# Patient Record
Sex: MALE | Race: WHITE | NOT HISPANIC OR LATINO | Employment: OTHER | URBAN - METROPOLITAN AREA
[De-identification: names, ages, dates, MRNs, and addresses within clinical notes are randomized per-mention and may not be internally consistent; named-entity substitution may affect disease eponyms.]

---

## 2019-06-27 ENCOUNTER — APPOINTMENT (OUTPATIENT)
Dept: CT IMAGING | Facility: HOSPITAL | Age: 44
End: 2019-06-27

## 2019-06-27 ENCOUNTER — HOSPITAL ENCOUNTER (OUTPATIENT)
Facility: HOSPITAL | Age: 44
Discharge: LEFT BEFORE DISCHARGE | End: 2019-06-29
Attending: HOSPITALIST | Admitting: INTERNAL MEDICINE

## 2019-06-27 ENCOUNTER — APPOINTMENT (OUTPATIENT)
Dept: GENERAL RADIOLOGY | Facility: HOSPITAL | Age: 44
End: 2019-06-27

## 2019-06-27 DIAGNOSIS — R07.9 CHEST PAIN, UNSPECIFIED TYPE: Primary | ICD-10-CM

## 2019-06-27 DIAGNOSIS — R07.2 PRECORDIAL PAIN: ICD-10-CM

## 2019-06-27 DIAGNOSIS — R94.39 ABNORMAL NUCLEAR STRESS TEST: ICD-10-CM

## 2019-06-27 DIAGNOSIS — I10 ESSENTIAL HYPERTENSION: ICD-10-CM

## 2019-06-27 LAB
ALBUMIN SERPL-MCNC: 3.6 G/DL (ref 3.5–4.8)
ALBUMIN/GLOB SERPL: 0.9 G/DL (ref 1–1.7)
ALP SERPL-CCNC: 78 U/L (ref 32–91)
ALT SERPL W P-5'-P-CCNC: 32 U/L (ref 17–63)
AMPHET+METHAMPHET UR QL: POSITIVE
ANION GAP SERPL CALCULATED.3IONS-SCNC: 15.7 MMOL/L (ref 10–20)
APTT PPP: 26.8 SECONDS (ref 24–31)
AST SERPL-CCNC: 28 U/L (ref 15–41)
BARBITURATES UR QL SCN: ABNORMAL
BASOPHILS # BLD AUTO: 0.1 10*3/MM3 (ref 0–0.2)
BASOPHILS NFR BLD AUTO: 0.6 % (ref 0–1.5)
BENZODIAZ UR QL SCN: NEGATIVE
BILIRUB SERPL-MCNC: 0.4 MG/DL (ref 0.3–1.2)
BILIRUB UR QL STRIP: NEGATIVE
BUN BLD-MCNC: 14 MG/DL (ref 8–20)
BUN/CREAT SERPL: 15.6 (ref 6.2–20.3)
CALCIUM SPEC-SCNC: 9.4 MG/DL (ref 8.9–10.3)
CANNABINOIDS SERPL QL: NEGATIVE
CHLORIDE SERPL-SCNC: 101 MMOL/L (ref 101–111)
CLARITY UR: CLEAR
CO2 SERPL-SCNC: 24 MMOL/L (ref 22–32)
COCAINE UR QL: NEGATIVE
COLOR UR: YELLOW
CREAT BLD-MCNC: 0.9 MG/DL (ref 0.7–1.2)
CREAT UR-MCNC: 115.9 MG/DL
D DIMER PPP FEU-MCNC: 0.71 MCGFEU/ML (ref 0.17–0.59)
DEPRECATED RDW RBC AUTO: 41.6 FL (ref 37–54)
EOSINOPHIL # BLD AUTO: 0.4 10*3/MM3 (ref 0–0.4)
EOSINOPHIL NFR BLD AUTO: 2.9 % (ref 0.3–6.2)
ERYTHROCYTE [DISTWIDTH] IN BLOOD BY AUTOMATED COUNT: 13.1 % (ref 12.3–15.4)
GFR SERPL CREATININE-BSD FRML MDRD: 92 ML/MIN/1.73
GLOBULIN UR ELPH-MCNC: 4 GM/DL (ref 2.5–3.8)
GLUCOSE BLD-MCNC: 117 MG/DL (ref 65–99)
GLUCOSE UR STRIP-MCNC: NEGATIVE MG/DL
HCT VFR BLD AUTO: 47.2 % (ref 37.5–51)
HGB BLD-MCNC: 15.6 G/DL (ref 13–17.7)
HGB UR QL STRIP.AUTO: NEGATIVE
HOLD SPECIMEN: NORMAL
HOLD SPECIMEN: NORMAL
INR PPP: 0.89 (ref 0.9–1.1)
KETONES UR QL STRIP: NEGATIVE
LEUKOCYTE ESTERASE UR QL STRIP.AUTO: NEGATIVE
LYMPHOCYTES # BLD AUTO: 2.4 10*3/MM3 (ref 0.7–3.1)
LYMPHOCYTES NFR BLD AUTO: 18.3 % (ref 19.6–45.3)
MCH RBC QN AUTO: 30.1 PG (ref 26.6–33)
MCHC RBC AUTO-ENTMCNC: 33 G/DL (ref 31.5–35.7)
MCV RBC AUTO: 91.4 FL (ref 79–97)
METHADONE UR QL SCN: NEGATIVE
MONOCYTES # BLD AUTO: 1.1 10*3/MM3 (ref 0.1–0.9)
MONOCYTES NFR BLD AUTO: 8.6 % (ref 5–12)
NEUTROPHILS # BLD AUTO: 9.2 10*3/MM3 (ref 1.7–7)
NEUTROPHILS NFR BLD AUTO: 69.6 % (ref 42.7–76)
NITRITE UR QL STRIP: NEGATIVE
NRBC BLD AUTO-RTO: 0 /100 WBC (ref 0–0.2)
OPIATES UR QL: NEGATIVE
PCP UR QL SCN: NEGATIVE
PH UR STRIP.AUTO: 5.5 [PH] (ref 5–8)
PLATELET # BLD AUTO: 449 10*3/MM3 (ref 140–450)
PMV BLD AUTO: 8.2 FL (ref 6–12)
POTASSIUM BLD-SCNC: 3.7 MMOL/L (ref 3.6–5.1)
PROT SERPL-MCNC: 7.6 G/DL (ref 6.1–7.9)
PROT UR QL STRIP: NEGATIVE
PROTHROMBIN TIME: 9.4 SECONDS (ref 9.6–11.7)
RBC # BLD AUTO: 5.17 10*6/MM3 (ref 4.14–5.8)
SODIUM BLD-SCNC: 137 MMOL/L (ref 136–144)
SP GR UR STRIP: 1.02 (ref 1–1.03)
TROPONIN I SERPL-MCNC: 0.03 NG/ML (ref 0–0.03)
UROBILINOGEN UR QL STRIP: NORMAL
WBC NRBC COR # BLD: 13.2 10*3/MM3 (ref 3.4–10.8)
WHOLE BLOOD HOLD SPECIMEN: NORMAL
WHOLE BLOOD HOLD SPECIMEN: NORMAL

## 2019-06-27 PROCEDURE — 80307 DRUG TEST PRSMV CHEM ANLYZR: CPT | Performed by: PHYSICIAN ASSISTANT

## 2019-06-27 PROCEDURE — 93005 ELECTROCARDIOGRAM TRACING: CPT | Performed by: PHYSICIAN ASSISTANT

## 2019-06-27 PROCEDURE — 0 IOPAMIDOL PER 1 ML: Performed by: PHYSICIAN ASSISTANT

## 2019-06-27 PROCEDURE — 99285 EMERGENCY DEPT VISIT HI MDM: CPT

## 2019-06-27 PROCEDURE — G0378 HOSPITAL OBSERVATION PER HR: HCPCS

## 2019-06-27 PROCEDURE — 85610 PROTHROMBIN TIME: CPT | Performed by: PHYSICIAN ASSISTANT

## 2019-06-27 PROCEDURE — 85730 THROMBOPLASTIN TIME PARTIAL: CPT | Performed by: PHYSICIAN ASSISTANT

## 2019-06-27 PROCEDURE — 93005 ELECTROCARDIOGRAM TRACING: CPT

## 2019-06-27 PROCEDURE — 80053 COMPREHEN METABOLIC PANEL: CPT | Performed by: PHYSICIAN ASSISTANT

## 2019-06-27 PROCEDURE — 82570 ASSAY OF URINE CREATININE: CPT | Performed by: PHYSICIAN ASSISTANT

## 2019-06-27 PROCEDURE — 96365 THER/PROPH/DIAG IV INF INIT: CPT

## 2019-06-27 PROCEDURE — 85379 FIBRIN DEGRADATION QUANT: CPT | Performed by: PHYSICIAN ASSISTANT

## 2019-06-27 PROCEDURE — 96366 THER/PROPH/DIAG IV INF ADDON: CPT

## 2019-06-27 PROCEDURE — 81003 URINALYSIS AUTO W/O SCOPE: CPT | Performed by: PHYSICIAN ASSISTANT

## 2019-06-27 PROCEDURE — 99219 PR INITIAL OBSERVATION CARE/DAY 50 MINUTES: CPT | Performed by: PHYSICIAN ASSISTANT

## 2019-06-27 PROCEDURE — 71275 CT ANGIOGRAPHY CHEST: CPT

## 2019-06-27 PROCEDURE — 85025 COMPLETE CBC W/AUTO DIFF WBC: CPT | Performed by: PHYSICIAN ASSISTANT

## 2019-06-27 PROCEDURE — 93005 ELECTROCARDIOGRAM TRACING: CPT | Performed by: HOSPITALIST

## 2019-06-27 PROCEDURE — 71045 X-RAY EXAM CHEST 1 VIEW: CPT

## 2019-06-27 PROCEDURE — 84484 ASSAY OF TROPONIN QUANT: CPT | Performed by: PHYSICIAN ASSISTANT

## 2019-06-27 RX ORDER — ASPIRIN 81 MG/1
324 TABLET, CHEWABLE ORAL ONCE
Status: COMPLETED | OUTPATIENT
Start: 2019-06-27 | End: 2019-06-27

## 2019-06-27 RX ORDER — NITROGLYCERIN 10 MG/100ML
10-50 INJECTION INTRAVENOUS
Status: DISCONTINUED | OUTPATIENT
Start: 2019-06-27 | End: 2019-06-29 | Stop reason: HOSPADM

## 2019-06-27 RX ORDER — HYDRALAZINE HYDROCHLORIDE 20 MG/ML
10 INJECTION INTRAMUSCULAR; INTRAVENOUS EVERY 6 HOURS PRN
Status: DISCONTINUED | OUTPATIENT
Start: 2019-06-27 | End: 2019-06-29 | Stop reason: HOSPADM

## 2019-06-27 RX ADMIN — NITROGLYCERIN 10 MCG/MIN: 10 INJECTION INTRAVENOUS at 18:55

## 2019-06-27 RX ADMIN — ASPIRIN 81 MG 324 MG: 81 TABLET ORAL at 22:39

## 2019-06-27 RX ADMIN — IOPAMIDOL 100 ML: 755 INJECTION, SOLUTION INTRAVENOUS at 21:12

## 2019-06-28 ENCOUNTER — APPOINTMENT (OUTPATIENT)
Dept: NUCLEAR MEDICINE | Facility: HOSPITAL | Age: 44
End: 2019-06-28

## 2019-06-28 ENCOUNTER — HOSPITAL ENCOUNTER (OUTPATIENT)
Dept: CARDIOLOGY | Facility: HOSPITAL | Age: 44
Discharge: HOME | End: 2019-06-28

## 2019-06-28 PROBLEM — R94.39 ABNORMAL NUCLEAR STRESS TEST: Status: ACTIVE | Noted: 2019-06-28

## 2019-06-28 PROBLEM — R07.2 PRECORDIAL PAIN: Status: ACTIVE | Noted: 2019-06-28

## 2019-06-28 PROBLEM — I10 ESSENTIAL HYPERTENSION: Status: ACTIVE | Noted: 2019-06-28

## 2019-06-28 LAB
ANION GAP SERPL CALCULATED.3IONS-SCNC: 13.9 MMOL/L (ref 10–20)
BASOPHILS # BLD AUTO: 0.1 10*3/MM3 (ref 0–0.2)
BASOPHILS NFR BLD AUTO: 0.7 % (ref 0–1.5)
BH CV ECHO MEAS - % IVS THICK: 13.5 %
BH CV ECHO MEAS - % LVPW THICK: 26.1 %
BH CV ECHO MEAS - ACS: 2.5 CM
BH CV ECHO MEAS - AO ROOT AREA (BSA CORRECTED): 1.6
BH CV ECHO MEAS - AO ROOT AREA: 9.6 CM^2
BH CV ECHO MEAS - AO ROOT DIAM: 3.5 CM
BH CV ECHO MEAS - ASC AORTA: 2.9 CM
BH CV ECHO MEAS - BSA(HAYCOCK): 2.3 M^2
BH CV ECHO MEAS - BSA: 2.2 M^2
BH CV ECHO MEAS - BZI_BMI: 29.8 KILOGRAMS/M^2
BH CV ECHO MEAS - BZI_METRIC_HEIGHT: 182.9 CM
BH CV ECHO MEAS - BZI_METRIC_WEIGHT: 99.8 KG
BH CV ECHO MEAS - EDV(CUBED): 111.5 ML
BH CV ECHO MEAS - EDV(MOD-SP2): 85.8 ML
BH CV ECHO MEAS - EDV(MOD-SP4): 146.1 ML
BH CV ECHO MEAS - EDV(TEICH): 108.2 ML
BH CV ECHO MEAS - EF(CUBED): 66.7 %
BH CV ECHO MEAS - EF(MOD-SP2): 57.4 %
BH CV ECHO MEAS - EF(MOD-SP4): 72.1 %
BH CV ECHO MEAS - EF(TEICH): 58.1 %
BH CV ECHO MEAS - ESV(CUBED): 37.1 ML
BH CV ECHO MEAS - ESV(MOD-SP2): 36.5 ML
BH CV ECHO MEAS - ESV(MOD-SP4): 40.7 ML
BH CV ECHO MEAS - ESV(TEICH): 45.3 ML
BH CV ECHO MEAS - FS: 30.7 %
BH CV ECHO MEAS - IVS/LVPW: 0.93
BH CV ECHO MEAS - IVSD: 1.8 CM
BH CV ECHO MEAS - IVSS: 2 CM
BH CV ECHO MEAS - LV DIASTOLIC VOL/BSA (35-75): 65.9 ML/M^2
BH CV ECHO MEAS - LV MASS(C)D: 422.6 GRAMS
BH CV ECHO MEAS - LV MASS(C)DI: 190.5 GRAMS/M^2
BH CV ECHO MEAS - LV MASS(C)S: 362.4 GRAMS
BH CV ECHO MEAS - LV MASS(C)SI: 163.3 GRAMS/M^2
BH CV ECHO MEAS - LV MAX PG: 3.6 MMHG
BH CV ECHO MEAS - LV MEAN PG: 1.4 MMHG
BH CV ECHO MEAS - LV SYSTOLIC VOL/BSA (12-30): 18.4 ML/M^2
BH CV ECHO MEAS - LV V1 MAX: 94.4 CM/SEC
BH CV ECHO MEAS - LV V1 MEAN: 51.3 CM/SEC
BH CV ECHO MEAS - LV V1 VTI: 15 CM
BH CV ECHO MEAS - LVIDD: 4.8 CM
BH CV ECHO MEAS - LVIDS: 3.3 CM
BH CV ECHO MEAS - LVOT AREA: 4.7 CM^2
BH CV ECHO MEAS - LVOT DIAM: 2.5 CM
BH CV ECHO MEAS - LVPWD: 1.9 CM
BH CV ECHO MEAS - LVPWS: 2.4 CM
BH CV ECHO MEAS - MV A MAX VEL: 71.8 CM/SEC
BH CV ECHO MEAS - MV DEC SLOPE: 157.5 CM/SEC^2
BH CV ECHO MEAS - MV DEC TIME: 0.27 SEC
BH CV ECHO MEAS - MV E MAX VEL: 42.9 CM/SEC
BH CV ECHO MEAS - MV E/A: 0.6
BH CV ECHO MEAS - MV MAX PG: 4.2 MMHG
BH CV ECHO MEAS - MV MEAN PG: 1.8 MMHG
BH CV ECHO MEAS - MV V2 MAX: 102.5 CM/SEC
BH CV ECHO MEAS - MV V2 MEAN: 62.4 CM/SEC
BH CV ECHO MEAS - MV V2 VTI: 22.3 CM
BH CV ECHO MEAS - MVA(VTI): 3.2 CM^2
BH CV ECHO MEAS - PA ACC TIME: 0.13 SEC
BH CV ECHO MEAS - PA MAX PG (FULL): 0.52 MMHG
BH CV ECHO MEAS - PA MAX PG: 5.5 MMHG
BH CV ECHO MEAS - PA MEAN PG (FULL): 0.29 MMHG
BH CV ECHO MEAS - PA MEAN PG: 2.7 MMHG
BH CV ECHO MEAS - PA PR(ACCEL): 19.1 MMHG
BH CV ECHO MEAS - PA V2 MAX: 116.8 CM/SEC
BH CV ECHO MEAS - PA V2 MEAN: 76 CM/SEC
BH CV ECHO MEAS - PA V2 VTI: 20.5 CM
BH CV ECHO MEAS - PVA(I,A): 9.1 CM^2
BH CV ECHO MEAS - PVA(I,D): 9.1 CM^2
BH CV ECHO MEAS - PVA(V,A): 9.6 CM^2
BH CV ECHO MEAS - PVA(V,D): 9.6 CM^2
BH CV ECHO MEAS - QP/QS: 2.6
BH CV ECHO MEAS - RV MAX PG: 4.9 MMHG
BH CV ECHO MEAS - RV MEAN PG: 2.4 MMHG
BH CV ECHO MEAS - RV V1 MAX: 111 CM/SEC
BH CV ECHO MEAS - RV V1 MEAN: 69.2 CM/SEC
BH CV ECHO MEAS - RV V1 VTI: 18.4 CM
BH CV ECHO MEAS - RVDD: 2.8 CM
BH CV ECHO MEAS - RVOT AREA: 10.1 CM^2
BH CV ECHO MEAS - RVOT DIAM: 3.6 CM
BH CV ECHO MEAS - SI(CUBED): 33.5 ML/M^2
BH CV ECHO MEAS - SI(LVOT): 32 ML/M^2
BH CV ECHO MEAS - SI(MOD-SP2): 22.2 ML/M^2
BH CV ECHO MEAS - SI(MOD-SP4): 47.5 ML/M^2
BH CV ECHO MEAS - SI(TEICH): 28.3 ML/M^2
BH CV ECHO MEAS - SV(CUBED): 74.3 ML
BH CV ECHO MEAS - SV(LVOT): 71 ML
BH CV ECHO MEAS - SV(MOD-SP2): 49.3 ML
BH CV ECHO MEAS - SV(MOD-SP4): 105.4 ML
BH CV ECHO MEAS - SV(RVOT): 186.4 ML
BH CV ECHO MEAS - SV(TEICH): 62.9 ML
BH CV NUCLEAR PRIOR STUDY: 3
BH CV STRESS COMMENTS STAGE 1: NORMAL
BH CV STRESS DOSE REGADENOSON STAGE 1: 0.4
BH CV STRESS DURATION MIN STAGE 1: 0
BH CV STRESS DURATION SEC STAGE 1: 10
BH CV STRESS PROTOCOL 1: NORMAL
BH CV STRESS RECOVERY BP: NORMAL MMHG
BH CV STRESS RECOVERY HR: 91 BPM
BH CV STRESS STAGE 1: 1
BUN BLD-MCNC: 12 MG/DL (ref 8–20)
BUN/CREAT SERPL: 17.1 (ref 6.2–20.3)
CALCIUM SPEC-SCNC: 9 MG/DL (ref 8.9–10.3)
CHLORIDE SERPL-SCNC: 101 MMOL/L (ref 101–111)
CO2 SERPL-SCNC: 23 MMOL/L (ref 22–32)
CREAT BLD-MCNC: 0.7 MG/DL (ref 0.7–1.2)
DEPRECATED RDW RBC AUTO: 42.4 FL (ref 37–54)
EOSINOPHIL # BLD AUTO: 0.2 10*3/MM3 (ref 0–0.4)
EOSINOPHIL NFR BLD AUTO: 1.5 % (ref 0.3–6.2)
ERYTHROCYTE [DISTWIDTH] IN BLOOD BY AUTOMATED COUNT: 13.3 % (ref 12.3–15.4)
GFR SERPL CREATININE-BSD FRML MDRD: 123 ML/MIN/1.73
GLUCOSE BLD-MCNC: 170 MG/DL (ref 65–99)
HAV IGM SERPL QL IA: ABNORMAL
HBV CORE IGM SERPL QL IA: ABNORMAL
HBV SURFACE AG SERPL QL IA: ABNORMAL
HCT VFR BLD AUTO: 43.8 % (ref 37.5–51)
HCV AB SER DONR QL: REACTIVE
HGB BLD-MCNC: 14.5 G/DL (ref 13–17.7)
LV EF NUC BP: 35 %
LYMPHOCYTES # BLD AUTO: 2.3 10*3/MM3 (ref 0.7–3.1)
LYMPHOCYTES NFR BLD AUTO: 18.4 % (ref 19.6–45.3)
MAXIMAL PREDICTED HEART RATE: 176 BPM
MAXIMAL PREDICTED HEART RATE: 176 BPM
MCH RBC QN AUTO: 30.1 PG (ref 26.6–33)
MCHC RBC AUTO-ENTMCNC: 33.1 G/DL (ref 31.5–35.7)
MCV RBC AUTO: 91 FL (ref 79–97)
MONOCYTES # BLD AUTO: 0.7 10*3/MM3 (ref 0.1–0.9)
MONOCYTES NFR BLD AUTO: 5.5 % (ref 5–12)
NEUTROPHILS # BLD AUTO: 9.2 10*3/MM3 (ref 1.7–7)
NEUTROPHILS NFR BLD AUTO: 73.9 % (ref 42.7–76)
NRBC BLD AUTO-RTO: 0.1 /100 WBC (ref 0–0.2)
PERCENT MAX PREDICTED HR: 67.61 %
PLATELET # BLD AUTO: 409 10*3/MM3 (ref 140–450)
PMV BLD AUTO: 8 FL (ref 6–12)
POTASSIUM BLD-SCNC: 3.9 MMOL/L (ref 3.6–5.1)
RBC # BLD AUTO: 4.81 10*6/MM3 (ref 4.14–5.8)
SODIUM BLD-SCNC: 134 MMOL/L (ref 136–144)
STRESS BASELINE BP: NORMAL MMHG
STRESS BASELINE HR: 84 BPM
STRESS PERCENT HR: 80 %
STRESS POST PEAK BP: NORMAL MMHG
STRESS POST PEAK HR: 119 BPM
STRESS TARGET HR: 150 BPM
STRESS TARGET HR: 150 BPM
TROPONIN I SERPL-MCNC: 0.03 NG/ML (ref 0–0.03)
WBC NRBC COR # BLD: 12.5 10*3/MM3 (ref 3.4–10.8)

## 2019-06-28 PROCEDURE — 0 TECHNETIUM SESTAMIBI: Performed by: HOSPITALIST

## 2019-06-28 PROCEDURE — 25010000002 HYDRALAZINE PER 20 MG: Performed by: PHYSICIAN ASSISTANT

## 2019-06-28 PROCEDURE — G0378 HOSPITAL OBSERVATION PER HR: HCPCS

## 2019-06-28 PROCEDURE — 96366 THER/PROPH/DIAG IV INF ADDON: CPT

## 2019-06-28 PROCEDURE — 85025 COMPLETE CBC W/AUTO DIFF WBC: CPT | Performed by: PHYSICIAN ASSISTANT

## 2019-06-28 PROCEDURE — 93018 CV STRESS TEST I&R ONLY: CPT | Performed by: INTERNAL MEDICINE

## 2019-06-28 PROCEDURE — 25010000002 KETOROLAC TROMETHAMINE PER 15 MG: Performed by: PHYSICIAN ASSISTANT

## 2019-06-28 PROCEDURE — 80074 ACUTE HEPATITIS PANEL: CPT | Performed by: PHYSICIAN ASSISTANT

## 2019-06-28 PROCEDURE — 93306 TTE W/DOPPLER COMPLETE: CPT | Performed by: INTERNAL MEDICINE

## 2019-06-28 PROCEDURE — A9500 TC99M SESTAMIBI: HCPCS | Performed by: HOSPITALIST

## 2019-06-28 PROCEDURE — 93017 CV STRESS TEST TRACING ONLY: CPT

## 2019-06-28 PROCEDURE — 84484 ASSAY OF TROPONIN QUANT: CPT | Performed by: PHYSICIAN ASSISTANT

## 2019-06-28 PROCEDURE — 78452 HT MUSCLE IMAGE SPECT MULT: CPT

## 2019-06-28 PROCEDURE — 99217 PR OBSERVATION CARE DISCHARGE MANAGEMENT: CPT | Performed by: HOSPITALIST

## 2019-06-28 PROCEDURE — 25010000002 REGADENOSON 0.4 MG/5ML SOLUTION: Performed by: HOSPITALIST

## 2019-06-28 PROCEDURE — 96376 TX/PRO/DX INJ SAME DRUG ADON: CPT

## 2019-06-28 PROCEDURE — 25010000002 ONDANSETRON PER 1 MG: Performed by: PHYSICIAN ASSISTANT

## 2019-06-28 PROCEDURE — 80048 BASIC METABOLIC PNL TOTAL CA: CPT | Performed by: PHYSICIAN ASSISTANT

## 2019-06-28 PROCEDURE — 93016 CV STRESS TEST SUPVJ ONLY: CPT | Performed by: NURSE PRACTITIONER

## 2019-06-28 PROCEDURE — 78452 HT MUSCLE IMAGE SPECT MULT: CPT | Performed by: INTERNAL MEDICINE

## 2019-06-28 PROCEDURE — 96375 TX/PRO/DX INJ NEW DRUG ADDON: CPT

## 2019-06-28 PROCEDURE — 99244 OFF/OP CNSLTJ NEW/EST MOD 40: CPT | Performed by: INTERNAL MEDICINE

## 2019-06-28 PROCEDURE — 93306 TTE W/DOPPLER COMPLETE: CPT

## 2019-06-28 RX ORDER — SODIUM CHLORIDE 0.9 % (FLUSH) 0.9 %
3-10 SYRINGE (ML) INJECTION AS NEEDED
Status: DISCONTINUED | OUTPATIENT
Start: 2019-06-28 | End: 2019-06-29 | Stop reason: HOSPADM

## 2019-06-28 RX ORDER — LISINOPRIL 20 MG/1
20 TABLET ORAL DAILY
Qty: 10 TABLET | Refills: 0 | Status: SHIPPED | OUTPATIENT
Start: 2019-06-28 | End: 2019-07-08

## 2019-06-28 RX ORDER — POTASSIUM CHLORIDE 20 MEQ/1
40 TABLET, EXTENDED RELEASE ORAL AS NEEDED
Status: DISCONTINUED | OUTPATIENT
Start: 2019-06-28 | End: 2019-06-29 | Stop reason: HOSPADM

## 2019-06-28 RX ORDER — POTASSIUM CHLORIDE 1.5 G/1.77G
40 POWDER, FOR SOLUTION ORAL AS NEEDED
Status: DISCONTINUED | OUTPATIENT
Start: 2019-06-28 | End: 2019-06-29 | Stop reason: HOSPADM

## 2019-06-28 RX ORDER — KETOROLAC TROMETHAMINE 15 MG/ML
15 INJECTION, SOLUTION INTRAMUSCULAR; INTRAVENOUS EVERY 6 HOURS PRN
Status: DISCONTINUED | OUTPATIENT
Start: 2019-06-28 | End: 2019-06-29 | Stop reason: HOSPADM

## 2019-06-28 RX ORDER — METOPROLOL SUCCINATE 25 MG/1
25 TABLET, EXTENDED RELEASE ORAL
Status: DISCONTINUED | OUTPATIENT
Start: 2019-06-28 | End: 2019-06-29 | Stop reason: HOSPADM

## 2019-06-28 RX ORDER — ACETAMINOPHEN 325 MG/1
650 TABLET ORAL EVERY 4 HOURS PRN
Status: DISCONTINUED | OUTPATIENT
Start: 2019-06-28 | End: 2019-06-29 | Stop reason: HOSPADM

## 2019-06-28 RX ORDER — DOCUSATE SODIUM 100 MG/1
100 CAPSULE, LIQUID FILLED ORAL 2 TIMES DAILY PRN
Status: DISCONTINUED | OUTPATIENT
Start: 2019-06-28 | End: 2019-06-29 | Stop reason: HOSPADM

## 2019-06-28 RX ORDER — SODIUM CHLORIDE 0.9 % (FLUSH) 0.9 %
3 SYRINGE (ML) INJECTION EVERY 12 HOURS SCHEDULED
Status: DISCONTINUED | OUTPATIENT
Start: 2019-06-28 | End: 2019-06-29 | Stop reason: HOSPADM

## 2019-06-28 RX ORDER — ONDANSETRON 2 MG/ML
4 INJECTION INTRAMUSCULAR; INTRAVENOUS EVERY 6 HOURS PRN
Status: DISCONTINUED | OUTPATIENT
Start: 2019-06-28 | End: 2019-06-29 | Stop reason: HOSPADM

## 2019-06-28 RX ORDER — ASPIRIN 81 MG/1
81 TABLET ORAL DAILY
Status: DISCONTINUED | OUTPATIENT
Start: 2019-06-28 | End: 2019-06-29 | Stop reason: HOSPADM

## 2019-06-28 RX ORDER — CHOLECALCIFEROL (VITAMIN D3) 125 MCG
5 CAPSULE ORAL NIGHTLY PRN
Status: DISCONTINUED | OUTPATIENT
Start: 2019-06-28 | End: 2019-06-29 | Stop reason: HOSPADM

## 2019-06-28 RX ORDER — ACETAMINOPHEN 650 MG/1
650 SUPPOSITORY RECTAL EVERY 4 HOURS PRN
Status: DISCONTINUED | OUTPATIENT
Start: 2019-06-28 | End: 2019-06-29 | Stop reason: HOSPADM

## 2019-06-28 RX ORDER — BISACODYL 10 MG
10 SUPPOSITORY, RECTAL RECTAL DAILY PRN
Status: DISCONTINUED | OUTPATIENT
Start: 2019-06-28 | End: 2019-06-29 | Stop reason: HOSPADM

## 2019-06-28 RX ORDER — ALUMINA, MAGNESIA, AND SIMETHICONE 2400; 2400; 240 MG/30ML; MG/30ML; MG/30ML
15 SUSPENSION ORAL EVERY 6 HOURS PRN
Status: DISCONTINUED | OUTPATIENT
Start: 2019-06-28 | End: 2019-06-29 | Stop reason: HOSPADM

## 2019-06-28 RX ORDER — LISINOPRIL 5 MG/1
10 TABLET ORAL
Status: DISCONTINUED | OUTPATIENT
Start: 2019-06-28 | End: 2019-06-29 | Stop reason: HOSPADM

## 2019-06-28 RX ORDER — CALCIUM CARBONATE 200(500)MG
2 TABLET,CHEWABLE ORAL 2 TIMES DAILY PRN
Status: DISCONTINUED | OUTPATIENT
Start: 2019-06-28 | End: 2019-06-29 | Stop reason: HOSPADM

## 2019-06-28 RX ORDER — ONDANSETRON 4 MG/1
4 TABLET, FILM COATED ORAL EVERY 6 HOURS PRN
Status: DISCONTINUED | OUTPATIENT
Start: 2019-06-28 | End: 2019-06-29 | Stop reason: HOSPADM

## 2019-06-28 RX ADMIN — ASPIRIN 81 MG: 81 TABLET, COATED ORAL at 21:08

## 2019-06-28 RX ADMIN — LISINOPRIL 10 MG: 5 TABLET ORAL at 21:08

## 2019-06-28 RX ADMIN — Medication 3 ML: at 08:51

## 2019-06-28 RX ADMIN — KETOROLAC TROMETHAMINE 15 MG: 15 INJECTION, SOLUTION INTRAMUSCULAR; INTRAVENOUS at 21:07

## 2019-06-28 RX ADMIN — TECHNETIUM TC 99M SESTAMIBI 1 DOSE: 1 INJECTION INTRAVENOUS at 08:10

## 2019-06-28 RX ADMIN — REGADENOSON 0.4 MG: 0.08 INJECTION, SOLUTION INTRAVENOUS at 08:10

## 2019-06-28 RX ADMIN — ONDANSETRON 4 MG: 2 INJECTION INTRAMUSCULAR; INTRAVENOUS at 13:35

## 2019-06-28 RX ADMIN — Medication 3 ML: at 21:09

## 2019-06-28 RX ADMIN — HYDRALAZINE HYDROCHLORIDE 10 MG: 20 INJECTION INTRAMUSCULAR; INTRAVENOUS at 15:45

## 2019-06-28 RX ADMIN — KETOROLAC TROMETHAMINE 15 MG: 15 INJECTION, SOLUTION INTRAMUSCULAR; INTRAVENOUS at 09:23

## 2019-06-28 RX ADMIN — ONDANSETRON 4 MG: 2 INJECTION INTRAMUSCULAR; INTRAVENOUS at 21:07

## 2019-06-28 RX ADMIN — HYDRALAZINE HYDROCHLORIDE 10 MG: 20 INJECTION INTRAMUSCULAR; INTRAVENOUS at 00:20

## 2019-06-28 RX ADMIN — TECHNETIUM TC 99M SESTAMIBI 1 DOSE: 1 INJECTION INTRAVENOUS at 06:55

## 2019-06-28 RX ADMIN — KETOROLAC TROMETHAMINE 15 MG: 15 INJECTION, SOLUTION INTRAMUSCULAR; INTRAVENOUS at 15:45

## 2019-06-28 RX ADMIN — KETOROLAC TROMETHAMINE 15 MG: 15 INJECTION, SOLUTION INTRAMUSCULAR; INTRAVENOUS at 01:54

## 2019-06-28 RX ADMIN — ONDANSETRON 4 MG: 2 INJECTION INTRAMUSCULAR; INTRAVENOUS at 03:57

## 2019-06-28 RX ADMIN — METOPROLOL SUCCINATE 25 MG: 25 TABLET, EXTENDED RELEASE ORAL at 21:09

## 2019-06-29 VITALS
OXYGEN SATURATION: 99 % | WEIGHT: 212.3 LBS | BODY MASS INDEX: 28.76 KG/M2 | HEIGHT: 72 IN | DIASTOLIC BLOOD PRESSURE: 75 MMHG | RESPIRATION RATE: 19 BRPM | TEMPERATURE: 98.5 F | SYSTOLIC BLOOD PRESSURE: 127 MMHG | HEART RATE: 72 BPM

## 2019-06-29 LAB
ANION GAP SERPL CALCULATED.3IONS-SCNC: 13.9 MMOL/L (ref 10–20)
ANION GAP SERPL CALCULATED.3IONS-SCNC: 15 MMOL/L (ref 10–20)
APTT PPP: 26.9 SECONDS (ref 24–31)
BASOPHILS # BLD AUTO: 0.1 10*3/MM3 (ref 0–0.2)
BASOPHILS # BLD AUTO: 0.1 10*3/MM3 (ref 0–0.2)
BASOPHILS NFR BLD AUTO: 0.7 % (ref 0–1.5)
BASOPHILS NFR BLD AUTO: 0.7 % (ref 0–1.5)
BUN BLD-MCNC: 10 MG/DL (ref 8–20)
BUN BLD-MCNC: 13 MG/DL (ref 8–20)
BUN/CREAT SERPL: 12.5 (ref 6.2–20.3)
BUN/CREAT SERPL: 16.3 (ref 6.2–20.3)
CALCIUM SPEC-SCNC: 9 MG/DL (ref 8.9–10.3)
CALCIUM SPEC-SCNC: 9.2 MG/DL (ref 8.9–10.3)
CHLORIDE SERPL-SCNC: 100 MMOL/L (ref 101–111)
CHLORIDE SERPL-SCNC: 101 MMOL/L (ref 101–111)
CO2 SERPL-SCNC: 25 MMOL/L (ref 22–32)
CO2 SERPL-SCNC: 25 MMOL/L (ref 22–32)
CREAT BLD-MCNC: 0.8 MG/DL (ref 0.7–1.2)
CREAT BLD-MCNC: 0.8 MG/DL (ref 0.7–1.2)
DEPRECATED RDW RBC AUTO: 42 FL (ref 37–54)
DEPRECATED RDW RBC AUTO: 42 FL (ref 37–54)
EOSINOPHIL # BLD AUTO: 0.3 10*3/MM3 (ref 0–0.4)
EOSINOPHIL # BLD AUTO: 0.4 10*3/MM3 (ref 0–0.4)
EOSINOPHIL NFR BLD AUTO: 2.6 % (ref 0.3–6.2)
EOSINOPHIL NFR BLD AUTO: 4 % (ref 0.3–6.2)
ERYTHROCYTE [DISTWIDTH] IN BLOOD BY AUTOMATED COUNT: 13.1 % (ref 12.3–15.4)
ERYTHROCYTE [DISTWIDTH] IN BLOOD BY AUTOMATED COUNT: 13.3 % (ref 12.3–15.4)
GFR SERPL CREATININE-BSD FRML MDRD: 105 ML/MIN/1.73
GFR SERPL CREATININE-BSD FRML MDRD: 105 ML/MIN/1.73
GLUCOSE BLD-MCNC: 120 MG/DL (ref 65–99)
GLUCOSE BLD-MCNC: 142 MG/DL (ref 65–99)
HCT VFR BLD AUTO: 42.6 % (ref 37.5–51)
HCT VFR BLD AUTO: 43.4 % (ref 37.5–51)
HGB BLD-MCNC: 14.3 G/DL (ref 13–17.7)
HGB BLD-MCNC: 14.6 G/DL (ref 13–17.7)
INR PPP: 0.94 (ref 0.9–1.1)
LYMPHOCYTES # BLD AUTO: 2 10*3/MM3 (ref 0.7–3.1)
LYMPHOCYTES # BLD AUTO: 2.7 10*3/MM3 (ref 0.7–3.1)
LYMPHOCYTES NFR BLD AUTO: 19.1 % (ref 19.6–45.3)
LYMPHOCYTES NFR BLD AUTO: 21.4 % (ref 19.6–45.3)
MCH RBC QN AUTO: 30.1 PG (ref 26.6–33)
MCH RBC QN AUTO: 30.2 PG (ref 26.6–33)
MCHC RBC AUTO-ENTMCNC: 33.5 G/DL (ref 31.5–35.7)
MCHC RBC AUTO-ENTMCNC: 33.5 G/DL (ref 31.5–35.7)
MCV RBC AUTO: 89.8 FL (ref 79–97)
MCV RBC AUTO: 90.3 FL (ref 79–97)
MONOCYTES # BLD AUTO: 0.9 10*3/MM3 (ref 0.1–0.9)
MONOCYTES # BLD AUTO: 1.2 10*3/MM3 (ref 0.1–0.9)
MONOCYTES NFR BLD AUTO: 8.8 % (ref 5–12)
MONOCYTES NFR BLD AUTO: 9.2 % (ref 5–12)
NEUTROPHILS # BLD AUTO: 6.9 10*3/MM3 (ref 1.7–7)
NEUTROPHILS # BLD AUTO: 8.3 10*3/MM3 (ref 1.7–7)
NEUTROPHILS NFR BLD AUTO: 66.1 % (ref 42.7–76)
NEUTROPHILS NFR BLD AUTO: 67.4 % (ref 42.7–76)
NRBC BLD AUTO-RTO: 0 /100 WBC (ref 0–0.2)
NRBC BLD AUTO-RTO: 0.1 /100 WBC (ref 0–0.2)
PLATELET # BLD AUTO: 409 10*3/MM3 (ref 140–450)
PLATELET # BLD AUTO: 426 10*3/MM3 (ref 140–450)
PMV BLD AUTO: 7.5 FL (ref 6–12)
PMV BLD AUTO: 7.7 FL (ref 6–12)
POTASSIUM BLD-SCNC: 3.9 MMOL/L (ref 3.6–5.1)
POTASSIUM BLD-SCNC: 4 MMOL/L (ref 3.6–5.1)
PROTHROMBIN TIME: 9.8 SECONDS (ref 9.6–11.7)
RBC # BLD AUTO: 4.72 10*6/MM3 (ref 4.14–5.8)
RBC # BLD AUTO: 4.83 10*6/MM3 (ref 4.14–5.8)
SODIUM BLD-SCNC: 136 MMOL/L (ref 136–144)
SODIUM BLD-SCNC: 136 MMOL/L (ref 136–144)
WBC NRBC COR # BLD: 10.3 10*3/MM3 (ref 3.4–10.8)
WBC NRBC COR # BLD: 12.6 10*3/MM3 (ref 3.4–10.8)

## 2019-06-29 PROCEDURE — 85025 COMPLETE CBC W/AUTO DIFF WBC: CPT | Performed by: PHYSICIAN ASSISTANT

## 2019-06-29 PROCEDURE — 93458 L HRT ARTERY/VENTRICLE ANGIO: CPT | Performed by: INTERNAL MEDICINE

## 2019-06-29 PROCEDURE — G0378 HOSPITAL OBSERVATION PER HR: HCPCS

## 2019-06-29 PROCEDURE — 85025 COMPLETE CBC W/AUTO DIFF WBC: CPT | Performed by: INTERNAL MEDICINE

## 2019-06-29 PROCEDURE — 80048 BASIC METABOLIC PNL TOTAL CA: CPT | Performed by: INTERNAL MEDICINE

## 2019-06-29 PROCEDURE — 85610 PROTHROMBIN TIME: CPT | Performed by: INTERNAL MEDICINE

## 2019-06-29 PROCEDURE — 25010000002 ONDANSETRON PER 1 MG: Performed by: PHYSICIAN ASSISTANT

## 2019-06-29 PROCEDURE — 80048 BASIC METABOLIC PNL TOTAL CA: CPT | Performed by: PHYSICIAN ASSISTANT

## 2019-06-29 PROCEDURE — 96376 TX/PRO/DX INJ SAME DRUG ADON: CPT

## 2019-06-29 PROCEDURE — 25010000002 KETOROLAC TROMETHAMINE PER 15 MG: Performed by: PHYSICIAN ASSISTANT

## 2019-06-29 PROCEDURE — C1769 GUIDE WIRE: HCPCS | Performed by: INTERNAL MEDICINE

## 2019-06-29 PROCEDURE — 99224 PR SBSQ OBSERVATION CARE/DAY 15 MINUTES: CPT | Performed by: HOSPITALIST

## 2019-06-29 PROCEDURE — C1894 INTRO/SHEATH, NON-LASER: HCPCS | Performed by: INTERNAL MEDICINE

## 2019-06-29 PROCEDURE — 85730 THROMBOPLASTIN TIME PARTIAL: CPT | Performed by: INTERNAL MEDICINE

## 2019-06-29 PROCEDURE — 25010000002 DIPHENHYDRAMINE PER 50 MG: Performed by: INTERNAL MEDICINE

## 2019-06-29 RX ORDER — ISOSORBIDE MONONITRATE 30 MG/1
30 TABLET, EXTENDED RELEASE ORAL DAILY
Qty: 10 TABLET | Refills: 0 | Status: SHIPPED | OUTPATIENT
Start: 2019-06-29 | End: 2019-07-08

## 2019-06-29 RX ORDER — DIPHENHYDRAMINE HYDROCHLORIDE 50 MG/ML
25 INJECTION INTRAMUSCULAR; INTRAVENOUS ONCE
Status: COMPLETED | OUTPATIENT
Start: 2019-06-29 | End: 2019-06-29

## 2019-06-29 RX ORDER — ACETAMINOPHEN 325 MG/1
325 TABLET ORAL EVERY 4 HOURS PRN
Status: DISCONTINUED | OUTPATIENT
Start: 2019-06-29 | End: 2019-06-29 | Stop reason: HOSPADM

## 2019-06-29 RX ORDER — SODIUM CHLORIDE 0.9 % (FLUSH) 0.9 %
3 SYRINGE (ML) INJECTION EVERY 12 HOURS SCHEDULED
Status: DISCONTINUED | OUTPATIENT
Start: 2019-06-29 | End: 2019-06-29 | Stop reason: HOSPADM

## 2019-06-29 RX ORDER — ASPIRIN 81 MG/1
81 TABLET ORAL DAILY
Qty: 10 TABLET | Refills: 0 | Status: SHIPPED | OUTPATIENT
Start: 2019-06-29 | End: 2019-07-08

## 2019-06-29 RX ORDER — SODIUM CHLORIDE 0.9 % (FLUSH) 0.9 %
3-10 SYRINGE (ML) INJECTION AS NEEDED
Status: DISCONTINUED | OUTPATIENT
Start: 2019-06-29 | End: 2019-06-29 | Stop reason: HOSPADM

## 2019-06-29 RX ORDER — NITROGLYCERIN 0.4 MG/1
0.4 TABLET SUBLINGUAL
Status: DISCONTINUED | OUTPATIENT
Start: 2019-06-29 | End: 2019-06-29 | Stop reason: HOSPADM

## 2019-06-29 RX ORDER — HYDROCODONE BITARTRATE AND ACETAMINOPHEN 5; 325 MG/1; MG/1
1 TABLET ORAL EVERY 4 HOURS PRN
Status: DISCONTINUED | OUTPATIENT
Start: 2019-06-29 | End: 2019-06-29 | Stop reason: HOSPADM

## 2019-06-29 RX ADMIN — DIPHENHYDRAMINE HYDROCHLORIDE 50 MG: 50 INJECTION, SOLUTION INTRAMUSCULAR; INTRAVENOUS at 11:12

## 2019-06-29 RX ADMIN — Medication 10 ML: at 08:30

## 2019-06-29 RX ADMIN — ASPIRIN 81 MG: 81 TABLET, COATED ORAL at 05:29

## 2019-06-29 RX ADMIN — ONDANSETRON 4 MG: 2 INJECTION INTRAMUSCULAR; INTRAVENOUS at 05:31

## 2019-06-29 RX ADMIN — Medication 10 ML: at 11:16

## 2019-06-29 RX ADMIN — LISINOPRIL 10 MG: 5 TABLET ORAL at 08:31

## 2019-06-29 RX ADMIN — KETOROLAC TROMETHAMINE 15 MG: 15 INJECTION, SOLUTION INTRAMUSCULAR; INTRAVENOUS at 05:29

## 2019-06-29 RX ADMIN — HYDROCODONE BITARTRATE AND ACETAMINOPHEN 1 TABLET: 5; 325 TABLET ORAL at 11:20

## 2019-07-08 ENCOUNTER — APPOINTMENT (OUTPATIENT)
Dept: GENERAL RADIOLOGY | Facility: HOSPITAL | Age: 44
End: 2019-07-08

## 2019-07-08 ENCOUNTER — HOSPITAL ENCOUNTER (OUTPATIENT)
Facility: HOSPITAL | Age: 44
Discharge: HOME | End: 2019-07-09
Attending: EMERGENCY MEDICINE | Admitting: INTERNAL MEDICINE

## 2019-07-08 DIAGNOSIS — F17.200 TOBACCO DEPENDENCY: ICD-10-CM

## 2019-07-08 DIAGNOSIS — I16.0 HYPERTENSIVE URGENCY: ICD-10-CM

## 2019-07-08 DIAGNOSIS — I25.9 CHEST PAIN DUE TO MYOCARDIAL ISCHEMIA, UNSPECIFIED ISCHEMIC CHEST PAIN TYPE: ICD-10-CM

## 2019-07-08 DIAGNOSIS — I20.0 UNSTABLE ANGINA (HCC): ICD-10-CM

## 2019-07-08 DIAGNOSIS — R07.9 CHEST PAIN, UNSPECIFIED TYPE: Primary | ICD-10-CM

## 2019-07-08 DIAGNOSIS — R94.39 ABNORMAL NUCLEAR STRESS TEST: ICD-10-CM

## 2019-07-08 PROBLEM — M54.9 ACUTE BACK PAIN: Status: ACTIVE | Noted: 2019-07-08

## 2019-07-08 PROBLEM — I10 ESSENTIAL HYPERTENSION: Chronic | Status: ACTIVE | Noted: 2019-06-28

## 2019-07-08 PROBLEM — Z91.199 MEDICALLY NONCOMPLIANT: Chronic | Status: ACTIVE | Noted: 2019-07-08

## 2019-07-08 PROBLEM — F10.20 ALCOHOL DEPENDENCE (HCC): Chronic | Status: ACTIVE | Noted: 2019-07-08

## 2019-07-08 PROBLEM — B19.20 HEPATITIS C: Chronic | Status: ACTIVE | Noted: 2019-07-08

## 2019-07-08 LAB
AMPHET+METHAMPHET UR QL: NEGATIVE
ANION GAP SERPL CALCULATED.3IONS-SCNC: 15.2 MMOL/L (ref 10–20)
BARBITURATES UR QL SCN: NORMAL
BASOPHILS # BLD AUTO: 0.2 10*3/MM3 (ref 0–0.2)
BASOPHILS NFR BLD AUTO: 1.2 % (ref 0–1.5)
BENZODIAZ UR QL SCN: NEGATIVE
BUN BLD-MCNC: 11 MG/DL (ref 8–20)
BUN/CREAT SERPL: 18.3 (ref 6.2–20.3)
CALCIUM SPEC-SCNC: 9.4 MG/DL (ref 8.9–10.3)
CANNABINOIDS SERPL QL: NEGATIVE
CHLORIDE SERPL-SCNC: 103 MMOL/L (ref 101–111)
CO2 SERPL-SCNC: 20 MMOL/L (ref 22–32)
COCAINE UR QL: NEGATIVE
CREAT BLD-MCNC: 0.6 MG/DL (ref 0.7–1.2)
CREAT UR-MCNC: 81.3 MG/DL
DEPRECATED RDW RBC AUTO: 40.7 FL (ref 37–54)
EOSINOPHIL # BLD AUTO: 0.4 10*3/MM3 (ref 0–0.4)
EOSINOPHIL NFR BLD AUTO: 2.6 % (ref 0.3–6.2)
ERYTHROCYTE [DISTWIDTH] IN BLOOD BY AUTOMATED COUNT: 13.1 % (ref 12.3–15.4)
GFR SERPL CREATININE-BSD FRML MDRD: 146 ML/MIN/1.73
GLUCOSE BLD-MCNC: 121 MG/DL (ref 65–99)
HCT VFR BLD AUTO: 41.6 % (ref 37.5–51)
HGB BLD-MCNC: 14 G/DL (ref 13–17.7)
HOLD SPECIMEN: NORMAL
LYMPHOCYTES # BLD AUTO: 2.6 10*3/MM3 (ref 0.7–3.1)
LYMPHOCYTES NFR BLD AUTO: 18.2 % (ref 19.6–45.3)
MCH RBC QN AUTO: 29.8 PG (ref 26.6–33)
MCHC RBC AUTO-ENTMCNC: 33.7 G/DL (ref 31.5–35.7)
MCV RBC AUTO: 88.3 FL (ref 79–97)
METHADONE UR QL SCN: NEGATIVE
MONOCYTES # BLD AUTO: 1.1 10*3/MM3 (ref 0.1–0.9)
MONOCYTES NFR BLD AUTO: 7.8 % (ref 5–12)
NEUTROPHILS # BLD AUTO: 10.1 10*3/MM3 (ref 1.7–7)
NEUTROPHILS NFR BLD AUTO: 70.2 % (ref 42.7–76)
NRBC BLD AUTO-RTO: 0 /100 WBC (ref 0–0.2)
OPIATES UR QL: NEGATIVE
PCP UR QL SCN: NEGATIVE
PLATELET # BLD AUTO: 497 10*3/MM3 (ref 140–450)
PMV BLD AUTO: 7.1 FL (ref 6–12)
POTASSIUM BLD-SCNC: 4.2 MMOL/L (ref 3.6–5.1)
RBC # BLD AUTO: 4.71 10*6/MM3 (ref 4.14–5.8)
SODIUM BLD-SCNC: 134 MMOL/L (ref 136–144)
TROPONIN I SERPL-MCNC: 0.03 NG/ML (ref 0–0.03)
TROPONIN I SERPL-MCNC: 0.03 NG/ML (ref 0–0.03)
WBC NRBC COR # BLD: 14.4 10*3/MM3 (ref 3.4–10.8)
WHOLE BLOOD HOLD SPECIMEN: NORMAL

## 2019-07-08 PROCEDURE — 80307 DRUG TEST PRSMV CHEM ANLYZR: CPT | Performed by: EMERGENCY MEDICINE

## 2019-07-08 PROCEDURE — 85025 COMPLETE CBC W/AUTO DIFF WBC: CPT | Performed by: EMERGENCY MEDICINE

## 2019-07-08 PROCEDURE — 96376 TX/PRO/DX INJ SAME DRUG ADON: CPT

## 2019-07-08 PROCEDURE — 93005 ELECTROCARDIOGRAM TRACING: CPT | Performed by: EMERGENCY MEDICINE

## 2019-07-08 PROCEDURE — 82570 ASSAY OF URINE CREATININE: CPT | Performed by: EMERGENCY MEDICINE

## 2019-07-08 PROCEDURE — 25010000002 KETOROLAC TROMETHAMINE PER 15 MG: Performed by: NURSE PRACTITIONER

## 2019-07-08 PROCEDURE — 99214 OFFICE O/P EST MOD 30 MIN: CPT | Performed by: NURSE PRACTITIONER

## 2019-07-08 PROCEDURE — 84484 ASSAY OF TROPONIN QUANT: CPT | Performed by: NURSE PRACTITIONER

## 2019-07-08 PROCEDURE — G0378 HOSPITAL OBSERVATION PER HR: HCPCS

## 2019-07-08 PROCEDURE — 96375 TX/PRO/DX INJ NEW DRUG ADDON: CPT

## 2019-07-08 PROCEDURE — 71045 X-RAY EXAM CHEST 1 VIEW: CPT

## 2019-07-08 PROCEDURE — 99220 PR INITIAL OBSERVATION CARE/DAY 70 MINUTES: CPT | Performed by: INTERNAL MEDICINE

## 2019-07-08 PROCEDURE — 25010000002 HYDROMORPHONE PER 4 MG: Performed by: EMERGENCY MEDICINE

## 2019-07-08 PROCEDURE — 84484 ASSAY OF TROPONIN QUANT: CPT | Performed by: EMERGENCY MEDICINE

## 2019-07-08 PROCEDURE — 96365 THER/PROPH/DIAG IV INF INIT: CPT

## 2019-07-08 PROCEDURE — 80048 BASIC METABOLIC PNL TOTAL CA: CPT | Performed by: EMERGENCY MEDICINE

## 2019-07-08 PROCEDURE — 99284 EMERGENCY DEPT VISIT MOD MDM: CPT

## 2019-07-08 PROCEDURE — 96366 THER/PROPH/DIAG IV INF ADDON: CPT

## 2019-07-08 RX ORDER — ASPIRIN 81 MG/1
81 TABLET ORAL DAILY
Status: DISCONTINUED | OUTPATIENT
Start: 2019-07-09 | End: 2019-07-08

## 2019-07-08 RX ORDER — ONDANSETRON 4 MG/1
4 TABLET, FILM COATED ORAL EVERY 6 HOURS PRN
Status: DISCONTINUED | OUTPATIENT
Start: 2019-07-08 | End: 2019-07-09 | Stop reason: HOSPADM

## 2019-07-08 RX ORDER — LABETALOL HYDROCHLORIDE 5 MG/ML
10 INJECTION, SOLUTION INTRAVENOUS EVERY 4 HOURS PRN
Status: DISCONTINUED | OUTPATIENT
Start: 2019-07-08 | End: 2019-07-09 | Stop reason: HOSPADM

## 2019-07-08 RX ORDER — SODIUM CHLORIDE 0.9 % (FLUSH) 0.9 %
10 SYRINGE (ML) INJECTION AS NEEDED
Status: DISCONTINUED | OUTPATIENT
Start: 2019-07-08 | End: 2019-07-09 | Stop reason: HOSPADM

## 2019-07-08 RX ORDER — HYDROMORPHONE HCL 110MG/55ML
1 PATIENT CONTROLLED ANALGESIA SYRINGE INTRAVENOUS ONCE
Status: COMPLETED | OUTPATIENT
Start: 2019-07-08 | End: 2019-07-08

## 2019-07-08 RX ORDER — NITROGLYCERIN 0.4 MG/1
0.4 TABLET SUBLINGUAL
Status: DISCONTINUED | OUTPATIENT
Start: 2019-07-08 | End: 2019-07-08

## 2019-07-08 RX ORDER — SODIUM CHLORIDE 0.9 % (FLUSH) 0.9 %
3-10 SYRINGE (ML) INJECTION AS NEEDED
Status: DISCONTINUED | OUTPATIENT
Start: 2019-07-08 | End: 2019-07-09 | Stop reason: HOSPADM

## 2019-07-08 RX ORDER — KETOROLAC TROMETHAMINE 15 MG/ML
15 INJECTION, SOLUTION INTRAMUSCULAR; INTRAVENOUS EVERY 6 HOURS PRN
Status: DISCONTINUED | OUTPATIENT
Start: 2019-07-08 | End: 2019-07-09 | Stop reason: HOSPADM

## 2019-07-08 RX ORDER — CHOLECALCIFEROL (VITAMIN D3) 125 MCG
5 CAPSULE ORAL NIGHTLY PRN
Status: DISCONTINUED | OUTPATIENT
Start: 2019-07-08 | End: 2019-07-09 | Stop reason: HOSPADM

## 2019-07-08 RX ORDER — ACETAMINOPHEN 325 MG/1
650 TABLET ORAL EVERY 4 HOURS PRN
Status: DISCONTINUED | OUTPATIENT
Start: 2019-07-08 | End: 2019-07-09 | Stop reason: HOSPADM

## 2019-07-08 RX ORDER — NITROGLYCERIN 20 MG/100ML
10-50 INJECTION INTRAVENOUS
Status: DISCONTINUED | OUTPATIENT
Start: 2019-07-08 | End: 2019-07-09

## 2019-07-08 RX ORDER — ASPIRIN 325 MG
325 TABLET ORAL ONCE
Status: COMPLETED | OUTPATIENT
Start: 2019-07-08 | End: 2019-07-08

## 2019-07-08 RX ORDER — ALUMINA, MAGNESIA, AND SIMETHICONE 2400; 2400; 240 MG/30ML; MG/30ML; MG/30ML
15 SUSPENSION ORAL EVERY 6 HOURS PRN
Status: DISCONTINUED | OUTPATIENT
Start: 2019-07-08 | End: 2019-07-09 | Stop reason: HOSPADM

## 2019-07-08 RX ORDER — ONDANSETRON 2 MG/ML
4 INJECTION INTRAMUSCULAR; INTRAVENOUS EVERY 6 HOURS PRN
Status: DISCONTINUED | OUTPATIENT
Start: 2019-07-08 | End: 2019-07-09 | Stop reason: HOSPADM

## 2019-07-08 RX ORDER — SODIUM CHLORIDE 0.9 % (FLUSH) 0.9 %
3 SYRINGE (ML) INJECTION EVERY 12 HOURS SCHEDULED
Status: DISCONTINUED | OUTPATIENT
Start: 2019-07-08 | End: 2019-07-09 | Stop reason: HOSPADM

## 2019-07-08 RX ORDER — BISACODYL 10 MG
10 SUPPOSITORY, RECTAL RECTAL DAILY PRN
Status: DISCONTINUED | OUTPATIENT
Start: 2019-07-08 | End: 2019-07-09 | Stop reason: HOSPADM

## 2019-07-08 RX ORDER — ACETAMINOPHEN 650 MG/1
650 SUPPOSITORY RECTAL EVERY 4 HOURS PRN
Status: DISCONTINUED | OUTPATIENT
Start: 2019-07-08 | End: 2019-07-09 | Stop reason: HOSPADM

## 2019-07-08 RX ORDER — LISINOPRIL 5 MG/1
5 TABLET ORAL
Status: DISCONTINUED | OUTPATIENT
Start: 2019-07-08 | End: 2019-07-09

## 2019-07-08 RX ADMIN — METOPROLOL TARTRATE 25 MG: 25 TABLET, FILM COATED ORAL at 15:39

## 2019-07-08 RX ADMIN — MELATONIN TAB 5 MG 5 MG: 5 TAB at 19:50

## 2019-07-08 RX ADMIN — NITROGLYCERIN 10 MCG/MIN: 20 INJECTION INTRAVENOUS at 11:55

## 2019-07-08 RX ADMIN — Medication 10 ML: at 19:51

## 2019-07-08 RX ADMIN — HYDROMORPHONE HYDROCHLORIDE 1 MG: 2 INJECTION, SOLUTION INTRAMUSCULAR; INTRAVENOUS; SUBCUTANEOUS at 11:31

## 2019-07-08 RX ADMIN — ACETAMINOPHEN 650 MG: 325 TABLET, FILM COATED ORAL at 15:39

## 2019-07-08 RX ADMIN — KETOROLAC TROMETHAMINE 15 MG: 15 INJECTION, SOLUTION INTRAMUSCULAR; INTRAVENOUS at 19:50

## 2019-07-08 RX ADMIN — ASPIRIN 325 MG ORAL TABLET 325 MG: 325 PILL ORAL at 11:31

## 2019-07-08 RX ADMIN — LISINOPRIL 5 MG: 5 TABLET ORAL at 15:39

## 2019-07-08 RX ADMIN — Medication 3 ML: at 20:09

## 2019-07-08 RX ADMIN — ACETAMINOPHEN 650 MG: 325 TABLET, FILM COATED ORAL at 19:40

## 2019-07-08 RX ADMIN — KETOROLAC TROMETHAMINE 15 MG: 15 INJECTION, SOLUTION INTRAMUSCULAR; INTRAVENOUS at 13:58

## 2019-07-09 VITALS
BODY MASS INDEX: 29.17 KG/M2 | HEIGHT: 72 IN | SYSTOLIC BLOOD PRESSURE: 116 MMHG | HEART RATE: 70 BPM | TEMPERATURE: 98.4 F | RESPIRATION RATE: 17 BRPM | WEIGHT: 215.39 LBS | DIASTOLIC BLOOD PRESSURE: 54 MMHG | OXYGEN SATURATION: 97 %

## 2019-07-09 LAB
ANION GAP SERPL CALCULATED.3IONS-SCNC: 15.4 MMOL/L (ref 5–15)
APTT PPP: 26.8 SECONDS (ref 24–31)
ARTICHOKE IGE QN: 77 MG/DL (ref 0–100)
BASOPHILS # BLD AUTO: 0.1 10*3/MM3 (ref 0–0.2)
BASOPHILS NFR BLD AUTO: 0.8 % (ref 0–1.5)
BUN BLD-MCNC: 15 MG/DL (ref 8–20)
BUN/CREAT SERPL: 21.4 (ref 6.2–20.3)
CALCIUM SPEC-SCNC: 9.4 MG/DL (ref 8.9–10.3)
CHLORIDE SERPL-SCNC: 101 MMOL/L (ref 101–111)
CHOLEST SERPL-MCNC: 128 MG/DL
CO2 SERPL-SCNC: 22 MMOL/L (ref 22–32)
CREAT BLD-MCNC: 0.7 MG/DL (ref 0.7–1.2)
DEPRECATED RDW RBC AUTO: 41.1 FL (ref 37–54)
EOSINOPHIL # BLD AUTO: 0.2 10*3/MM3 (ref 0–0.4)
EOSINOPHIL NFR BLD AUTO: 1.6 % (ref 0.3–6.2)
ERYTHROCYTE [DISTWIDTH] IN BLOOD BY AUTOMATED COUNT: 13.2 % (ref 12.3–15.4)
GFR SERPL CREATININE-BSD FRML MDRD: 123 ML/MIN/1.73
GLUCOSE BLD-MCNC: 143 MG/DL (ref 65–99)
HBA1C MFR BLD: 5.8 % (ref 3.5–5.6)
HCT VFR BLD AUTO: 40.2 % (ref 37.5–51)
HDLC SERPL QL: 2.72
HDLC SERPL-MCNC: 47 MG/DL
HGB BLD-MCNC: 13.5 G/DL (ref 13–17.7)
INR PPP: 0.93 (ref 0.9–1.1)
LDLC/HDLC SERPL: 1.51 {RATIO}
LYMPHOCYTES # BLD AUTO: 2.2 10*3/MM3 (ref 0.7–3.1)
LYMPHOCYTES NFR BLD AUTO: 15 % (ref 19.6–45.3)
MCH RBC QN AUTO: 29.9 PG (ref 26.6–33)
MCHC RBC AUTO-ENTMCNC: 33.6 G/DL (ref 31.5–35.7)
MCV RBC AUTO: 89.2 FL (ref 79–97)
MONOCYTES # BLD AUTO: 1.2 10*3/MM3 (ref 0.1–0.9)
MONOCYTES NFR BLD AUTO: 8.1 % (ref 5–12)
NEUTROPHILS # BLD AUTO: 10.7 10*3/MM3 (ref 1.7–7)
NEUTROPHILS NFR BLD AUTO: 74.5 % (ref 42.7–76)
NRBC BLD AUTO-RTO: 0.1 /100 WBC (ref 0–0.2)
PLATELET # BLD AUTO: 471 10*3/MM3 (ref 140–450)
PMV BLD AUTO: 7.2 FL (ref 6–12)
POTASSIUM BLD-SCNC: 4.4 MMOL/L (ref 3.6–5.1)
PROTHROMBIN TIME: 9.7 SECONDS (ref 9.6–11.7)
RBC # BLD AUTO: 4.5 10*6/MM3 (ref 4.14–5.8)
SODIUM BLD-SCNC: 134 MMOL/L (ref 136–144)
TRIGL SERPL-MCNC: 51 MG/DL
TROPONIN I SERPL-MCNC: <0.03 NG/ML (ref 0–0.03)
VLDLC SERPL-MCNC: 10.2 MG/DL
WBC NRBC COR # BLD: 14.4 10*3/MM3 (ref 3.4–10.8)

## 2019-07-09 PROCEDURE — 83036 HEMOGLOBIN GLYCOSYLATED A1C: CPT | Performed by: NURSE PRACTITIONER

## 2019-07-09 PROCEDURE — 25010000002 MORPHINE PER 10 MG: Performed by: INTERNAL MEDICINE

## 2019-07-09 PROCEDURE — 85025 COMPLETE CBC W/AUTO DIFF WBC: CPT | Performed by: NURSE PRACTITIONER

## 2019-07-09 PROCEDURE — G0378 HOSPITAL OBSERVATION PER HR: HCPCS

## 2019-07-09 PROCEDURE — 84484 ASSAY OF TROPONIN QUANT: CPT | Performed by: NURSE PRACTITIONER

## 2019-07-09 PROCEDURE — 80048 BASIC METABOLIC PNL TOTAL CA: CPT | Performed by: NURSE PRACTITIONER

## 2019-07-09 PROCEDURE — 80061 LIPID PANEL: CPT | Performed by: NURSE PRACTITIONER

## 2019-07-09 PROCEDURE — 99217 PR OBSERVATION CARE DISCHARGE MANAGEMENT: CPT | Performed by: INTERNAL MEDICINE

## 2019-07-09 PROCEDURE — 0 IOPAMIDOL PER 1 ML: Performed by: INTERNAL MEDICINE

## 2019-07-09 PROCEDURE — 25010000002 KETOROLAC TROMETHAMINE PER 15 MG: Performed by: NURSE PRACTITIONER

## 2019-07-09 PROCEDURE — 96376 TX/PRO/DX INJ SAME DRUG ADON: CPT

## 2019-07-09 PROCEDURE — 25010000002 DIPHENHYDRAMINE PER 50 MG: Performed by: INTERNAL MEDICINE

## 2019-07-09 PROCEDURE — 25010000002 FENTANYL CITRATE (PF) 100 MCG/2ML SOLUTION: Performed by: INTERNAL MEDICINE

## 2019-07-09 PROCEDURE — 25010000002 MIDAZOLAM PER 1 MG: Performed by: INTERNAL MEDICINE

## 2019-07-09 PROCEDURE — C1894 INTRO/SHEATH, NON-LASER: HCPCS | Performed by: INTERNAL MEDICINE

## 2019-07-09 PROCEDURE — 85730 THROMBOPLASTIN TIME PARTIAL: CPT | Performed by: INTERNAL MEDICINE

## 2019-07-09 PROCEDURE — 96366 THER/PROPH/DIAG IV INF ADDON: CPT

## 2019-07-09 PROCEDURE — C1769 GUIDE WIRE: HCPCS | Performed by: INTERNAL MEDICINE

## 2019-07-09 PROCEDURE — 25010000002 ONDANSETRON PER 1 MG: Performed by: NURSE PRACTITIONER

## 2019-07-09 PROCEDURE — 93458 L HRT ARTERY/VENTRICLE ANGIO: CPT | Performed by: INTERNAL MEDICINE

## 2019-07-09 PROCEDURE — 85610 PROTHROMBIN TIME: CPT | Performed by: INTERNAL MEDICINE

## 2019-07-09 RX ORDER — ASPIRIN 81 MG/1
81 TABLET ORAL DAILY
Status: DISCONTINUED | OUTPATIENT
Start: 2019-07-10 | End: 2019-07-09

## 2019-07-09 RX ORDER — DOXYCYCLINE HYCLATE 100 MG/1
100 TABLET, DELAYED RELEASE ORAL 2 TIMES DAILY
Qty: 14 TABLET | Refills: 0 | Status: SHIPPED | OUTPATIENT
Start: 2019-07-09 | End: 2019-07-23 | Stop reason: HOSPADM

## 2019-07-09 RX ORDER — ASPIRIN 81 MG/1
324 TABLET, CHEWABLE ORAL ONCE
Status: COMPLETED | OUTPATIENT
Start: 2019-07-09 | End: 2019-07-09

## 2019-07-09 RX ORDER — NITROGLYCERIN 0.4 MG/1
0.4 TABLET SUBLINGUAL
Status: DISCONTINUED | OUTPATIENT
Start: 2019-07-09 | End: 2019-07-09 | Stop reason: HOSPADM

## 2019-07-09 RX ORDER — LIDOCAINE HYDROCHLORIDE 20 MG/ML
INJECTION, SOLUTION INFILTRATION; PERINEURAL AS NEEDED
Status: DISCONTINUED | OUTPATIENT
Start: 2019-07-09 | End: 2019-07-09 | Stop reason: HOSPADM

## 2019-07-09 RX ORDER — DIPHENHYDRAMINE HCL 25 MG
25 TABLET ORAL EVERY 6 HOURS PRN
Status: DISCONTINUED | OUTPATIENT
Start: 2019-07-09 | End: 2019-07-09 | Stop reason: HOSPADM

## 2019-07-09 RX ORDER — ASPIRIN 81 MG/1
81 TABLET ORAL DAILY
Qty: 30 TABLET | Refills: 0 | Status: ON HOLD | OUTPATIENT
Start: 2019-07-10 | End: 2019-08-05

## 2019-07-09 RX ORDER — ONDANSETRON 2 MG/ML
4 INJECTION INTRAMUSCULAR; INTRAVENOUS EVERY 6 HOURS PRN
Status: DISCONTINUED | OUTPATIENT
Start: 2019-07-09 | End: 2019-07-09 | Stop reason: HOSPADM

## 2019-07-09 RX ORDER — HYDROCODONE BITARTRATE AND ACETAMINOPHEN 5; 325 MG/1; MG/1
1 TABLET ORAL EVERY 4 HOURS PRN
Status: DISCONTINUED | OUTPATIENT
Start: 2019-07-09 | End: 2019-07-09 | Stop reason: HOSPADM

## 2019-07-09 RX ORDER — MIDAZOLAM HYDROCHLORIDE 1 MG/ML
INJECTION INTRAMUSCULAR; INTRAVENOUS AS NEEDED
Status: DISCONTINUED | OUTPATIENT
Start: 2019-07-09 | End: 2019-07-09 | Stop reason: HOSPADM

## 2019-07-09 RX ORDER — LISINOPRIL 5 MG/1
10 TABLET ORAL
Status: DISCONTINUED | OUTPATIENT
Start: 2019-07-10 | End: 2019-07-09 | Stop reason: HOSPADM

## 2019-07-09 RX ORDER — ACETAMINOPHEN 325 MG/1
325 TABLET ORAL EVERY 4 HOURS PRN
Status: DISCONTINUED | OUTPATIENT
Start: 2019-07-09 | End: 2019-07-09 | Stop reason: HOSPADM

## 2019-07-09 RX ORDER — SODIUM CHLORIDE 0.9 % (FLUSH) 0.9 %
3-10 SYRINGE (ML) INJECTION AS NEEDED
Status: DISCONTINUED | OUTPATIENT
Start: 2019-07-09 | End: 2019-07-09

## 2019-07-09 RX ORDER — ASPIRIN 81 MG/1
TABLET, CHEWABLE ORAL
Status: DISCONTINUED
Start: 2019-07-09 | End: 2019-07-09 | Stop reason: HOSPADM

## 2019-07-09 RX ORDER — ATROPINE SULFATE 1 MG/ML
.5-1 INJECTION, SOLUTION INTRAMUSCULAR; INTRAVENOUS; SUBCUTANEOUS
Status: DISCONTINUED | OUTPATIENT
Start: 2019-07-09 | End: 2019-07-09 | Stop reason: HOSPADM

## 2019-07-09 RX ORDER — ASPIRIN 81 MG/1
81 TABLET ORAL DAILY
Status: DISCONTINUED | OUTPATIENT
Start: 2019-07-09 | End: 2019-07-09 | Stop reason: SDUPTHER

## 2019-07-09 RX ORDER — ASPIRIN 81 MG/1
81 TABLET ORAL DAILY
Status: DISCONTINUED | OUTPATIENT
Start: 2019-07-10 | End: 2019-07-09 | Stop reason: HOSPADM

## 2019-07-09 RX ORDER — SODIUM CHLORIDE 9 MG/ML
250 INJECTION, SOLUTION INTRAVENOUS ONCE AS NEEDED
Status: DISCONTINUED | OUTPATIENT
Start: 2019-07-09 | End: 2019-07-09 | Stop reason: HOSPADM

## 2019-07-09 RX ORDER — DOCUSATE SODIUM 100 MG/1
100 CAPSULE, LIQUID FILLED ORAL 2 TIMES DAILY
Status: DISCONTINUED | OUTPATIENT
Start: 2019-07-09 | End: 2019-07-09 | Stop reason: HOSPADM

## 2019-07-09 RX ORDER — DIPHENHYDRAMINE HYDROCHLORIDE 50 MG/ML
25 INJECTION INTRAMUSCULAR; INTRAVENOUS ONCE
Status: COMPLETED | OUTPATIENT
Start: 2019-07-09 | End: 2019-07-09

## 2019-07-09 RX ORDER — ONDANSETRON 4 MG/1
4 TABLET, FILM COATED ORAL EVERY 6 HOURS PRN
Status: DISCONTINUED | OUTPATIENT
Start: 2019-07-09 | End: 2019-07-09 | Stop reason: HOSPADM

## 2019-07-09 RX ORDER — LISINOPRIL 10 MG/1
10 TABLET ORAL
Qty: 30 TABLET | Refills: 0 | Status: SHIPPED | OUTPATIENT
Start: 2019-07-10 | End: 2019-08-09

## 2019-07-09 RX ORDER — SODIUM CHLORIDE 0.9 % (FLUSH) 0.9 %
3 SYRINGE (ML) INJECTION EVERY 12 HOURS SCHEDULED
Status: DISCONTINUED | OUTPATIENT
Start: 2019-07-09 | End: 2019-07-09

## 2019-07-09 RX ORDER — FENTANYL CITRATE 50 UG/ML
INJECTION, SOLUTION INTRAMUSCULAR; INTRAVENOUS AS NEEDED
Status: DISCONTINUED | OUTPATIENT
Start: 2019-07-09 | End: 2019-07-09 | Stop reason: HOSPADM

## 2019-07-09 RX ORDER — MORPHINE SULFATE 4 MG/ML
INJECTION, SOLUTION INTRAMUSCULAR; INTRAVENOUS AS NEEDED
Status: DISCONTINUED | OUTPATIENT
Start: 2019-07-09 | End: 2019-07-09 | Stop reason: HOSPADM

## 2019-07-09 RX ORDER — ACETAMINOPHEN 325 MG/1
650 TABLET ORAL EVERY 4 HOURS PRN
Status: DISCONTINUED | OUTPATIENT
Start: 2019-07-09 | End: 2019-07-09 | Stop reason: HOSPADM

## 2019-07-09 RX ADMIN — HYDROCODONE BITARTRATE AND ACETAMINOPHEN 1 TABLET: 5; 325 TABLET ORAL at 07:49

## 2019-07-09 RX ADMIN — HYDROCODONE BITARTRATE AND ACETAMINOPHEN 1 TABLET: 5; 325 TABLET ORAL at 12:34

## 2019-07-09 RX ADMIN — LABETALOL 20 MG/4 ML (5 MG/ML) INTRAVENOUS SYRINGE 10 MG: at 00:09

## 2019-07-09 RX ADMIN — METOPROLOL TARTRATE 12.5 MG: 25 TABLET, FILM COATED ORAL at 09:46

## 2019-07-09 RX ADMIN — HYDROCODONE BITARTRATE AND ACETAMINOPHEN 1 TABLET: 5; 325 TABLET ORAL at 16:17

## 2019-07-09 RX ADMIN — ASPIRIN 81 MG 324 MG: 81 TABLET ORAL at 07:59

## 2019-07-09 RX ADMIN — ACETAMINOPHEN 650 MG: 325 TABLET, FILM COATED ORAL at 04:53

## 2019-07-09 RX ADMIN — DIPHENHYDRAMINE HYDROCHLORIDE 25 MG: 50 INJECTION, SOLUTION INTRAMUSCULAR; INTRAVENOUS at 07:49

## 2019-07-09 RX ADMIN — ONDANSETRON 4 MG: 2 INJECTION INTRAMUSCULAR; INTRAVENOUS at 02:19

## 2019-07-09 RX ADMIN — KETOROLAC TROMETHAMINE 15 MG: 15 INJECTION, SOLUTION INTRAMUSCULAR; INTRAVENOUS at 09:52

## 2019-07-09 RX ADMIN — ACETAMINOPHEN 650 MG: 325 TABLET, FILM COATED ORAL at 00:52

## 2019-07-09 RX ADMIN — KETOROLAC TROMETHAMINE 15 MG: 15 INJECTION, SOLUTION INTRAMUSCULAR; INTRAVENOUS at 02:13

## 2019-07-09 RX ADMIN — LISINOPRIL 5 MG: 5 TABLET ORAL at 09:46

## 2019-07-10 ENCOUNTER — READMISSION MANAGEMENT (OUTPATIENT)
Dept: CALL CENTER | Facility: HOSPITAL | Age: 44
End: 2019-07-10

## 2019-07-10 NOTE — OUTREACH NOTE
Prep Survey      Responses   Facility patient discharged from?  [facility]   Is patient eligible?  Yes   Discharge diagnosis  Chest pain,    Left heart catheterization,    Hypertensive urgency,     Left thigh cellulitis   Does the patient have one of the following disease processes/diagnoses(primary or secondary)?  Other   Does the patient have Home health ordered?  No   Is there a DME ordered?  No   Prep survey completed?  Yes          Kanika Marino RN

## 2019-07-11 ENCOUNTER — APPOINTMENT (OUTPATIENT)
Dept: GENERAL RADIOLOGY | Facility: HOSPITAL | Age: 44
End: 2019-07-11

## 2019-07-11 ENCOUNTER — HOSPITAL ENCOUNTER (EMERGENCY)
Facility: HOSPITAL | Age: 44
Discharge: HOME | End: 2019-07-11
Attending: NURSE PRACTITIONER | Admitting: EMERGENCY MEDICINE

## 2019-07-11 ENCOUNTER — READMISSION MANAGEMENT (OUTPATIENT)
Dept: CALL CENTER | Facility: HOSPITAL | Age: 44
End: 2019-07-11

## 2019-07-11 VITALS
BODY MASS INDEX: 29.8 KG/M2 | TEMPERATURE: 97.5 F | SYSTOLIC BLOOD PRESSURE: 138 MMHG | RESPIRATION RATE: 20 BRPM | DIASTOLIC BLOOD PRESSURE: 70 MMHG | OXYGEN SATURATION: 99 % | HEART RATE: 80 BPM | HEIGHT: 72 IN | WEIGHT: 220 LBS

## 2019-07-11 DIAGNOSIS — M79.10 MYALGIA: Primary | ICD-10-CM

## 2019-07-11 LAB
ALBUMIN SERPL-MCNC: 3.1 G/DL (ref 3.5–4.8)
ALBUMIN/GLOB SERPL: 0.8 G/DL (ref 1–1.7)
ALP SERPL-CCNC: 67 U/L (ref 32–91)
ALT SERPL W P-5'-P-CCNC: 29 U/L (ref 17–63)
ANION GAP SERPL CALCULATED.3IONS-SCNC: 14.4 MMOL/L (ref 5–15)
AST SERPL-CCNC: 24 U/L (ref 15–41)
BASOPHILS # BLD AUTO: 0.1 10*3/MM3 (ref 0–0.2)
BASOPHILS NFR BLD AUTO: 1.1 % (ref 0–1.5)
BILIRUB SERPL-MCNC: 0.6 MG/DL (ref 0.3–1.2)
BILIRUB UR QL STRIP: NEGATIVE
BUN BLD-MCNC: 16 MG/DL (ref 8–20)
BUN/CREAT SERPL: 20 (ref 6.2–20.3)
CALCIUM SPEC-SCNC: 9.1 MG/DL (ref 8.9–10.3)
CHLORIDE SERPL-SCNC: 102 MMOL/L (ref 101–111)
CLARITY UR: CLEAR
CO2 SERPL-SCNC: 23 MMOL/L (ref 22–32)
COLOR UR: ABNORMAL
CREAT BLD-MCNC: 0.8 MG/DL (ref 0.7–1.2)
DEPRECATED RDW RBC AUTO: 41.1 FL (ref 37–54)
EOSINOPHIL # BLD AUTO: 0.6 10*3/MM3 (ref 0–0.4)
EOSINOPHIL NFR BLD AUTO: 5.4 % (ref 0.3–6.2)
ERYTHROCYTE [DISTWIDTH] IN BLOOD BY AUTOMATED COUNT: 13.3 % (ref 12.3–15.4)
GFR SERPL CREATININE-BSD FRML MDRD: 105 ML/MIN/1.73
GLOBULIN UR ELPH-MCNC: 4 GM/DL (ref 2.5–3.8)
GLUCOSE BLD-MCNC: 116 MG/DL (ref 65–99)
GLUCOSE UR STRIP-MCNC: NEGATIVE MG/DL
HCT VFR BLD AUTO: 37.4 % (ref 37.5–51)
HGB BLD-MCNC: 12.9 G/DL (ref 13–17.7)
HGB UR QL STRIP.AUTO: NEGATIVE
KETONES UR QL STRIP: NEGATIVE
LEUKOCYTE ESTERASE UR QL STRIP.AUTO: NEGATIVE
LIPASE SERPL-CCNC: 31 U/L (ref 22–51)
LYMPHOCYTES # BLD AUTO: 2.1 10*3/MM3 (ref 0.7–3.1)
LYMPHOCYTES NFR BLD AUTO: 18 % (ref 19.6–45.3)
MCH RBC QN AUTO: 30.4 PG (ref 26.6–33)
MCHC RBC AUTO-ENTMCNC: 34.5 G/DL (ref 31.5–35.7)
MCV RBC AUTO: 88.1 FL (ref 79–97)
MONOCYTES # BLD AUTO: 1.2 10*3/MM3 (ref 0.1–0.9)
MONOCYTES NFR BLD AUTO: 10.2 % (ref 5–12)
NEUTROPHILS # BLD AUTO: 7.6 10*3/MM3 (ref 1.7–7)
NEUTROPHILS NFR BLD AUTO: 65.3 % (ref 42.7–76)
NITRITE UR QL STRIP: NEGATIVE
NRBC BLD AUTO-RTO: 0 /100 WBC (ref 0–0.2)
PH UR STRIP.AUTO: 6 [PH] (ref 5–8)
PLATELET # BLD AUTO: 470 10*3/MM3 (ref 140–450)
PMV BLD AUTO: 7.3 FL (ref 6–12)
POTASSIUM BLD-SCNC: 4.4 MMOL/L (ref 3.6–5.1)
PROT SERPL-MCNC: 7.1 G/DL (ref 6.1–7.9)
PROT UR QL STRIP: NEGATIVE
RBC # BLD AUTO: 4.25 10*6/MM3 (ref 4.14–5.8)
SODIUM BLD-SCNC: 135 MMOL/L (ref 136–144)
SP GR UR STRIP: 1.03 (ref 1–1.03)
UROBILINOGEN UR QL STRIP: ABNORMAL
WBC NRBC COR # BLD: 11.6 10*3/MM3 (ref 3.4–10.8)

## 2019-07-11 PROCEDURE — 81003 URINALYSIS AUTO W/O SCOPE: CPT | Performed by: NURSE PRACTITIONER

## 2019-07-11 PROCEDURE — 99284 EMERGENCY DEPT VISIT MOD MDM: CPT

## 2019-07-11 PROCEDURE — 80053 COMPREHEN METABOLIC PANEL: CPT | Performed by: NURSE PRACTITIONER

## 2019-07-11 PROCEDURE — 96374 THER/PROPH/DIAG INJ IV PUSH: CPT

## 2019-07-11 PROCEDURE — 85025 COMPLETE CBC W/AUTO DIFF WBC: CPT | Performed by: NURSE PRACTITIONER

## 2019-07-11 PROCEDURE — 25010000002 ONDANSETRON PER 1 MG: Performed by: NURSE PRACTITIONER

## 2019-07-11 PROCEDURE — 25010000002 KETOROLAC TROMETHAMINE PER 15 MG: Performed by: NURSE PRACTITIONER

## 2019-07-11 PROCEDURE — 83690 ASSAY OF LIPASE: CPT | Performed by: NURSE PRACTITIONER

## 2019-07-11 PROCEDURE — 72072 X-RAY EXAM THORAC SPINE 3VWS: CPT

## 2019-07-11 PROCEDURE — 96375 TX/PRO/DX INJ NEW DRUG ADDON: CPT

## 2019-07-11 PROCEDURE — 71111 X-RAY EXAM RIBS/CHEST4/> VWS: CPT

## 2019-07-11 PROCEDURE — 72110 X-RAY EXAM L-2 SPINE 4/>VWS: CPT

## 2019-07-11 RX ORDER — LIDOCAINE 50 MG/G
3 PATCH TOPICAL
Status: DISCONTINUED | OUTPATIENT
Start: 2019-07-11 | End: 2019-07-11 | Stop reason: HOSPADM

## 2019-07-11 RX ORDER — KETOROLAC TROMETHAMINE 30 MG/ML
30 INJECTION, SOLUTION INTRAMUSCULAR; INTRAVENOUS ONCE
Status: COMPLETED | OUTPATIENT
Start: 2019-07-11 | End: 2019-07-11

## 2019-07-11 RX ORDER — ONDANSETRON 2 MG/ML
4 INJECTION INTRAMUSCULAR; INTRAVENOUS ONCE
Status: COMPLETED | OUTPATIENT
Start: 2019-07-11 | End: 2019-07-11

## 2019-07-11 RX ORDER — DICLOFENAC SODIUM 75 MG/1
75 TABLET, DELAYED RELEASE ORAL 2 TIMES DAILY PRN
Qty: 20 TABLET | Refills: 0 | Status: SHIPPED | OUTPATIENT
Start: 2019-07-11 | End: 2019-07-23 | Stop reason: HOSPADM

## 2019-07-11 RX ORDER — SODIUM CHLORIDE 0.9 % (FLUSH) 0.9 %
10 SYRINGE (ML) INJECTION AS NEEDED
Status: DISCONTINUED | OUTPATIENT
Start: 2019-07-11 | End: 2019-07-11 | Stop reason: HOSPADM

## 2019-07-11 RX ORDER — PREDNISONE 20 MG/1
20 TABLET ORAL DAILY
Qty: 5 TABLET | Refills: 0 | Status: SHIPPED | OUTPATIENT
Start: 2019-07-11 | End: 2019-07-23 | Stop reason: HOSPADM

## 2019-07-11 RX ORDER — HYDROCODONE BITARTRATE AND ACETAMINOPHEN 5; 325 MG/1; MG/1
1 TABLET ORAL ONCE AS NEEDED
Status: DISCONTINUED | OUTPATIENT
Start: 2019-07-11 | End: 2019-07-11 | Stop reason: HOSPADM

## 2019-07-11 RX ADMIN — LIDOCAINE 3 PATCH: 50 PATCH CUTANEOUS at 12:43

## 2019-07-11 RX ADMIN — ONDANSETRON 4 MG: 2 INJECTION INTRAMUSCULAR; INTRAVENOUS at 11:20

## 2019-07-11 RX ADMIN — HYDROCODONE BITARTRATE AND ACETAMINOPHEN 1 TABLET: 5; 325 TABLET ORAL at 12:45

## 2019-07-11 RX ADMIN — KETOROLAC TROMETHAMINE 30 MG: 30 INJECTION, SOLUTION INTRAMUSCULAR at 11:20

## 2019-07-11 NOTE — OUTREACH NOTE
Medical Week 1 Survey      Responses   Facility patient discharged from?  [facility]   Does the patient have one of the following disease processes/diagnoses(primary or secondary)?  Other   Is there a successful TCM telephone encounter documented?  No   Week 1 attempt successful?  No   Rescheduled  Revoked   Revoke  Other transitional program [Patient was in ER]          Yoana Bishop LPN

## 2019-07-13 ENCOUNTER — APPOINTMENT (OUTPATIENT)
Dept: CT IMAGING | Facility: HOSPITAL | Age: 44
End: 2019-07-13

## 2019-07-13 ENCOUNTER — APPOINTMENT (OUTPATIENT)
Dept: GENERAL RADIOLOGY | Facility: HOSPITAL | Age: 44
End: 2019-07-13

## 2019-07-13 ENCOUNTER — HOSPITAL ENCOUNTER (INPATIENT)
Facility: HOSPITAL | Age: 44
LOS: 10 days | Discharge: HOME | End: 2019-07-23
Attending: EMERGENCY MEDICINE | Admitting: INTERNAL MEDICINE

## 2019-07-13 ENCOUNTER — INPATIENT HOSPITAL (OUTPATIENT)
Dept: URBAN - METROPOLITAN AREA HOSPITAL 84 | Facility: HOSPITAL | Age: 44
End: 2019-07-13
Payer: COMMERCIAL

## 2019-07-13 DIAGNOSIS — R10.13 EPIGASTRIC PAIN: ICD-10-CM

## 2019-07-13 DIAGNOSIS — R11.2 INTRACTABLE VOMITING WITH NAUSEA, UNSPECIFIED VOMITING TYPE: ICD-10-CM

## 2019-07-13 DIAGNOSIS — A41.9 SEPSIS, DUE TO UNSPECIFIED ORGANISM: ICD-10-CM

## 2019-07-13 DIAGNOSIS — R55 SYNCOPE AND COLLAPSE: ICD-10-CM

## 2019-07-13 DIAGNOSIS — K62.5 RECTAL BLEEDING: ICD-10-CM

## 2019-07-13 DIAGNOSIS — K92.1 MELENA: ICD-10-CM

## 2019-07-13 DIAGNOSIS — B18.2 CHRONIC VIRAL HEPATITIS C: ICD-10-CM

## 2019-07-13 DIAGNOSIS — K92.0 HEMATEMESIS: ICD-10-CM

## 2019-07-13 DIAGNOSIS — F10.10 ALCOHOL ABUSE, UNCOMPLICATED: ICD-10-CM

## 2019-07-13 DIAGNOSIS — R55 SYNCOPE, UNSPECIFIED SYNCOPE TYPE: Primary | ICD-10-CM

## 2019-07-13 LAB
ABO GROUP BLD: NORMAL
ALBUMIN SERPL-MCNC: 2.7 G/DL (ref 3.5–4.8)
ALBUMIN/GLOB SERPL: 0.7 G/DL (ref 1–1.7)
ALP SERPL-CCNC: 62 U/L (ref 32–91)
ALT SERPL W P-5'-P-CCNC: 26 U/L (ref 17–63)
AMPHET+METHAMPHET UR QL: NEGATIVE
ANION GAP SERPL CALCULATED.3IONS-SCNC: 13.4 MMOL/L (ref 5–15)
ANION GAP SERPL CALCULATED.3IONS-SCNC: 18.5 MMOL/L (ref 5–15)
AST SERPL-CCNC: 23 U/L (ref 15–41)
BARBITURATES UR QL SCN: NORMAL
BASOPHILS # BLD AUTO: 0.1 10*3/MM3 (ref 0–0.2)
BASOPHILS # BLD AUTO: 0.1 10*3/MM3 (ref 0–0.2)
BASOPHILS NFR BLD AUTO: 0.7 % (ref 0–1.5)
BASOPHILS NFR BLD AUTO: 0.8 % (ref 0–1.5)
BENZODIAZ UR QL SCN: NEGATIVE
BILIRUB SERPL-MCNC: 0.6 MG/DL (ref 0.3–1.2)
BILIRUB UR QL STRIP: NEGATIVE
BLD GP AB SCN SERPL QL: NEGATIVE
BNP SERPL-MCNC: 33 PG/ML
BUN BLD-MCNC: 24 MG/DL (ref 8–20)
BUN BLD-MCNC: 31 MG/DL (ref 8–20)
BUN/CREAT SERPL: 20 (ref 6.2–20.3)
BUN/CREAT SERPL: 34.4 (ref 6.2–20.3)
CALCIUM SPEC-SCNC: 7.9 MG/DL (ref 8.9–10.3)
CALCIUM SPEC-SCNC: 9.2 MG/DL (ref 8.9–10.3)
CANNABINOIDS SERPL QL: NEGATIVE
CHLORIDE SERPL-SCNC: 100 MMOL/L (ref 101–111)
CHLORIDE SERPL-SCNC: 107 MMOL/L (ref 101–111)
CLARITY UR: CLEAR
CO2 SERPL-SCNC: 18 MMOL/L (ref 22–32)
CO2 SERPL-SCNC: 21 MMOL/L (ref 22–32)
COCAINE UR QL: NEGATIVE
COLOR UR: YELLOW
CREAT BLD-MCNC: 0.9 MG/DL (ref 0.7–1.2)
CREAT BLD-MCNC: 1.2 MG/DL (ref 0.7–1.2)
CREAT UR-MCNC: 131.3 MG/DL
D-LACTATE SERPL-SCNC: 1.5 MMOL/L (ref 0.5–2.2)
D-LACTATE SERPL-SCNC: 4.3 MMOL/L (ref 0.5–2)
DEPRECATED RDW RBC AUTO: 41.6 FL (ref 37–54)
DEPRECATED RDW RBC AUTO: 42 FL (ref 37–54)
EOSINOPHIL # BLD AUTO: 0 10*3/MM3 (ref 0–0.4)
EOSINOPHIL # BLD AUTO: 0.5 10*3/MM3 (ref 0–0.4)
EOSINOPHIL NFR BLD AUTO: 0.4 % (ref 0.3–6.2)
EOSINOPHIL NFR BLD AUTO: 3.3 % (ref 0.3–6.2)
ERYTHROCYTE [DISTWIDTH] IN BLOOD BY AUTOMATED COUNT: 13.1 % (ref 12.3–15.4)
ERYTHROCYTE [DISTWIDTH] IN BLOOD BY AUTOMATED COUNT: 13.2 % (ref 12.3–15.4)
ETHANOL UR QL: <0.01 %
GFR SERPL CREATININE-BSD FRML MDRD: 66 ML/MIN/1.73
GFR SERPL CREATININE-BSD FRML MDRD: 92 ML/MIN/1.73
GLOBULIN UR ELPH-MCNC: 3.8 GM/DL (ref 2.5–3.8)
GLUCOSE BLD-MCNC: 144 MG/DL (ref 65–99)
GLUCOSE BLD-MCNC: 229 MG/DL (ref 65–99)
GLUCOSE UR STRIP-MCNC: NEGATIVE MG/DL
HCT VFR BLD AUTO: 24.8 % (ref 37.5–51)
HCT VFR BLD AUTO: 32 % (ref 37.5–51)
HGB BLD-MCNC: 10.6 G/DL (ref 13–17.7)
HGB BLD-MCNC: 8.1 G/DL (ref 13–17.7)
HGB UR QL STRIP.AUTO: NEGATIVE
HOLD SPECIMEN: NORMAL
KETONES UR QL STRIP: NEGATIVE
LEUKOCYTE ESTERASE UR QL STRIP.AUTO: NEGATIVE
LYMPHOCYTES # BLD AUTO: 2.2 10*3/MM3 (ref 0.7–3.1)
LYMPHOCYTES # BLD AUTO: 4.4 10*3/MM3 (ref 0.7–3.1)
LYMPHOCYTES NFR BLD AUTO: 18.6 % (ref 19.6–45.3)
LYMPHOCYTES NFR BLD AUTO: 32.8 % (ref 19.6–45.3)
MCH RBC QN AUTO: 29.3 PG (ref 26.6–33)
MCH RBC QN AUTO: 29.5 PG (ref 26.6–33)
MCHC RBC AUTO-ENTMCNC: 32.8 G/DL (ref 31.5–35.7)
MCHC RBC AUTO-ENTMCNC: 33.2 G/DL (ref 31.5–35.7)
MCV RBC AUTO: 88.7 FL (ref 79–97)
MCV RBC AUTO: 89.4 FL (ref 79–97)
METHADONE UR QL SCN: NEGATIVE
MONOCYTES # BLD AUTO: 0.8 10*3/MM3 (ref 0.1–0.9)
MONOCYTES # BLD AUTO: 1.2 10*3/MM3 (ref 0.1–0.9)
MONOCYTES NFR BLD AUTO: 6.4 % (ref 5–12)
MONOCYTES NFR BLD AUTO: 8.7 % (ref 5–12)
NEUTROPHILS # BLD AUTO: 7.3 10*3/MM3 (ref 1.7–7)
NEUTROPHILS # BLD AUTO: 8.7 10*3/MM3 (ref 1.7–7)
NEUTROPHILS NFR BLD AUTO: 54.4 % (ref 42.7–76)
NEUTROPHILS NFR BLD AUTO: 73.9 % (ref 42.7–76)
NITRITE UR QL STRIP: NEGATIVE
NRBC BLD AUTO-RTO: 0.1 /100 WBC (ref 0–0.2)
NRBC BLD AUTO-RTO: 0.1 /100 WBC (ref 0–0.2)
OPIATES UR QL: NEGATIVE
PCP UR QL SCN: NEGATIVE
PH UR STRIP.AUTO: 6.5 [PH] (ref 5–8)
PLATELET # BLD AUTO: 387 10*3/MM3 (ref 140–450)
PLATELET # BLD AUTO: 616 10*3/MM3 (ref 140–450)
PMV BLD AUTO: 7.2 FL (ref 6–12)
PMV BLD AUTO: 7.7 FL (ref 6–12)
POTASSIUM BLD-SCNC: 3.5 MMOL/L (ref 3.6–5.1)
POTASSIUM BLD-SCNC: 4.4 MMOL/L (ref 3.6–5.1)
PROT SERPL-MCNC: 6.5 G/DL (ref 6.1–7.9)
PROT UR QL STRIP: NEGATIVE
RBC # BLD AUTO: 2.78 10*6/MM3 (ref 4.14–5.8)
RBC # BLD AUTO: 3.61 10*6/MM3 (ref 4.14–5.8)
RH BLD: POSITIVE
SODIUM BLD-SCNC: 134 MMOL/L (ref 136–144)
SODIUM BLD-SCNC: 136 MMOL/L (ref 136–144)
SP GR UR STRIP: 1.03 (ref 1–1.03)
T&S EXPIRATION DATE: NORMAL
TROPONIN I SERPL-MCNC: 0.05 NG/ML (ref 0–0.03)
TROPONIN I SERPL-MCNC: 0.41 NG/ML (ref 0–0.03)
TROPONIN I SERPL-MCNC: 0.42 NG/ML (ref 0–0.03)
UROBILINOGEN UR QL STRIP: NORMAL
WBC NRBC COR # BLD: 11.7 10*3/MM3 (ref 3.4–10.8)
WBC NRBC COR # BLD: 13.5 10*3/MM3 (ref 3.4–10.8)
WHOLE BLOOD HOLD SPECIMEN: NORMAL
WHOLE BLOOD HOLD SPECIMEN: NORMAL

## 2019-07-13 PROCEDURE — 70450 CT HEAD/BRAIN W/O DYE: CPT

## 2019-07-13 PROCEDURE — 86923 COMPATIBILITY TEST ELECTRIC: CPT

## 2019-07-13 PROCEDURE — 87040 BLOOD CULTURE FOR BACTERIA: CPT

## 2019-07-13 PROCEDURE — 80307 DRUG TEST PRSMV CHEM ANLYZR: CPT | Performed by: EMERGENCY MEDICINE

## 2019-07-13 PROCEDURE — G0378 HOSPITAL OBSERVATION PER HR: HCPCS

## 2019-07-13 PROCEDURE — 83605 ASSAY OF LACTIC ACID: CPT

## 2019-07-13 PROCEDURE — 81003 URINALYSIS AUTO W/O SCOPE: CPT | Performed by: EMERGENCY MEDICINE

## 2019-07-13 PROCEDURE — 25010000002 VANCOMYCIN 10 G RECONSTITUTED SOLUTION: Performed by: EMERGENCY MEDICINE

## 2019-07-13 PROCEDURE — 84484 ASSAY OF TROPONIN QUANT: CPT | Performed by: FAMILY MEDICINE

## 2019-07-13 PROCEDURE — P9016 RBC LEUKOCYTES REDUCED: HCPCS

## 2019-07-13 PROCEDURE — 82962 GLUCOSE BLOOD TEST: CPT

## 2019-07-13 PROCEDURE — 82570 ASSAY OF URINE CREATININE: CPT | Performed by: EMERGENCY MEDICINE

## 2019-07-13 PROCEDURE — 83880 ASSAY OF NATRIURETIC PEPTIDE: CPT | Performed by: EMERGENCY MEDICINE

## 2019-07-13 PROCEDURE — 85025 COMPLETE CBC W/AUTO DIFF WBC: CPT | Performed by: FAMILY MEDICINE

## 2019-07-13 PROCEDURE — 71045 X-RAY EXAM CHEST 1 VIEW: CPT

## 2019-07-13 PROCEDURE — 86900 BLOOD TYPING SEROLOGIC ABO: CPT

## 2019-07-13 PROCEDURE — 84484 ASSAY OF TROPONIN QUANT: CPT | Performed by: EMERGENCY MEDICINE

## 2019-07-13 PROCEDURE — 86900 BLOOD TYPING SEROLOGIC ABO: CPT | Performed by: EMERGENCY MEDICINE

## 2019-07-13 PROCEDURE — 63710000001 DIPHENHYDRAMINE PER 50 MG: Performed by: FAMILY MEDICINE

## 2019-07-13 PROCEDURE — 36430 TRANSFUSION BLD/BLD COMPNT: CPT

## 2019-07-13 PROCEDURE — 86901 BLOOD TYPING SEROLOGIC RH(D): CPT | Performed by: EMERGENCY MEDICINE

## 2019-07-13 PROCEDURE — 85025 COMPLETE CBC W/AUTO DIFF WBC: CPT | Performed by: EMERGENCY MEDICINE

## 2019-07-13 PROCEDURE — 99222 1ST HOSP IP/OBS MODERATE 55: CPT | Performed by: NURSE PRACTITIONER

## 2019-07-13 PROCEDURE — 25010000002 PIPERACILLIN SOD-TAZOBACTAM PER 1 G: Performed by: EMERGENCY MEDICINE

## 2019-07-13 PROCEDURE — 86850 RBC ANTIBODY SCREEN: CPT | Performed by: EMERGENCY MEDICINE

## 2019-07-13 PROCEDURE — 99285 EMERGENCY DEPT VISIT HI MDM: CPT

## 2019-07-13 PROCEDURE — 86901 BLOOD TYPING SEROLOGIC RH(D): CPT

## 2019-07-13 PROCEDURE — 80053 COMPREHEN METABOLIC PANEL: CPT | Performed by: EMERGENCY MEDICINE

## 2019-07-13 PROCEDURE — 93005 ELECTROCARDIOGRAM TRACING: CPT | Performed by: EMERGENCY MEDICINE

## 2019-07-13 RX ORDER — MAGNESIUM SULFATE HEPTAHYDRATE 40 MG/ML
4 INJECTION, SOLUTION INTRAVENOUS AS NEEDED
Status: DISCONTINUED | OUTPATIENT
Start: 2019-07-13 | End: 2019-07-23 | Stop reason: HOSPADM

## 2019-07-13 RX ORDER — PANTOPRAZOLE SODIUM 40 MG/10ML
80 INJECTION, POWDER, LYOPHILIZED, FOR SOLUTION INTRAVENOUS ONCE
Status: COMPLETED | OUTPATIENT
Start: 2019-07-13 | End: 2019-07-13

## 2019-07-13 RX ORDER — SODIUM CHLORIDE 0.9 % (FLUSH) 0.9 %
10 SYRINGE (ML) INJECTION AS NEEDED
Status: DISCONTINUED | OUTPATIENT
Start: 2019-07-13 | End: 2019-07-15 | Stop reason: SDUPTHER

## 2019-07-13 RX ORDER — SODIUM CHLORIDE 0.9 % (FLUSH) 0.9 %
3 SYRINGE (ML) INJECTION EVERY 12 HOURS SCHEDULED
Status: DISCONTINUED | OUTPATIENT
Start: 2019-07-13 | End: 2019-07-15 | Stop reason: SDUPTHER

## 2019-07-13 RX ORDER — ACETAMINOPHEN 650 MG/1
650 SUPPOSITORY RECTAL EVERY 4 HOURS PRN
Status: DISCONTINUED | OUTPATIENT
Start: 2019-07-13 | End: 2019-07-23 | Stop reason: HOSPADM

## 2019-07-13 RX ORDER — PANTOPRAZOLE SODIUM 40 MG/10ML
40 INJECTION, POWDER, LYOPHILIZED, FOR SOLUTION INTRAVENOUS
Status: DISCONTINUED | OUTPATIENT
Start: 2019-07-14 | End: 2019-07-15

## 2019-07-13 RX ORDER — POTASSIUM CHLORIDE 7.45 MG/ML
10 INJECTION INTRAVENOUS
Status: DISCONTINUED | OUTPATIENT
Start: 2019-07-13 | End: 2019-07-17

## 2019-07-13 RX ORDER — ONDANSETRON 2 MG/ML
4 INJECTION INTRAMUSCULAR; INTRAVENOUS EVERY 6 HOURS PRN
Status: DISCONTINUED | OUTPATIENT
Start: 2019-07-13 | End: 2019-07-15 | Stop reason: SDUPTHER

## 2019-07-13 RX ORDER — LIDOCAINE 50 MG/G
3 PATCH TOPICAL
Status: DISCONTINUED | OUTPATIENT
Start: 2019-07-13 | End: 2019-07-15 | Stop reason: SDUPTHER

## 2019-07-13 RX ORDER — DIPHENHYDRAMINE HCL 25 MG
25 TABLET ORAL NIGHTLY PRN
Status: DISCONTINUED | OUTPATIENT
Start: 2019-07-13 | End: 2019-07-17

## 2019-07-13 RX ORDER — SODIUM CHLORIDE 0.9 % (FLUSH) 0.9 %
10 SYRINGE (ML) INJECTION AS NEEDED
Status: DISCONTINUED | OUTPATIENT
Start: 2019-07-13 | End: 2019-07-23 | Stop reason: HOSPADM

## 2019-07-13 RX ORDER — ASPIRIN 600 MG/1
300 SUPPOSITORY RECTAL ONCE
Status: DISCONTINUED | OUTPATIENT
Start: 2019-07-13 | End: 2019-07-13

## 2019-07-13 RX ORDER — ACETAMINOPHEN 325 MG/1
650 TABLET ORAL EVERY 4 HOURS PRN
Status: DISCONTINUED | OUTPATIENT
Start: 2019-07-13 | End: 2019-07-17

## 2019-07-13 RX ORDER — MAGNESIUM SULFATE HEPTAHYDRATE 40 MG/ML
2 INJECTION, SOLUTION INTRAVENOUS AS NEEDED
Status: DISCONTINUED | OUTPATIENT
Start: 2019-07-13 | End: 2019-07-23 | Stop reason: HOSPADM

## 2019-07-13 RX ORDER — SODIUM CHLORIDE 0.9 % (FLUSH) 0.9 %
3-10 SYRINGE (ML) INJECTION AS NEEDED
Status: DISCONTINUED | OUTPATIENT
Start: 2019-07-13 | End: 2019-07-15 | Stop reason: SDUPTHER

## 2019-07-13 RX ORDER — SODIUM CHLORIDE 9 MG/ML
100 INJECTION, SOLUTION INTRAVENOUS CONTINUOUS
Status: DISCONTINUED | OUTPATIENT
Start: 2019-07-13 | End: 2019-07-18

## 2019-07-13 RX ADMIN — LIDOCAINE 1 PATCH: 50 PATCH CUTANEOUS at 17:00

## 2019-07-13 RX ADMIN — VANCOMYCIN HYDROCHLORIDE 2000 MG: 10 INJECTION, POWDER, LYOPHILIZED, FOR SOLUTION INTRAVENOUS at 12:29

## 2019-07-13 RX ADMIN — ACETAMINOPHEN 650 MG: 325 TABLET, FILM COATED ORAL at 17:00

## 2019-07-13 RX ADMIN — SODIUM CHLORIDE 2994 ML: 900 INJECTION, SOLUTION INTRAVENOUS at 11:55

## 2019-07-13 RX ADMIN — DIPHENHYDRAMINE HCL 25 MG: 25 TABLET ORAL at 20:53

## 2019-07-13 RX ADMIN — PIPERACILLIN SODIUM,TAZOBACTAM SODIUM 3.38 G: 3; .375 INJECTION, POWDER, FOR SOLUTION INTRAVENOUS at 12:08

## 2019-07-13 RX ADMIN — PANTOPRAZOLE SODIUM 80 MG: 40 INJECTION, POWDER, FOR SOLUTION INTRAVENOUS at 12:28

## 2019-07-13 RX ADMIN — Medication 10 ML: at 12:37

## 2019-07-13 RX ADMIN — METOPROLOL TARTRATE 25 MG: 25 TABLET, FILM COATED ORAL at 20:53

## 2019-07-13 RX ADMIN — SODIUM CHLORIDE 100 ML/HR: 900 INJECTION, SOLUTION INTRAVENOUS at 17:01

## 2019-07-13 RX ADMIN — ACETAMINOPHEN 650 MG: 325 TABLET, FILM COATED ORAL at 20:53

## 2019-07-13 RX ADMIN — Medication 3 ML: at 20:54

## 2019-07-14 ENCOUNTER — ANESTHESIA EVENT (OUTPATIENT)
Dept: GASTROENTEROLOGY | Facility: HOSPITAL | Age: 44
End: 2019-07-14

## 2019-07-14 ENCOUNTER — INPATIENT HOSPITAL (OUTPATIENT)
Dept: URBAN - METROPOLITAN AREA HOSPITAL 84 | Facility: HOSPITAL | Age: 44
End: 2019-07-14
Payer: COMMERCIAL

## 2019-07-14 ENCOUNTER — ANESTHESIA (OUTPATIENT)
Dept: GASTROENTEROLOGY | Facility: HOSPITAL | Age: 44
End: 2019-07-14

## 2019-07-14 DIAGNOSIS — R11.2 NAUSEA WITH VOMITING, UNSPECIFIED: ICD-10-CM

## 2019-07-14 DIAGNOSIS — K26.4 CHRONIC OR UNSPECIFIED DUODENAL ULCER WITH HEMORRHAGE: ICD-10-CM

## 2019-07-14 PROBLEM — K92.1 MELENA: Status: ACTIVE | Noted: 2019-07-14

## 2019-07-14 PROBLEM — R07.89 CHEST PAIN, ATYPICAL: Status: ACTIVE | Noted: 2019-07-14

## 2019-07-14 PROBLEM — I42.8 NICM (NONISCHEMIC CARDIOMYOPATHY) (HCC): Status: ACTIVE | Noted: 2019-07-14

## 2019-07-14 PROBLEM — K62.5 RECTAL BLEEDING: Status: ACTIVE | Noted: 2019-07-14

## 2019-07-14 PROBLEM — F41.8 DEPRESSION WITH ANXIETY: Chronic | Status: ACTIVE | Noted: 2019-07-14

## 2019-07-14 LAB
ANION GAP SERPL CALCULATED.3IONS-SCNC: 10.5 MMOL/L (ref 5–15)
BASOPHILS # BLD AUTO: 0.1 10*3/MM3 (ref 0–0.2)
BASOPHILS NFR BLD AUTO: 0.7 % (ref 0–1.5)
BUN BLD-MCNC: 23 MG/DL (ref 8–20)
BUN/CREAT SERPL: 38.3 (ref 6.2–20.3)
CALCIUM SPEC-SCNC: 8.4 MG/DL (ref 8.9–10.3)
CHLORIDE SERPL-SCNC: 108 MMOL/L (ref 101–111)
CO2 SERPL-SCNC: 22 MMOL/L (ref 22–32)
CREAT BLD-MCNC: 0.6 MG/DL (ref 0.7–1.2)
DEPRECATED RDW RBC AUTO: 40.3 FL (ref 37–54)
EOSINOPHIL # BLD AUTO: 0.3 10*3/MM3 (ref 0–0.4)
EOSINOPHIL NFR BLD AUTO: 3.3 % (ref 0.3–6.2)
ERYTHROCYTE [DISTWIDTH] IN BLOOD BY AUTOMATED COUNT: 13 % (ref 12.3–15.4)
GFR SERPL CREATININE-BSD FRML MDRD: 146 ML/MIN/1.73
GLUCOSE BLD-MCNC: 97 MG/DL (ref 65–99)
HCT VFR BLD AUTO: 21 % (ref 37.5–51)
HCT VFR BLD AUTO: 22.3 % (ref 37.5–51)
HGB BLD-MCNC: 7.1 G/DL (ref 13–17.7)
HGB BLD-MCNC: 7.2 G/DL (ref 13–17.7)
HGB BLD-MCNC: 7.8 G/DL (ref 13–17.7)
HGB BLD-MCNC: 7.8 G/DL (ref 13–17.7)
LYMPHOCYTES # BLD AUTO: 2.8 10*3/MM3 (ref 0.7–3.1)
LYMPHOCYTES NFR BLD AUTO: 28.1 % (ref 19.6–45.3)
MCH RBC QN AUTO: 29.6 PG (ref 26.6–33)
MCHC RBC AUTO-ENTMCNC: 33.8 G/DL (ref 31.5–35.7)
MCV RBC AUTO: 87.5 FL (ref 79–97)
MONOCYTES # BLD AUTO: 0.8 10*3/MM3 (ref 0.1–0.9)
MONOCYTES NFR BLD AUTO: 8.3 % (ref 5–12)
NEUTROPHILS # BLD AUTO: 5.9 10*3/MM3 (ref 1.7–7)
NEUTROPHILS NFR BLD AUTO: 59.6 % (ref 42.7–76)
NRBC BLD AUTO-RTO: 0.1 /100 WBC (ref 0–0.2)
PLATELET # BLD AUTO: 343 10*3/MM3 (ref 140–450)
PMV BLD AUTO: 7.2 FL (ref 6–12)
POTASSIUM BLD-SCNC: 4.5 MMOL/L (ref 3.6–5.1)
RBC # BLD AUTO: 2.39 10*6/MM3 (ref 4.14–5.8)
SODIUM BLD-SCNC: 136 MMOL/L (ref 136–144)
TROPONIN I SERPL-MCNC: 0.12 NG/ML (ref 0–0.03)
TROPONIN I SERPL-MCNC: 0.14 NG/ML (ref 0–0.03)
TROPONIN I SERPL-MCNC: 0.26 NG/ML (ref 0–0.03)
WBC NRBC COR # BLD: 9.9 10*3/MM3 (ref 3.4–10.8)

## 2019-07-14 PROCEDURE — G0378 HOSPITAL OBSERVATION PER HR: HCPCS

## 2019-07-14 PROCEDURE — 85014 HEMATOCRIT: CPT | Performed by: INTERNAL MEDICINE

## 2019-07-14 PROCEDURE — 43255 EGD CONTROL BLEEDING ANY: CPT | Performed by: INTERNAL MEDICINE

## 2019-07-14 PROCEDURE — 85018 HEMOGLOBIN: CPT | Performed by: NURSE PRACTITIONER

## 2019-07-14 PROCEDURE — 84484 ASSAY OF TROPONIN QUANT: CPT | Performed by: NURSE PRACTITIONER

## 2019-07-14 PROCEDURE — P9016 RBC LEUKOCYTES REDUCED: HCPCS

## 2019-07-14 PROCEDURE — 63710000001 DIPHENHYDRAMINE PER 50 MG: Performed by: FAMILY MEDICINE

## 2019-07-14 PROCEDURE — 85025 COMPLETE CBC W/AUTO DIFF WBC: CPT | Performed by: FAMILY MEDICINE

## 2019-07-14 PROCEDURE — 36430 TRANSFUSION BLD/BLD COMPNT: CPT

## 2019-07-14 PROCEDURE — 0W3P8ZZ CONTROL BLEEDING IN GASTROINTESTINAL TRACT, VIA NATURAL OR ARTIFICIAL OPENING ENDOSCOPIC: ICD-10-PCS | Performed by: INTERNAL MEDICINE

## 2019-07-14 PROCEDURE — 25010000002 EPINEPHRINE PER 0.1 MG: Performed by: INTERNAL MEDICINE

## 2019-07-14 PROCEDURE — 85018 HEMOGLOBIN: CPT | Performed by: INTERNAL MEDICINE

## 2019-07-14 PROCEDURE — 84484 ASSAY OF TROPONIN QUANT: CPT | Performed by: FAMILY MEDICINE

## 2019-07-14 PROCEDURE — 86900 BLOOD TYPING SEROLOGIC ABO: CPT

## 2019-07-14 PROCEDURE — 80048 BASIC METABOLIC PNL TOTAL CA: CPT | Performed by: FAMILY MEDICINE

## 2019-07-14 PROCEDURE — 25010000002 PROPOFOL 200 MG/20ML EMULSION: Performed by: ANESTHESIOLOGY

## 2019-07-14 PROCEDURE — 25010000002 MORPHINE PER 10 MG: Performed by: NURSE PRACTITIONER

## 2019-07-14 RX ORDER — EPINEPHRINE 1 MG/ML
INJECTION, SOLUTION, CONCENTRATE INTRAVENOUS AS NEEDED
Status: DISCONTINUED | OUTPATIENT
Start: 2019-07-14 | End: 2019-07-14 | Stop reason: HOSPADM

## 2019-07-14 RX ORDER — ONDANSETRON 2 MG/ML
4 INJECTION INTRAMUSCULAR; INTRAVENOUS EVERY 6 HOURS PRN
Status: DISCONTINUED | OUTPATIENT
Start: 2019-07-14 | End: 2019-07-23 | Stop reason: HOSPADM

## 2019-07-14 RX ORDER — SODIUM CHLORIDE 0.9 % (FLUSH) 0.9 %
3-10 SYRINGE (ML) INJECTION AS NEEDED
Status: DISCONTINUED | OUTPATIENT
Start: 2019-07-14 | End: 2019-07-23 | Stop reason: HOSPADM

## 2019-07-14 RX ORDER — LORAZEPAM 2 MG/ML
0.5 INJECTION INTRAMUSCULAR EVERY 6 HOURS PRN
Status: DISCONTINUED | OUTPATIENT
Start: 2019-07-14 | End: 2019-07-17

## 2019-07-14 RX ORDER — POLYETHYLENE GLYCOL 3350 17 G/17G
17 POWDER, FOR SOLUTION ORAL DAILY
Status: DISCONTINUED | OUTPATIENT
Start: 2019-07-14 | End: 2019-07-14

## 2019-07-14 RX ORDER — ALCOHOL 0.98 ML/ML
INJECTION INTRASPINAL AS NEEDED
Status: DISCONTINUED | OUTPATIENT
Start: 2019-07-14 | End: 2019-07-14 | Stop reason: HOSPADM

## 2019-07-14 RX ORDER — ONDANSETRON 4 MG/1
4 TABLET, FILM COATED ORAL EVERY 6 HOURS PRN
Status: DISCONTINUED | OUTPATIENT
Start: 2019-07-14 | End: 2019-07-17

## 2019-07-14 RX ORDER — NITROGLYCERIN 0.4 MG/1
0.4 TABLET SUBLINGUAL
Status: DISCONTINUED | OUTPATIENT
Start: 2019-07-14 | End: 2019-07-17

## 2019-07-14 RX ORDER — NALOXONE HCL 0.4 MG/ML
0.4 VIAL (ML) INJECTION
Status: DISCONTINUED | OUTPATIENT
Start: 2019-07-14 | End: 2019-07-17

## 2019-07-14 RX ORDER — PROPOFOL 10 MG/ML
INJECTION, EMULSION INTRAVENOUS AS NEEDED
Status: DISCONTINUED | OUTPATIENT
Start: 2019-07-14 | End: 2019-07-14 | Stop reason: SURG

## 2019-07-14 RX ORDER — HYDROMORPHONE HCL 110MG/55ML
0.5 PATIENT CONTROLLED ANALGESIA SYRINGE INTRAVENOUS
Status: DISCONTINUED | OUTPATIENT
Start: 2019-07-14 | End: 2019-07-14 | Stop reason: HOSPADM

## 2019-07-14 RX ORDER — LIDOCAINE HYDROCHLORIDE 20 MG/ML
INJECTION, SOLUTION EPIDURAL; INFILTRATION; INTRACAUDAL; PERINEURAL AS NEEDED
Status: DISCONTINUED | OUTPATIENT
Start: 2019-07-14 | End: 2019-07-14 | Stop reason: SURG

## 2019-07-14 RX ORDER — SODIUM CHLORIDE 0.9 % (FLUSH) 0.9 %
3 SYRINGE (ML) INJECTION EVERY 12 HOURS SCHEDULED
Status: DISCONTINUED | OUTPATIENT
Start: 2019-07-14 | End: 2019-07-23 | Stop reason: HOSPADM

## 2019-07-14 RX ORDER — DOCUSATE SODIUM 100 MG/1
100 CAPSULE, LIQUID FILLED ORAL 2 TIMES DAILY
Status: DISCONTINUED | OUTPATIENT
Start: 2019-07-14 | End: 2019-07-14

## 2019-07-14 RX ORDER — MORPHINE SULFATE 4 MG/ML
2 INJECTION, SOLUTION INTRAMUSCULAR; INTRAVENOUS
Status: DISCONTINUED | OUTPATIENT
Start: 2019-07-14 | End: 2019-07-17

## 2019-07-14 RX ADMIN — PANTOPRAZOLE SODIUM 40 MG: 40 INJECTION, POWDER, FOR SOLUTION INTRAVENOUS at 05:44

## 2019-07-14 RX ADMIN — METOPROLOL TARTRATE 25 MG: 25 TABLET, FILM COATED ORAL at 20:20

## 2019-07-14 RX ADMIN — MORPHINE SULFATE 2 MG: 4 INJECTION INTRAVENOUS at 21:00

## 2019-07-14 RX ADMIN — DIPHENHYDRAMINE HCL 25 MG: 25 TABLET ORAL at 20:20

## 2019-07-14 RX ADMIN — PROPOFOL 150 MG: 10 INJECTION, EMULSION INTRAVENOUS at 12:33

## 2019-07-14 RX ADMIN — SODIUM CHLORIDE 100 ML/HR: 900 INJECTION, SOLUTION INTRAVENOUS at 04:18

## 2019-07-14 RX ADMIN — LIDOCAINE 3 PATCH: 50 PATCH CUTANEOUS at 20:20

## 2019-07-14 RX ADMIN — ACETAMINOPHEN 650 MG: 325 TABLET, FILM COATED ORAL at 03:09

## 2019-07-14 RX ADMIN — MORPHINE SULFATE 2 MG: 4 INJECTION INTRAVENOUS at 12:24

## 2019-07-14 RX ADMIN — PROPOFOL 50 MG: 10 INJECTION, EMULSION INTRAVENOUS at 12:34

## 2019-07-14 RX ADMIN — LIDOCAINE HYDROCHLORIDE 25 MG: 20 INJECTION, SOLUTION EPIDURAL; INFILTRATION; INTRACAUDAL; PERINEURAL at 12:33

## 2019-07-14 RX ADMIN — PROPOFOL 50 MG: 10 INJECTION, EMULSION INTRAVENOUS at 12:35

## 2019-07-14 RX ADMIN — MORPHINE SULFATE 2 MG: 4 INJECTION INTRAVENOUS at 18:11

## 2019-07-14 RX ADMIN — SODIUM CHLORIDE, PRESERVATIVE FREE 3 ML: 5 INJECTION INTRAVENOUS at 20:27

## 2019-07-14 RX ADMIN — ACETAMINOPHEN 650 MG: 325 TABLET, FILM COATED ORAL at 20:20

## 2019-07-14 NOTE — ANESTHESIA PREPROCEDURE EVALUATION
Anesthesia Evaluation     Patient summary reviewed and Nursing notes reviewed   NPO Solid Status: > 6 hours  NPO Liquid Status: > 6 hours           Airway   Mallampati: II  TM distance: >3 FB  Neck ROM: full  No difficulty expected  Dental      Pulmonary - negative pulmonary ROS and normal exam    breath sounds clear to auscultation  Cardiovascular - negative cardio ROS and normal exam    ECG reviewed  Rhythm: regular  Rate: normal        Neuro/Psych- negative ROS  GI/Hepatic/Renal/Endo - negative ROS     Musculoskeletal (-) negative ROS    Abdominal  - normal exam   Substance History - negative use     OB/GYN          Other            Phys Exam Other: Only 2 teeth. Extremely poor care.              Anesthesia Plan    ASA 3     MAC     intravenous induction   Anesthetic plan, all risks, benefits, and alternatives have been provided, discussed and informed consent has been obtained with: patient.

## 2019-07-14 NOTE — ANESTHESIA POSTPROCEDURE EVALUATION
Patient: Asad Bethea    Procedure Summary     Date:  07/14/19 Room / Location:  [facility] ENDOSCOPY 1 / [facility] ENDOSCOPY    Anesthesia Start:  1227 Anesthesia Stop:  1247    Procedure:  ESOPHAGOGASTRODUODENOSCOPY (N/A ) Diagnosis:       Intractable vomiting with nausea, unspecified vomiting type      (Intractable vomiting with nausea, unspecified vomiting type [R11.2])    Surgeon:  Wesly Myers MD Provider:  Morgan Blanco MD    Anesthesia Type:  MAC ASA Status:  3          Anesthesia Type: MAC  Last vitals  BP   152/70 (07/14/19 1015)   Temp   99 °F (37.2 °C) (07/14/19 1015)   Pulse   83 (07/14/19 1015)   Resp   16 (07/14/19 1015)     SpO2   100 % (07/14/19 1015)     Post Anesthesia Care and Evaluation    Patient location during evaluation: PACU  Patient participation: complete - patient participated  Level of consciousness: awake  Pain management: adequate  Airway patency: patent  Anesthetic complications: No anesthetic complications  PONV Status: none  Cardiovascular status: acceptable  Respiratory status: acceptable  Hydration status: acceptable    Comments: Patient seen and examined postoperatively; vital signs stable; SpO2 greater than or equal to 90%; cardiopulmonary status stable; nausea/vomiting adequately controlled; pain adequately controlled; no apparent anesthesia complications; patient discharged from anesthesia care when discharge criteria were met

## 2019-07-15 ENCOUNTER — APPOINTMENT (OUTPATIENT)
Dept: GENERAL RADIOLOGY | Facility: HOSPITAL | Age: 44
End: 2019-07-15

## 2019-07-15 ENCOUNTER — APPOINTMENT (OUTPATIENT)
Dept: INTERVENTIONAL RADIOLOGY/VASCULAR | Facility: HOSPITAL | Age: 44
End: 2019-07-15

## 2019-07-15 ENCOUNTER — INPATIENT HOSPITAL (OUTPATIENT)
Dept: URBAN - METROPOLITAN AREA HOSPITAL 84 | Facility: HOSPITAL | Age: 44
End: 2019-07-15
Payer: COMMERCIAL

## 2019-07-15 DIAGNOSIS — K92.0 HEMATEMESIS: ICD-10-CM

## 2019-07-15 DIAGNOSIS — B18.2 CHRONIC VIRAL HEPATITIS C: ICD-10-CM

## 2019-07-15 DIAGNOSIS — F10.10 ALCOHOL ABUSE, UNCOMPLICATED: ICD-10-CM

## 2019-07-15 DIAGNOSIS — R10.13 EPIGASTRIC PAIN: ICD-10-CM

## 2019-07-15 DIAGNOSIS — R55 SYNCOPE AND COLLAPSE: ICD-10-CM

## 2019-07-15 DIAGNOSIS — K26.4 CHRONIC OR UNSPECIFIED DUODENAL ULCER WITH HEMORRHAGE: ICD-10-CM

## 2019-07-15 DIAGNOSIS — R19.5 OTHER FECAL ABNORMALITIES: ICD-10-CM

## 2019-07-15 LAB
ABO + RH BLD: NORMAL
ANION GAP SERPL CALCULATED.3IONS-SCNC: 12 MMOL/L (ref 5–15)
APTT PPP: 25.6 SECONDS (ref 24–31)
ARTERIAL PATENCY WRIST A: POSITIVE
ATMOSPHERIC PRESS: ABNORMAL MMHG
BASE EXCESS BLDA CALC-SCNC: -1.8 MMOL/L (ref 0–3)
BASOPHILS # BLD AUTO: 0.1 10*3/MM3 (ref 0–0.2)
BASOPHILS NFR BLD AUTO: 0.8 % (ref 0–1.5)
BDY SITE: ABNORMAL
BH BB BLOOD EXPIRATION DATE: NORMAL
BH BB BLOOD TYPE BARCODE: 5100
BH BB DISPENSE STATUS: NORMAL
BH BB PRODUCT CODE: NORMAL
BH BB UNIT NUMBER: NORMAL
BUN BLD-MCNC: 14 MG/DL (ref 8–20)
BUN/CREAT SERPL: 20 (ref 6.2–20.3)
CALCIUM SPEC-SCNC: 8.1 MG/DL (ref 8.9–10.3)
CHLORIDE SERPL-SCNC: 106 MMOL/L (ref 101–111)
CO2 BLDA-SCNC: 24.8 MMOL/L (ref 22–29)
CO2 SERPL-SCNC: 23 MMOL/L (ref 22–32)
CREAT BLD-MCNC: 0.7 MG/DL (ref 0.7–1.2)
DEPRECATED RDW RBC AUTO: 40.7 FL (ref 37–54)
EOSINOPHIL # BLD AUTO: 0.6 10*3/MM3 (ref 0–0.4)
EOSINOPHIL NFR BLD AUTO: 6.8 % (ref 0.3–6.2)
ERYTHROCYTE [DISTWIDTH] IN BLOOD BY AUTOMATED COUNT: 13 % (ref 12.3–15.4)
GFR SERPL CREATININE-BSD FRML MDRD: 123 ML/MIN/1.73
GLUCOSE BLD-MCNC: 135 MG/DL (ref 65–99)
HCO3 BLDA-SCNC: 23.5 MMOL/L (ref 21–28)
HCT VFR BLD AUTO: 18.7 % (ref 37.5–51)
HCT VFR BLD AUTO: 20.6 % (ref 37.5–51)
HCT VFR BLD AUTO: 20.8 % (ref 37.5–51)
HCT VFR BLD AUTO: 26 % (ref 37.5–51)
HCT VFR BLD AUTO: 26.9 % (ref 37.5–51)
HEMODILUTION: NO
HGB BLD-MCNC: 6.3 G/DL (ref 13–17.7)
HGB BLD-MCNC: 7 G/DL (ref 13–17.7)
HGB BLD-MCNC: 8.8 G/DL (ref 13–17.7)
HGB BLD-MCNC: 9 G/DL (ref 13–17.7)
HOROWITZ INDEX BLD+IHG-RTO: 80 %
INR PPP: 0.96 (ref 0.9–1.1)
LYMPHOCYTES # BLD AUTO: 2.2 10*3/MM3 (ref 0.7–3.1)
LYMPHOCYTES NFR BLD AUTO: 23.1 % (ref 19.6–45.3)
MAGNESIUM SERPL-MCNC: 1.6 MG/DL (ref 1.8–2.5)
MCH RBC QN AUTO: 29.6 PG (ref 26.6–33)
MCHC RBC AUTO-ENTMCNC: 33.7 G/DL (ref 31.5–35.7)
MCV RBC AUTO: 87.9 FL (ref 79–97)
MODALITY: ABNORMAL
MONOCYTES # BLD AUTO: 0.8 10*3/MM3 (ref 0.1–0.9)
MONOCYTES NFR BLD AUTO: 8.4 % (ref 5–12)
NEUTROPHILS # BLD AUTO: 5.8 10*3/MM3 (ref 1.7–7)
NEUTROPHILS NFR BLD AUTO: 60.9 % (ref 42.7–76)
NRBC BLD AUTO-RTO: 0 /100 WBC (ref 0–0.2)
PCO2 BLDA: 41.3 MM HG (ref 35–48)
PEEP RESPIRATORY: 5 CM[H2O]
PH BLDA: 7.36 PH UNITS (ref 7.35–7.45)
PLATELET # BLD AUTO: 317 10*3/MM3 (ref 140–450)
PMV BLD AUTO: 7 FL (ref 6–12)
PO2 BLDA: 487.2 MM HG (ref 83–108)
POTASSIUM BLD-SCNC: 4 MMOL/L (ref 3.6–5.1)
PROTHROMBIN TIME: 10 SECONDS (ref 9.6–11.7)
RBC # BLD AUTO: 2.37 10*6/MM3 (ref 4.14–5.8)
RESPIRATORY RATE: 16
SAO2 % BLDCOA: 100 % (ref 94–98)
SODIUM BLD-SCNC: 137 MMOL/L (ref 136–144)
UNIT  ABO: NORMAL
UNIT  RH: NORMAL
VENTILATOR MODE: ABNORMAL
VT ON VENT VENT: 450 ML
WBC NRBC COR # BLD: 9.5 10*3/MM3 (ref 3.4–10.8)

## 2019-07-15 PROCEDURE — 94799 UNLISTED PULMONARY SVC/PX: CPT

## 2019-07-15 PROCEDURE — 36430 TRANSFUSION BLD/BLD COMPNT: CPT

## 2019-07-15 PROCEDURE — 85018 HEMOGLOBIN: CPT | Performed by: HOSPITALIST

## 2019-07-15 PROCEDURE — 85018 HEMOGLOBIN: CPT | Performed by: INTERNAL MEDICINE

## 2019-07-15 PROCEDURE — 25010000002 FENTANYL CITRATE (PF) 100 MCG/2ML SOLUTION: Performed by: RADIOLOGY

## 2019-07-15 PROCEDURE — C1887 CATHETER, GUIDING: HCPCS

## 2019-07-15 PROCEDURE — 75774 ARTERY X-RAY EACH VESSEL: CPT

## 2019-07-15 PROCEDURE — 86900 BLOOD TYPING SEROLOGIC ABO: CPT

## 2019-07-15 PROCEDURE — 85018 HEMOGLOBIN: CPT | Performed by: NURSE PRACTITIONER

## 2019-07-15 PROCEDURE — 85730 THROMBOPLASTIN TIME PARTIAL: CPT | Performed by: NURSE PRACTITIONER

## 2019-07-15 PROCEDURE — 85014 HEMATOCRIT: CPT | Performed by: HOSPITALIST

## 2019-07-15 PROCEDURE — P9016 RBC LEUKOCYTES REDUCED: HCPCS

## 2019-07-15 PROCEDURE — C1769 GUIDE WIRE: HCPCS

## 2019-07-15 PROCEDURE — 75726 ARTERY X-RAYS ABDOMEN: CPT

## 2019-07-15 PROCEDURE — 85014 HEMATOCRIT: CPT | Performed by: INTERNAL MEDICINE

## 2019-07-15 PROCEDURE — 80048 BASIC METABOLIC PNL TOTAL CA: CPT | Performed by: FAMILY MEDICINE

## 2019-07-15 PROCEDURE — 99255 IP/OBS CONSLTJ NEW/EST HI 80: CPT | Performed by: NURSE PRACTITIONER

## 2019-07-15 PROCEDURE — 25010000002 OCTREOTIDE PER 25 MCG: Performed by: INTERNAL MEDICINE

## 2019-07-15 PROCEDURE — C1894 INTRO/SHEATH, NON-LASER: HCPCS

## 2019-07-15 PROCEDURE — 25010000002 THIAMINE PER 100 MG: Performed by: INTERNAL MEDICINE

## 2019-07-15 PROCEDURE — 94002 VENT MGMT INPAT INIT DAY: CPT

## 2019-07-15 PROCEDURE — 83735 ASSAY OF MAGNESIUM: CPT | Performed by: NURSE PRACTITIONER

## 2019-07-15 PROCEDURE — 85025 COMPLETE CBC W/AUTO DIFF WBC: CPT | Performed by: FAMILY MEDICINE

## 2019-07-15 PROCEDURE — 36600 WITHDRAWAL OF ARTERIAL BLOOD: CPT

## 2019-07-15 PROCEDURE — 82803 BLOOD GASES ANY COMBINATION: CPT

## 2019-07-15 PROCEDURE — 74018 RADEX ABDOMEN 1 VIEW: CPT

## 2019-07-15 PROCEDURE — 99232 SBSQ HOSP IP/OBS MODERATE 35: CPT | Performed by: HOSPITALIST

## 2019-07-15 PROCEDURE — 25010000003 CEFAZOLIN PER 500 MG: Performed by: RADIOLOGY

## 2019-07-15 PROCEDURE — 25010000002 MIDAZOLAM PER 1 MG

## 2019-07-15 PROCEDURE — 25010000002 MIDAZOLAM 50 MG/10ML SOLUTION: Performed by: INTERNAL MEDICINE

## 2019-07-15 PROCEDURE — C1760 CLOSURE DEV, VASC: HCPCS

## 2019-07-15 PROCEDURE — 25010000002 PROPOFOL 1000 MG/ML EMULSION: Performed by: INTERNAL MEDICINE

## 2019-07-15 PROCEDURE — 99232 SBSQ HOSP IP/OBS MODERATE 35: CPT | Performed by: NURSE PRACTITIONER

## 2019-07-15 PROCEDURE — 0 IOPAMIDOL PER 1 ML: Performed by: HOSPITALIST

## 2019-07-15 PROCEDURE — 04L23DZ OCCLUSION OF GASTRIC ARTERY WITH INTRALUMINAL DEVICE, PERCUTANEOUS APPROACH: ICD-10-PCS | Performed by: PEDIATRICS

## 2019-07-15 PROCEDURE — B414ZZZ FLUOROSCOPY OF SUPERIOR MESENTERIC ARTERY: ICD-10-PCS | Performed by: PEDIATRICS

## 2019-07-15 PROCEDURE — 85610 PROTHROMBIN TIME: CPT | Performed by: NURSE PRACTITIONER

## 2019-07-15 PROCEDURE — 25010000002 MORPHINE PER 10 MG: Performed by: NURSE PRACTITIONER

## 2019-07-15 PROCEDURE — 25010000002 PROPOFOL 10 MG/ML EMULSION

## 2019-07-15 PROCEDURE — 71045 X-RAY EXAM CHEST 1 VIEW: CPT

## 2019-07-15 RX ORDER — MIDAZOLAM HYDROCHLORIDE 1 MG/ML
INJECTION INTRAMUSCULAR; INTRAVENOUS
Status: COMPLETED
Start: 2019-07-15 | End: 2019-07-15

## 2019-07-15 RX ORDER — MIDAZOLAM HCL IN 0.9 % NACL/PF 1 MG/ML
1-10 PLASTIC BAG, INJECTION (ML) INTRAVENOUS
Status: DISCONTINUED | OUTPATIENT
Start: 2019-07-15 | End: 2019-07-17

## 2019-07-15 RX ORDER — MIDAZOLAM HCL IN 0.9 % NACL/PF 1 MG/ML
1 PLASTIC BAG, INJECTION (ML) INTRAVENOUS
Status: DISCONTINUED | OUTPATIENT
Start: 2019-07-15 | End: 2019-07-15

## 2019-07-15 RX ORDER — PROPOFOL 10 MG/ML
VIAL (ML) INTRAVENOUS
Status: COMPLETED
Start: 2019-07-15 | End: 2019-07-15

## 2019-07-15 RX ORDER — FENTANYL CITRATE 50 UG/ML
INJECTION, SOLUTION INTRAMUSCULAR; INTRAVENOUS
Status: COMPLETED | OUTPATIENT
Start: 2019-07-15 | End: 2019-07-15

## 2019-07-15 RX ORDER — PANTOPRAZOLE SODIUM 40 MG/10ML
40 INJECTION, POWDER, LYOPHILIZED, FOR SOLUTION INTRAVENOUS
Status: DISCONTINUED | OUTPATIENT
Start: 2019-07-15 | End: 2019-07-23 | Stop reason: HOSPADM

## 2019-07-15 RX ORDER — FENTANYL CITRATE-0.9 % NACL/PF 10 MCG/ML
50-300 PLASTIC BAG, INJECTION (ML) INTRAVENOUS
Status: DISCONTINUED | OUTPATIENT
Start: 2019-07-16 | End: 2019-07-17

## 2019-07-15 RX ORDER — LORAZEPAM 2 MG/ML
INJECTION INTRAMUSCULAR
Status: DISPENSED
Start: 2019-07-15 | End: 2019-07-15

## 2019-07-15 RX ORDER — ETOMIDATE 2 MG/ML
INJECTION INTRAVENOUS
Status: COMPLETED
Start: 2019-07-15 | End: 2019-07-15

## 2019-07-15 RX ADMIN — IOPAMIDOL 53 ML: 755 INJECTION, SOLUTION INTRAVENOUS at 14:50

## 2019-07-15 RX ADMIN — METOPROLOL TARTRATE 5 MG: 5 INJECTION INTRAVENOUS at 23:52

## 2019-07-15 RX ADMIN — MORPHINE SULFATE 2 MG: 4 INJECTION INTRAVENOUS at 00:00

## 2019-07-15 RX ADMIN — PROPOFOL 50 MCG/KG/MIN: 10 INJECTION, EMULSION INTRAVENOUS at 23:52

## 2019-07-15 RX ADMIN — FOLIC ACID 100 ML/HR: 5 INJECTION, SOLUTION INTRAMUSCULAR; INTRAVENOUS; SUBCUTANEOUS at 15:22

## 2019-07-15 RX ADMIN — OCTREOTIDE ACETATE 25 MCG/HR: 500 INJECTION, SOLUTION INTRAVENOUS; SUBCUTANEOUS at 15:24

## 2019-07-15 RX ADMIN — SODIUM CHLORIDE 1 MG/HR: 900 INJECTION, SOLUTION INTRAVENOUS at 16:58

## 2019-07-15 RX ADMIN — FENTANYL CITRATE 50 MCG: 50 INJECTION, SOLUTION INTRAMUSCULAR; INTRAVENOUS at 14:07

## 2019-07-15 RX ADMIN — MORPHINE SULFATE 2 MG: 4 INJECTION INTRAVENOUS at 10:20

## 2019-07-15 RX ADMIN — SODIUM CHLORIDE, PRESERVATIVE FREE 3 ML: 5 INJECTION INTRAVENOUS at 20:36

## 2019-07-15 RX ADMIN — OCTREOTIDE ACETATE 25 MCG/HR: 500 INJECTION, SOLUTION INTRAVENOUS; SUBCUTANEOUS at 13:08

## 2019-07-15 RX ADMIN — PROPOFOL 50 MCG/KG/MIN: 10 INJECTION, EMULSION INTRAVENOUS at 13:09

## 2019-07-15 RX ADMIN — PROPOFOL 50 MCG/KG/MIN: 10 INJECTION, EMULSION INTRAVENOUS at 17:27

## 2019-07-15 RX ADMIN — PROPOFOL 50 MCG/KG/MIN: 10 INJECTION, EMULSION INTRAVENOUS at 20:34

## 2019-07-15 RX ADMIN — SODIUM CHLORIDE 2000 ML: 0.9 INJECTION, SOLUTION INTRAVENOUS at 13:12

## 2019-07-15 RX ADMIN — MIDAZOLAM: 1 INJECTION INTRAMUSCULAR; INTRAVENOUS at 13:07

## 2019-07-15 RX ADMIN — METOPROLOL TARTRATE 25 MG: 25 TABLET, FILM COATED ORAL at 10:19

## 2019-07-15 RX ADMIN — PANTOPRAZOLE SODIUM 40 MG: 40 INJECTION, POWDER, FOR SOLUTION INTRAVENOUS at 04:52

## 2019-07-15 RX ADMIN — SODIUM CHLORIDE, PRESERVATIVE FREE 3 ML: 5 INJECTION INTRAVENOUS at 10:23

## 2019-07-15 RX ADMIN — MIDAZOLAM HYDROCHLORIDE: 1 INJECTION, SOLUTION INTRAMUSCULAR; INTRAVENOUS at 13:07

## 2019-07-15 RX ADMIN — ETOMIDATE: 2 INJECTION, SOLUTION INTRAVENOUS at 13:06

## 2019-07-15 RX ADMIN — CEFAZOLIN SODIUM 2 G: 1 INJECTION, POWDER, FOR SOLUTION INTRAMUSCULAR; INTRAVENOUS at 13:12

## 2019-07-15 RX ADMIN — PANTOPRAZOLE SODIUM 40 MG: 40 INJECTION, POWDER, FOR SOLUTION INTRAVENOUS at 16:33

## 2019-07-15 RX ADMIN — FENTANYL CITRATE 50 MCG: 50 INJECTION, SOLUTION INTRAMUSCULAR; INTRAVENOUS at 14:01

## 2019-07-15 RX ADMIN — SODIUM CHLORIDE 1000 ML: 0.9 INJECTION, SOLUTION INTRAVENOUS at 13:12

## 2019-07-15 RX ADMIN — MORPHINE SULFATE 2 MG: 4 INJECTION INTRAVENOUS at 04:51

## 2019-07-16 ENCOUNTER — ANESTHESIA EVENT (OUTPATIENT)
Dept: PERIOP | Facility: HOSPITAL | Age: 44
End: 2019-07-16

## 2019-07-16 ENCOUNTER — ANESTHESIA (OUTPATIENT)
Dept: PERIOP | Facility: HOSPITAL | Age: 44
End: 2019-07-16

## 2019-07-16 ENCOUNTER — APPOINTMENT (OUTPATIENT)
Dept: GENERAL RADIOLOGY | Facility: HOSPITAL | Age: 44
End: 2019-07-16

## 2019-07-16 LAB
ABO + RH BLD: NORMAL
ABO + RH BLD: NORMAL
ALBUMIN SERPL-MCNC: 2.2 G/DL (ref 3.5–4.8)
ALBUMIN/GLOB SERPL: 0.8 G/DL (ref 1–1.7)
ALP SERPL-CCNC: 46 U/L (ref 32–91)
ALT SERPL W P-5'-P-CCNC: 49 U/L (ref 17–63)
ANION GAP SERPL CALCULATED.3IONS-SCNC: 17.4 MMOL/L (ref 5–15)
ANION GAP SERPL CALCULATED.3IONS-SCNC: 17.6 MMOL/L (ref 5–15)
ANION GAP SERPL CALCULATED.3IONS-SCNC: 18.4 MMOL/L (ref 5–15)
ARTERIAL PATENCY WRIST A: POSITIVE
AST SERPL-CCNC: 53 U/L (ref 15–41)
ATMOSPHERIC PRESS: ABNORMAL MMHG
BASE EXCESS BLDA CALC-SCNC: -6.9 MMOL/L (ref 0–3)
BDY SITE: ABNORMAL
BH BB BLOOD EXPIRATION DATE: NORMAL
BH BB BLOOD EXPIRATION DATE: NORMAL
BH BB BLOOD TYPE BARCODE: 5100
BH BB BLOOD TYPE BARCODE: 5100
BH BB DISPENSE STATUS: NORMAL
BH BB DISPENSE STATUS: NORMAL
BH BB PRODUCT CODE: NORMAL
BH BB PRODUCT CODE: NORMAL
BH BB UNIT NUMBER: NORMAL
BH BB UNIT NUMBER: NORMAL
BILIRUB SERPL-MCNC: 0.5 MG/DL (ref 0.3–1.2)
BILIRUB UR QL STRIP: NEGATIVE
BUN BLD-MCNC: 35 MG/DL (ref 8–20)
BUN BLD-MCNC: 49 MG/DL (ref 8–20)
BUN BLD-MCNC: 56 MG/DL (ref 8–20)
BUN/CREAT SERPL: 15.8 (ref 6.2–20.3)
BUN/CREAT SERPL: 17.5 (ref 6.2–20.3)
BUN/CREAT SERPL: 17.5 (ref 6.2–20.3)
CALCIUM SPEC-SCNC: 7 MG/DL (ref 8.9–10.3)
CALCIUM SPEC-SCNC: 7.3 MG/DL (ref 8.9–10.3)
CALCIUM SPEC-SCNC: 7.6 MG/DL (ref 8.9–10.3)
CHLORIDE SERPL-SCNC: 106 MMOL/L (ref 101–111)
CHLORIDE SERPL-SCNC: 108 MMOL/L (ref 101–111)
CHLORIDE SERPL-SCNC: 109 MMOL/L (ref 101–111)
CLARITY UR: CLEAR
CO2 BLDA-SCNC: 19.3 MMOL/L (ref 22–29)
CO2 SERPL-SCNC: 13 MMOL/L (ref 22–32)
CO2 SERPL-SCNC: 15 MMOL/L (ref 22–32)
CO2 SERPL-SCNC: 15 MMOL/L (ref 22–32)
COLOR UR: YELLOW
CREAT BLD-MCNC: 2 MG/DL (ref 0.7–1.2)
CREAT BLD-MCNC: 3.1 MG/DL (ref 0.7–1.2)
CREAT BLD-MCNC: 3.2 MG/DL (ref 0.7–1.2)
D-LACTATE SERPL-SCNC: 4.7 MMOL/L (ref 0.5–2.2)
DEPRECATED RDW RBC AUTO: 43.8 FL (ref 37–54)
ERYTHROCYTE [DISTWIDTH] IN BLOOD BY AUTOMATED COUNT: 14.1 % (ref 12.3–15.4)
GFR SERPL CREATININE-BSD FRML MDRD: 21 ML/MIN/1.73
GFR SERPL CREATININE-BSD FRML MDRD: 22 ML/MIN/1.73
GFR SERPL CREATININE-BSD FRML MDRD: 36 ML/MIN/1.73
GLOBULIN UR ELPH-MCNC: 2.6 GM/DL (ref 2.5–3.8)
GLUCOSE BLD-MCNC: 130 MG/DL (ref 65–99)
GLUCOSE BLD-MCNC: 135 MG/DL (ref 65–99)
GLUCOSE BLD-MCNC: 155 MG/DL (ref 65–99)
GLUCOSE BLDC GLUCOMTR-MCNC: 172 MG/DL (ref 70–105)
GLUCOSE UR STRIP-MCNC: NEGATIVE MG/DL
HCO3 BLDA-SCNC: 18.2 MMOL/L (ref 21–28)
HCT VFR BLD AUTO: 20.4 % (ref 37.5–51)
HCT VFR BLD AUTO: 22.4 % (ref 37.5–51)
HCT VFR BLD AUTO: 22.9 % (ref 37.5–51)
HCT VFR BLD AUTO: 25.3 % (ref 37.5–51)
HCT VFR BLD AUTO: 26 % (ref 37.5–51)
HEMODILUTION: NO
HGB BLD-MCNC: 6.4 G/DL (ref 13–17.7)
HGB BLD-MCNC: 7.5 G/DL (ref 13–17.7)
HGB BLD-MCNC: 7.8 G/DL (ref 13–17.7)
HGB BLD-MCNC: 8.4 G/DL (ref 13–17.7)
HGB BLD-MCNC: 8.8 G/DL (ref 13–17.7)
HGB UR QL STRIP.AUTO: NEGATIVE
HOROWITZ INDEX BLD+IHG-RTO: 40 %
INR PPP: 0.98 (ref 0.9–1.1)
KETONES UR QL STRIP: NEGATIVE
LEUKOCYTE ESTERASE UR QL STRIP.AUTO: NEGATIVE
LYMPHOCYTES # BLD MANUAL: 2.98 10*3/MM3 (ref 0.7–3.1)
LYMPHOCYTES NFR BLD MANUAL: 9 % (ref 19.6–45.3)
MAGNESIUM SERPL-MCNC: 1.6 MG/DL (ref 1.8–2.5)
MCH RBC QN AUTO: 30.1 PG (ref 26.6–33)
MCHC RBC AUTO-ENTMCNC: 34.1 G/DL (ref 31.5–35.7)
MCV RBC AUTO: 88.1 FL (ref 79–97)
METAMYELOCYTES NFR BLD MANUAL: 2 % (ref 0–0)
MODALITY: ABNORMAL
NEUTROPHILS # BLD AUTO: 29.46 10*3/MM3 (ref 1.7–7)
NEUTROPHILS NFR BLD MANUAL: 72 % (ref 42.7–76)
NEUTS BAND NFR BLD MANUAL: 17 % (ref 0–5)
NITRITE UR QL STRIP: NEGATIVE
NRBC SPEC MANUAL: 1 /100 WBC (ref 0–0.2)
PCO2 BLDA: 34 MM HG (ref 35–48)
PEEP RESPIRATORY: 5 CM[H2O]
PH BLDA: 7.34 PH UNITS (ref 7.35–7.45)
PH UR STRIP.AUTO: <=5 [PH] (ref 5–8)
PHOSPHATE SERPL-MCNC: 4.5 MG/DL (ref 2.4–4.7)
PLAT MORPH BLD: NORMAL
PLATELET # BLD AUTO: 329 10*3/MM3 (ref 140–450)
PMV BLD AUTO: 8 FL (ref 6–12)
PO2 BLDA: 189.1 MM HG (ref 83–108)
POTASSIUM BLD-SCNC: 5.4 MMOL/L (ref 3.6–5.1)
POTASSIUM BLD-SCNC: 5.6 MMOL/L (ref 3.6–5.1)
POTASSIUM BLD-SCNC: 6.4 MMOL/L (ref 3.6–5.1)
PROT SERPL-MCNC: 4.8 G/DL (ref 6.1–7.9)
PROT UR QL STRIP: NEGATIVE
PROTHROMBIN TIME: 10.1 SECONDS (ref 9.6–11.7)
RBC # BLD AUTO: 2.6 10*6/MM3 (ref 4.14–5.8)
RBC MORPH BLD: NORMAL
RESPIRATORY RATE: 16
SAO2 % BLDCOA: 99.6 % (ref 94–98)
SCAN SLIDE: NORMAL
SODIUM BLD-SCNC: 133 MMOL/L (ref 136–144)
SODIUM BLD-SCNC: 134 MMOL/L (ref 136–144)
SODIUM BLD-SCNC: 135 MMOL/L (ref 136–144)
SP GR UR STRIP: 1.03 (ref 1–1.03)
TOXIC GRANULATION: ABNORMAL
UNIT  ABO: NORMAL
UNIT  ABO: NORMAL
UNIT  RH: NORMAL
UNIT  RH: NORMAL
UROBILINOGEN UR QL STRIP: NORMAL
VENTILATOR MODE: ABNORMAL
VT ON VENT VENT: 550 ML
WBC NRBC COR # BLD: 33.1 10*3/MM3 (ref 3.4–10.8)

## 2019-07-16 PROCEDURE — 85025 COMPLETE CBC W/AUTO DIFF WBC: CPT | Performed by: FAMILY MEDICINE

## 2019-07-16 PROCEDURE — 87040 BLOOD CULTURE FOR BACTERIA: CPT | Performed by: NURSE PRACTITIONER

## 2019-07-16 PROCEDURE — 02HV33Z INSERTION OF INFUSION DEVICE INTO SUPERIOR VENA CAVA, PERCUTANEOUS APPROACH: ICD-10-PCS | Performed by: INTERNAL MEDICINE

## 2019-07-16 PROCEDURE — 85018 HEMOGLOBIN: CPT | Performed by: INTERNAL MEDICINE

## 2019-07-16 PROCEDURE — 80053 COMPREHEN METABOLIC PANEL: CPT | Performed by: NURSE PRACTITIONER

## 2019-07-16 PROCEDURE — 85610 PROTHROMBIN TIME: CPT | Performed by: INTERNAL MEDICINE

## 2019-07-16 PROCEDURE — 0DQ90ZZ REPAIR DUODENUM, OPEN APPROACH: ICD-10-PCS | Performed by: SURGERY

## 2019-07-16 PROCEDURE — 87070 CULTURE OTHR SPECIMN AEROBIC: CPT | Performed by: NURSE PRACTITIONER

## 2019-07-16 PROCEDURE — 25010000002 MEROPENEM PER 100 MG: Performed by: INTERNAL MEDICINE

## 2019-07-16 PROCEDURE — 84132 ASSAY OF SERUM POTASSIUM: CPT

## 2019-07-16 PROCEDURE — P9016 RBC LEUKOCYTES REDUCED: HCPCS

## 2019-07-16 PROCEDURE — 85014 HEMATOCRIT: CPT | Performed by: INTERNAL MEDICINE

## 2019-07-16 PROCEDURE — P9041 ALBUMIN (HUMAN),5%, 50ML: HCPCS | Performed by: ANESTHESIOLOGY

## 2019-07-16 PROCEDURE — 94003 VENT MGMT INPAT SUBQ DAY: CPT

## 2019-07-16 PROCEDURE — 94799 UNLISTED PULMONARY SVC/PX: CPT

## 2019-07-16 PROCEDURE — 25010000002 OCTREOTIDE PER 25 MCG: Performed by: INTERNAL MEDICINE

## 2019-07-16 PROCEDURE — 86923 COMPATIBILITY TEST ELECTRIC: CPT

## 2019-07-16 PROCEDURE — 82330 ASSAY OF CALCIUM: CPT

## 2019-07-16 PROCEDURE — 86900 BLOOD TYPING SEROLOGIC ABO: CPT

## 2019-07-16 PROCEDURE — 0DHA3UZ INSERTION OF FEEDING DEVICE INTO JEJUNUM, PERCUTANEOUS APPROACH: ICD-10-PCS | Performed by: SURGERY

## 2019-07-16 PROCEDURE — 87205 SMEAR GRAM STAIN: CPT | Performed by: NURSE PRACTITIONER

## 2019-07-16 PROCEDURE — 99231 SBSQ HOSP IP/OBS SF/LOW 25: CPT | Performed by: NURSE PRACTITIONER

## 2019-07-16 PROCEDURE — 0DH63UZ INSERTION OF FEEDING DEVICE INTO STOMACH, PERCUTANEOUS APPROACH: ICD-10-PCS | Performed by: SURGERY

## 2019-07-16 PROCEDURE — 82947 ASSAY GLUCOSE BLOOD QUANT: CPT

## 2019-07-16 PROCEDURE — 25010000002 PHENYLEPHRINE 10 MG/ML SOLUTION: Performed by: NURSE PRACTITIONER

## 2019-07-16 PROCEDURE — 25010000002 ALBUMIN HUMAN 5% PER 50 ML: Performed by: ANESTHESIOLOGY

## 2019-07-16 PROCEDURE — 25010000002 PHENYLEPHRINE 10 MG/ML SOLUTION 5 ML VIAL: Performed by: ANESTHESIOLOGY

## 2019-07-16 PROCEDURE — 84295 ASSAY OF SERUM SODIUM: CPT

## 2019-07-16 PROCEDURE — 82803 BLOOD GASES ANY COMBINATION: CPT

## 2019-07-16 PROCEDURE — 71045 X-RAY EXAM CHEST 1 VIEW: CPT

## 2019-07-16 PROCEDURE — 25010000002 FENTANYL CITRATE (PF) 2500 MCG/50ML SOLUTION: Performed by: NURSE PRACTITIONER

## 2019-07-16 PROCEDURE — 25010000002 THIAMINE PER 100 MG: Performed by: INTERNAL MEDICINE

## 2019-07-16 PROCEDURE — 25010000002 VANCOMYCIN 10 G RECONSTITUTED SOLUTION: Performed by: INTERNAL MEDICINE

## 2019-07-16 PROCEDURE — 5A1D70Z PERFORMANCE OF URINARY FILTRATION, INTERMITTENT, LESS THAN 6 HOURS PER DAY: ICD-10-PCS | Performed by: INTERNAL MEDICINE

## 2019-07-16 PROCEDURE — 84100 ASSAY OF PHOSPHORUS: CPT | Performed by: NURSE PRACTITIONER

## 2019-07-16 PROCEDURE — 94640 AIRWAY INHALATION TREATMENT: CPT

## 2019-07-16 PROCEDURE — 83735 ASSAY OF MAGNESIUM: CPT | Performed by: NURSE PRACTITIONER

## 2019-07-16 PROCEDURE — 85014 HEMATOCRIT: CPT

## 2019-07-16 PROCEDURE — 85007 BL SMEAR W/DIFF WBC COUNT: CPT | Performed by: FAMILY MEDICINE

## 2019-07-16 PROCEDURE — 36600 WITHDRAWAL OF ARTERIAL BLOOD: CPT

## 2019-07-16 PROCEDURE — 25010000002 PROPOFOL 1000 MG/ML EMULSION: Performed by: INTERNAL MEDICINE

## 2019-07-16 PROCEDURE — 83605 ASSAY OF LACTIC ACID: CPT | Performed by: NURSE PRACTITIONER

## 2019-07-16 PROCEDURE — 81003 URINALYSIS AUTO W/O SCOPE: CPT | Performed by: NURSE PRACTITIONER

## 2019-07-16 PROCEDURE — 25010000002 MAGNESIUM SULFATE 2 GM/50ML SOLUTION: Performed by: FAMILY MEDICINE

## 2019-07-16 PROCEDURE — 25010000002 PROPOFOL 10 MG/ML EMULSION: Performed by: INTERNAL MEDICINE

## 2019-07-16 PROCEDURE — 99232 SBSQ HOSP IP/OBS MODERATE 35: CPT | Performed by: INTERNAL MEDICINE

## 2019-07-16 RX ORDER — MAGNESIUM SULFATE HEPTAHYDRATE 40 MG/ML
2 INJECTION, SOLUTION INTRAVENOUS ONCE
Status: DISCONTINUED | OUTPATIENT
Start: 2019-07-16 | End: 2019-07-16

## 2019-07-16 RX ORDER — ROCURONIUM BROMIDE 10 MG/ML
INJECTION, SOLUTION INTRAVENOUS AS NEEDED
Status: DISCONTINUED | OUTPATIENT
Start: 2019-07-16 | End: 2019-07-16 | Stop reason: SURG

## 2019-07-16 RX ORDER — IPRATROPIUM BROMIDE AND ALBUTEROL SULFATE 2.5; .5 MG/3ML; MG/3ML
3 SOLUTION RESPIRATORY (INHALATION)
Status: DISCONTINUED | OUTPATIENT
Start: 2019-07-16 | End: 2019-07-23 | Stop reason: HOSPADM

## 2019-07-16 RX ORDER — PHENYLEPHRINE HCL IN 0.9% NACL 0.5 MG/5ML
.5-3 SYRINGE (ML) INTRAVENOUS
Status: DISCONTINUED | OUTPATIENT
Start: 2019-07-16 | End: 2019-07-17

## 2019-07-16 RX ORDER — ALBUMIN, HUMAN INJ 5% 5 %
SOLUTION INTRAVENOUS CONTINUOUS PRN
Status: DISCONTINUED | OUTPATIENT
Start: 2019-07-16 | End: 2019-07-16 | Stop reason: SURG

## 2019-07-16 RX ORDER — ALBUMIN (HUMAN) 12.5 G/50ML
12.5 SOLUTION INTRAVENOUS AS NEEDED
Status: ACTIVE | OUTPATIENT
Start: 2019-07-16 | End: 2019-07-17

## 2019-07-16 RX ADMIN — MAGNESIUM SULFATE HEPTAHYDRATE 4 G: 40 INJECTION, SOLUTION INTRAVENOUS at 06:49

## 2019-07-16 RX ADMIN — ALBUMIN HUMAN: 0.05 INJECTION, SOLUTION INTRAVENOUS at 20:50

## 2019-07-16 RX ADMIN — PROPOFOL 50 MCG/KG/MIN: 10 INJECTION, EMULSION INTRAVENOUS at 06:49

## 2019-07-16 RX ADMIN — PROPOFOL 50 MCG/KG/MIN: 10 INJECTION, EMULSION INTRAVENOUS at 19:25

## 2019-07-16 RX ADMIN — SODIUM BICARBONATE 50 MEQ: 84 INJECTION, SOLUTION INTRAVENOUS at 20:00

## 2019-07-16 RX ADMIN — SODIUM BICARBONATE 50 MEQ: 84 INJECTION, SOLUTION INTRAVENOUS at 20:10

## 2019-07-16 RX ADMIN — SODIUM CHLORIDE, PRESERVATIVE FREE 3 ML: 5 INJECTION INTRAVENOUS at 08:39

## 2019-07-16 RX ADMIN — PANTOPRAZOLE SODIUM 40 MG: 40 INJECTION, POWDER, FOR SOLUTION INTRAVENOUS at 06:49

## 2019-07-16 RX ADMIN — FENTANYL CITRATE 50 MCG/HR: 50 INJECTION, SOLUTION INTRAMUSCULAR; INTRAVENOUS at 00:12

## 2019-07-16 RX ADMIN — FOLIC ACID 100 ML/HR: 5 INJECTION, SOLUTION INTRAMUSCULAR; INTRAVENOUS; SUBCUTANEOUS at 08:38

## 2019-07-16 RX ADMIN — SODIUM CHLORIDE 1000 ML: 900 INJECTION, SOLUTION INTRAVENOUS at 13:18

## 2019-07-16 RX ADMIN — VANCOMYCIN HYDROCHLORIDE 1500 MG: 10 INJECTION, POWDER, LYOPHILIZED, FOR SOLUTION INTRAVENOUS at 13:19

## 2019-07-16 RX ADMIN — PROPOFOL 35 MCG/KG/MIN: 10 INJECTION, EMULSION INTRAVENOUS at 11:37

## 2019-07-16 RX ADMIN — ROCURONIUM BROMIDE 50 MG: 10 INJECTION, SOLUTION INTRAVENOUS at 19:50

## 2019-07-16 RX ADMIN — PROPOFOL 50 MCG/KG/MIN: 10 INJECTION, EMULSION INTRAVENOUS at 04:41

## 2019-07-16 RX ADMIN — ROCURONIUM BROMIDE 50 MG: 10 INJECTION, SOLUTION INTRAVENOUS at 21:00

## 2019-07-16 RX ADMIN — MEROPENEM 1 G: 1 INJECTION, POWDER, FOR SOLUTION INTRAVENOUS at 15:59

## 2019-07-16 RX ADMIN — PANTOPRAZOLE SODIUM 40 MG: 40 INJECTION, POWDER, FOR SOLUTION INTRAVENOUS at 17:59

## 2019-07-16 RX ADMIN — SODIUM CHLORIDE: 900 INJECTION, SOLUTION INTRAVENOUS at 19:53

## 2019-07-16 RX ADMIN — SODIUM BICARBONATE 50 MEQ: 84 INJECTION, SOLUTION INTRAVENOUS at 21:15

## 2019-07-16 RX ADMIN — IPRATROPIUM BROMIDE AND ALBUTEROL SULFATE 3 ML: .5; 3 SOLUTION RESPIRATORY (INHALATION) at 12:02

## 2019-07-16 RX ADMIN — FENTANYL CITRATE 62.5 MCG/HR: 50 INJECTION, SOLUTION INTRAMUSCULAR; INTRAVENOUS at 17:17

## 2019-07-16 RX ADMIN — PHENYLEPHRINE HYDROCHLORIDE 1.5 MCG: 10 INJECTION INTRAVENOUS at 19:50

## 2019-07-16 RX ADMIN — PHENYLEPHRINE HYDROCHLORIDE 1.5 MCG/KG/MIN: 10 INJECTION INTRAVENOUS at 18:43

## 2019-07-16 RX ADMIN — ACETAMINOPHEN 650 MG: 650 SUPPOSITORY RECTAL at 04:41

## 2019-07-16 RX ADMIN — OCTREOTIDE ACETATE 25 MCG/HR: 500 INJECTION, SOLUTION INTRAVENOUS; SUBCUTANEOUS at 06:50

## 2019-07-16 RX ADMIN — ALBUMIN HUMAN: 0.05 INJECTION, SOLUTION INTRAVENOUS at 20:39

## 2019-07-16 RX ADMIN — PROPOFOL 50 MCG/KG/MIN: 10 INJECTION, EMULSION INTRAVENOUS at 16:04

## 2019-07-16 NOTE — ANESTHESIA PREPROCEDURE EVALUATION
Anesthesia Evaluation     Nursing notes reviewed                Airway   Dental      Pulmonary    Cardiovascular     (+) hypertension,       Neuro/Psych  (+) syncope,     GI/Hepatic/Renal/Endo    (+)  GI bleeding, hepatitis, liver disease, renal disease ARF and dialysis,     Musculoskeletal     Abdominal    Substance History   (+) alcohol use,      OB/GYN          Other        ROS/Med Hx Other: Acute renal failure K 5-6, need for emergent surg to control bleeding and then emerg dialysis      Phys Exam Other: Intubated and sedated, central line and mult periph per rn              Anesthesia Plan    ASA 5 - emergent     general     inhalational induction   Anesthetic plan, all risks, benefits, and alternatives have been provided, discussed and informed consent has been obtained with: spouse/significant other.  Use of blood products discussed with spouse/significant other  Consented to blood products.   Plan discussed with attending.

## 2019-07-17 LAB
ALBUMIN SERPL-MCNC: 2.3 G/DL (ref 3.5–4.8)
ALBUMIN/GLOB SERPL: 0.9 G/DL (ref 1–1.7)
ALP SERPL-CCNC: 45 U/L (ref 32–91)
ALT SERPL W P-5'-P-CCNC: 558 U/L (ref 17–63)
ANION GAP SERPL CALCULATED.3IONS-SCNC: 12.7 MMOL/L (ref 5–15)
ARTERIAL PATENCY WRIST A: ABNORMAL
ARTERIAL PATENCY WRIST A: ABNORMAL
AST SERPL-CCNC: 538 U/L (ref 15–41)
ATMOSPHERIC PRESS: ABNORMAL MMHG
ATMOSPHERIC PRESS: ABNORMAL MMHG
BASE DEFICIT: ABNORMAL MEQ/LITER
BASE EXCESS BLDA CALC-SCNC: 2.3 MMOL/L (ref 0–3)
BASE EXCESS BLDA CALC-SCNC: 2.3 MMOL/L (ref 0–3)
BASE EXCESS BLDA CALC-SCNC: <0 MMOL/L (ref 0–3)
BASOPHILS # BLD AUTO: 0.1 10*3/MM3 (ref 0–0.2)
BASOPHILS NFR BLD AUTO: 0.4 % (ref 0–1.5)
BDY SITE: ABNORMAL
BDY SITE: ABNORMAL
BILIRUB SERPL-MCNC: 0.9 MG/DL (ref 0.3–1.2)
BUN BLD-MCNC: 33 MG/DL (ref 8–20)
BUN/CREAT SERPL: 22 (ref 6.2–20.3)
CA-I BLDA-SCNC: 0.92 MMOL/L (ref 1.12–1.32)
CA-I BLDA-SCNC: 1 MMOL/L (ref 1.12–1.32)
CA-I BLDA-SCNC: 1.07 MMOL/L (ref 1.12–1.32)
CALCIUM SPEC-SCNC: 7.1 MG/DL (ref 8.9–10.3)
CHLORIDE SERPL-SCNC: 106 MMOL/L (ref 101–111)
CHLORIDE UR-SCNC: <50 MMOL/L
CO2 BLDA-SCNC: 20 MMOL/L (ref 23–27)
CO2 BLDA-SCNC: 21 MMOL/L (ref 23–27)
CO2 BLDA-SCNC: 21 MMOL/L (ref 23–27)
CO2 BLDA-SCNC: 27.7 MMOL/L (ref 22–29)
CO2 BLDA-SCNC: 28 MMOL/L (ref 22–29)
CO2 SERPL-SCNC: 23 MMOL/L (ref 22–32)
CREAT BLD-MCNC: 1.5 MG/DL (ref 0.7–1.2)
DEPRECATED RDW RBC AUTO: 42 FL (ref 37–54)
EOSINOPHIL # BLD AUTO: 0 10*3/MM3 (ref 0–0.4)
EOSINOPHIL NFR BLD AUTO: 0.1 % (ref 0.3–6.2)
ERYTHROCYTE [DISTWIDTH] IN BLOOD BY AUTOMATED COUNT: 14.2 % (ref 12.3–15.4)
GFR SERPL CREATININE-BSD FRML MDRD: 51 ML/MIN/1.73
GLOBULIN UR ELPH-MCNC: 2.5 GM/DL (ref 2.5–3.8)
GLUCOSE BLD-MCNC: 153 MG/DL (ref 65–99)
GLUCOSE BLDC GLUCOMTR-MCNC: 142 MG/DL (ref 70–105)
GLUCOSE BLDC GLUCOMTR-MCNC: 145 MG/DL (ref 70–105)
GLUCOSE BLDC GLUCOMTR-MCNC: 148 MG/DL (ref 70–105)
GLUCOSE BLDC GLUCOMTR-MCNC: 150 MG/DL (ref 70–105)
GLUCOSE BLDC GLUCOMTR-MCNC: 159 MG/DL (ref 70–105)
HCO3 BLDA-SCNC: 18.5 MMOL/L (ref 22–26)
HCO3 BLDA-SCNC: 18.9 MMOL/L (ref 22–26)
HCO3 BLDA-SCNC: 19.5 MMOL/L (ref 22–26)
HCO3 BLDA-SCNC: 26.5 MMOL/L (ref 21–28)
HCO3 BLDA-SCNC: 26.8 MMOL/L (ref 21–28)
HCT VFR BLD AUTO: 23.8 % (ref 37.5–51)
HCT VFR BLD AUTO: 25.6 % (ref 37.5–51)
HCT VFR BLDA CALC: 16 % (ref 38–51)
HCT VFR BLDA CALC: 20 % (ref 38–51)
HCT VFR BLDA CALC: 25 % (ref 38–51)
HEMODILUTION: NO
HEMODILUTION: NO
HGB BLD-MCNC: 8.2 G/DL (ref 13–17.7)
HGB BLD-MCNC: 8.8 G/DL (ref 13–17.7)
HGB BLDA-MCNC: 5.4 G/DL (ref 12–17)
HGB BLDA-MCNC: 6.8 G/DL (ref 12–17)
HGB BLDA-MCNC: 8.5 G/DL (ref 12–17)
HOROWITZ INDEX BLD+IHG-RTO: 40 %
HOROWITZ INDEX BLD+IHG-RTO: 40 %
LYMPHOCYTES # BLD AUTO: 1.1 10*3/MM3 (ref 0.7–3.1)
LYMPHOCYTES NFR BLD AUTO: 5.8 % (ref 19.6–45.3)
MAGNESIUM SERPL-MCNC: 2 MG/DL (ref 1.8–2.5)
MCH RBC QN AUTO: 29.1 PG (ref 26.6–33)
MCHC RBC AUTO-ENTMCNC: 34.4 G/DL (ref 31.5–35.7)
MCV RBC AUTO: 84.7 FL (ref 79–97)
MODALITY: ABNORMAL
MODALITY: ABNORMAL
MONOCYTES # BLD AUTO: 0.9 10*3/MM3 (ref 0.1–0.9)
MONOCYTES NFR BLD AUTO: 4.7 % (ref 5–12)
NEUTROPHILS # BLD AUTO: 17.3 10*3/MM3 (ref 1.7–7)
NEUTROPHILS NFR BLD AUTO: 89 % (ref 42.7–76)
NRBC BLD AUTO-RTO: 0 /100 WBC (ref 0–0.2)
PCO2 BLDA: 38.4 MM HG (ref 35–48)
PCO2 BLDA: 40 MM HG (ref 35–48)
PCO2 BLDA: 45 MM HG (ref 35–45)
PCO2 BLDA: 47 MM HG (ref 35–45)
PCO2 BLDA: 52 MM HG (ref 35–45)
PEEP RESPIRATORY: 5 CM[H2O]
PEEP RESPIRATORY: 5 CM[H2O]
PH BLDA: 7.17 PH UNITS (ref 7.35–7.45)
PH BLDA: 7.22 PH UNITS (ref 7.35–7.45)
PH BLDA: 7.22 PH UNITS (ref 7.35–7.45)
PH BLDA: 7.43 PH UNITS (ref 7.35–7.45)
PH BLDA: 7.45 PH UNITS (ref 7.35–7.45)
PLATELET # BLD AUTO: 224 10*3/MM3 (ref 140–450)
PMV BLD AUTO: 7.6 FL (ref 6–12)
PO2 BLDA: 155 MM HG (ref 80–105)
PO2 BLDA: 162.7 MM HG (ref 83–108)
PO2 BLDA: 198.6 MM HG (ref 83–108)
PO2 BLDA: 297 MM HG (ref 80–105)
PO2 BLDA: 537 MM HG (ref 80–105)
POTASSIUM BLD-SCNC: 3.7 MMOL/L (ref 3.6–5.1)
POTASSIUM BLDA-SCNC: 4.8 MMOL/L (ref 3.5–4.9)
POTASSIUM BLDA-SCNC: 5.3 MMOL/L (ref 3.5–4.9)
POTASSIUM BLDA-SCNC: 5.6 MMOL/L (ref 3.5–4.9)
PROT SERPL-MCNC: 4.8 G/DL (ref 6.1–7.9)
PSV: 8 CMH2O
RBC # BLD AUTO: 3.03 10*6/MM3 (ref 4.14–5.8)
RESPIRATORY RATE: 16
SAO2 % BLDCOA: 100 % (ref 95–98)
SAO2 % BLDCOA: 100 % (ref 95–98)
SAO2 % BLDCOA: 99 % (ref 95–98)
SAO2 % BLDCOA: 99.5 % (ref 94–98)
SAO2 % BLDCOA: 99.7 % (ref 94–98)
SODIUM BLD-SCNC: 138 MMOL/L (ref 136–144)
SODIUM BLDA-SCNC: 136 MMOL/L (ref 138–146)
SODIUM BLDA-SCNC: 141 MMOL/L (ref 138–146)
SODIUM BLDA-SCNC: 142 MMOL/L (ref 138–146)
SODIUM UR-SCNC: 14 MMOL/L
VANCOMYCIN SERPL-MCNC: 7.3 MCG/ML (ref 5–25)
VENTILATOR MODE: ABNORMAL
VENTILATOR MODE: ABNORMAL
VT ON VENT VENT: 600 ML
VT ON VENT VENT: 600 ML
WBC NRBC COR # BLD: 19.4 10*3/MM3 (ref 3.4–10.8)

## 2019-07-17 PROCEDURE — 85025 COMPLETE CBC W/AUTO DIFF WBC: CPT | Performed by: FAMILY MEDICINE

## 2019-07-17 PROCEDURE — 25010000002 MEROPENEM PER 100 MG: Performed by: INTERNAL MEDICINE

## 2019-07-17 PROCEDURE — 94799 UNLISTED PULMONARY SVC/PX: CPT

## 2019-07-17 PROCEDURE — 83735 ASSAY OF MAGNESIUM: CPT | Performed by: NURSE PRACTITIONER

## 2019-07-17 PROCEDURE — 99222 1ST HOSP IP/OBS MODERATE 55: CPT | Performed by: PHYSICIAN ASSISTANT

## 2019-07-17 PROCEDURE — 80053 COMPREHEN METABOLIC PANEL: CPT | Performed by: NURSE PRACTITIONER

## 2019-07-17 PROCEDURE — 85018 HEMOGLOBIN: CPT | Performed by: INTERNAL MEDICINE

## 2019-07-17 PROCEDURE — 84300 ASSAY OF URINE SODIUM: CPT | Performed by: INTERNAL MEDICINE

## 2019-07-17 PROCEDURE — 82436 ASSAY OF URINE CHLORIDE: CPT | Performed by: INTERNAL MEDICINE

## 2019-07-17 PROCEDURE — 25010000002 FENTANYL CITRATE (PF) 2500 MCG/50ML SOLUTION: Performed by: NURSE PRACTITIONER

## 2019-07-17 PROCEDURE — 85014 HEMATOCRIT: CPT | Performed by: INTERNAL MEDICINE

## 2019-07-17 PROCEDURE — 25010000002 THIAMINE PER 100 MG: Performed by: INTERNAL MEDICINE

## 2019-07-17 PROCEDURE — 25010000002 PROPOFOL 1000 MG/ML EMULSION: Performed by: INTERNAL MEDICINE

## 2019-07-17 PROCEDURE — 82803 BLOOD GASES ANY COMBINATION: CPT

## 2019-07-17 PROCEDURE — 80202 ASSAY OF VANCOMYCIN: CPT | Performed by: INTERNAL MEDICINE

## 2019-07-17 PROCEDURE — 82962 GLUCOSE BLOOD TEST: CPT

## 2019-07-17 PROCEDURE — 25010000002 CALCIUM GLUCONATE PER 10 ML: Performed by: INTERNAL MEDICINE

## 2019-07-17 PROCEDURE — 99231 SBSQ HOSP IP/OBS SF/LOW 25: CPT | Performed by: NURSE PRACTITIONER

## 2019-07-17 PROCEDURE — 25010000002 OCTREOTIDE PER 25 MCG: Performed by: INTERNAL MEDICINE

## 2019-07-17 PROCEDURE — 99232 SBSQ HOSP IP/OBS MODERATE 35: CPT | Performed by: INTERNAL MEDICINE

## 2019-07-17 PROCEDURE — 94003 VENT MGMT INPAT SUBQ DAY: CPT

## 2019-07-17 PROCEDURE — 25010000002 VANCOMYCIN 10 G RECONSTITUTED SOLUTION: Performed by: INTERNAL MEDICINE

## 2019-07-17 RX ORDER — QUETIAPINE FUMARATE 25 MG/1
50 TABLET, FILM COATED ORAL NIGHTLY
Status: DISCONTINUED | OUTPATIENT
Start: 2019-07-17 | End: 2019-07-19

## 2019-07-17 RX ADMIN — OCTREOTIDE ACETATE 25 MCG/HR: 500 INJECTION, SOLUTION INTRAVENOUS; SUBCUTANEOUS at 05:25

## 2019-07-17 RX ADMIN — MEROPENEM 1 G: 1 INJECTION, POWDER, FOR SOLUTION INTRAVENOUS at 02:22

## 2019-07-17 RX ADMIN — PANTOPRAZOLE SODIUM 40 MG: 40 INJECTION, POWDER, FOR SOLUTION INTRAVENOUS at 08:04

## 2019-07-17 RX ADMIN — FOLIC ACID 100 ML/HR: 5 INJECTION, SOLUTION INTRAMUSCULAR; INTRAVENOUS; SUBCUTANEOUS at 08:05

## 2019-07-17 RX ADMIN — VANCOMYCIN HYDROCHLORIDE 1250 MG: 10 INJECTION, POWDER, LYOPHILIZED, FOR SOLUTION INTRAVENOUS at 13:20

## 2019-07-17 RX ADMIN — PROPOFOL 50 MCG/KG/MIN: 10 INJECTION, EMULSION INTRAVENOUS at 07:05

## 2019-07-17 RX ADMIN — QUETIAPINE 50 MG: 25 TABLET, FILM COATED ORAL at 22:04

## 2019-07-17 RX ADMIN — PROPOFOL 50 MCG/KG/MIN: 10 INJECTION, EMULSION INTRAVENOUS at 01:24

## 2019-07-17 RX ADMIN — CALCIUM GLUCONATE 2 G: 98 INJECTION, SOLUTION INTRAVENOUS at 10:25

## 2019-07-17 RX ADMIN — IPRATROPIUM BROMIDE AND ALBUTEROL SULFATE 3 ML: .5; 3 SOLUTION RESPIRATORY (INHALATION) at 11:06

## 2019-07-17 RX ADMIN — SODIUM CHLORIDE, PRESERVATIVE FREE: 5 INJECTION INTRAVENOUS at 08:05

## 2019-07-17 RX ADMIN — MEROPENEM 1 G: 1 INJECTION, POWDER, FOR SOLUTION INTRAVENOUS at 14:35

## 2019-07-17 RX ADMIN — IPRATROPIUM BROMIDE AND ALBUTEROL SULFATE 3 ML: .5; 3 SOLUTION RESPIRATORY (INHALATION) at 07:55

## 2019-07-17 RX ADMIN — IPRATROPIUM BROMIDE AND ALBUTEROL SULFATE 3 ML: .5; 3 SOLUTION RESPIRATORY (INHALATION) at 14:45

## 2019-07-17 RX ADMIN — IPRATROPIUM BROMIDE AND ALBUTEROL SULFATE 3 ML: .5; 3 SOLUTION RESPIRATORY (INHALATION) at 19:59

## 2019-07-17 RX ADMIN — FENTANYL CITRATE 100 MCG/HR: 50 INJECTION, SOLUTION INTRAMUSCULAR; INTRAVENOUS at 07:05

## 2019-07-17 RX ADMIN — PROPOFOL 50 MCG/KG/MIN: 10 INJECTION, EMULSION INTRAVENOUS at 03:45

## 2019-07-17 NOTE — ANESTHESIA POSTPROCEDURE EVALUATION
Patient: Asad Bethea    Procedure Summary     Date:  07/16/19 Room / Location:  [facility] OR 08 / [facility] MAIN OR    Anesthesia Start:  1953 Anesthesia Stop:  2155    Procedure:  LAPAROTOMY EXPLORATORY (N/A Abdomen) Diagnosis:  (GI Bleed)    Surgeon:  Patience Peng MD Provider:  Clint Espinoza MD    Anesthesia Type:  general ASA Status:  5 - Emergent          Anesthesia Type: general  Last vitals  BP   156/71 (07/16/19 2144)   Temp   98.8 °F (37.1 °C) (07/16/19 2128)   Pulse   107 (07/16/19 2144)   Resp   22 (07/16/19 2144)     SpO2   98 % (07/16/19 2144)     Post Anesthesia Care and Evaluation    Patient location during evaluation: PACU  Patient participation: complete - patient cannot participate  Pain management: adequate  Airway patency: patent  Anesthetic complications: No anesthetic complications  PONV Status: none  Cardiovascular status: acceptable  Respiratory status: ETT, intubated and ventilator  Hydration status: acceptable    Comments: abg rechecked.  Pt to get dialysis for continued acid/base and potassium tx.  hgb 8.5, ph 7.22, co2 44, hco3 18.5, k 5.3  bp stable with breanna gtt off.  Transfer back to ICU for continued care.

## 2019-07-17 NOTE — ANESTHESIA PROCEDURE NOTES
Arterial Line      Patient location during procedure: OR   Performed By   Anesthesiologist: Clint Espinoza MD  Preanesthetic Checklist  Completed: patient identified, site marked, surgical consent, pre-op evaluation, timeout performed, IV checked, risks and benefits discussed and monitors and equipment checked  Arterial Line Prep   Sterile Tech: gloves, mask, sterile barriers and cap  Prep: alcohol swabs  Patient monitoring: blood pressure monitoring, continuous pulse oximetry and EKG  Arterial Line Procedure   Laterality:left  Location:  radial artery   Guidance: ultrasound guided  PROCEDURE NOTE/ULTRASOUND INTERPRETATION.  Using ultrasound guidance the potential vascular sites for insertion of the catheter were visualized to determine the patency of the vessel to be used for vascular access.  After selecting the appropriate site for insertion, the needle was visualized under ultrasound being inserted into the radial artery, followed by ultrasound confirmation of wire and catheter placement. There were no abnormalities seen on ultrasound; an image was taken; and the patient tolerated the procedure with no complications.   Number of attempts: 2  Successful placement: yes  Post Assessment   Dressing Type: occlusive dressing applied, secured with tape and wrist guard applied.   Complications no

## 2019-07-18 LAB
ABO + RH BLD: NORMAL
ANION GAP SERPL CALCULATED.3IONS-SCNC: 10.8 MMOL/L (ref 5–15)
BACTERIA SPEC AEROBE CULT: NORMAL
BACTERIA SPEC AEROBE CULT: NORMAL
BACTERIA SPEC RESP CULT: NORMAL
BASOPHILS # BLD AUTO: 0 10*3/MM3 (ref 0–0.2)
BASOPHILS NFR BLD AUTO: 0.3 % (ref 0–1.5)
BH BB BLOOD EXPIRATION DATE: NORMAL
BH BB BLOOD TYPE BARCODE: 5100
BH BB DISPENSE STATUS: NORMAL
BH BB PRODUCT CODE: NORMAL
BH BB UNIT NUMBER: NORMAL
BUN BLD-MCNC: 26 MG/DL (ref 8–20)
BUN/CREAT SERPL: 28.9 (ref 6.2–20.3)
CALCIUM SPEC-SCNC: 7.4 MG/DL (ref 8.9–10.3)
CHLORIDE SERPL-SCNC: 107 MMOL/L (ref 101–111)
CO2 SERPL-SCNC: 26 MMOL/L (ref 22–32)
CREAT BLD-MCNC: 0.9 MG/DL (ref 0.7–1.2)
CROSSMATCH INTERPRETATION: NORMAL
DEPRECATED RDW RBC AUTO: 44.6 FL (ref 37–54)
EOSINOPHIL # BLD AUTO: 0 10*3/MM3 (ref 0–0.4)
EOSINOPHIL NFR BLD AUTO: 0.3 % (ref 0.3–6.2)
ERYTHROCYTE [DISTWIDTH] IN BLOOD BY AUTOMATED COUNT: 14.5 % (ref 12.3–15.4)
GFR SERPL CREATININE-BSD FRML MDRD: 92 ML/MIN/1.73
GLUCOSE BLD-MCNC: 141 MG/DL (ref 65–99)
GLUCOSE BLDC GLUCOMTR-MCNC: 113 MG/DL (ref 70–105)
GLUCOSE BLDC GLUCOMTR-MCNC: 141 MG/DL (ref 70–105)
GRAM STN SPEC: NORMAL
HCT VFR BLD AUTO: 25.4 % (ref 37.5–51)
HGB BLD-MCNC: 8.6 G/DL (ref 13–17.7)
LYMPHOCYTES # BLD AUTO: 0.8 10*3/MM3 (ref 0.7–3.1)
LYMPHOCYTES NFR BLD AUTO: 6.6 % (ref 19.6–45.3)
MAGNESIUM SERPL-MCNC: 2.3 MG/DL (ref 1.8–2.5)
MCH RBC QN AUTO: 29.4 PG (ref 26.6–33)
MCHC RBC AUTO-ENTMCNC: 33.7 G/DL (ref 31.5–35.7)
MCV RBC AUTO: 87.1 FL (ref 79–97)
MONOCYTES # BLD AUTO: 0.5 10*3/MM3 (ref 0.1–0.9)
MONOCYTES NFR BLD AUTO: 4 % (ref 5–12)
NEUTROPHILS # BLD AUTO: 11 10*3/MM3 (ref 1.7–7)
NEUTROPHILS NFR BLD AUTO: 88.8 % (ref 42.7–76)
NRBC BLD AUTO-RTO: 0.2 /100 WBC (ref 0–0.2)
PLATELET # BLD AUTO: 192 10*3/MM3 (ref 140–450)
PMV BLD AUTO: 7.7 FL (ref 6–12)
POTASSIUM BLD-SCNC: 3.8 MMOL/L (ref 3.6–5.1)
RBC # BLD AUTO: 2.92 10*6/MM3 (ref 4.14–5.8)
SODIUM BLD-SCNC: 140 MMOL/L (ref 136–144)
TRIGL SERPL-MCNC: 106 MG/DL
UNIT  ABO: NORMAL
UNIT  RH: NORMAL
VANCOMYCIN SERPL-MCNC: 5.3 MCG/ML (ref 5–25)
WBC NRBC COR # BLD: 12.4 10*3/MM3 (ref 3.4–10.8)

## 2019-07-18 PROCEDURE — 25010000002 THIAMINE PER 100 MG: Performed by: INTERNAL MEDICINE

## 2019-07-18 PROCEDURE — 94799 UNLISTED PULMONARY SVC/PX: CPT

## 2019-07-18 PROCEDURE — 86920 COMPATIBILITY TEST SPIN: CPT

## 2019-07-18 PROCEDURE — 80048 BASIC METABOLIC PNL TOTAL CA: CPT | Performed by: FAMILY MEDICINE

## 2019-07-18 PROCEDURE — 97116 GAIT TRAINING THERAPY: CPT

## 2019-07-18 PROCEDURE — 80202 ASSAY OF VANCOMYCIN: CPT | Performed by: INTERNAL MEDICINE

## 2019-07-18 PROCEDURE — 25010000002 MEROPENEM PER 100 MG: Performed by: INTERNAL MEDICINE

## 2019-07-18 PROCEDURE — 82962 GLUCOSE BLOOD TEST: CPT

## 2019-07-18 PROCEDURE — 83735 ASSAY OF MAGNESIUM: CPT | Performed by: NURSE PRACTITIONER

## 2019-07-18 PROCEDURE — 99232 SBSQ HOSP IP/OBS MODERATE 35: CPT | Performed by: INTERNAL MEDICINE

## 2019-07-18 PROCEDURE — 85025 COMPLETE CBC W/AUTO DIFF WBC: CPT | Performed by: FAMILY MEDICINE

## 2019-07-18 PROCEDURE — 84478 ASSAY OF TRIGLYCERIDES: CPT | Performed by: INTERNAL MEDICINE

## 2019-07-18 PROCEDURE — 99252 IP/OBS CONSLTJ NEW/EST SF 35: CPT | Performed by: NURSE PRACTITIONER

## 2019-07-18 PROCEDURE — 97163 PT EVAL HIGH COMPLEX 45 MIN: CPT

## 2019-07-18 PROCEDURE — 97530 THERAPEUTIC ACTIVITIES: CPT

## 2019-07-18 PROCEDURE — 97166 OT EVAL MOD COMPLEX 45 MIN: CPT

## 2019-07-18 PROCEDURE — 99232 SBSQ HOSP IP/OBS MODERATE 35: CPT | Performed by: PHYSICIAN ASSISTANT

## 2019-07-18 RX ADMIN — IPRATROPIUM BROMIDE AND ALBUTEROL SULFATE 3 ML: .5; 3 SOLUTION RESPIRATORY (INHALATION) at 07:37

## 2019-07-18 RX ADMIN — IPRATROPIUM BROMIDE AND ALBUTEROL SULFATE 3 ML: .5; 3 SOLUTION RESPIRATORY (INHALATION) at 20:26

## 2019-07-18 RX ADMIN — SODIUM CHLORIDE, PRESERVATIVE FREE 3 ML: 5 INJECTION INTRAVENOUS at 09:19

## 2019-07-18 RX ADMIN — FOLIC ACID 100 ML/HR: 5 INJECTION, SOLUTION INTRAMUSCULAR; INTRAVENOUS; SUBCUTANEOUS at 09:19

## 2019-07-18 RX ADMIN — Medication 10 ML: at 21:02

## 2019-07-18 RX ADMIN — METOPROLOL TARTRATE 12.5 MG: 25 TABLET, FILM COATED ORAL at 21:02

## 2019-07-18 RX ADMIN — PANTOPRAZOLE SODIUM 40 MG: 40 INJECTION, POWDER, FOR SOLUTION INTRAVENOUS at 06:34

## 2019-07-18 RX ADMIN — SERTRALINE HYDROCHLORIDE 50 MG: 50 TABLET ORAL at 17:55

## 2019-07-18 RX ADMIN — IPRATROPIUM BROMIDE AND ALBUTEROL SULFATE 3 ML: .5; 3 SOLUTION RESPIRATORY (INHALATION) at 15:13

## 2019-07-18 RX ADMIN — IPRATROPIUM BROMIDE AND ALBUTEROL SULFATE 3 ML: .5; 3 SOLUTION RESPIRATORY (INHALATION) at 11:03

## 2019-07-18 RX ADMIN — MEROPENEM 1 G: 1 INJECTION, POWDER, FOR SOLUTION INTRAVENOUS at 23:00

## 2019-07-18 RX ADMIN — QUETIAPINE 50 MG: 25 TABLET, FILM COATED ORAL at 21:02

## 2019-07-18 RX ADMIN — PANTOPRAZOLE SODIUM 40 MG: 40 INJECTION, POWDER, FOR SOLUTION INTRAVENOUS at 17:55

## 2019-07-18 RX ADMIN — METOPROLOL TARTRATE 12.5 MG: 25 TABLET, FILM COATED ORAL at 12:53

## 2019-07-18 RX ADMIN — MEROPENEM 1 G: 1 INJECTION, POWDER, FOR SOLUTION INTRAVENOUS at 15:06

## 2019-07-18 RX ADMIN — MEROPENEM 1 G: 1 INJECTION, POWDER, FOR SOLUTION INTRAVENOUS at 02:00

## 2019-07-19 LAB
ANION GAP SERPL CALCULATED.3IONS-SCNC: 11.3 MMOL/L (ref 5–15)
BASOPHILS # BLD MANUAL: 0.35 10*3/MM3 (ref 0–0.2)
BASOPHILS NFR BLD AUTO: 3 % (ref 0–1.5)
BUN BLD-MCNC: 16 MG/DL (ref 8–20)
BUN/CREAT SERPL: 20 (ref 6.2–20.3)
CALCIUM SPEC-SCNC: 7.9 MG/DL (ref 8.9–10.3)
CHLORIDE SERPL-SCNC: 113 MMOL/L (ref 101–111)
CO2 SERPL-SCNC: 24 MMOL/L (ref 22–32)
CREAT BLD-MCNC: 0.8 MG/DL (ref 0.7–1.2)
DEPRECATED RDW RBC AUTO: 46.4 FL (ref 37–54)
EOSINOPHIL # BLD MANUAL: 0.12 10*3/MM3 (ref 0–0.4)
EOSINOPHIL NFR BLD MANUAL: 1 % (ref 0.3–6.2)
ERYTHROCYTE [DISTWIDTH] IN BLOOD BY AUTOMATED COUNT: 15.1 % (ref 12.3–15.4)
GFR SERPL CREATININE-BSD FRML MDRD: 105 ML/MIN/1.73
GLUCOSE BLD-MCNC: 122 MG/DL (ref 65–99)
HCT VFR BLD AUTO: 23.7 % (ref 37.5–51)
HGB BLD-MCNC: 7.7 G/DL (ref 13–17.7)
LYMPHOCYTES # BLD MANUAL: 1.15 10*3/MM3 (ref 0.7–3.1)
LYMPHOCYTES NFR BLD MANUAL: 10 % (ref 19.6–45.3)
LYMPHOCYTES NFR BLD MANUAL: 2 % (ref 5–12)
MAGNESIUM SERPL-MCNC: 2.1 MG/DL (ref 1.8–2.5)
MCH RBC QN AUTO: 28.7 PG (ref 26.6–33)
MCHC RBC AUTO-ENTMCNC: 32.6 G/DL (ref 31.5–35.7)
MCV RBC AUTO: 88.1 FL (ref 79–97)
MONOCYTES # BLD AUTO: 0.23 10*3/MM3 (ref 0.1–0.9)
MYELOCYTES NFR BLD MANUAL: 1 % (ref 0–0)
NEUTROPHILS # BLD AUTO: 9.55 10*3/MM3 (ref 1.7–7)
NEUTROPHILS NFR BLD MANUAL: 76 % (ref 42.7–76)
NEUTS BAND NFR BLD MANUAL: 7 % (ref 0–5)
NEUTS VAC BLD QL SMEAR: ABNORMAL
PLAT MORPH BLD: NORMAL
PLATELET # BLD AUTO: 231 10*3/MM3 (ref 140–450)
PMV BLD AUTO: 7.5 FL (ref 6–12)
POLYCHROMASIA BLD QL SMEAR: ABNORMAL
POTASSIUM BLD-SCNC: 3.3 MMOL/L (ref 3.6–5.1)
RBC # BLD AUTO: 2.69 10*6/MM3 (ref 4.14–5.8)
SCAN SLIDE: NORMAL
SODIUM BLD-SCNC: 145 MMOL/L (ref 136–144)
WBC NRBC COR # BLD: 11.5 10*3/MM3 (ref 3.4–10.8)

## 2019-07-19 PROCEDURE — 25010000002 THIAMINE PER 100 MG: Performed by: INTERNAL MEDICINE

## 2019-07-19 PROCEDURE — 99232 SBSQ HOSP IP/OBS MODERATE 35: CPT | Performed by: INTERNAL MEDICINE

## 2019-07-19 PROCEDURE — 99232 SBSQ HOSP IP/OBS MODERATE 35: CPT | Performed by: PSYCHIATRY & NEUROLOGY

## 2019-07-19 PROCEDURE — 94760 N-INVAS EAR/PLS OXIMETRY 1: CPT

## 2019-07-19 PROCEDURE — 25010000002 MEROPENEM PER 100 MG: Performed by: INTERNAL MEDICINE

## 2019-07-19 PROCEDURE — 94799 UNLISTED PULMONARY SVC/PX: CPT

## 2019-07-19 PROCEDURE — 80048 BASIC METABOLIC PNL TOTAL CA: CPT | Performed by: FAMILY MEDICINE

## 2019-07-19 PROCEDURE — 85025 COMPLETE CBC W/AUTO DIFF WBC: CPT | Performed by: FAMILY MEDICINE

## 2019-07-19 PROCEDURE — 83735 ASSAY OF MAGNESIUM: CPT | Performed by: NURSE PRACTITIONER

## 2019-07-19 PROCEDURE — 85007 BL SMEAR W/DIFF WBC COUNT: CPT | Performed by: FAMILY MEDICINE

## 2019-07-19 RX ORDER — LISINOPRIL 5 MG/1
5 TABLET ORAL
Status: DISCONTINUED | OUTPATIENT
Start: 2019-07-19 | End: 2019-07-22

## 2019-07-19 RX ORDER — QUETIAPINE FUMARATE 100 MG/1
100 TABLET, FILM COATED ORAL NIGHTLY
Status: DISCONTINUED | OUTPATIENT
Start: 2019-07-19 | End: 2019-07-23 | Stop reason: HOSPADM

## 2019-07-19 RX ORDER — AMOXICILLIN AND CLAVULANATE POTASSIUM 875; 125 MG/1; MG/1
1 TABLET, FILM COATED ORAL EVERY 12 HOURS SCHEDULED
Status: DISCONTINUED | OUTPATIENT
Start: 2019-07-19 | End: 2019-07-23 | Stop reason: HOSPADM

## 2019-07-19 RX ADMIN — PANTOPRAZOLE SODIUM 40 MG: 40 INJECTION, POWDER, FOR SOLUTION INTRAVENOUS at 09:57

## 2019-07-19 RX ADMIN — SODIUM CHLORIDE, PRESERVATIVE FREE 3 ML: 5 INJECTION INTRAVENOUS at 09:58

## 2019-07-19 RX ADMIN — SERTRALINE HYDROCHLORIDE 50 MG: 50 TABLET ORAL at 09:57

## 2019-07-19 RX ADMIN — AMOXICILLIN AND CLAVULANATE POTASSIUM 1 TABLET: 875; 125 TABLET, FILM COATED ORAL at 20:51

## 2019-07-19 RX ADMIN — IPRATROPIUM BROMIDE AND ALBUTEROL SULFATE 3 ML: .5; 3 SOLUTION RESPIRATORY (INHALATION) at 07:51

## 2019-07-19 RX ADMIN — LISINOPRIL 5 MG: 5 TABLET ORAL at 20:51

## 2019-07-19 RX ADMIN — OYSTER SHELL CALCIUM WITH VITAMIN D 2 TABLET: 500; 200 TABLET, FILM COATED ORAL at 09:57

## 2019-07-19 RX ADMIN — QUETIAPINE 100 MG: 100 TABLET, FILM COATED ORAL at 20:51

## 2019-07-19 RX ADMIN — FOLIC ACID 100 ML/HR: 5 INJECTION, SOLUTION INTRAMUSCULAR; INTRAVENOUS; SUBCUTANEOUS at 09:57

## 2019-07-19 RX ADMIN — MEROPENEM 1 G: 1 INJECTION, POWDER, FOR SOLUTION INTRAVENOUS at 06:17

## 2019-07-20 LAB
ANION GAP SERPL CALCULATED.3IONS-SCNC: 10 MMOL/L (ref 5–15)
ANISOCYTOSIS BLD QL: ABNORMAL
BUN BLD-MCNC: 14 MG/DL (ref 8–20)
BUN/CREAT SERPL: 20 (ref 6.2–20.3)
CALCIUM SPEC-SCNC: 7.8 MG/DL (ref 8.9–10.3)
CHLORIDE SERPL-SCNC: 113 MMOL/L (ref 101–111)
CO2 SERPL-SCNC: 22 MMOL/L (ref 22–32)
CREAT BLD-MCNC: 0.7 MG/DL (ref 0.7–1.2)
DEPRECATED RDW RBC AUTO: 45.1 FL (ref 37–54)
EOSINOPHIL # BLD MANUAL: 0.08 10*3/MM3 (ref 0–0.4)
EOSINOPHIL NFR BLD MANUAL: 1 % (ref 0.3–6.2)
ERYTHROCYTE [DISTWIDTH] IN BLOOD BY AUTOMATED COUNT: 14.7 % (ref 12.3–15.4)
GFR SERPL CREATININE-BSD FRML MDRD: 123 ML/MIN/1.73
GLUCOSE BLD-MCNC: 116 MG/DL (ref 65–99)
HCT VFR BLD AUTO: 23.9 % (ref 37.5–51)
HGB BLD-MCNC: 7.7 G/DL (ref 13–17.7)
LYMPHOCYTES # BLD MANUAL: 0.95 10*3/MM3 (ref 0.7–3.1)
LYMPHOCYTES NFR BLD MANUAL: 12 % (ref 19.6–45.3)
LYMPHOCYTES NFR BLD MANUAL: 9 % (ref 5–12)
MAGNESIUM SERPL-MCNC: 1.8 MG/DL (ref 1.8–2.5)
MCH RBC QN AUTO: 28.4 PG (ref 26.6–33)
MCHC RBC AUTO-ENTMCNC: 32.4 G/DL (ref 31.5–35.7)
MCV RBC AUTO: 87.8 FL (ref 79–97)
METAMYELOCYTES NFR BLD MANUAL: 1 % (ref 0–0)
MONOCYTES # BLD AUTO: 0.71 10*3/MM3 (ref 0.1–0.9)
MYELOCYTES NFR BLD MANUAL: 1 % (ref 0–0)
NEUTROPHILS # BLD AUTO: 6 10*3/MM3 (ref 1.7–7)
NEUTROPHILS NFR BLD MANUAL: 70 % (ref 42.7–76)
NEUTS BAND NFR BLD MANUAL: 6 % (ref 0–5)
PLAT MORPH BLD: NORMAL
PLATELET # BLD AUTO: 244 10*3/MM3 (ref 140–450)
PMV BLD AUTO: 7.3 FL (ref 6–12)
POLYCHROMASIA BLD QL SMEAR: ABNORMAL
POTASSIUM BLD-SCNC: 3 MMOL/L (ref 3.6–5.1)
RBC # BLD AUTO: 2.72 10*6/MM3 (ref 4.14–5.8)
SCAN SLIDE: NORMAL
SODIUM BLD-SCNC: 142 MMOL/L (ref 136–144)
WBC MORPH BLD: NORMAL
WBC NRBC COR # BLD: 7.9 10*3/MM3 (ref 3.4–10.8)

## 2019-07-20 PROCEDURE — 94799 UNLISTED PULMONARY SVC/PX: CPT

## 2019-07-20 PROCEDURE — 85007 BL SMEAR W/DIFF WBC COUNT: CPT | Performed by: FAMILY MEDICINE

## 2019-07-20 PROCEDURE — 99232 SBSQ HOSP IP/OBS MODERATE 35: CPT | Performed by: INTERNAL MEDICINE

## 2019-07-20 PROCEDURE — 80048 BASIC METABOLIC PNL TOTAL CA: CPT | Performed by: FAMILY MEDICINE

## 2019-07-20 PROCEDURE — 99024 POSTOP FOLLOW-UP VISIT: CPT | Performed by: SURGERY

## 2019-07-20 PROCEDURE — 83735 ASSAY OF MAGNESIUM: CPT | Performed by: NURSE PRACTITIONER

## 2019-07-20 PROCEDURE — 85025 COMPLETE CBC W/AUTO DIFF WBC: CPT | Performed by: FAMILY MEDICINE

## 2019-07-20 PROCEDURE — 25010000002 THIAMINE PER 100 MG: Performed by: INTERNAL MEDICINE

## 2019-07-20 PROCEDURE — 99231 SBSQ HOSP IP/OBS SF/LOW 25: CPT | Performed by: PHYSICIAN ASSISTANT

## 2019-07-20 RX ORDER — POTASSIUM CHLORIDE 1.5 G/1.77G
40 POWDER, FOR SOLUTION ORAL AS NEEDED
Status: DISCONTINUED | OUTPATIENT
Start: 2019-07-20 | End: 2019-07-23 | Stop reason: HOSPADM

## 2019-07-20 RX ORDER — POTASSIUM CHLORIDE 7.45 MG/ML
10 INJECTION INTRAVENOUS
Status: DISCONTINUED | OUTPATIENT
Start: 2019-07-20 | End: 2019-07-23 | Stop reason: HOSPADM

## 2019-07-20 RX ORDER — POTASSIUM CHLORIDE 20 MEQ/1
40 TABLET, EXTENDED RELEASE ORAL AS NEEDED
Status: DISCONTINUED | OUTPATIENT
Start: 2019-07-20 | End: 2019-07-23 | Stop reason: HOSPADM

## 2019-07-20 RX ADMIN — IPRATROPIUM BROMIDE AND ALBUTEROL SULFATE 3 ML: .5; 3 SOLUTION RESPIRATORY (INHALATION) at 15:12

## 2019-07-20 RX ADMIN — IPRATROPIUM BROMIDE AND ALBUTEROL SULFATE 3 ML: .5; 3 SOLUTION RESPIRATORY (INHALATION) at 18:22

## 2019-07-20 RX ADMIN — POTASSIUM CHLORIDE 40 MEQ: 1500 TABLET, EXTENDED RELEASE ORAL at 16:49

## 2019-07-20 RX ADMIN — OYSTER SHELL CALCIUM WITH VITAMIN D 2 TABLET: 500; 200 TABLET, FILM COATED ORAL at 11:06

## 2019-07-20 RX ADMIN — SODIUM CHLORIDE, PRESERVATIVE FREE 3 ML: 5 INJECTION INTRAVENOUS at 22:14

## 2019-07-20 RX ADMIN — IPRATROPIUM BROMIDE AND ALBUTEROL SULFATE 3 ML: .5; 3 SOLUTION RESPIRATORY (INHALATION) at 10:51

## 2019-07-20 RX ADMIN — AMOXICILLIN AND CLAVULANATE POTASSIUM 1 TABLET: 875; 125 TABLET, FILM COATED ORAL at 22:11

## 2019-07-20 RX ADMIN — SERTRALINE HYDROCHLORIDE 50 MG: 50 TABLET ORAL at 11:05

## 2019-07-20 RX ADMIN — QUETIAPINE 100 MG: 100 TABLET, FILM COATED ORAL at 22:11

## 2019-07-20 RX ADMIN — METOPROLOL TARTRATE 12.5 MG: 25 TABLET, FILM COATED ORAL at 22:12

## 2019-07-20 RX ADMIN — PANTOPRAZOLE SODIUM 40 MG: 40 INJECTION, POWDER, FOR SOLUTION INTRAVENOUS at 11:06

## 2019-07-20 RX ADMIN — PANTOPRAZOLE SODIUM 40 MG: 40 INJECTION, POWDER, FOR SOLUTION INTRAVENOUS at 16:49

## 2019-07-20 RX ADMIN — LISINOPRIL 5 MG: 5 TABLET ORAL at 11:06

## 2019-07-20 RX ADMIN — METOPROLOL TARTRATE 12.5 MG: 25 TABLET, FILM COATED ORAL at 11:05

## 2019-07-20 RX ADMIN — AMOXICILLIN AND CLAVULANATE POTASSIUM 1 TABLET: 875; 125 TABLET, FILM COATED ORAL at 11:06

## 2019-07-20 RX ADMIN — FOLIC ACID 100 ML/HR: 5 INJECTION, SOLUTION INTRAMUSCULAR; INTRAVENOUS; SUBCUTANEOUS at 11:07

## 2019-07-21 ENCOUNTER — APPOINTMENT (OUTPATIENT)
Dept: GENERAL RADIOLOGY | Facility: HOSPITAL | Age: 44
End: 2019-07-21

## 2019-07-21 LAB
ANION GAP SERPL CALCULATED.3IONS-SCNC: 11.5 MMOL/L (ref 5–15)
ANISOCYTOSIS BLD QL: ABNORMAL
BACTERIA SPEC AEROBE CULT: NORMAL
BACTERIA SPEC AEROBE CULT: NORMAL
BUN BLD-MCNC: 13 MG/DL (ref 8–20)
BUN/CREAT SERPL: 16.3 (ref 6.2–20.3)
CALCIUM SPEC-SCNC: 8.1 MG/DL (ref 8.9–10.3)
CHLORIDE SERPL-SCNC: 114 MMOL/L (ref 101–111)
CO2 SERPL-SCNC: 22 MMOL/L (ref 22–32)
CREAT BLD-MCNC: 0.8 MG/DL (ref 0.7–1.2)
DEPRECATED RDW RBC AUTO: 44.6 FL (ref 37–54)
ERYTHROCYTE [DISTWIDTH] IN BLOOD BY AUTOMATED COUNT: 14.4 % (ref 12.3–15.4)
GFR SERPL CREATININE-BSD FRML MDRD: 105 ML/MIN/1.73
GLUCOSE BLD-MCNC: 117 MG/DL (ref 65–99)
HCT VFR BLD AUTO: 23.5 % (ref 37.5–51)
HGB BLD-MCNC: 8 G/DL (ref 13–17.7)
LYMPHOCYTES # BLD MANUAL: 1.62 10*3/MM3 (ref 0.7–3.1)
LYMPHOCYTES NFR BLD MANUAL: 18 % (ref 19.6–45.3)
LYMPHOCYTES NFR BLD MANUAL: 4 % (ref 5–12)
MCH RBC QN AUTO: 29.8 PG (ref 26.6–33)
MCHC RBC AUTO-ENTMCNC: 34.1 G/DL (ref 31.5–35.7)
MCV RBC AUTO: 87.5 FL (ref 79–97)
MONOCYTES # BLD AUTO: 0.36 10*3/MM3 (ref 0.1–0.9)
NEUTROPHILS # BLD AUTO: 7.02 10*3/MM3 (ref 1.7–7)
NEUTROPHILS NFR BLD MANUAL: 73 % (ref 42.7–76)
NEUTS BAND NFR BLD MANUAL: 5 % (ref 0–5)
PLAT MORPH BLD: NORMAL
PLATELET # BLD AUTO: 268 10*3/MM3 (ref 140–450)
PMV BLD AUTO: 7.6 FL (ref 6–12)
POLYCHROMASIA BLD QL SMEAR: ABNORMAL
POTASSIUM BLD-SCNC: 3.5 MMOL/L (ref 3.6–5.1)
POTASSIUM BLD-SCNC: 3.8 MMOL/L (ref 3.6–5.1)
RBC # BLD AUTO: 2.68 10*6/MM3 (ref 4.14–5.8)
SCAN SLIDE: NORMAL
SODIUM BLD-SCNC: 144 MMOL/L (ref 136–144)
WBC MORPH BLD: NORMAL
WBC NRBC COR # BLD: 9 10*3/MM3 (ref 3.4–10.8)

## 2019-07-21 PROCEDURE — 99232 SBSQ HOSP IP/OBS MODERATE 35: CPT | Performed by: INTERNAL MEDICINE

## 2019-07-21 PROCEDURE — 97116 GAIT TRAINING THERAPY: CPT

## 2019-07-21 PROCEDURE — 94799 UNLISTED PULMONARY SVC/PX: CPT

## 2019-07-21 PROCEDURE — 0 IOPAMIDOL PER 1 ML: Performed by: INTERNAL MEDICINE

## 2019-07-21 PROCEDURE — 85007 BL SMEAR W/DIFF WBC COUNT: CPT | Performed by: FAMILY MEDICINE

## 2019-07-21 PROCEDURE — 84132 ASSAY OF SERUM POTASSIUM: CPT | Performed by: INTERNAL MEDICINE

## 2019-07-21 PROCEDURE — 74241: CPT

## 2019-07-21 PROCEDURE — 25010000002 THIAMINE PER 100 MG: Performed by: INTERNAL MEDICINE

## 2019-07-21 PROCEDURE — 80048 BASIC METABOLIC PNL TOTAL CA: CPT | Performed by: FAMILY MEDICINE

## 2019-07-21 PROCEDURE — 99024 POSTOP FOLLOW-UP VISIT: CPT | Performed by: SURGERY

## 2019-07-21 PROCEDURE — 85025 COMPLETE CBC W/AUTO DIFF WBC: CPT | Performed by: FAMILY MEDICINE

## 2019-07-21 RX ADMIN — QUETIAPINE 100 MG: 100 TABLET, FILM COATED ORAL at 21:03

## 2019-07-21 RX ADMIN — IPRATROPIUM BROMIDE AND ALBUTEROL SULFATE 3 ML: .5; 3 SOLUTION RESPIRATORY (INHALATION) at 15:36

## 2019-07-21 RX ADMIN — IPRATROPIUM BROMIDE AND ALBUTEROL SULFATE 3 ML: .5; 3 SOLUTION RESPIRATORY (INHALATION) at 07:59

## 2019-07-21 RX ADMIN — AMOXICILLIN AND CLAVULANATE POTASSIUM 1 TABLET: 875; 125 TABLET, FILM COATED ORAL at 21:03

## 2019-07-21 RX ADMIN — PANTOPRAZOLE SODIUM 40 MG: 40 INJECTION, POWDER, FOR SOLUTION INTRAVENOUS at 16:44

## 2019-07-21 RX ADMIN — POTASSIUM CHLORIDE 40 MEQ: 1.5 POWDER, FOR SOLUTION ORAL at 10:54

## 2019-07-21 RX ADMIN — FOLIC ACID 100 ML/HR: 5 INJECTION, SOLUTION INTRAMUSCULAR; INTRAVENOUS; SUBCUTANEOUS at 10:51

## 2019-07-21 RX ADMIN — SERTRALINE HYDROCHLORIDE 50 MG: 50 TABLET ORAL at 10:54

## 2019-07-21 RX ADMIN — PANTOPRAZOLE SODIUM 40 MG: 40 INJECTION, POWDER, FOR SOLUTION INTRAVENOUS at 10:52

## 2019-07-21 RX ADMIN — METOPROLOL TARTRATE: 25 TABLET, FILM COATED ORAL at 21:04

## 2019-07-21 RX ADMIN — OYSTER SHELL CALCIUM WITH VITAMIN D 2 TABLET: 500; 200 TABLET, FILM COATED ORAL at 10:53

## 2019-07-21 RX ADMIN — POTASSIUM CHLORIDE 40 MEQ: 1.5 POWDER, FOR SOLUTION ORAL at 00:13

## 2019-07-21 RX ADMIN — POTASSIUM CHLORIDE 40 MEQ: 1.5 POWDER, FOR SOLUTION ORAL at 16:44

## 2019-07-21 RX ADMIN — METOPROLOL TARTRATE 12.5 MG: 25 TABLET, FILM COATED ORAL at 10:53

## 2019-07-21 RX ADMIN — AMOXICILLIN AND CLAVULANATE POTASSIUM 1 TABLET: 875; 125 TABLET, FILM COATED ORAL at 10:53

## 2019-07-21 RX ADMIN — SODIUM CHLORIDE, PRESERVATIVE FREE 3 ML: 5 INJECTION INTRAVENOUS at 22:46

## 2019-07-21 RX ADMIN — SODIUM CHLORIDE, PRESERVATIVE FREE 3 ML: 5 INJECTION INTRAVENOUS at 10:51

## 2019-07-21 RX ADMIN — LISINOPRIL 5 MG: 5 TABLET ORAL at 10:52

## 2019-07-21 RX ADMIN — IOPAMIDOL 100 ML: 755 INJECTION, SOLUTION INTRAVENOUS at 10:45

## 2019-07-22 LAB
ANION GAP SERPL CALCULATED.3IONS-SCNC: 12.3 MMOL/L (ref 5–15)
BASOPHILS # BLD AUTO: 0.1 10*3/MM3 (ref 0–0.2)
BASOPHILS NFR BLD AUTO: 0.6 % (ref 0–1.5)
BUN BLD-MCNC: 10 MG/DL (ref 8–20)
BUN/CREAT SERPL: 12.5 (ref 6.2–20.3)
CALCIUM SPEC-SCNC: 7.8 MG/DL (ref 8.9–10.3)
CHLORIDE SERPL-SCNC: 108 MMOL/L (ref 101–111)
CO2 SERPL-SCNC: 22 MMOL/L (ref 22–32)
CREAT BLD-MCNC: 0.8 MG/DL (ref 0.7–1.2)
DEPRECATED RDW RBC AUTO: 44.2 FL (ref 37–54)
EOSINOPHIL # BLD AUTO: 0.3 10*3/MM3 (ref 0–0.4)
EOSINOPHIL NFR BLD AUTO: 3.9 % (ref 0.3–6.2)
ERYTHROCYTE [DISTWIDTH] IN BLOOD BY AUTOMATED COUNT: 14.4 % (ref 12.3–15.4)
GFR SERPL CREATININE-BSD FRML MDRD: 105 ML/MIN/1.73
GLUCOSE BLD-MCNC: 86 MG/DL (ref 65–99)
HCT VFR BLD AUTO: 23.5 % (ref 37.5–51)
HGB BLD-MCNC: 7.8 G/DL (ref 13–17.7)
LYMPHOCYTES # BLD AUTO: 1.9 10*3/MM3 (ref 0.7–3.1)
LYMPHOCYTES NFR BLD AUTO: 21.1 % (ref 19.6–45.3)
MCH RBC QN AUTO: 28.9 PG (ref 26.6–33)
MCHC RBC AUTO-ENTMCNC: 33 G/DL (ref 31.5–35.7)
MCV RBC AUTO: 87.7 FL (ref 79–97)
MONOCYTES # BLD AUTO: 0.8 10*3/MM3 (ref 0.1–0.9)
MONOCYTES NFR BLD AUTO: 9.5 % (ref 5–12)
NEUTROPHILS # BLD AUTO: 5.8 10*3/MM3 (ref 1.7–7)
NEUTROPHILS NFR BLD AUTO: 64.9 % (ref 42.7–76)
NRBC BLD AUTO-RTO: 0.1 /100 WBC (ref 0–0.2)
PLATELET # BLD AUTO: 302 10*3/MM3 (ref 140–450)
PMV BLD AUTO: 7.9 FL (ref 6–12)
POTASSIUM BLD-SCNC: 3.3 MMOL/L (ref 3.6–5.1)
POTASSIUM BLD-SCNC: 3.7 MMOL/L (ref 3.6–5.1)
RBC # BLD AUTO: 2.68 10*6/MM3 (ref 4.14–5.8)
SODIUM BLD-SCNC: 139 MMOL/L (ref 136–144)
WBC NRBC COR # BLD: 8.9 10*3/MM3 (ref 3.4–10.8)

## 2019-07-22 PROCEDURE — 94799 UNLISTED PULMONARY SVC/PX: CPT

## 2019-07-22 PROCEDURE — 80048 BASIC METABOLIC PNL TOTAL CA: CPT | Performed by: FAMILY MEDICINE

## 2019-07-22 PROCEDURE — 97116 GAIT TRAINING THERAPY: CPT

## 2019-07-22 PROCEDURE — 85025 COMPLETE CBC W/AUTO DIFF WBC: CPT | Performed by: FAMILY MEDICINE

## 2019-07-22 PROCEDURE — 97530 THERAPEUTIC ACTIVITIES: CPT

## 2019-07-22 PROCEDURE — 25010000002 THIAMINE PER 100 MG: Performed by: INTERNAL MEDICINE

## 2019-07-22 PROCEDURE — 99232 SBSQ HOSP IP/OBS MODERATE 35: CPT | Performed by: INTERNAL MEDICINE

## 2019-07-22 PROCEDURE — 84132 ASSAY OF SERUM POTASSIUM: CPT | Performed by: SURGERY

## 2019-07-22 PROCEDURE — 97535 SELF CARE MNGMENT TRAINING: CPT

## 2019-07-22 RX ORDER — LISINOPRIL 5 MG/1
10 TABLET ORAL
Status: DISCONTINUED | OUTPATIENT
Start: 2019-07-23 | End: 2019-07-23 | Stop reason: HOSPADM

## 2019-07-22 RX ORDER — POTASSIUM CHLORIDE 20 MEQ/1
20 TABLET, EXTENDED RELEASE ORAL DAILY
Status: DISCONTINUED | OUTPATIENT
Start: 2019-07-22 | End: 2019-07-23 | Stop reason: HOSPADM

## 2019-07-22 RX ADMIN — QUETIAPINE 100 MG: 100 TABLET, FILM COATED ORAL at 21:09

## 2019-07-22 RX ADMIN — FOLIC ACID 100 ML/HR: 5 INJECTION, SOLUTION INTRAMUSCULAR; INTRAVENOUS; SUBCUTANEOUS at 12:03

## 2019-07-22 RX ADMIN — PANTOPRAZOLE SODIUM 40 MG: 40 INJECTION, POWDER, FOR SOLUTION INTRAVENOUS at 16:48

## 2019-07-22 RX ADMIN — METOPROLOL TARTRATE 12.5 MG: 25 TABLET, FILM COATED ORAL at 08:27

## 2019-07-22 RX ADMIN — POTASSIUM CHLORIDE 20 MEQ: 1500 TABLET, EXTENDED RELEASE ORAL at 08:26

## 2019-07-22 RX ADMIN — PANTOPRAZOLE SODIUM 40 MG: 40 INJECTION, POWDER, FOR SOLUTION INTRAVENOUS at 08:28

## 2019-07-22 RX ADMIN — OYSTER SHELL CALCIUM WITH VITAMIN D 2 TABLET: 500; 200 TABLET, FILM COATED ORAL at 08:26

## 2019-07-22 RX ADMIN — METOPROLOL TARTRATE 25 MG: 25 TABLET, FILM COATED ORAL at 21:09

## 2019-07-22 RX ADMIN — IPRATROPIUM BROMIDE AND ALBUTEROL SULFATE 3 ML: .5; 3 SOLUTION RESPIRATORY (INHALATION) at 07:37

## 2019-07-22 RX ADMIN — IPRATROPIUM BROMIDE AND ALBUTEROL SULFATE 3 ML: .5; 3 SOLUTION RESPIRATORY (INHALATION) at 14:20

## 2019-07-22 RX ADMIN — LISINOPRIL 5 MG: 5 TABLET ORAL at 08:26

## 2019-07-22 RX ADMIN — SERTRALINE HYDROCHLORIDE 50 MG: 50 TABLET ORAL at 08:26

## 2019-07-22 RX ADMIN — SODIUM CHLORIDE, PRESERVATIVE FREE 3 ML: 5 INJECTION INTRAVENOUS at 08:30

## 2019-07-22 RX ADMIN — AMOXICILLIN AND CLAVULANATE POTASSIUM 1 TABLET: 875; 125 TABLET, FILM COATED ORAL at 21:09

## 2019-07-22 RX ADMIN — POTASSIUM CHLORIDE 40 MEQ: 1500 TABLET, EXTENDED RELEASE ORAL at 06:09

## 2019-07-22 RX ADMIN — SODIUM CHLORIDE, PRESERVATIVE FREE 3 ML: 5 INJECTION INTRAVENOUS at 21:10

## 2019-07-22 RX ADMIN — POTASSIUM CHLORIDE 40 MEQ: 1500 TABLET, EXTENDED RELEASE ORAL at 11:40

## 2019-07-22 RX ADMIN — AMOXICILLIN AND CLAVULANATE POTASSIUM 1 TABLET: 875; 125 TABLET, FILM COATED ORAL at 08:28

## 2019-07-23 VITALS
DIASTOLIC BLOOD PRESSURE: 72 MMHG | RESPIRATION RATE: 16 BRPM | BODY MASS INDEX: 29.26 KG/M2 | HEIGHT: 72 IN | OXYGEN SATURATION: 100 % | TEMPERATURE: 98.6 F | WEIGHT: 216.05 LBS | SYSTOLIC BLOOD PRESSURE: 124 MMHG | HEART RATE: 69 BPM

## 2019-07-23 LAB
ALBUMIN SERPL-MCNC: 2 G/DL (ref 3.5–4.8)
ALBUMIN/GLOB SERPL: 0.6 G/DL (ref 1–1.7)
ALP SERPL-CCNC: 50 U/L (ref 32–91)
ALT SERPL W P-5'-P-CCNC: 82 U/L (ref 17–63)
ANION GAP SERPL CALCULATED.3IONS-SCNC: 9.6 MMOL/L (ref 5–15)
AST SERPL-CCNC: 27 U/L (ref 15–41)
BASOPHILS # BLD AUTO: 0.1 10*3/MM3 (ref 0–0.2)
BASOPHILS NFR BLD AUTO: 0.7 % (ref 0–1.5)
BILIRUB SERPL-MCNC: 0.3 MG/DL (ref 0.3–1.2)
BUN BLD-MCNC: 7 MG/DL (ref 8–20)
BUN/CREAT SERPL: 8.8 (ref 6.2–20.3)
CALCIUM SPEC-SCNC: 7.9 MG/DL (ref 8.9–10.3)
CHLORIDE SERPL-SCNC: 109 MMOL/L (ref 101–111)
CO2 SERPL-SCNC: 23 MMOL/L (ref 22–32)
CREAT BLD-MCNC: 0.8 MG/DL (ref 0.7–1.2)
DEPRECATED RDW RBC AUTO: 44.6 FL (ref 37–54)
EOSINOPHIL # BLD AUTO: 0.3 10*3/MM3 (ref 0–0.4)
EOSINOPHIL NFR BLD AUTO: 3.8 % (ref 0.3–6.2)
ERYTHROCYTE [DISTWIDTH] IN BLOOD BY AUTOMATED COUNT: 14.5 % (ref 12.3–15.4)
GFR SERPL CREATININE-BSD FRML MDRD: 105 ML/MIN/1.73
GLOBULIN UR ELPH-MCNC: 3.2 GM/DL (ref 2.5–3.8)
GLUCOSE BLD-MCNC: 111 MG/DL (ref 65–99)
HCT VFR BLD AUTO: 23.3 % (ref 37.5–51)
HGB BLD-MCNC: 8 G/DL (ref 13–17.7)
LYMPHOCYTES # BLD AUTO: 1.6 10*3/MM3 (ref 0.7–3.1)
LYMPHOCYTES NFR BLD AUTO: 19.6 % (ref 19.6–45.3)
MCH RBC QN AUTO: 29.8 PG (ref 26.6–33)
MCHC RBC AUTO-ENTMCNC: 34.2 G/DL (ref 31.5–35.7)
MCV RBC AUTO: 87.2 FL (ref 79–97)
MONOCYTES # BLD AUTO: 0.9 10*3/MM3 (ref 0.1–0.9)
MONOCYTES NFR BLD AUTO: 10.1 % (ref 5–12)
NEUTROPHILS # BLD AUTO: 5.5 10*3/MM3 (ref 1.7–7)
NEUTROPHILS NFR BLD AUTO: 65.8 % (ref 42.7–76)
NRBC BLD AUTO-RTO: 0 /100 WBC (ref 0–0.2)
PLATELET # BLD AUTO: 326 10*3/MM3 (ref 140–450)
PMV BLD AUTO: 7.9 FL (ref 6–12)
POTASSIUM BLD-SCNC: 3.6 MMOL/L (ref 3.6–5.1)
PROT SERPL-MCNC: 5.2 G/DL (ref 6.1–7.9)
RBC # BLD AUTO: 2.67 10*6/MM3 (ref 4.14–5.8)
SODIUM BLD-SCNC: 138 MMOL/L (ref 136–144)
WBC NRBC COR # BLD: 8.4 10*3/MM3 (ref 3.4–10.8)

## 2019-07-23 PROCEDURE — 85025 COMPLETE CBC W/AUTO DIFF WBC: CPT | Performed by: FAMILY MEDICINE

## 2019-07-23 PROCEDURE — 80053 COMPREHEN METABOLIC PANEL: CPT | Performed by: INTERNAL MEDICINE

## 2019-07-23 PROCEDURE — 94799 UNLISTED PULMONARY SVC/PX: CPT

## 2019-07-23 PROCEDURE — 99238 HOSP IP/OBS DSCHRG MGMT 30/<: CPT | Performed by: INTERNAL MEDICINE

## 2019-07-23 RX ADMIN — AMOXICILLIN AND CLAVULANATE POTASSIUM 1 TABLET: 875; 125 TABLET, FILM COATED ORAL at 08:01

## 2019-07-23 RX ADMIN — METOPROLOL TARTRATE 25 MG: 25 TABLET, FILM COATED ORAL at 08:01

## 2019-07-23 RX ADMIN — LISINOPRIL 10 MG: 5 TABLET ORAL at 08:01

## 2019-07-23 RX ADMIN — POTASSIUM CHLORIDE 20 MEQ: 1500 TABLET, EXTENDED RELEASE ORAL at 08:01

## 2019-07-23 RX ADMIN — SERTRALINE HYDROCHLORIDE 50 MG: 50 TABLET ORAL at 08:01

## 2019-07-23 RX ADMIN — IPRATROPIUM BROMIDE AND ALBUTEROL SULFATE 3 ML: .5; 3 SOLUTION RESPIRATORY (INHALATION) at 11:47

## 2019-07-23 RX ADMIN — OYSTER SHELL CALCIUM WITH VITAMIN D 2 TABLET: 500; 200 TABLET, FILM COATED ORAL at 08:01

## 2019-07-23 RX ADMIN — PANTOPRAZOLE SODIUM 40 MG: 40 INJECTION, POWDER, FOR SOLUTION INTRAVENOUS at 07:58

## 2019-07-23 RX ADMIN — SODIUM CHLORIDE, PRESERVATIVE FREE 3 ML: 5 INJECTION INTRAVENOUS at 08:06

## 2019-07-24 ENCOUNTER — READMISSION MANAGEMENT (OUTPATIENT)
Dept: CALL CENTER | Facility: HOSPITAL | Age: 44
End: 2019-07-24

## 2019-07-24 NOTE — OUTREACH NOTE
Prep Survey      Responses   Facility patient discharged from?  [facility]   Is patient eligible?  Yes   Discharge diagnosis  Syncope, GI bleed, s/p LAPAROTOMY EXPLORATORY   Does the patient have one of the following disease processes/diagnoses(primary or secondary)?  General Surgery   Does the patient have Home health ordered?  No   What is the Home health agency?   not eligible for home health   Is there a DME ordered?  No   Prep survey completed?  Yes          Candace Eagle RN

## 2019-07-25 ENCOUNTER — READMISSION MANAGEMENT (OUTPATIENT)
Dept: CALL CENTER | Facility: HOSPITAL | Age: 44
End: 2019-07-25

## 2019-07-25 NOTE — OUTREACH NOTE
General Surgery Week 1 Survey      Responses   Facility patient discharged from?  [facility]   Does the patient have one of the following disease processes/diagnoses(primary or secondary)?  General Surgery   Is there a successful TCM telephone encounter documented?  No   Week 1 attempt successful?  No   Unsuccessful attempts  Attempt 4 [NO ANSWER, LEFT VM]          Sudha Holly LPN

## 2019-08-01 ENCOUNTER — READMISSION MANAGEMENT (OUTPATIENT)
Dept: CALL CENTER | Facility: HOSPITAL | Age: 44
End: 2019-08-01

## 2019-08-01 NOTE — OUTREACH NOTE
General Surgery Week 2 Survey      Responses   Facility patient discharged from?  [facility]   Does the patient have one of the following disease processes/diagnoses(primary or secondary)?  General Surgery   Week 2 attempt successful?  Yes   Call start time  1555   Call end time  1600   Discharge diagnosis  Syncope, GI bleed, s/p LAPAROTOMY EXPLORATORY   Meds reviewed with patient/caregiver?  Yes   Is the patient having any side effects they believe may be caused by any medication additions or changes?  No   Does the patient have all medications related to this admission filled (includes all antibiotics, pain medications, etc.)  N/A   Is the patient taking all medications as directed (includes completed medication regime)?  Yes   Does the patient have a follow up appointment scheduled with their surgeon?  Yes   Has the patient kept scheduled appointments due by today?  N/A   Has home health visited the patient within 72 hours of discharge?  N/A   Did the patient receive a copy of their discharge instructions?  Yes   Nursing interventions  Reviewed instructions with patient   What is the patient's perception of their health status since discharge?  Same   Is the patient /caregiver able to teach back basic post-op care?  Keep incision areas clean,dry and protected, Lifting as instructed by MD in discharge instructions, No tub bath, swimming, or hot tub until instructed by MD, Take showers only when approved by MD-sponge bathe until then, Do not remove steri-strips   Is the patient/caregiver able to teach back signs and symptoms of incisional infection?  Increased redness, swelling or pain at the incisonal site, Increased drainage or bleeding, Incisional warmth, Pus or odor from incision, Fever   Is the patient/caregiver able to teach back steps to recovery at home?  Set small, achievable goals for return to baseline health, Rest and rebuild strength, gradually increase activity, Make a list of questions for surgeon's  appointment   Is the patient/caregiver able to teach back the hierarchy of who to call/visit for symptoms/problems? PCP, Specialist, Home health nurse, Urgent Care, ED, 911  Yes   Week 2 call completed?  Yes   Revoked  No further contact(revokes)-requires comment          Sudha Holly LPN

## 2019-08-02 ENCOUNTER — HOSPITAL ENCOUNTER (OUTPATIENT)
Facility: HOSPITAL | Age: 44
Setting detail: OBSERVATION
Discharge: HOME | End: 2019-08-05
Attending: INTERNAL MEDICINE | Admitting: INTERNAL MEDICINE

## 2019-08-02 ENCOUNTER — APPOINTMENT (OUTPATIENT)
Dept: CT IMAGING | Facility: HOSPITAL | Age: 44
End: 2019-08-02

## 2019-08-02 ENCOUNTER — APPOINTMENT (OUTPATIENT)
Dept: GENERAL RADIOLOGY | Facility: HOSPITAL | Age: 44
End: 2019-08-02

## 2019-08-02 DIAGNOSIS — E83.42 HYPOMAGNESEMIA: ICD-10-CM

## 2019-08-02 DIAGNOSIS — R10.84 GENERALIZED ABDOMINAL PAIN: Primary | ICD-10-CM

## 2019-08-02 DIAGNOSIS — R50.9 FEVER, UNSPECIFIED FEVER CAUSE: ICD-10-CM

## 2019-08-02 PROBLEM — R06.00 DYSPNEA: Status: ACTIVE | Noted: 2019-08-02

## 2019-08-02 LAB
ALBUMIN SERPL-MCNC: 2.7 G/DL (ref 3.5–4.8)
ALBUMIN/GLOB SERPL: 0.7 G/DL (ref 1–1.7)
ALP SERPL-CCNC: 80 U/L (ref 32–91)
ALT SERPL W P-5'-P-CCNC: 24 U/L (ref 17–63)
ANION GAP SERPL CALCULATED.3IONS-SCNC: 15.6 MMOL/L (ref 5–15)
APTT PPP: 25.5 SECONDS (ref 24–31)
AST SERPL-CCNC: 21 U/L (ref 15–41)
BASOPHILS # BLD AUTO: 0.1 10*3/MM3 (ref 0–0.2)
BASOPHILS NFR BLD AUTO: 0.9 % (ref 0–1.5)
BILIRUB SERPL-MCNC: 0.5 MG/DL (ref 0.3–1.2)
BILIRUB UR QL STRIP: NEGATIVE
BUN BLD-MCNC: 8 MG/DL (ref 8–20)
BUN/CREAT SERPL: 10 (ref 6.2–20.3)
CALCIUM SPEC-SCNC: 8.3 MG/DL (ref 8.9–10.3)
CHLORIDE SERPL-SCNC: 99 MMOL/L (ref 101–111)
CLARITY UR: CLEAR
CO2 SERPL-SCNC: 23 MMOL/L (ref 22–32)
COLOR UR: YELLOW
CREAT BLD-MCNC: 0.8 MG/DL (ref 0.7–1.2)
CREAT BLDA-MCNC: 0.6 MG/DL (ref 0.6–1.3)
D-LACTATE SERPL-SCNC: 1.5 MMOL/L (ref 0.5–2)
DEPRECATED RDW RBC AUTO: 42.9 FL (ref 37–54)
EOSINOPHIL # BLD AUTO: 0.1 10*3/MM3 (ref 0–0.4)
EOSINOPHIL NFR BLD AUTO: 1.1 % (ref 0.3–6.2)
ERYTHROCYTE [DISTWIDTH] IN BLOOD BY AUTOMATED COUNT: 14.5 % (ref 12.3–15.4)
GFR SERPL CREATININE-BSD FRML MDRD: 105 ML/MIN/1.73
GLOBULIN UR ELPH-MCNC: 4 GM/DL (ref 2.5–3.8)
GLUCOSE BLD-MCNC: 103 MG/DL (ref 65–99)
GLUCOSE UR STRIP-MCNC: NEGATIVE MG/DL
HCT VFR BLD AUTO: 28.6 % (ref 37.5–51)
HGB BLD-MCNC: 9.4 G/DL (ref 13–17.7)
HGB UR QL STRIP.AUTO: NEGATIVE
HOLD SPECIMEN: NORMAL
INR PPP: 1 (ref 0.9–1.1)
KETONES UR QL STRIP: NEGATIVE
LEUKOCYTE ESTERASE UR QL STRIP.AUTO: NEGATIVE
LIPASE SERPL-CCNC: 36 U/L (ref 22–51)
LYMPHOCYTES # BLD AUTO: 1.8 10*3/MM3 (ref 0.7–3.1)
LYMPHOCYTES NFR BLD AUTO: 29.3 % (ref 19.6–45.3)
MAGNESIUM SERPL-MCNC: 1.5 MG/DL (ref 1.8–2.5)
MCH RBC QN AUTO: 27.6 PG (ref 26.6–33)
MCHC RBC AUTO-ENTMCNC: 32.8 G/DL (ref 31.5–35.7)
MCV RBC AUTO: 84.1 FL (ref 79–97)
MONOCYTES # BLD AUTO: 0.9 10*3/MM3 (ref 0.1–0.9)
MONOCYTES NFR BLD AUTO: 13.8 % (ref 5–12)
NEUTROPHILS # BLD AUTO: 3.4 10*3/MM3 (ref 1.7–7)
NEUTROPHILS NFR BLD AUTO: 54.9 % (ref 42.7–76)
NITRITE UR QL STRIP: NEGATIVE
NRBC BLD AUTO-RTO: 0 /100 WBC (ref 0–0.2)
PH UR STRIP.AUTO: 8.5 [PH] (ref 5–8)
PLATELET # BLD AUTO: 579 10*3/MM3 (ref 140–450)
PMV BLD AUTO: 7.4 FL (ref 6–12)
POTASSIUM BLD-SCNC: 3.6 MMOL/L (ref 3.6–5.1)
PROT SERPL-MCNC: 6.7 G/DL (ref 6.1–7.9)
PROT UR QL STRIP: ABNORMAL
PROTHROMBIN TIME: 10.3 SECONDS (ref 9.6–11.7)
RBC # BLD AUTO: 3.4 10*6/MM3 (ref 4.14–5.8)
SODIUM BLD-SCNC: 134 MMOL/L (ref 136–144)
SP GR UR STRIP: 1.02 (ref 1–1.03)
UROBILINOGEN UR QL STRIP: ABNORMAL
WBC NRBC COR # BLD: 6.2 10*3/MM3 (ref 3.4–10.8)
WHOLE BLOOD HOLD SPECIMEN: NORMAL

## 2019-08-02 PROCEDURE — G0378 HOSPITAL OBSERVATION PER HR: HCPCS

## 2019-08-02 PROCEDURE — 80053 COMPREHEN METABOLIC PANEL: CPT | Performed by: NURSE PRACTITIONER

## 2019-08-02 PROCEDURE — 96376 TX/PRO/DX INJ SAME DRUG ADON: CPT

## 2019-08-02 PROCEDURE — 71045 X-RAY EXAM CHEST 1 VIEW: CPT

## 2019-08-02 PROCEDURE — 71275 CT ANGIOGRAPHY CHEST: CPT

## 2019-08-02 PROCEDURE — 96375 TX/PRO/DX INJ NEW DRUG ADDON: CPT

## 2019-08-02 PROCEDURE — 83690 ASSAY OF LIPASE: CPT | Performed by: NURSE PRACTITIONER

## 2019-08-02 PROCEDURE — 25010000002 HYDROMORPHONE PER 4 MG: Performed by: NURSE PRACTITIONER

## 2019-08-02 PROCEDURE — 83735 ASSAY OF MAGNESIUM: CPT | Performed by: NURSE PRACTITIONER

## 2019-08-02 PROCEDURE — 25010000002 ONDANSETRON PER 1 MG: Performed by: NURSE PRACTITIONER

## 2019-08-02 PROCEDURE — 99219 PR INITIAL OBSERVATION CARE/DAY 50 MINUTES: CPT | Performed by: NURSE PRACTITIONER

## 2019-08-02 PROCEDURE — 25010000002 CEFEPIME PER 500 MG: Performed by: NURSE PRACTITIONER

## 2019-08-02 PROCEDURE — 81003 URINALYSIS AUTO W/O SCOPE: CPT | Performed by: NURSE PRACTITIONER

## 2019-08-02 PROCEDURE — 85025 COMPLETE CBC W/AUTO DIFF WBC: CPT | Performed by: NURSE PRACTITIONER

## 2019-08-02 PROCEDURE — 93005 ELECTROCARDIOGRAM TRACING: CPT | Performed by: NURSE PRACTITIONER

## 2019-08-02 PROCEDURE — 85730 THROMBOPLASTIN TIME PARTIAL: CPT | Performed by: NURSE PRACTITIONER

## 2019-08-02 PROCEDURE — 25010000002 MAGNESIUM SULFATE 2 GM/50ML SOLUTION: Performed by: NURSE PRACTITIONER

## 2019-08-02 PROCEDURE — 74177 CT ABD & PELVIS W/CONTRAST: CPT

## 2019-08-02 PROCEDURE — 99284 EMERGENCY DEPT VISIT MOD MDM: CPT

## 2019-08-02 PROCEDURE — 96365 THER/PROPH/DIAG IV INF INIT: CPT

## 2019-08-02 PROCEDURE — 85610 PROTHROMBIN TIME: CPT | Performed by: NURSE PRACTITIONER

## 2019-08-02 PROCEDURE — 25010000002 MORPHINE PER 10 MG

## 2019-08-02 PROCEDURE — 87040 BLOOD CULTURE FOR BACTERIA: CPT | Performed by: NURSE PRACTITIONER

## 2019-08-02 PROCEDURE — 83605 ASSAY OF LACTIC ACID: CPT

## 2019-08-02 PROCEDURE — 96366 THER/PROPH/DIAG IV INF ADDON: CPT

## 2019-08-02 PROCEDURE — 82565 ASSAY OF CREATININE: CPT

## 2019-08-02 PROCEDURE — 0 IOPAMIDOL PER 1 ML: Performed by: NURSE PRACTITIONER

## 2019-08-02 RX ORDER — NITROGLYCERIN 0.4 MG/1
0.4 TABLET SUBLINGUAL
Status: DISCONTINUED | OUTPATIENT
Start: 2019-08-02 | End: 2019-08-04

## 2019-08-02 RX ORDER — MORPHINE SULFATE 4 MG/ML
4 INJECTION, SOLUTION INTRAMUSCULAR; INTRAVENOUS EVERY 4 HOURS PRN
Status: DISCONTINUED | OUTPATIENT
Start: 2019-08-02 | End: 2019-08-04

## 2019-08-02 RX ORDER — SODIUM CHLORIDE 0.9 % (FLUSH) 0.9 %
3 SYRINGE (ML) INJECTION EVERY 12 HOURS SCHEDULED
Status: DISCONTINUED | OUTPATIENT
Start: 2019-08-02 | End: 2019-08-05 | Stop reason: HOSPADM

## 2019-08-02 RX ORDER — MAGNESIUM SULFATE HEPTAHYDRATE 40 MG/ML
4 INJECTION, SOLUTION INTRAVENOUS AS NEEDED
Status: DISCONTINUED | OUTPATIENT
Start: 2019-08-02 | End: 2019-08-05 | Stop reason: HOSPADM

## 2019-08-02 RX ORDER — SODIUM CHLORIDE 9 MG/ML
100 INJECTION, SOLUTION INTRAVENOUS CONTINUOUS
Status: DISCONTINUED | OUTPATIENT
Start: 2019-08-03 | End: 2019-08-04

## 2019-08-02 RX ORDER — MAGNESIUM SULFATE HEPTAHYDRATE 40 MG/ML
2 INJECTION, SOLUTION INTRAVENOUS AS NEEDED
Status: DISCONTINUED | OUTPATIENT
Start: 2019-08-02 | End: 2019-08-05 | Stop reason: HOSPADM

## 2019-08-02 RX ORDER — ONDANSETRON 4 MG/1
4 TABLET, FILM COATED ORAL EVERY 6 HOURS PRN
Status: DISCONTINUED | OUTPATIENT
Start: 2019-08-02 | End: 2019-08-05 | Stop reason: HOSPADM

## 2019-08-02 RX ORDER — HYDROMORPHONE HCL 110MG/55ML
1 PATIENT CONTROLLED ANALGESIA SYRINGE INTRAVENOUS ONCE
Status: COMPLETED | OUTPATIENT
Start: 2019-08-02 | End: 2019-08-02

## 2019-08-02 RX ORDER — HYDROMORPHONE HCL 110MG/55ML
0.5 PATIENT CONTROLLED ANALGESIA SYRINGE INTRAVENOUS ONCE
Status: COMPLETED | OUTPATIENT
Start: 2019-08-02 | End: 2019-08-02

## 2019-08-02 RX ORDER — LISINOPRIL 5 MG/1
10 TABLET ORAL
Status: DISCONTINUED | OUTPATIENT
Start: 2019-08-02 | End: 2019-08-02

## 2019-08-02 RX ORDER — POTASSIUM CHLORIDE 7.45 MG/ML
10 INJECTION INTRAVENOUS
Status: DISCONTINUED | OUTPATIENT
Start: 2019-08-02 | End: 2019-08-05 | Stop reason: HOSPADM

## 2019-08-02 RX ORDER — ONDANSETRON 2 MG/ML
4 INJECTION INTRAMUSCULAR; INTRAVENOUS ONCE
Status: COMPLETED | OUTPATIENT
Start: 2019-08-02 | End: 2019-08-02

## 2019-08-02 RX ORDER — MORPHINE SULFATE 4 MG/ML
INJECTION, SOLUTION INTRAMUSCULAR; INTRAVENOUS
Status: COMPLETED
Start: 2019-08-02 | End: 2019-08-02

## 2019-08-02 RX ORDER — SODIUM CHLORIDE 0.9 % (FLUSH) 0.9 %
3-10 SYRINGE (ML) INJECTION AS NEEDED
Status: DISCONTINUED | OUTPATIENT
Start: 2019-08-02 | End: 2019-08-05 | Stop reason: HOSPADM

## 2019-08-02 RX ORDER — SODIUM CHLORIDE 0.9 % (FLUSH) 0.9 %
10 SYRINGE (ML) INJECTION AS NEEDED
Status: DISCONTINUED | OUTPATIENT
Start: 2019-08-02 | End: 2019-08-05 | Stop reason: HOSPADM

## 2019-08-02 RX ORDER — ONDANSETRON 2 MG/ML
4 INJECTION INTRAMUSCULAR; INTRAVENOUS EVERY 6 HOURS PRN
Status: DISCONTINUED | OUTPATIENT
Start: 2019-08-02 | End: 2019-08-05 | Stop reason: HOSPADM

## 2019-08-02 RX ORDER — TRAZODONE HYDROCHLORIDE 100 MG/1
100 TABLET ORAL NIGHTLY
Status: ON HOLD | COMMUNITY
End: 2020-01-01 | Stop reason: SDUPTHER

## 2019-08-02 RX ORDER — TRAZODONE HYDROCHLORIDE 100 MG/1
100 TABLET ORAL NIGHTLY
Status: DISCONTINUED | OUTPATIENT
Start: 2019-08-02 | End: 2019-08-05 | Stop reason: HOSPADM

## 2019-08-02 RX ORDER — LISINOPRIL 5 MG/1
10 TABLET ORAL
Status: DISCONTINUED | OUTPATIENT
Start: 2019-08-03 | End: 2019-08-05 | Stop reason: HOSPADM

## 2019-08-02 RX ORDER — POTASSIUM CHLORIDE 1.5 G/1.77G
40 POWDER, FOR SOLUTION ORAL AS NEEDED
Status: DISCONTINUED | OUTPATIENT
Start: 2019-08-02 | End: 2019-08-05 | Stop reason: HOSPADM

## 2019-08-02 RX ORDER — POTASSIUM CHLORIDE 20 MEQ/1
40 TABLET, EXTENDED RELEASE ORAL AS NEEDED
Status: DISCONTINUED | OUTPATIENT
Start: 2019-08-02 | End: 2019-08-05 | Stop reason: HOSPADM

## 2019-08-02 RX ORDER — MAGNESIUM SULFATE HEPTAHYDRATE 40 MG/ML
2 INJECTION, SOLUTION INTRAVENOUS ONCE
Status: COMPLETED | OUTPATIENT
Start: 2019-08-02 | End: 2019-08-02

## 2019-08-02 RX ADMIN — HYDROMORPHONE HYDROCHLORIDE 0.5 MG: 2 INJECTION, SOLUTION INTRAMUSCULAR; INTRAVENOUS; SUBCUTANEOUS at 16:51

## 2019-08-02 RX ADMIN — IOPAMIDOL 100 ML: 755 INJECTION, SOLUTION INTRAVENOUS at 16:42

## 2019-08-02 RX ADMIN — TRAZODONE HYDROCHLORIDE 100 MG: 100 TABLET ORAL at 23:30

## 2019-08-02 RX ADMIN — CEFEPIME HYDROCHLORIDE 2 G: 2 INJECTION, POWDER, FOR SOLUTION INTRAVENOUS at 17:21

## 2019-08-02 RX ADMIN — MORPHINE SULFATE 4 MG: 4 INJECTION INTRAVENOUS at 23:03

## 2019-08-02 RX ADMIN — METOPROLOL TARTRATE 25 MG: 25 TABLET, FILM COATED ORAL at 23:30

## 2019-08-02 RX ADMIN — HYDROMORPHONE HYDROCHLORIDE 1 MG: 2 INJECTION, SOLUTION INTRAMUSCULAR; INTRAVENOUS; SUBCUTANEOUS at 18:06

## 2019-08-02 RX ADMIN — ONDANSETRON 4 MG: 2 INJECTION INTRAMUSCULAR; INTRAVENOUS at 15:45

## 2019-08-02 RX ADMIN — SODIUM CHLORIDE, PRESERVATIVE FREE 3 ML: 5 INJECTION INTRAVENOUS at 23:33

## 2019-08-02 RX ADMIN — MAGNESIUM SULFATE HEPTAHYDRATE 2 G: 40 INJECTION, SOLUTION INTRAVENOUS at 17:30

## 2019-08-02 RX ADMIN — HYDROMORPHONE HYDROCHLORIDE 1 MG: 2 INJECTION, SOLUTION INTRAMUSCULAR; INTRAVENOUS; SUBCUTANEOUS at 19:50

## 2019-08-02 RX ADMIN — HYDROMORPHONE HYDROCHLORIDE 0.5 MG: 2 INJECTION, SOLUTION INTRAMUSCULAR; INTRAVENOUS; SUBCUTANEOUS at 15:46

## 2019-08-02 NOTE — ED PROVIDER NOTES
Subjective   Chief complaint: abdominal pain, chest pain      Context: he is a 43-year-old male who comes in by private vehicle with a significant he states he has had recent admission with multiple surgeries for duodenal ulcers.  He denies any melena hematochezia or hematemesis.  He states he was told he had a fever when he signed then but did not know he had one.  He denies any cough or congestion.  He denies any swelling in his legs or feet.  He states his chest pain feels like it did when he had an ulcer.  He states he did go home with a drain in a G-tube but did not go home with home health and has not been using his G-tube.  He states he has been eating a soft diet.    Duration: worse over the last 2 days    Timing: waxes and wanes    Severity: moderate to severe    Associated symptoms:          PCP:               Review of Systems   Constitutional: Positive for fever.   HENT: Negative.    Eyes: Negative.    Respiratory: Negative.    Cardiovascular: Positive for chest pain.   Gastrointestinal: Positive for abdominal pain, diarrhea, nausea and vomiting. Negative for blood in stool.   Endocrine: Negative.    Genitourinary: Negative.    Musculoskeletal: Negative.    Skin: Negative.    Neurological: Negative.    Hematological: Negative.        Past Medical History:   Diagnosis Date   • ADHD (attention deficit hyperactivity disorder)    • Alcohol dependence (CMS/HCC)    • Anxiety    • Depression    • Essential hypertension 6/28/2019   • Hepatitis C 06/2019   • NICM (nonischemic cardiomyopathy) (CMS/HCC) 7/14/2019   • Substance abuse (CMS/HCC)    • Tobacco dependency        No Known Allergies    Past Surgical History:   Procedure Laterality Date   • CARDIAC CATHETERIZATION     • ENDOSCOPY N/A 7/14/2019    Procedure: ESOPHAGOGASTRODUODENOSCOPY WITH SCLEROTHERAPY, BICAP CAUTERY OF DUODENAL ULCER AND HEMOSTASIS SPRAY OF DUODENAL ULCER;  Surgeon: Wesly Myers MD;  Location: [facility] ENDOSCOPY;  Service:  "Gastroenterology   • EXPLORATORY LAPAROTOMY N/A 2019    Procedure: LAPAROTOMY EXPLORATORY;  Surgeon: Patience Peng MD;  Location: [facility] MAIN OR;  Service: General   • WRIST SURGERY N/A        Family History   Problem Relation Age of Onset   • Heart disease Mother    • Heart attack Mother    • Colon cancer Mother    • Heart disease Brother    • Heart attack Brother    • Heart disease Brother    • Heart attack Brother        Social History     Socioeconomic History   • Marital status:      Spouse name: Not on file   • Number of children: Not on file   • Years of education: Not on file   • Highest education level: Not on file   Tobacco Use   • Smoking status: Former Smoker     Packs/day: 1.00     Years: 20.00     Pack years: 20.00     Types: Cigarettes     Last attempt to quit: 2019     Years since quittin.0   • Smokeless tobacco: Former User   Substance and Sexual Activity   • Alcohol use: Yes     Frequency: Never     Comment: use to drink half gallon of liquor per day; down to 1 pint of liquor per day; quit 2019   • Drug use: Yes     Types: Cocaine, Methamphetamines     Comment: last used meth about 2 weeks ago   • Sexual activity: Defer   Social History Narrative    He reports extensive history of depression, anxiety, and substance abuse. He's been diagnosed with ADD and bipolar disorder, and has been treated at [facility] and [facility]. Medications in the past have made him feel \"slow\" and \"dumb\". He reports that his mind is always going \"a million miles a minute\", and he \"can't get shit done\" because he can't focus. He reports very low self esteem. He reports his son's mother has told him they'd be better off if he was dead and he wonders if that's true, but states tearfully that he doesn't want to die. He has quit drinking and doing drugs in the past, and says he went to [employer] a lot. He's never had a sponsor or really participated in AA. He did cocaine on and off for around 10 " years but quit when his son was born 9 yrs ago. He says that cocaine calmed him down. In the past couple years, he started using meth which calms him down if he snorts it, but amps him up if he smokes it. Alcohol calms him down. He reports feeling very scared right now and worried that he's going to die. He seems ready to address his substance abuse and try to live a healthier lifestyle. His son is his main motivation.            Objective   Physical Exam     Vital signs and triage nurse note reviewed.   Constitutional: Awake, alert; well-developed and well-nourished.  Uncomfortable.  Family at bedside  HEENT: Normocephalic, atraumatic; pupils are PERRL with intact EOM; oropharynx is pink and moist without exudate or erythema.   Neck: Supple, full range of motion without pain; no cervical lymphadenopathy.   Cardiovascular: Regular rate and rhythm, normal S1-S2.   Pulmonary: Respiratory effort regular nonlabored, breath sounds clear to auscultation all fields.   Abdomen: Soft, diffusely tender nondistended with normoactive bowel sounds; no rebound or guarding. Midline incision with staples with close approximation without any active drainage or erythema.  G-tube and drain noted to the left side  Musculoskeletal: Independent range of motion of all extremities with no palpable tenderness or edema.   Neuro: Alert oriented x3, speech is clear and appropriate, GCS 15   Skin:  Fleshtone warm, dry, intact; no erythematous or petechial rash or lesion       Procedures           ED Course                  MDM  Number of Diagnoses or Management Options  Diagnosis management comments: Chart review: 7/13/19:  Syncope hypertension melena cardiomyopathy chest pain  7/14: padmini: egd with cautery of duodenal ulcer, erosive esophagitis  7/16: Danish: exp lap- ventilated, vasopressers, multiorgan failure; arterial bleed in duodenal bulb      Comorbidities:  has a past medical history of ADHD (attention deficit hyperactivity  "disorder), Alcohol dependence (CMS/HCC), Anxiety, Depression, Essential hypertension (6/28/2019), Hepatitis C (06/2019), NICM (nonischemic cardiomyopathy) (CMS/HCC) (7/14/2019), Substance abuse (CMS/HCC), and Tobacco dependency.  Differentials:  Discussion with provider:  Radiology interpretation:  X-rays reviewed by me and interpreted by radiologist,   Xr Chest 1 View    Result Date: 8/2/2019  No acute cardiopulmonary disease is seen radiographically.   Electronically Signed By-Dr. Olaf Fisher MD On:8/2/2019 3:43 PM This report was finalized on [id number] by Dr. Olaf Fisher MD.    Lab interpretation:  Labs viewed by me significant for,    S/o somewhat agitated stating \"you aren't looking at his GI stuff.\" discussed EGD would not be done at ER bedside, but explained cts/labs etc, patient agreeable.   Patient did screen positive for hospital sepsis policy and was given antibiotics.  He was also given analgesics and antiemetics and fluids.  He has not been hypotensive.  Care was transferred to [facility] at 1700 pending disposition and plan of care          Results for orders placed or performed during the hospital encounter of 08/02/19   Comprehensive Metabolic Panel   Result Value Ref Range    Glucose 103 (H) 65 - 99 mg/dL    BUN 8 8 - 20 mg/dL    Creatinine 0.80 0.70 - 1.20 mg/dL    Sodium 134 (L) 136 - 144 mmol/L    Potassium 3.6 3.6 - 5.1 mmol/L    Chloride 99 (L) 101 - 111 mmol/L    CO2 23.0 22.0 - 32.0 mmol/L    Calcium 8.3 (L) 8.9 - 10.3 mg/dL    Total Protein 6.7 6.1 - 7.9 g/dL    Albumin 2.70 (L) 3.50 - 4.80 g/dL    ALT (SGPT) 24 17 - 63 U/L    AST (SGOT) 21 15 - 41 U/L    Alkaline Phosphatase 80 32 - 91 U/L    Total Bilirubin 0.5 0.3 - 1.2 mg/dL    eGFR Non African Amer 105 >60 mL/min/1.73    Globulin 4.0 (H) 2.5 - 3.8 gm/dL    A/G Ratio 0.7 (L) 1.0 - 1.7 g/dL    BUN/Creatinine Ratio 10.0 6.2 - 20.3    Anion Gap 15.6 (H) 5.0 - 15.0 mmol/L   Lipase   Result Value Ref Range    Lipase 36 22 - 51 U/L "   Urinalysis With Culture If Indicated - Urine, Clean Catch   Result Value Ref Range    Color, UA Yellow Yellow, Straw    Appearance, UA Clear Clear    pH, UA 8.5 (H) 5.0 - 8.0    Specific Gravity, UA 1.021 1.005 - 1.030    Glucose, UA Negative Negative    Ketones, UA Negative Negative    Bilirubin, UA Negative Negative    Blood, UA Negative Negative    Protein, UA Trace (A) Negative    Leuk Esterase, UA Negative Negative    Nitrite, UA Negative Negative    Urobilinogen, UA 1.0 E.U./dL 0.2 - 1.0 E.U./dL   Magnesium   Result Value Ref Range    Magnesium 1.5 (L) 1.8 - 2.5 mg/dL   CBC Auto Differential   Result Value Ref Range    WBC 6.20 3.40 - 10.80 10*3/mm3    RBC 3.40 (L) 4.14 - 5.80 10*6/mm3    Hemoglobin 9.4 (L) 13.0 - 17.7 g/dL    Hematocrit 28.6 (L) 37.5 - 51.0 %    MCV 84.1 79.0 - 97.0 fL    MCH 27.6 26.6 - 33.0 pg    MCHC 32.8 31.5 - 35.7 g/dL    RDW 14.5 12.3 - 15.4 %    RDW-SD 42.9 37.0 - 54.0 fl    MPV 7.4 6.0 - 12.0 fL    Platelets 579 (H) 140 - 450 10*3/mm3    Neutrophil % 54.9 42.7 - 76.0 %    Lymphocyte % 29.3 19.6 - 45.3 %    Monocyte % 13.8 (H) 5.0 - 12.0 %    Eosinophil % 1.1 0.3 - 6.2 %    Basophil % 0.9 0.0 - 1.5 %    Neutrophils, Absolute 3.40 1.70 - 7.00 10*3/mm3    Lymphocytes, Absolute 1.80 0.70 - 3.10 10*3/mm3    Monocytes, Absolute 0.90 0.10 - 0.90 10*3/mm3    Eosinophils, Absolute 0.10 0.00 - 0.40 10*3/mm3    Basophils, Absolute 0.10 0.00 - 0.20 10*3/mm3    nRBC 0.0 0.0 - 0.2 /100 WBC   Protime-INR   Result Value Ref Range    Protime 10.3 9.6 - 11.7 Seconds    INR 1.00 0.90 - 1.10   aPTT   Result Value Ref Range    PTT 25.5 24.0 - 31.0 seconds   POC Lactate   Result Value Ref Range    Lactate 1.5 0.5 - 2.0 mmol/L   POC Creatinine   Result Value Ref Range    Creatinine 0.60 0.60 - 1.30 mg/dL    GFR MDRD African American  0 - 60 mL/min/1.73 sq.M    GFR MDRD Non African American  0 - 60 mL/min/1.73 sq.M   Light Blue Top   Result Value Ref Range    Extra Tube hold for add-on    Green Top (Gel)    Result Value Ref Range    Extra Tube Hold for add-ons.    Lavender Top   Result Value Ref Range    Extra Tube hold for add-on    Gold Top - SST   Result Value Ref Range    Extra Tube Hold for add-ons.    Green Top (No Gel)   Result Value Ref Range    Extra Tube Hold for add-ons.    Lavender Top   Result Value Ref Range    Extra Tube hold for add-on    Gold Top - SST   Result Value Ref Range    Extra Tube Hold for add-ons.    Ct Abdomen Pelvis With Contrast    Result Date: 8/2/2019   1. Abnormal appearance of the gastroduodenal junction may represent sequelae of recent surgical intervention with residual inflammatory process. Air-fluid collection is seen at the medial margin of the gastroduodenal junction which is not definitely intraluminal, and could represent recurrent or residual ulceration or abscess or contained leak. 2. 10 mm fluid density lesion within the pancreatic neck nonspecific and could represent pancreatic cyst or pseudocyst. Likely secondary reactive inflammatory changes within the pancreatic head and neck, as well. 3. Abnormal thickening and enhancement of the rectum. Correlate for proctitis symptoms. 4. New 5 mm groundglass nodule in the left lower lobe. Benign infectious/inflammatory nodule is favored.    Electronically Signed By-Dr. Yoon Garcia MD On:8/2/2019 4:55 PM This report was finalized on [id number] by Dr. Yoon Garcia MD.    Xr Chest 1 View    Result Date: 8/2/2019  No acute cardiopulmonary disease is seen radiographically.   Electronically Signed By-Dr. Olaf Fisher MD On:8/2/2019 3:43 PM This report was finalized on [id number] by Dr. Olaf Fisher MD.    Ct Chest Pulmonary Embolism With Contrast    Result Date: 8/2/2019   1. Abnormal appearance of the gastroduodenal junction may represent sequelae of recent surgical intervention with residual inflammatory process. Air-fluid collection is seen at the medial margin of the gastroduodenal junction which is not definitely  intraluminal, and could represent recurrent or residual ulceration or abscess or contained leak. 2. 10 mm fluid density lesion within the pancreatic neck nonspecific and could represent pancreatic cyst or pseudocyst. Likely secondary reactive inflammatory changes within the pancreatic head and neck, as well. 3. Abnormal thickening and enhancement of the rectum. Correlate for proctitis symptoms. 4. New 5 mm groundglass nodule in the left lower lobe. Benign infectious/inflammatory nodule is favored.    Electronically Signed By-Dr. Yoon Garcia MD On:8/2/2019 4:55 PM This report was finalized on [id number] by Dr. Yoon Garcia MD.    Physical examination and test results were discussed with the hospitalist service Bee guajardoed for Dr. Abbott for further GI evaluation      Final diagnoses:   Generalized abdominal pain   Fever, unspecified fever cause   Hypomagnesemia            Martin Aguillon, NP  08/02/19 1942

## 2019-08-02 NOTE — PROGRESS NOTES
Case Management/Social Work    Patient Name:  Asad Bethea  YOB: 1975  MRN: [id number]  Admit Date:  8/2/2019        Attempted to speak with patient for discharge planning and readmission assessment.Pt states he doesn't feel like talking at this time and requests that someone see him at a later time.      Electronically signed by:  Katie Hayes RN  08/02/19 7:53 PM

## 2019-08-02 NOTE — ED NOTES
Flushed pt Gtube, flushed and rodney back well, no difficulty noted.      Nola Magana, RN  08/02/19 1943

## 2019-08-03 ENCOUNTER — ON CAMPUS - OUTPATIENT (OUTPATIENT)
Dept: URBAN - METROPOLITAN AREA HOSPITAL 85 | Facility: HOSPITAL | Age: 44
End: 2019-08-03
Payer: COMMERCIAL

## 2019-08-03 DIAGNOSIS — D64.9 ANEMIA, UNSPECIFIED: ICD-10-CM

## 2019-08-03 DIAGNOSIS — I10 ESSENTIAL (PRIMARY) HYPERTENSION: ICD-10-CM

## 2019-08-03 DIAGNOSIS — F10.10 ALCOHOL ABUSE, UNCOMPLICATED: ICD-10-CM

## 2019-08-03 DIAGNOSIS — R19.7 DIARRHEA, UNSPECIFIED: ICD-10-CM

## 2019-08-03 PROBLEM — F19.10 SUBSTANCE ABUSE (HCC): Chronic | Status: ACTIVE | Noted: 2019-08-03

## 2019-08-03 LAB
ANION GAP SERPL CALCULATED.3IONS-SCNC: 11.4 MMOL/L (ref 5–15)
BASOPHILS # BLD AUTO: 0.1 10*3/MM3 (ref 0–0.2)
BASOPHILS NFR BLD AUTO: 1 % (ref 0–1.5)
BUN BLD-MCNC: 5 MG/DL (ref 8–20)
BUN/CREAT SERPL: 7.1 (ref 6.2–20.3)
CALCIUM SPEC-SCNC: 8.2 MG/DL (ref 8.9–10.3)
CHLORIDE SERPL-SCNC: 102 MMOL/L (ref 101–111)
CO2 SERPL-SCNC: 24 MMOL/L (ref 22–32)
CREAT BLD-MCNC: 0.7 MG/DL (ref 0.7–1.2)
DEPRECATED RDW RBC AUTO: 45.1 FL (ref 37–54)
EOSINOPHIL # BLD AUTO: 0.1 10*3/MM3 (ref 0–0.4)
EOSINOPHIL NFR BLD AUTO: 1.4 % (ref 0.3–6.2)
ERYTHROCYTE [DISTWIDTH] IN BLOOD BY AUTOMATED COUNT: 14.7 % (ref 12.3–15.4)
GFR SERPL CREATININE-BSD FRML MDRD: 123 ML/MIN/1.73
GLUCOSE BLD-MCNC: 112 MG/DL (ref 65–99)
HCT VFR BLD AUTO: 26.6 % (ref 37.5–51)
HGB BLD-MCNC: 9 G/DL (ref 13–17.7)
LYMPHOCYTES # BLD AUTO: 1.5 10*3/MM3 (ref 0.7–3.1)
LYMPHOCYTES NFR BLD AUTO: 19.7 % (ref 19.6–45.3)
MAGNESIUM SERPL-MCNC: 2 MG/DL (ref 1.8–2.5)
MCH RBC QN AUTO: 28.7 PG (ref 26.6–33)
MCHC RBC AUTO-ENTMCNC: 33.9 G/DL (ref 31.5–35.7)
MCV RBC AUTO: 84.5 FL (ref 79–97)
MONOCYTES # BLD AUTO: 0.9 10*3/MM3 (ref 0.1–0.9)
MONOCYTES NFR BLD AUTO: 12.3 % (ref 5–12)
NEUTROPHILS # BLD AUTO: 4.9 10*3/MM3 (ref 1.7–7)
NEUTROPHILS NFR BLD AUTO: 65.6 % (ref 42.7–76)
NRBC BLD AUTO-RTO: 0 /100 WBC (ref 0–0.2)
PLATELET # BLD AUTO: 436 10*3/MM3 (ref 140–450)
PMV BLD AUTO: 7.2 FL (ref 6–12)
POTASSIUM BLD-SCNC: 4.4 MMOL/L (ref 3.6–5.1)
RBC # BLD AUTO: 3.14 10*6/MM3 (ref 4.14–5.8)
SODIUM BLD-SCNC: 133 MMOL/L (ref 136–144)
WBC NRBC COR # BLD: 7.4 10*3/MM3 (ref 3.4–10.8)

## 2019-08-03 PROCEDURE — 99226 PR SBSQ OBSERVATION CARE/DAY 35 MINUTES: CPT | Performed by: INTERNAL MEDICINE

## 2019-08-03 PROCEDURE — 80048 BASIC METABOLIC PNL TOTAL CA: CPT | Performed by: NURSE PRACTITIONER

## 2019-08-03 PROCEDURE — G0378 HOSPITAL OBSERVATION PER HR: HCPCS

## 2019-08-03 PROCEDURE — 99213 OFFICE O/P EST LOW 20 MIN: CPT | Performed by: NURSE PRACTITIONER

## 2019-08-03 PROCEDURE — 96361 HYDRATE IV INFUSION ADD-ON: CPT

## 2019-08-03 PROCEDURE — 25010000002 MORPHINE PER 10 MG: Performed by: NURSE PRACTITIONER

## 2019-08-03 PROCEDURE — 83735 ASSAY OF MAGNESIUM: CPT | Performed by: NURSE PRACTITIONER

## 2019-08-03 PROCEDURE — 96375 TX/PRO/DX INJ NEW DRUG ADDON: CPT

## 2019-08-03 PROCEDURE — 96376 TX/PRO/DX INJ SAME DRUG ADON: CPT

## 2019-08-03 PROCEDURE — 85025 COMPLETE CBC W/AUTO DIFF WBC: CPT | Performed by: NURSE PRACTITIONER

## 2019-08-03 PROCEDURE — 87338 HPYLORI STOOL AG IA: CPT | Performed by: NURSE PRACTITIONER

## 2019-08-03 RX ORDER — SUCRALFATE 1 G/1
1 TABLET ORAL
Status: DISCONTINUED | OUTPATIENT
Start: 2019-08-03 | End: 2019-08-05 | Stop reason: HOSPADM

## 2019-08-03 RX ORDER — PANTOPRAZOLE SODIUM 40 MG/10ML
40 INJECTION, POWDER, LYOPHILIZED, FOR SOLUTION INTRAVENOUS
Status: DISCONTINUED | OUTPATIENT
Start: 2019-08-03 | End: 2019-08-05 | Stop reason: HOSPADM

## 2019-08-03 RX ADMIN — MORPHINE SULFATE 4 MG: 4 INJECTION INTRAVENOUS at 07:17

## 2019-08-03 RX ADMIN — PANTOPRAZOLE SODIUM 40 MG: 40 INJECTION, POWDER, FOR SOLUTION INTRAVENOUS at 15:10

## 2019-08-03 RX ADMIN — MORPHINE SULFATE 4 MG: 4 INJECTION INTRAVENOUS at 11:20

## 2019-08-03 RX ADMIN — SUCRALFATE 1 G: 1 TABLET ORAL at 20:48

## 2019-08-03 RX ADMIN — SODIUM CHLORIDE, PRESERVATIVE FREE 3 ML: 5 INJECTION INTRAVENOUS at 08:56

## 2019-08-03 RX ADMIN — MORPHINE SULFATE 4 MG: 4 INJECTION INTRAVENOUS at 20:48

## 2019-08-03 RX ADMIN — METOPROLOL TARTRATE 25 MG: 25 TABLET, FILM COATED ORAL at 08:55

## 2019-08-03 RX ADMIN — MORPHINE SULFATE 4 MG: 4 INJECTION INTRAVENOUS at 15:09

## 2019-08-03 RX ADMIN — SODIUM CHLORIDE 100 ML/HR: 0.9 INJECTION, SOLUTION INTRAVENOUS at 10:15

## 2019-08-03 RX ADMIN — MORPHINE SULFATE 4 MG: 4 INJECTION INTRAVENOUS at 03:10

## 2019-08-03 RX ADMIN — TRAZODONE HYDROCHLORIDE 100 MG: 100 TABLET ORAL at 20:48

## 2019-08-03 RX ADMIN — LISINOPRIL 10 MG: 5 TABLET ORAL at 08:54

## 2019-08-03 RX ADMIN — SODIUM CHLORIDE, PRESERVATIVE FREE 3 ML: 5 INJECTION INTRAVENOUS at 20:52

## 2019-08-03 RX ADMIN — METOPROLOL TARTRATE 25 MG: 25 TABLET, FILM COATED ORAL at 20:48

## 2019-08-03 RX ADMIN — SUCRALFATE 1 G: 1 TABLET ORAL at 17:54

## 2019-08-03 RX ADMIN — SERTRALINE HYDROCHLORIDE 50 MG: 50 TABLET ORAL at 08:54

## 2019-08-03 RX ADMIN — SODIUM CHLORIDE 100 ML/HR: 0.9 INJECTION, SOLUTION INTRAVENOUS at 00:05

## 2019-08-03 NOTE — CONSULTS
GI CONSULT  NOTE:    Referring Provider:   NOEMI Cornelius    Chief complaint: abdominal pain     Subjective     History of present illness: Patient is a 43-year-old male with a history of alcohol abuse, hypertension, hepatitis C who presented to the emergency room with complaint of abdominal & back pain. States he has been tolerating soft diet at home but states he has not been able to afford it. States he is not having any abdominal pain, nausea or vomiting.     7/15/19 LAPAROTOMY EXPLORATORY with Dr Peng Oversewing of gastroduodenal artery. Blas gastrostomy. Witzel jejunostomy. Placement of gastric & jejunal tube.            Endo History:  7/14/19 EGD (Dr Myers) 3 cm duodenal bulb ulcer which was cratered with a pulsating visible vessel with oozing around the base of the vessel.  This was injected with epinephrine and alcohol, cauterized with gold probe cautery, and hemospray.         Past Medical History:  Past Medical History:   Diagnosis Date   • ADHD (attention deficit hyperactivity disorder)    • Alcohol dependence (CMS/HCC)    • Anxiety    • Depression    • Essential hypertension 6/28/2019   • Hepatitis C 06/2019   • NICM (nonischemic cardiomyopathy) (CMS/HCC) 7/14/2019   • Substance abuse (CMS/HCC)    • Tobacco dependency        Past Surgical History:  Past Surgical History:   Procedure Laterality Date   • CARDIAC CATHETERIZATION     • ENDOSCOPY N/A 7/14/2019    Procedure: ESOPHAGOGASTRODUODENOSCOPY WITH SCLEROTHERAPY, BICAP CAUTERY OF DUODENAL ULCER AND HEMOSTASIS SPRAY OF DUODENAL ULCER;  Surgeon: Wesly Myesr MD;  Location: [facility] ENDOSCOPY;  Service: Gastroenterology   • EXPLORATORY LAPAROTOMY N/A 7/16/2019    Procedure: LAPAROTOMY EXPLORATORY;  Surgeon: Patience Peng MD;  Location: [facility] MAIN OR;  Service: General   • WRIST SURGERY N/A        Social History:  Social History     Tobacco Use   • Smoking status: Former Smoker     Packs/day: 1.00     Years: 20.00     Pack years: 20.00      Types: Cigarettes     Last attempt to quit: 2019     Years since quittin.0   • Smokeless tobacco: Former User   Substance Use Topics   • Alcohol use: Yes     Frequency: Never     Comment: use to drink half gallon of liquor per day; down to 1 pint of liquor per day; quit 2019   • Drug use: Yes     Types: Cocaine, Methamphetamines     Comment: last used meth about 2 months ago       Family History:  Family History   Problem Relation Age of Onset   • Heart disease Mother    • Heart attack Mother    • Colon cancer Mother    • Heart disease Brother    • Heart attack Brother    • Heart disease Brother    • Heart attack Brother        Medications:  Medications Prior to Admission   Medication Sig Dispense Refill Last Dose   • aspirin 81 MG EC tablet Take 1 tablet by mouth Daily for 30 days. 30 tablet 0 2019 at Unknown time   • lisinopril (PRINIVIL,ZESTRIL) 10 MG tablet Take 1 tablet by mouth Daily for 30 days. 30 tablet 0 2019 at Unknown time   • metoprolol tartrate (LOPRESSOR) 25 MG tablet Take 1 tablet by mouth Every 12 (Twelve) Hours for 30 days. 60 tablet 0 2019 at Unknown time   • sertraline (ZOLOFT) 50 MG tablet Take 50 mg by mouth Daily.   2019 at Unknown time   • traZODone (DESYREL) 100 MG tablet Take 100 mg by mouth Every Night.   2019 at Unknown time       Scheduled Meds:    lisinopril 10 mg Oral Q24H   metoprolol tartrate 25 mg Oral Q12H   sertraline 50 mg Oral Daily   sodium chloride 3 mL Intravenous Q12H   traZODone 100 mg Oral Nightly     Continuous Infusions:    sodium chloride 100 mL/hr Last Rate: 100 mL/hr (19 1015)     PRN Meds:.•  calcium gluconate IVPB **AND** calcium gluconate **AND** Calcium  •  magnesium sulfate **OR** magnesium sulfate **OR** magnesium sulfate  •  Morphine  •  nitroglycerin  •  ondansetron **OR** ondansetron  •  potassium chloride **OR** potassium chloride **OR** potassium chloride  •  [COMPLETED] Insert peripheral IV **AND** sodium chloride  •   sodium chloride  •  sodium chloride    ALLERGIES:  Patient has no known allergies.    ROS:  Review of Systems   Constitutional: Positive for fatigue. Negative for chills, diaphoresis and fever.   HENT: Negative.    Respiratory: Negative.    Cardiovascular: Positive for chest pain. Negative for palpitations and leg swelling.   Gastrointestinal: Negative.    Endocrine: Negative.    Genitourinary: Negative.    Musculoskeletal: Positive for arthralgias, back pain and myalgias.   Skin: Negative.    Allergic/Immunologic: Negative.    Neurological: Negative.    Hematological: Negative.    Psychiatric/Behavioral: Negative for suicidal ideas. The patient is nervous/anxious.        Objective Resting in bed    Vital Signs:   Vitals:    08/02/19 2330 08/03/19 0226 08/03/19 0539 08/03/19 1039   BP: 168/87 107/70 105/57 127/78   BP Location:  Left arm Right arm    Patient Position:  Lying Lying    Pulse: 68 66 66 51   Resp:  17 17 18   Temp:  98.2 °F (36.8 °C) 98.3 °F (36.8 °C) 98.2 °F (36.8 °C)   TempSrc:  Oral Oral Oral   SpO2:  100% 99% 100%   Weight:       Height:           Physical Exam:   General Appearance:    Awake and alert, in no acute distress   Head:    Normocephalic, without obvious abnormality, atraumatic   Eyes:            Conjunctivae normal, anicteric sclera, pupils equal   Ears:    Ears appear intact with no abnormalities noted   Throat:   No oral lesions, no thrush, oral mucosa moist   Neck:   Supple, no JVD   Lungs:     Clear to auscultation bilaterally, respirations regular, even and unlabored    Heart:    Regular rhythm and normal rate, normal S1 and S2, no            Murmur appreciated   Chest Wall:    No abnormalities observed   Abdomen:    Midline incision noted with staples still intact. G & J tube intact. Normal bowel sounds, soft, non-tender, no rebound or guarding, non-distended, no hepatosplenomegaly   Rectal:     Deferred   Extremities:   Moves all extremities, no edema, no cyanosis   Pulses:    Pulses palpable and equal bilaterally   Skin:   No rash, no jaundice, normal palpaion   Lymph nodes:   No cervical, supraclavicular or submandibular palpable adenopathy   Neurologic:   Cranial nerves 2 - 12 grossly intact, no asterixis       Results Review:   I reviewed the patient's labs and imaging.  Lab Results (last 24 hours)     Procedure Component Value Units Date/Time    Magnesium [[id number]]  (Normal) Collected:  08/03/19 0247    Specimen:  Blood Updated:  08/03/19 0322     Magnesium 2.0 mg/dL     CBC & Differential [[id number]] Collected:  08/03/19 0247    Specimen:  Blood Updated:  08/03/19 0322    Narrative:       The following orders were created for panel order CBC & Differential.  Procedure                               Abnormality         Status                     ---------                               -----------         ------                     CBC Auto Differential[[id number]]        Abnormal            Final result                 Please view results for these tests on the individual orders.    CBC Auto Differential [[id number]]  (Abnormal) Collected:  08/03/19 0247    Specimen:  Blood Updated:  08/03/19 0322     WBC 7.40 10*3/mm3      RBC 3.14 10*6/mm3      Hemoglobin 9.0 g/dL      Comment: Result checked        Hematocrit 26.6 %      MCV 84.5 fL      MCH 28.7 pg      MCHC 33.9 g/dL      RDW 14.7 %      RDW-SD 45.1 fl      MPV 7.2 fL      Platelets 436 10*3/mm3      Neutrophil % 65.6 %      Lymphocyte % 19.7 %      Monocyte % 12.3 %      Eosinophil % 1.4 %      Basophil % 1.0 %      Neutrophils, Absolute 4.90 10*3/mm3      Lymphocytes, Absolute 1.50 10*3/mm3      Monocytes, Absolute 0.90 10*3/mm3      Eosinophils, Absolute 0.10 10*3/mm3      Basophils, Absolute 0.10 10*3/mm3      nRBC 0.0 /100 WBC     Basic Metabolic Panel [[id number]]  (Abnormal) Collected:  08/03/19 0247    Specimen:  Blood Updated:  08/03/19 0320     Glucose 112 mg/dL      BUN 5 mg/dL      Creatinine 0.70 mg/dL      Sodium 133  mmol/L      Potassium 4.4 mmol/L      Chloride 102 mmol/L      CO2 24.0 mmol/L      Calcium 8.2 mg/dL      eGFR Non African Amer 123 mL/min/1.73      BUN/Creatinine Ratio 7.1     Anion Gap 11.4 mmol/L     Extra Tubes [[id number]] Collected:  08/02/19 1554    Specimen:  Blood, Venous Line Updated:  08/02/19 1704    Narrative:       The following orders were created for panel order Extra Tubes.  Procedure                               Abnormality         Status                     ---------                               -----------         ------                     Green Top (No Gel)[[id number]]                               Final result               Lavender Top[[id number]]                                     Final result               Gold Top - SST[[id number]]                                   Final result                 Please view results for these tests on the individual orders.    Lavender Top [[id number]] Collected:  08/02/19 1554    Specimen:  Blood Updated:  08/02/19 1704     Extra Tube hold for add-on     Comment: Auto resulted       [place] Draw [[id number]] Collected:  08/02/19 1555    Specimen:  Blood Updated:  08/02/19 1704    Narrative:       The following orders were created for panel order [place] Draw.  Procedure                               Abnormality         Status                     ---------                               -----------         ------                     Light Blue Top[[id number]]                                   Final result               Green Top (Gel)[[id number]]                                  Final result               Lavender Top[[id number]]                                     Final result               Gold Top - SST[[id number]]                                   Final result                 Please view results for these tests on the individual orders.    Green Top (Gel) [[id number]] Collected:  08/02/19 1555    Specimen:  Blood from Arm, Right Updated:  08/02/19 1704     Extra  Tube Hold for add-ons.     Comment: Auto resulted.       Gold Top - SST [[id number]] Collected:  08/02/19 1555    Specimen:  Blood from Arm, Right Updated:  08/02/19 1704     Extra Tube Hold for add-ons.     Comment: Auto resulted.       Green Top (No Gel) [[id number]] Collected:  08/02/19 1554    Specimen:  Blood Updated:  08/02/19 1704     Extra Tube Hold for add-ons.     Comment: Auto resulted.       Gold Top - SST [[id number]] Collected:  08/02/19 1554    Specimen:  Blood Updated:  08/02/19 1704     Extra Tube Hold for add-ons.     Comment: Auto resulted.       Light Blue Top [[id number]] Collected:  08/02/19 1555    Specimen:  Blood from Arm, Right Updated:  08/02/19 1704     Extra Tube hold for add-on     Comment: Auto resulted       Lavender Top [[id number]] Collected:  08/02/19 1555    Specimen:  Blood Updated:  08/02/19 1704     Extra Tube hold for add-on     Comment: Auto resulted       Comprehensive Metabolic Panel [[id number]]  (Abnormal) Collected:  08/02/19 1555    Specimen:  Blood from Arm, Right Updated:  08/02/19 1643     Glucose 103 mg/dL      BUN 8 mg/dL      Creatinine 0.80 mg/dL      Sodium 134 mmol/L      Potassium 3.6 mmol/L      Chloride 99 mmol/L      CO2 23.0 mmol/L      Calcium 8.3 mg/dL      Total Protein 6.7 g/dL      Comment: Result checked         Albumin 2.70 g/dL      ALT (SGPT) 24 U/L      AST (SGOT) 21 U/L      Alkaline Phosphatase 80 U/L      Comment: Result checked         Total Bilirubin 0.5 mg/dL      eGFR Non African Amer 105 mL/min/1.73      Globulin 4.0 gm/dL      A/G Ratio 0.7 g/dL      BUN/Creatinine Ratio 10.0     Anion Gap 15.6 mmol/L     Lipase [[id number]]  (Normal) Collected:  08/02/19 1555    Specimen:  Blood from Arm, Right Updated:  08/02/19 1637     Lipase 36 U/L     Magnesium [[id number]]  (Abnormal) Collected:  08/02/19 1555    Specimen:  Blood from Arm, Right Updated:  08/02/19 1637     Magnesium 1.5 mg/dL     CBC & Differential [[id number]] Collected:  08/02/19 1559     Specimen:  Blood Updated:  08/02/19 1626    Narrative:       The following orders were created for panel order CBC & Differential.  Procedure                               Abnormality         Status                     ---------                               -----------         ------                     CBC Auto Differential[[id number]]        Abnormal            Final result                 Please view results for these tests on the individual orders.    CBC Auto Differential [[id number]]  (Abnormal) Collected:  08/02/19 1555    Specimen:  Blood Updated:  08/02/19 1626     WBC 6.20 10*3/mm3      RBC 3.40 10*6/mm3      Hemoglobin 9.4 g/dL      Hematocrit 28.6 %      MCV 84.1 fL      MCH 27.6 pg      MCHC 32.8 g/dL      RDW 14.5 %      RDW-SD 42.9 fl      MPV 7.4 fL      Platelets 579 10*3/mm3      Neutrophil % 54.9 %      Lymphocyte % 29.3 %      Monocyte % 13.8 %      Eosinophil % 1.1 %      Basophil % 0.9 %      Neutrophils, Absolute 3.40 10*3/mm3      Lymphocytes, Absolute 1.80 10*3/mm3      Monocytes, Absolute 0.90 10*3/mm3      Eosinophils, Absolute 0.10 10*3/mm3      Basophils, Absolute 0.10 10*3/mm3      nRBC 0.0 /100 WBC     Blood Culture - Blood, Arm, Right [[id number]] Collected:  08/02/19 1554    Specimen:  Blood from Arm, Right Updated:  08/02/19 1625    POC Creatinine [[id number]] Collected:  08/02/19 1608    Specimen:  Blood Updated:  08/02/19 1621     Creatinine 0.60 mg/dL      Comment: Serial Number: [id number]:  [id number]        GFR MDRD  -- mL/min/1.73 sq.M      Comment: Serial Number: [id number]:  [id number]        GFR MDRD Non  -- mL/min/1.73 sq.M      Comment: Serial Number: [id number]:  [id number]       aPTT [[id number]]  (Normal) Collected:  08/02/19 1555    Specimen:  Blood from Arm, Right Updated:  08/02/19 1619     PTT 25.5 seconds     Protime-INR [[id number]]  (Normal) Collected:  08/02/19 1555    Specimen:  Blood from Arm, Right Updated:  08/02/19 1619     Protime  10.3 Seconds      INR 1.00    Urinalysis With Culture If Indicated - Urine, Clean Catch [[id number]]  (Abnormal) Collected:  08/02/19 1605    Specimen:  Urine, Clean Catch Updated:  08/02/19 1618     Color, UA Yellow     Appearance, UA Clear     pH, UA 8.5     Specific Gravity, UA 1.021     Glucose, UA Negative     Ketones, UA Negative     Bilirubin, UA Negative     Blood, UA Negative     Protein, UA Trace     Leuk Esterase, UA Negative     Nitrite, UA Negative     Urobilinogen, UA 1.0 E.U./dL    Narrative:       Urine microscopic not indicated.    Blood Culture - Blood, Arm, Right [[id number]] Collected:  08/02/19 1540    Specimen:  Blood from Arm, Right Updated:  08/02/19 1550    POC Lactate [[id number]]  (Normal) Collected:  08/02/19 1549    Specimen:  Blood Updated:  08/02/19 1550     Lactate 1.5 mmol/L      Comment: Serial Number: [id number]:  [id number]             Imaging Results (last 24 hours)     Procedure Component Value Units Date/Time    CT Chest Pulmonary Embolism With Contrast [[id number]] Collected:  08/02/19 1644     Updated:  08/02/19 1657    Narrative:       CT CHEST PULMONARY EMBOLISM W CONTRAST-, CT ABDOMEN PELVIS W CONTRAST-     Date of Exam: 8/2/2019 4:29 PM     Indication: Shortness of breath.  HISTORY of duodenal repair. HISTORY of  gastroduodenal artery embolization procedure on 15 2019.     Comparison: Upper GI series 07/21/2019. CT chest 06/27/2019.     Technique: Serial and axial CT images of the chest were obtained  following the uneventful intravenous administration of 100 cc Isovue-300  contrast. Reconstructions in the coronal and sagittal planes were also  performed.  Automated exposure control and iterative reconstruction  methods were used.     FINDINGS:     CHEST: The study is mildly degraded by respiratory motion. No pulmonary  embolism. No thoracic aortic aneurysm or aortic dissection. Mild  concentric thickening of the lower thoracic esophagus without discrete  mass lesion.  Tiny distal paraesophageal node measuring 7 mm, thought to  be stable. No pericardial effusion. No pleural effusion. Imaged thyroid  gland is normal.     The lungs are free of acute airspace disease. Benign calcified granuloma  in the left upper lobe. 5 mm non-calcified or groundglass nodular  density in the posterior pleural margin of the left lower lobe (image  88), new since the prior examination.     Degenerative change of the cervical spine. No acute or suspicious  osseous lesions.           ABDOMEN AND PELVIS: Abnormal mucosal thickening and hyperenhancement of  the gastric antrum and proximal duodenum in keeping with provided  history of wide no repair. Presumed embolization coils are seen near the  posterior inferior margin of the pancreatic head. Surgical clips or  coils are seen the medial margin of the gastroduodenal junction.     There is an irregular air-fluid density collection at the  gastric-duodenal junction which does not clearly appear intraluminal but  may:  The lumen. This collection measures 1.7 x 3.8 x 3.1 cm, and could  potentially represent a contained abscess or new or residual ulcer.     A small round fluid collection is located within the pancreatic neck  measuring 10 mm. It could represent a pancreatic cyst or pseudocyst, but  is not definitely seen on the previous CT chest study.     Tiny low-density lesions represent cysts are seen within the left  hepatic lobe, each measuring less than 5 mm. Gallbladder is contracted.  Spleen, adrenals and kidneys are normal. Pancreas can't appears  thickened and edematous likely representing secondary reactive  inflammatory change. Jejunostomy tube appears appropriately positioned  within the bowel lumen.     Uncomplicated diverticular changes within the descending and sigmoid  colon. The appendix is normal.     There is abnormal thickening and enhancement of the rectum, raising the  possibility of proctitis in the appropriate clinical context.  The  urinary bladder is decompressed. Prostate gland is normal. No pelvic  free fluid.     Mild lumbar dextroscoliosis and degenerative changes.          Impression:          1. Abnormal appearance of the gastroduodenal junction may represent  sequelae of recent surgical intervention with residual inflammatory  process. Air-fluid collection is seen at the medial margin of the  gastroduodenal junction which is not definitely intraluminal, and could  represent recurrent or residual ulceration or abscess or contained leak.  2. 10 mm fluid density lesion within the pancreatic neck nonspecific and  could represent pancreatic cyst or pseudocyst. Likely secondary reactive  inflammatory changes within the pancreatic head and neck, as well.  3. Abnormal thickening and enhancement of the rectum. Correlate for  proctitis symptoms.  4. New 5 mm groundglass nodule in the left lower lobe. Benign  infectious/inflammatory nodule is favored.           Electronically Signed By-Dr. Yoon Garcia MD On:8/2/2019 4:55 PM  This report was finalized on [id number] by Dr. Yoon Garcia MD.    CT Abdomen Pelvis With Contrast [[id number]] Collected:  08/02/19 1644     Updated:  08/02/19 1657    Narrative:       CT CHEST PULMONARY EMBOLISM W CONTRAST-, CT ABDOMEN PELVIS W CONTRAST-     Date of Exam: 8/2/2019 4:29 PM     Indication: Shortness of breath.  HISTORY of duodenal repair. HISTORY of  gastroduodenal artery embolization procedure on 15 2019.     Comparison: Upper GI series 07/21/2019. CT chest 06/27/2019.     Technique: Serial and axial CT images of the chest were obtained  following the uneventful intravenous administration of 100 cc Isovue-300  contrast. Reconstructions in the coronal and sagittal planes were also  performed.  Automated exposure control and iterative reconstruction  methods were used.     FINDINGS:     CHEST: The study is mildly degraded by respiratory motion. No pulmonary  embolism. No thoracic aortic aneurysm or  aortic dissection. Mild  concentric thickening of the lower thoracic esophagus without discrete  mass lesion. Tiny distal paraesophageal node measuring 7 mm, thought to  be stable. No pericardial effusion. No pleural effusion. Imaged thyroid  gland is normal.     The lungs are free of acute airspace disease. Benign calcified granuloma  in the left upper lobe. 5 mm non-calcified or groundglass nodular  density in the posterior pleural margin of the left lower lobe (image  88), new since the prior examination.     Degenerative change of the cervical spine. No acute or suspicious  osseous lesions.           ABDOMEN AND PELVIS: Abnormal mucosal thickening and hyperenhancement of  the gastric antrum and proximal duodenum in keeping with provided  history of wide no repair. Presumed embolization coils are seen near the  posterior inferior margin of the pancreatic head. Surgical clips or  coils are seen the medial margin of the gastroduodenal junction.     There is an irregular air-fluid density collection at the  gastric-duodenal junction which does not clearly appear intraluminal but  may:  The lumen. This collection measures 1.7 x 3.8 x 3.1 cm, and could  potentially represent a contained abscess or new or residual ulcer.     A small round fluid collection is located within the pancreatic neck  measuring 10 mm. It could represent a pancreatic cyst or pseudocyst, but  is not definitely seen on the previous CT chest study.     Tiny low-density lesions represent cysts are seen within the left  hepatic lobe, each measuring less than 5 mm. Gallbladder is contracted.  Spleen, adrenals and kidneys are normal. Pancreas can't appears  thickened and edematous likely representing secondary reactive  inflammatory change. Jejunostomy tube appears appropriately positioned  within the bowel lumen.     Uncomplicated diverticular changes within the descending and sigmoid  colon. The appendix is normal.     There is abnormal thickening  and enhancement of the rectum, raising the  possibility of proctitis in the appropriate clinical context. The  urinary bladder is decompressed. Prostate gland is normal. No pelvic  free fluid.     Mild lumbar dextroscoliosis and degenerative changes.          Impression:          1. Abnormal appearance of the gastroduodenal junction may represent  sequelae of recent surgical intervention with residual inflammatory  process. Air-fluid collection is seen at the medial margin of the  gastroduodenal junction which is not definitely intraluminal, and could  represent recurrent or residual ulceration or abscess or contained leak.  2. 10 mm fluid density lesion within the pancreatic neck nonspecific and  could represent pancreatic cyst or pseudocyst. Likely secondary reactive  inflammatory changes within the pancreatic head and neck, as well.  3. Abnormal thickening and enhancement of the rectum. Correlate for  proctitis symptoms.  4. New 5 mm groundglass nodule in the left lower lobe. Benign  infectious/inflammatory nodule is favored.           Electronically Signed By-Dr. Yoon Garcia MD On:8/2/2019 4:55 PM  This report was finalized on [id number] by Dr. Yoon Garcia MD.    XR Chest 1 View [[id number]] Collected:  08/02/19 1541     Updated:  08/02/19 1545    Narrative:       DATE OF EXAM:  8/2/2019 3:38 PM     PROCEDURE:  XR CHEST 1 VW-     INDICATIONS:  Simple Sepsis Protocol     COMPARISON:  7/16/2019.     TECHNIQUE:   Single radiographic AP view of the chest was obtained.     FINDINGS:  A single view of the chest reveals no cardiac enlargement and no focal  infiltrate. No pneumothorax is identified. There is suspected chronic  calcified granulomatous disease of the chest. The patient has been  extubated since 7/16/2019. The right IJ central venous line has been  removed. The nasogastric tube has also been removed (as well as the  endotracheal tube).       Impression:       No acute cardiopulmonary disease is  seen radiographically.        Electronically Signed By-Dr. Olaf Fisher MD On:8/2/2019 3:43 PM  This report was finalized on [id number] by Dr. Olaf Fisher MD.             ASSESSMENT AND PLAN:    7/15/19 LAPAROTOMY EXPLORATORY with Dr Peng Oversewing of gastroduodenal artery. Blas gastrostomy. Witzel jejunostomy. Placement of gastric & jejunal tube.     7/14/19 EGD (Dr Myers) 3 cm duodenal bulb ulcer which was cratered with a pulsating visible vessel with oozing around the base of the vessel.  This was injected with epinephrine and alcohol, cauterized with gold probe cautery, and hemospray.     Anemia related to above   Diarrhea post surgical   History of hepatitis C  Alcohol abuse  Hypertension    PLAN:   OK for soft diet  PPI & Carafate  Instructed he can use ensure down J-tube at home to supplement.   Needs  for evaluate for home environment-ensure he has the resources at home to eat & care for himself.       I discussed the patients findings and my recommendations with the patient.  Sue Grady, ZACK  08/03/19  11:18 AM    Time:

## 2019-08-03 NOTE — CONSULTS
Subjective   Asad Bethea is a 43 y.o. male.     History of present illness  Patient is seen at your request in consultation.  He is a 43-year-old who about 2-1/2 weeks ago underwent 3 different surgeries for bleeding duodenal ulcer.  They had tried coil embolization unsuccessfully and Dr. Mohr did a duodenotomy and oversewed the gastroduodenal artery.  He went home about a week after that and he states that since he has been home he has been on a soft diet.  He came back in through the emergency room with mid back pain he did have originally.  He states that he starving to death and he wants solid food to eat.  I have explained that at 2 weeks it is really too early for him to be eating solid foods after that type of surgery and that I am concerned he may be trying to perforate again due to his pain.  My suggestion is that we have GI see him and possibly either do an upper GI or a endoscopic evaluation to make sure that the area is healed before considered for any solid food.    Past Medical History:   Diagnosis Date   • ADHD (attention deficit hyperactivity disorder)    • Alcohol dependence (CMS/HCC)    • Anxiety    • Depression    • Essential hypertension 6/28/2019   • Hepatitis C 06/2019   • NICM (nonischemic cardiomyopathy) (CMS/HCC) 7/14/2019   • Substance abuse (CMS/HCC)    • Tobacco dependency        Past Surgical History:   Procedure Laterality Date   • CARDIAC CATHETERIZATION     • ENDOSCOPY N/A 7/14/2019    Procedure: ESOPHAGOGASTRODUODENOSCOPY WITH SCLEROTHERAPY, BICAP CAUTERY OF DUODENAL ULCER AND HEMOSTASIS SPRAY OF DUODENAL ULCER;  Surgeon: Wesly Myers MD;  Location: [facility] ENDOSCOPY;  Service: Gastroenterology   • EXPLORATORY LAPAROTOMY N/A 7/16/2019    Procedure: LAPAROTOMY EXPLORATORY;  Surgeon: Patience Peng MD;  Location: [facility] MAIN OR;  Service: General   • WRIST SURGERY N/A          Current Facility-Administered Medications:   •  calcium gluconate 1 g in sodium chloride 0.9 %  100 mL IVPB, 1 g, Intravenous, PRN **AND** calcium gluconate 2 g/100 mL NS IVPB, 2 g, Intravenous, PRN **AND** Calcium, , , PRN, Bee Le APRN  •  lisinopril (PRINIVIL,ZESTRIL) tablet 10 mg, 10 mg, Oral, Q24H, Bee Le APRN, 10 mg at 08/03/19 0854  •  Magnesium Sulfate 2 gram Bolus, followed by 8 gram infusion (total Mg dose 10 grams)- Mg less than or equal to 1mg/dL, 2 g, Intravenous, PRN **OR** Magnesium Sulfate 2 gram / 50mL Infusion (GIVE X 3 BAGS TO EQUAL 6GM TOTAL DOSE) - Mg 1.1 - 1.5 mg/dl, 2 g, Intravenous, PRN **OR** Magnesium Sulfate 4 gram infusion- Mg 1.6-1.9 mg/dL, 4 g, Intravenous, PRN, Bee Le APRN  •  metoprolol tartrate (LOPRESSOR) tablet 25 mg, 25 mg, Oral, Q12H, Bee Le APRN, 25 mg at 08/03/19 0855  •  Morphine sulfate (PF) injection 4 mg, 4 mg, Intravenous, Q4H PRN, Bee Le APRN, Stopped at 08/03/19 0956  •  nitroglycerin (NITROSTAT) SL tablet 0.4 mg, 0.4 mg, Sublingual, Q5 Min PRN, Bee Le APRN  •  ondansetron (ZOFRAN) tablet 4 mg, 4 mg, Oral, Q6H PRN **OR** ondansetron (ZOFRAN) injection 4 mg, 4 mg, Intravenous, Q6H PRN, Bee Le APRN  •  potassium chloride (K-DUR,KLOR-CON) CR tablet 40 mEq, 40 mEq, Oral, PRN **OR** potassium chloride (KLOR-CON) packet 40 mEq, 40 mEq, Oral, PRN **OR** potassium chloride 10 mEq in 100 mL IVPB, 10 mEq, Intravenous, Q1H PRN, Bee Le APRN  •  sertraline (ZOLOFT) tablet 50 mg, 50 mg, Oral, Daily, Bee Le APRN, 50 mg at 08/03/19 0854  •  [COMPLETED] Insert peripheral IV, , , Once **AND** sodium chloride 0.9 % flush 10 mL, 10 mL, Intravenous, PRN, Zamzam Garcia APRN  •  sodium chloride 0.9 % flush 10 mL, 10 mL, Intravenous, PRN, Zamzam Garcia APRN  •  sodium chloride 0.9 % flush 3 mL, 3 mL, Intravenous, Q12H, Bee Le APRN, 3 mL at 08/03/19 0856  •  sodium chloride 0.9 % flush 3-10 mL, 3-10 mL, Intravenous, PRN,  "Bee Le APRN  •  sodium chloride 0.9 % infusion, 100 mL/hr, Intravenous, Continuous, Bee Le APRN, Last Rate: 100 mL/hr at 19 1015, 100 mL/hr at 19 1015  •  traZODone (DESYREL) tablet 100 mg, 100 mg, Oral, Nightly, Bee Le APRN, 100 mg at 19 2330    No Known Allergies    Family History   Problem Relation Age of Onset   • Heart disease Mother    • Heart attack Mother    • Colon cancer Mother    • Heart disease Brother    • Heart attack Brother    • Heart disease Brother    • Heart attack Brother        Social History     Socioeconomic History   • Marital status:      Spouse name: Not on file   • Number of children: Not on file   • Years of education: Not on file   • Highest education level: Not on file   Tobacco Use   • Smoking status: Former Smoker     Packs/day: 1.00     Years: 20.00     Pack years: 20.00     Types: Cigarettes     Last attempt to quit: 2019     Years since quittin.0   • Smokeless tobacco: Former User   Substance and Sexual Activity   • Alcohol use: Yes     Frequency: Never     Comment: use to drink half gallon of liquor per day; down to 1 pint of liquor per day; quit 2019   • Drug use: Yes     Types: Cocaine, Methamphetamines     Comment: last used meth about 2 months ago   • Sexual activity: Defer   Social History Narrative    He reports extensive history of depression, anxiety, and substance abuse. He's been diagnosed with ADD and bipolar disorder, and has been treated at [facility] and [facility]. Medications in the past have made him feel \"slow\" and \"dumb\". He reports that his mind is always going \"a million miles a minute\", and he \"can't get shit done\" because he can't focus. He reports very low self esteem. He reports his son's mother has told him they'd be better off if he was dead and he wonders if that's true, but states tearfully that he doesn't want to die. He has quit drinking and doing drugs in the " past, and says he went to [employer] a lot. He's never had a sponsor or really participated in AA. He did cocaine on and off for around 10 years but quit when his son was born 9 yrs ago. He says that cocaine calmed him down. In the past couple years, he started using meth which calms him down if he snorts it, but amps him up if he smokes it. Alcohol calms him down. He reports feeling very scared right now and worried that he's going to die. He seems ready to address his substance abuse and try to live a healthier lifestyle. His son is his main motivation.        The following portions of the patient's history were reviewed and updated as appropriate: allergies, current medications, past family history, past medical history, past social history, past surgical history and problem list.      Assessment/Plan     Review of systems is reviewed through the chart and through the patient.    Physical exam shows pleasant 43-year-old.  HEENT is negative heart regular lungs are clear abdomen is soft.  Incision is healing appropriately.  Staples are still intact.  He has a G-tube in place and a J-tube in place.  He states that he did not use them at home.  Apparently his insurance denied him getting enteral feeds.    Labs have been reviewed.  9.  No leukocytosis    Impression: Patient is a little over 2 weeks out from 3 surgeries for duodenal bleed.  He is having abdominal pain that only may be from prior duodenal ulcers worsening.  I would not feed him solid food until he is cleared endoscopically or by upper GI to rule out worsening of the ulcer.    Morgan Mason,   8/3/2019  10:47 AM

## 2019-08-03 NOTE — H&P
[facility] HOSPITALIST     Provider, No Known    CHIEF COMPLAINT:     Abdominal pain     HISTORY OF PRESENT ILLNESS:    43 year old male presents to ED with complaints of abdominal pain past few days . He denies hematemesis , nausea vomiting, or changes in bowel or bladder . He also reports dyspnea, denies chest pain, fever, chills, cough , congestion. H was recently hospitalized here last month, initially for chest pain . Myoview was abnormal and he left AMA. Subsequent cardiac cath 7/9/19 showed no obstructive CAD.Subsequent he was re- admitted later in the month for syncope  And was found to have bleeding ulcers that were cauterized during EGD. He subsequently deteriorated and was placed in ICU. He underwent an exploratory lap and was eventually discharged home with G tube. He reports he has not used G tube at all as no home health was set up but has been able to eat soft foods. In ED today CT abdomen and pelvis show  some irregular air fluid density in abdomen and possible pancreatic cyst as well as 5 mm nodule LLL possibly infection. His temp was 100 F. Lactate 1.5, vitals stable, wbc normal. He was started on Iv Cefepime for possible HAP and GI and general surgery consulted for further evaluation. PMH Hep C, drug abuse ( denies current) , tobacco use, depression.  HPI      Past Medical History:   Diagnosis Date   • ADHD (attention deficit hyperactivity disorder)    • Alcohol dependence (CMS/HCC)    • Anxiety    • Depression    • Essential hypertension 6/28/2019   • Hepatitis C 06/2019   • NICM (nonischemic cardiomyopathy) (CMS/HCC) 7/14/2019   • Substance abuse (CMS/HCC)    • Tobacco dependency      Past Surgical History:   Procedure Laterality Date   • CARDIAC CATHETERIZATION     • ENDOSCOPY N/A 7/14/2019    Procedure: ESOPHAGOGASTRODUODENOSCOPY WITH SCLEROTHERAPY, BICAP CAUTERY OF DUODENAL ULCER AND HEMOSTASIS SPRAY OF DUODENAL ULCER;  Surgeon: Wesly Myers MD;  Location: [facility]  "ENDOSCOPY;  Service: Gastroenterology   • EXPLORATORY LAPAROTOMY N/A 2019    Procedure: LAPAROTOMY EXPLORATORY;  Surgeon: Patience Peng MD;  Location: [facility] MAIN OR;  Service: General   • WRIST SURGERY N/A      Family History   Problem Relation Age of Onset   • Heart disease Mother    • Heart attack Mother    • Colon cancer Mother    • Heart disease Brother    • Heart attack Brother    • Heart disease Brother    • Heart attack Brother      Social History     Tobacco Use   • Smoking status: Former Smoker     Packs/day: 1.00     Years: 20.00     Pack years: 20.00     Types: Cigarettes     Last attempt to quit: 2019     Years since quittin.0   • Smokeless tobacco: Former User   Substance Use Topics   • Alcohol use: Yes     Frequency: Never     Comment: use to drink half gallon of liquor per day; down to 1 pint of liquor per day; quit 2019   • Drug use: Yes     Types: Cocaine, Methamphetamines     Comment: last used meth about 2 weeks ago       (Not in a hospital admission)  Allergies:  Patient has no known allergies.      There is no immunization history on file for this patient.        REVIEW OF SYSTEMS:     Review of Systems   Gastrointestinal: Positive for abdominal pain.   All other systems reviewed and are negative.      Vital Signs  Temp:  [100 °F (37.8 °C)] 100 °F (37.8 °C)  Heart Rate:  [] 79  Resp:  [13-22] 13  BP: (140-177)/(55-83) 177/83    Flowsheet Rows      First Filed Value   Admission Height  177.8 cm (70\") Documented at 2019 1505   Admission Weight  90.7 kg (200 lb) Documented at 2019 1505           Physical Exam:    Physical Exam   Constitutional: He is oriented to person, place, and time. He appears well-developed and well-nourished.   Eyes: EOM are normal.   Cardiovascular: Normal rate, normal heart sounds and intact distal pulses.   Pulmonary/Chest: Effort normal and breath sounds normal.   Abdominal: Soft. Bowel sounds are normal.   Musculoskeletal: Normal " range of motion.   Neurological: He is alert and oriented to person, place, and time.   Skin: Skin is warm and dry.   Psychiatric: He has a normal mood and affect. His behavior is normal. Judgment and thought content normal.   Vitals reviewed.      Emotional Behavior:    cooperative    Debilities:  Age appropriate      Results Review:    I reviewed the patient's new clinical results.  Lab Results (most recent)     Procedure Component Value Units Date/Time    Extra Tubes [[id number]] Collected:  08/02/19 1554    Specimen:  Blood, Venous Line Updated:  08/02/19 1704    Narrative:       The following orders were created for panel order Extra Tubes.  Procedure                               Abnormality         Status                     ---------                               -----------         ------                     Green Top (No Gel)[[id number]]                               Final result               Lavender Top[[id number]]                                     Final result               Gold Top - [facility][[id number]]                                   Final result                 Please view results for these tests on the individual orders.    Lavender Top [[id number]] Collected:  08/02/19 1554    Specimen:  Blood Updated:  08/02/19 1704     Extra Tube hold for add-on     Comment: Auto resulted       [place] Draw [[id number]] Collected:  08/02/19 1555    Specimen:  Blood Updated:  08/02/19 1704    Narrative:       The following orders were created for panel order [place] Draw.  Procedure                               Abnormality         Status                     ---------                               -----------         ------                     Light Blue Top[[id number]]                                   Final result               Green Top (Gel)[[id number]]                                  Final result               Lavender Top[[id number]]                                     Final result               Gold Top -  [facility][[id number]]                                   Final result                 Please view results for these tests on the individual orders.    Green Top (Gel) [[id number]] Collected:  08/02/19 1555    Specimen:  Blood from Arm, Right Updated:  08/02/19 1704     Extra Tube Hold for add-ons.     Comment: Auto resulted.       Gold Top - [facility] [[id number]] Collected:  08/02/19 1555    Specimen:  Blood from Arm, Right Updated:  08/02/19 1704     Extra Tube Hold for add-ons.     Comment: Auto resulted.       Green Top (No Gel) [[id number]] Collected:  08/02/19 1554    Specimen:  Blood Updated:  08/02/19 1704     Extra Tube Hold for add-ons.     Comment: Auto resulted.       Gold Top - [facility] [[id number]] Collected:  08/02/19 1554    Specimen:  Blood Updated:  08/02/19 1704     Extra Tube Hold for add-ons.     Comment: Auto resulted.       Light Blue Top [[id number]] Collected:  08/02/19 1555    Specimen:  Blood from Arm, Right Updated:  08/02/19 1704     Extra Tube hold for add-on     Comment: Auto resulted       Lavender Top [[id number]] Collected:  08/02/19 1555    Specimen:  Blood Updated:  08/02/19 1704     Extra Tube hold for add-on     Comment: Auto resulted       Comprehensive Metabolic Panel [[id number]]  (Abnormal) Collected:  08/02/19 1555    Specimen:  Blood from Arm, Right Updated:  08/02/19 1643     Glucose 103 mg/dL      BUN 8 mg/dL      Creatinine 0.80 mg/dL      Sodium 134 mmol/L      Potassium 3.6 mmol/L      Chloride 99 mmol/L      CO2 23.0 mmol/L      Calcium 8.3 mg/dL      Total Protein 6.7 g/dL      Comment: Result checked         Albumin 2.70 g/dL      ALT (SGPT) 24 U/L      AST (SGOT) 21 U/L      Alkaline Phosphatase 80 U/L      Comment: Result checked         Total Bilirubin 0.5 mg/dL      eGFR Non African Amer 105 mL/min/1.73      Globulin 4.0 gm/dL      A/G Ratio 0.7 g/dL      BUN/Creatinine Ratio 10.0     Anion Gap 15.6 mmol/L     Lipase [[id number]]  (Normal) Collected:  08/02/19 1555    Specimen:  Blood  from Arm, Right Updated:  08/02/19 1637     Lipase 36 U/L     Magnesium [[id number]]  (Abnormal) Collected:  08/02/19 1555    Specimen:  Blood from Arm, Right Updated:  08/02/19 1637     Magnesium 1.5 mg/dL     CBC & Differential [[id number]] Collected:  08/02/19 1555    Specimen:  Blood Updated:  08/02/19 1626    Narrative:       The following orders were created for panel order CBC & Differential.  Procedure                               Abnormality         Status                     ---------                               -----------         ------                     CBC Auto Differential[[id number]]        Abnormal            Final result                 Please view results for these tests on the individual orders.    CBC Auto Differential [[id number]]  (Abnormal) Collected:  08/02/19 1555    Specimen:  Blood Updated:  08/02/19 1626     WBC 6.20 10*3/mm3      RBC 3.40 10*6/mm3      Hemoglobin 9.4 g/dL      Hematocrit 28.6 %      MCV 84.1 fL      MCH 27.6 pg      MCHC 32.8 g/dL      RDW 14.5 %      RDW-SD 42.9 fl      MPV 7.4 fL      Platelets 579 10*3/mm3      Neutrophil % 54.9 %      Lymphocyte % 29.3 %      Monocyte % 13.8 %      Eosinophil % 1.1 %      Basophil % 0.9 %      Neutrophils, Absolute 3.40 10*3/mm3      Lymphocytes, Absolute 1.80 10*3/mm3      Monocytes, Absolute 0.90 10*3/mm3      Eosinophils, Absolute 0.10 10*3/mm3      Basophils, Absolute 0.10 10*3/mm3      nRBC 0.0 /100 WBC     Blood Culture - Blood, Arm, Right [[id number]] Collected:  08/02/19 1554    Specimen:  Blood from Arm, Right Updated:  08/02/19 1625    POC Creatinine [[id number]] Collected:  08/02/19 1608    Specimen:  Blood Updated:  08/02/19 1621     Creatinine 0.60 mg/dL      Comment: Serial Number: [id number]:  [id number]        GFR MDRD  -- mL/min/1.73 sq.M      Comment: Serial Number: [id number]:  [id number]        GFR MDRD Non  -- mL/min/1.73 sq.M      Comment: Serial Number: [id number]:  [id number]        aPTT [[id number]]  (Normal) Collected:  08/02/19 1555    Specimen:  Blood from Arm, Right Updated:  08/02/19 1619     PTT 25.5 seconds     Protime-INR [[id number]]  (Normal) Collected:  08/02/19 1555    Specimen:  Blood from Arm, Right Updated:  08/02/19 1619     Protime 10.3 Seconds      INR 1.00    Urinalysis With Culture If Indicated - Urine, Clean Catch [[id number]]  (Abnormal) Collected:  08/02/19 1605    Specimen:  Urine, Clean Catch Updated:  08/02/19 1618     Color, UA Yellow     Appearance, UA Clear     pH, UA 8.5     Specific Gravity, UA 1.021     Glucose, UA Negative     Ketones, UA Negative     Bilirubin, UA Negative     Blood, UA Negative     Protein, UA Trace     Leuk Esterase, UA Negative     Nitrite, UA Negative     Urobilinogen, UA 1.0 E.U./dL    Narrative:       Urine microscopic not indicated.    Blood Culture - Blood, Arm, Right [[id number]] Collected:  08/02/19 1540    Specimen:  Blood from Arm, Right Updated:  08/02/19 1550    POC Lactate [[id number]]  (Normal) Collected:  08/02/19 1549    Specimen:  Blood Updated:  08/02/19 1550     Lactate 1.5 mmol/L      Comment: Serial Number: [id number]:  [id number]             Imaging Results (most recent)     Procedure Component Value Units Date/Time    CT Chest Pulmonary Embolism With Contrast [[id number]] Collected:  08/02/19 1644     Updated:  08/02/19 1657    Narrative:       CT CHEST PULMONARY EMBOLISM W CONTRAST-, CT ABDOMEN PELVIS W CONTRAST-     Date of Exam: 8/2/2019 4:29 PM     Indication: Shortness of breath.  HISTORY of duodenal repair. HISTORY of  gastroduodenal artery embolization procedure on 15 2019.     Comparison: Upper GI series 07/21/2019. CT chest 06/27/2019.     Technique: Serial and axial CT images of the chest were obtained  following the uneventful intravenous administration of 100 cc Isovue-300  contrast. Reconstructions in the coronal and sagittal planes were also  performed.  Automated exposure control and iterative  reconstruction  methods were used.     FINDINGS:     CHEST: The study is mildly degraded by respiratory motion. No pulmonary  embolism. No thoracic aortic aneurysm or aortic dissection. Mild  concentric thickening of the lower thoracic esophagus without discrete  mass lesion. Tiny distal paraesophageal node measuring 7 mm, thought to  be stable. No pericardial effusion. No pleural effusion. Imaged thyroid  gland is normal.     The lungs are free of acute airspace disease. Benign calcified granuloma  in the left upper lobe. 5 mm non-calcified or groundglass nodular  density in the posterior pleural margin of the left lower lobe (image  88), new since the prior examination.     Degenerative change of the cervical spine. No acute or suspicious  osseous lesions.           ABDOMEN AND PELVIS: Abnormal mucosal thickening and hyperenhancement of  the gastric antrum and proximal duodenum in keeping with provided  history of wide no repair. Presumed embolization coils are seen near the  posterior inferior margin of the pancreatic head. Surgical clips or  coils are seen the medial margin of the gastroduodenal junction.     There is an irregular air-fluid density collection at the  gastric-duodenal junction which does not clearly appear intraluminal but  may:  The lumen. This collection measures 1.7 x 3.8 x 3.1 cm, and could  potentially represent a contained abscess or new or residual ulcer.     A small round fluid collection is located within the pancreatic neck  measuring 10 mm. It could represent a pancreatic cyst or pseudocyst, but  is not definitely seen on the previous CT chest study.     Tiny low-density lesions represent cysts are seen within the left  hepatic lobe, each measuring less than 5 mm. Gallbladder is contracted.  Spleen, adrenals and kidneys are normal. Pancreas can't appears  thickened and edematous likely representing secondary reactive  inflammatory change. Jejunostomy tube appears appropriately  positioned  within the bowel lumen.     Uncomplicated diverticular changes within the descending and sigmoid  colon. The appendix is normal.     There is abnormal thickening and enhancement of the rectum, raising the  possibility of proctitis in the appropriate clinical context. The  urinary bladder is decompressed. Prostate gland is normal. No pelvic  free fluid.     Mild lumbar dextroscoliosis and degenerative changes.          Impression:          1. Abnormal appearance of the gastroduodenal junction may represent  sequelae of recent surgical intervention with residual inflammatory  process. Air-fluid collection is seen at the medial margin of the  gastroduodenal junction which is not definitely intraluminal, and could  represent recurrent or residual ulceration or abscess or contained leak.  2. 10 mm fluid density lesion within the pancreatic neck nonspecific and  could represent pancreatic cyst or pseudocyst. Likely secondary reactive  inflammatory changes within the pancreatic head and neck, as well.  3. Abnormal thickening and enhancement of the rectum. Correlate for  proctitis symptoms.  4. New 5 mm groundglass nodule in the left lower lobe. Benign  infectious/inflammatory nodule is favored.           Electronically Signed By-Dr. Yoon Garcia MD On:8/2/2019 4:55 PM  This report was finalized on [id number] by Dr. Yoon Garcia MD.    CT Abdomen Pelvis With Contrast [[id number]] Collected:  08/02/19 1644     Updated:  08/02/19 1657    Narrative:       CT CHEST PULMONARY EMBOLISM W CONTRAST-, CT ABDOMEN PELVIS W CONTRAST-     Date of Exam: 8/2/2019 4:29 PM     Indication: Shortness of breath.  HISTORY of duodenal repair. HISTORY of  gastroduodenal artery embolization procedure on 15 2019.     Comparison: Upper GI series 07/21/2019. CT chest 06/27/2019.     Technique: Serial and axial CT images of the chest were obtained  following the uneventful intravenous administration of 100 cc Isovue-300  contrast.  Reconstructions in the coronal and sagittal planes were also  performed.  Automated exposure control and iterative reconstruction  methods were used.     FINDINGS:     CHEST: The study is mildly degraded by respiratory motion. No pulmonary  embolism. No thoracic aortic aneurysm or aortic dissection. Mild  concentric thickening of the lower thoracic esophagus without discrete  mass lesion. Tiny distal paraesophageal node measuring 7 mm, thought to  be stable. No pericardial effusion. No pleural effusion. Imaged thyroid  gland is normal.     The lungs are free of acute airspace disease. Benign calcified granuloma  in the left upper lobe. 5 mm non-calcified or groundglass nodular  density in the posterior pleural margin of the left lower lobe (image  88), new since the prior examination.     Degenerative change of the cervical spine. No acute or suspicious  osseous lesions.           ABDOMEN AND PELVIS: Abnormal mucosal thickening and hyperenhancement of  the gastric antrum and proximal duodenum in keeping with provided  history of wide no repair. Presumed embolization coils are seen near the  posterior inferior margin of the pancreatic head. Surgical clips or  coils are seen the medial margin of the gastroduodenal junction.     There is an irregular air-fluid density collection at the  gastric-duodenal junction which does not clearly appear intraluminal but  may:  The lumen. This collection measures 1.7 x 3.8 x 3.1 cm, and could  potentially represent a contained abscess or new or residual ulcer.     A small round fluid collection is located within the pancreatic neck  measuring 10 mm. It could represent a pancreatic cyst or pseudocyst, but  is not definitely seen on the previous CT chest study.     Tiny low-density lesions represent cysts are seen within the left  hepatic lobe, each measuring less than 5 mm. Gallbladder is contracted.  Spleen, adrenals and kidneys are normal. Pancreas can't appears  thickened and  edematous likely representing secondary reactive  inflammatory change. Jejunostomy tube appears appropriately positioned  within the bowel lumen.     Uncomplicated diverticular changes within the descending and sigmoid  colon. The appendix is normal.     There is abnormal thickening and enhancement of the rectum, raising the  possibility of proctitis in the appropriate clinical context. The  urinary bladder is decompressed. Prostate gland is normal. No pelvic  free fluid.     Mild lumbar dextroscoliosis and degenerative changes.          Impression:          1. Abnormal appearance of the gastroduodenal junction may represent  sequelae of recent surgical intervention with residual inflammatory  process. Air-fluid collection is seen at the medial margin of the  gastroduodenal junction which is not definitely intraluminal, and could  represent recurrent or residual ulceration or abscess or contained leak.  2. 10 mm fluid density lesion within the pancreatic neck nonspecific and  could represent pancreatic cyst or pseudocyst. Likely secondary reactive  inflammatory changes within the pancreatic head and neck, as well.  3. Abnormal thickening and enhancement of the rectum. Correlate for  proctitis symptoms.  4. New 5 mm groundglass nodule in the left lower lobe. Benign  infectious/inflammatory nodule is favored.           Electronically Signed By-Dr. Yoon Garcia MD On:8/2/2019 4:55 PM  This report was finalized on [id number] by Dr. Yoon Garcia MD.    XR Chest 1 View [[id number]] Collected:  08/02/19 1541     Updated:  08/02/19 1545    Narrative:       DATE OF EXAM:  8/2/2019 3:38 PM     PROCEDURE:  XR CHEST 1 VW-     INDICATIONS:  Simple Sepsis Protocol     COMPARISON:  7/16/2019.     TECHNIQUE:   Single radiographic AP view of the chest was obtained.     FINDINGS:  A single view of the chest reveals no cardiac enlargement and no focal  infiltrate. No pneumothorax is identified. There is suspected  chronic  calcified granulomatous disease of the chest. The patient has been  extubated since 7/16/2019. The right IJ central venous line has been  removed. The nasogastric tube has also been removed (as well as the  endotracheal tube).       Impression:       No acute cardiopulmonary disease is seen radiographically.        Electronically Signed By-Dr. Olaf Fisher MD On:8/2/2019 3:43 PM  This report was finalized on [id number] by Dr. Olaf Fisher MD.        reviewed    ECG/EMG Results (most recent)     Procedure Component Value Units Date/Time    ECG 12 Lead [[id number]] Collected:  08/02/19 1616     Updated:  08/02/19 1617    Narrative:       HEART RATE= 81  bpm  RR Interval= 740  ms  DE Interval= 155  ms  P Horizontal Axis= 25  deg  P Front Axis= 38  deg  QRSD Interval= 92  ms  QT Interval= 364  ms  QRS Axis= 30  deg  T Wave Axis= 45  deg  - BORDERLINE ECG -  Sinus rhythm  Consider left ventricular hypertrophy  Electronically Signed By:   Date and Time of Study: 2019-08-02 16:16:17        reviewed         Results for orders placed during the hospital encounter of 06/27/19   Adult Transthoracic Echo Complete W/ Cont if Necessary Per Protocol    Narrative · Left ventricular systolic function is normal.  · Left ventricular wall thickness is consistent with moderate concentric   hypertrophy.  · Left ventricular diastolic dysfunction (grade I) consistent with   impaired relaxation.  · Estimated EF appears to be in the range of 61 - 65%.          CT Chest Pulmonary Embolism With Contrast  Narrative: CT CHEST PULMONARY EMBOLISM W CONTRAST-, CT ABDOMEN PELVIS W CONTRAST-     Date of Exam: 8/2/2019 4:29 PM     Indication: Shortness of breath.  HISTORY of duodenal repair. HISTORY of  gastroduodenal artery embolization procedure on 15 2019.     Comparison: Upper GI series 07/21/2019. CT chest 06/27/2019.     Technique: Serial and axial CT images of the chest were obtained  following the uneventful intravenous  administration of 100 cc Isovue-300  contrast. Reconstructions in the coronal and sagittal planes were also  performed.  Automated exposure control and iterative reconstruction  methods were used.     FINDINGS:     CHEST: The study is mildly degraded by respiratory motion. No pulmonary  embolism. No thoracic aortic aneurysm or aortic dissection. Mild  concentric thickening of the lower thoracic esophagus without discrete  mass lesion. Tiny distal paraesophageal node measuring 7 mm, thought to  be stable. No pericardial effusion. No pleural effusion. Imaged thyroid  gland is normal.     The lungs are free of acute airspace disease. Benign calcified granuloma  in the left upper lobe. 5 mm non-calcified or groundglass nodular  density in the posterior pleural margin of the left lower lobe (image  88), new since the prior examination.     Degenerative change of the cervical spine. No acute or suspicious  osseous lesions.           ABDOMEN AND PELVIS: Abnormal mucosal thickening and hyperenhancement of  the gastric antrum and proximal duodenum in keeping with provided  history of wide no repair. Presumed embolization coils are seen near the  posterior inferior margin of the pancreatic head. Surgical clips or  coils are seen the medial margin of the gastroduodenal junction.     There is an irregular air-fluid density collection at the  gastric-duodenal junction which does not clearly appear intraluminal but  may:  The lumen. This collection measures 1.7 x 3.8 x 3.1 cm, and could  potentially represent a contained abscess or new or residual ulcer.     A small round fluid collection is located within the pancreatic neck  measuring 10 mm. It could represent a pancreatic cyst or pseudocyst, but  is not definitely seen on the previous CT chest study.     Tiny low-density lesions represent cysts are seen within the left  hepatic lobe, each measuring less than 5 mm. Gallbladder is contracted.  Spleen, adrenals and kidneys are  normal. Pancreas can't appears  thickened and edematous likely representing secondary reactive  inflammatory change. Jejunostomy tube appears appropriately positioned  within the bowel lumen.     Uncomplicated diverticular changes within the descending and sigmoid  colon. The appendix is normal.     There is abnormal thickening and enhancement of the rectum, raising the  possibility of proctitis in the appropriate clinical context. The  urinary bladder is decompressed. Prostate gland is normal. No pelvic  free fluid.     Mild lumbar dextroscoliosis and degenerative changes.        Impression:    1. Abnormal appearance of the gastroduodenal junction may represent  sequelae of recent surgical intervention with residual inflammatory  process. Air-fluid collection is seen at the medial margin of the  gastroduodenal junction which is not definitely intraluminal, and could  represent recurrent or residual ulceration or abscess or contained leak.  2. 10 mm fluid density lesion within the pancreatic neck nonspecific and  could represent pancreatic cyst or pseudocyst. Likely secondary reactive  inflammatory changes within the pancreatic head and neck, as well.  3. Abnormal thickening and enhancement of the rectum. Correlate for  proctitis symptoms.  4. New 5 mm groundglass nodule in the left lower lobe. Benign  infectious/inflammatory nodule is favored.           Electronically Signed By-Dr. Yoon Garcia MD On:8/2/2019 4:55 PM  This report was finalized on [id number] by Dr. Yoon Garcia MD.  CT Abdomen Pelvis With Contrast  Narrative: CT CHEST PULMONARY EMBOLISM W CONTRAST-, CT ABDOMEN PELVIS W CONTRAST-     Date of Exam: 8/2/2019 4:29 PM     Indication: Shortness of breath.  HISTORY of duodenal repair. HISTORY of  gastroduodenal artery embolization procedure on 15 2019.     Comparison: Upper GI series 07/21/2019. CT chest 06/27/2019.     Technique: Serial and axial CT images of the chest were obtained  following the  uneventful intravenous administration of 100 cc Isovue-300  contrast. Reconstructions in the coronal and sagittal planes were also  performed.  Automated exposure control and iterative reconstruction  methods were used.     FINDINGS:     CHEST: The study is mildly degraded by respiratory motion. No pulmonary  embolism. No thoracic aortic aneurysm or aortic dissection. Mild  concentric thickening of the lower thoracic esophagus without discrete  mass lesion. Tiny distal paraesophageal node measuring 7 mm, thought to  be stable. No pericardial effusion. No pleural effusion. Imaged thyroid  gland is normal.     The lungs are free of acute airspace disease. Benign calcified granuloma  in the left upper lobe. 5 mm non-calcified or groundglass nodular  density in the posterior pleural margin of the left lower lobe (image  88), new since the prior examination.     Degenerative change of the cervical spine. No acute or suspicious  osseous lesions.           ABDOMEN AND PELVIS: Abnormal mucosal thickening and hyperenhancement of  the gastric antrum and proximal duodenum in keeping with provided  history of wide no repair. Presumed embolization coils are seen near the  posterior inferior margin of the pancreatic head. Surgical clips or  coils are seen the medial margin of the gastroduodenal junction.     There is an irregular air-fluid density collection at the  gastric-duodenal junction which does not clearly appear intraluminal but  may:  The lumen. This collection measures 1.7 x 3.8 x 3.1 cm, and could  potentially represent a contained abscess or new or residual ulcer.     A small round fluid collection is located within the pancreatic neck  measuring 10 mm. It could represent a pancreatic cyst or pseudocyst, but  is not definitely seen on the previous CT chest study.     Tiny low-density lesions represent cysts are seen within the left  hepatic lobe, each measuring less than 5 mm. Gallbladder is contracted.  Spleen,  adrenals and kidneys are normal. Pancreas can't appears  thickened and edematous likely representing secondary reactive  inflammatory change. Jejunostomy tube appears appropriately positioned  within the bowel lumen.     Uncomplicated diverticular changes within the descending and sigmoid  colon. The appendix is normal.     There is abnormal thickening and enhancement of the rectum, raising the  possibility of proctitis in the appropriate clinical context. The  urinary bladder is decompressed. Prostate gland is normal. No pelvic  free fluid.     Mild lumbar dextroscoliosis and degenerative changes.        Impression:    1. Abnormal appearance of the gastroduodenal junction may represent  sequelae of recent surgical intervention with residual inflammatory  process. Air-fluid collection is seen at the medial margin of the  gastroduodenal junction which is not definitely intraluminal, and could  represent recurrent or residual ulceration or abscess or contained leak.  2. 10 mm fluid density lesion within the pancreatic neck nonspecific and  could represent pancreatic cyst or pseudocyst. Likely secondary reactive  inflammatory changes within the pancreatic head and neck, as well.  3. Abnormal thickening and enhancement of the rectum. Correlate for  proctitis symptoms.  4. New 5 mm groundglass nodule in the left lower lobe. Benign  infectious/inflammatory nodule is favored.           Electronically Signed By-Dr. Yoon Garcia MD On:8/2/2019 4:55 PM  This report was finalized on [id number] by Dr. Yoon Garcia MD.  XR Chest 1 View  Narrative: DATE OF EXAM:  8/2/2019 3:38 PM     PROCEDURE:  XR CHEST 1 VW-     INDICATIONS:  Simple Sepsis Protocol     COMPARISON:  7/16/2019.     TECHNIQUE:   Single radiographic AP view of the chest was obtained.     FINDINGS:  A single view of the chest reveals no cardiac enlargement and no focal  infiltrate. No pneumothorax is identified. There is suspected chronic  calcified  granulomatous disease of the chest. The patient has been  extubated since 7/16/2019. The right IJ central venous line has been  removed. The nasogastric tube has also been removed (as well as the  endotracheal tube).     Impression: No acute cardiopulmonary disease is seen radiographically.        Electronically Signed By-Dr. Olaf Fisher MD On:8/2/2019 3:43 PM  This report was finalized on [id number] by Dr. Olaf Fisher MD.      Assessment/Plan       Generalized abdominal pain    Dyspnea      Plan      Generalized abdominal pain - several abnormalities per CT scan above - both GI and general surgery consulted given recent admission and G tube in place, IVF , NPO     Dyspnea- likley secondary to HAP per CT LLL, ED dose IV Cefepime with blood culture pending , duo nebs prn , monitor cbc , oxygen prn     Anemia - Hgb 9.4 stable - Monitor     DVT prophylaxis - scd     Disposition     Patient will be admitted for further evaluation and treatment with GI and general surgery consulted     I discussed the patients findings and my recommendations with patient .     Bee Le, ZACK  08/02/19  8:14 PM

## 2019-08-03 NOTE — PLAN OF CARE
Problem: Patient Care Overview  Goal: Plan of Care Review  Outcome: Ongoing (interventions implemented as appropriate)   08/02/19 9338   Coping/Psychosocial   Plan of Care Reviewed With patient   Plan of Care Review   Progress improving

## 2019-08-03 NOTE — PLAN OF CARE
Problem: Fall Risk (Adult)  Goal: Identify Related Risk Factors and Signs and Symptoms   08/02/19 3980   Fall Risk (Adult)   Related Risk Factors (Fall Risk) environment unfamiliar;confusion/agitation

## 2019-08-03 NOTE — PROGRESS NOTES
Hospitalist Team      Patient Care Team:  Provider, No Known as PCP - General      Chief Complaint / Subjective    43 year old male presents to ED with complaints of abdominal pain past few days . He denies hematemesis , nausea vomiting, or changes in bowel or bladder . He also reports dyspnea, denies chest pain, fever, chills, cough , congestion. H was recently hospitalized here last month, initially for chest pain . Myoview was abnormal and he left AMA. Subsequent cardiac cath 7/9/19 showed no obstructive CAD.Subsequent he was re- admitted later in the month for syncope And was found to have bleeding ulcers that were cauterized during EGD. He subsequently deteriorated and was placed in ICU. He underwent an exploratory lap and was eventually discharged home with G tube. He reports he has not used G tube at all as no home health was set up but has been able to eat soft foods. In ED today CT abdomen and pelvis show some irregular air fluid density in abdomen and possible pancreatic cyst as well as 5 mm nodule LLL possibly infection. His temp was 100 F. Lactate 1.5, vitals stable, wbc normal. He was started on Iv Cefepime for possible HAP and GI and general surgery consulted for further evaluation. PMH Hep C, drug abuse ( denies current) , tobacco use, depression.       Interval History and ROS:     History taken from: patient    Review of Systems   Constitution: Negative.   HENT: Negative.    Eyes: Negative.    Cardiovascular: Negative.    Respiratory: Negative.    Endocrine: Negative.    Hematologic/Lymphatic: Negative.    Skin: Negative.    Musculoskeletal: Negative.    Gastrointestinal: Positive for abdominal pain.   Genitourinary: Negative.    Neurological: Negative.    Psychiatric/Behavioral: Negative.    Allergic/Immunologic: Negative.    All other systems reviewed and are negative.    Very poor historian.  Gives very vague answers to repeated questions even.    Family History   Problem Relation Age of Onset  "  • Heart disease Mother    • Heart attack Mother    • Colon cancer Mother    • Heart disease Brother    • Heart attack Brother    • Heart disease Brother    • Heart attack Brother        Past Medical History:   Diagnosis Date   • ADHD (attention deficit hyperactivity disorder)    • Alcohol dependence (CMS/[facility])    • Anxiety    • Depression    • Essential hypertension 2019   • Hepatitis C 2019   • NICM (nonischemic cardiomyopathy) (CMS/[facility]) 2019   • Substance abuse (CMS/[facility])    • Tobacco dependency        Social History     Socioeconomic History   • Marital status:      Spouse name: Not on file   • Number of children: Not on file   • Years of education: Not on file   • Highest education level: Not on file   Tobacco Use   • Smoking status: Former Smoker     Packs/day: 1.00     Years: 20.00     Pack years: 20.00     Types: Cigarettes     Last attempt to quit: 2019     Years since quittin.0   • Smokeless tobacco: Former User   Substance and Sexual Activity   • Alcohol use: Yes     Frequency: Never     Comment: use to drink half gallon of liquor per day; down to 1 pint of liquor per day; quit 2019   • Drug use: Yes     Types: Cocaine, Methamphetamines     Comment: last used meth about 2 months ago   • Sexual activity: Defer   Social History Narrative    He reports extensive history of depression, anxiety, and substance abuse. He's been diagnosed with ADD and bipolar disorder, and has been treated at [facility] and [facility]. Medications in the past have made him feel \"slow\" and \"dumb\". He reports that his mind is always going \"a million miles a minute\", and he \"can't get shit done\" because he can't focus. He reports very low self esteem. He reports his son's mother has told him they'd be better off if he was dead and he wonders if that's true, but states tearfully that he doesn't want to die. He has quit drinking and doing drugs in the past, and says he went to [employer] a lot. He's " "never had a sponsor or really participated in AA. He did cocaine on and off for around 10 years but quit when his son was born 9 yrs ago. He says that cocaine calmed him down. In the past couple years, he started using meth which calms him down if he snorts it, but amps him up if he smokes it. Alcohol calms him down. He reports feeling very scared right now and worried that he's going to die. He seems ready to address his substance abuse and try to live a healthier lifestyle. His son is his main motivation.            Objective      Vital Signs  Temp:  [98.2 °F (36.8 °C)-100 °F (37.8 °C)] 98.2 °F (36.8 °C)  Heart Rate:  [] 51  Resp:  [13-22] 18  BP: (105-177)/(55-87) 127/78  Oxygen Therapy  SpO2: 100 %  Pulse Oximetry Type: Intermittent  Device (Oxygen Therapy): room air  Flowsheet Rows      First Filed Value   Admission Height  177.8 cm (70\") Documented at 08/02/2019 1505   Admission Weight  90.7 kg (200 lb) Documented at 08/02/2019 1505        Intake & Output (last 3 days)       07/31 0701 - 08/01 0700 08/01 0701 - 08/02 0700 08/02 0701 - 08/03 0700 08/03 0701 - 08/04 0700    P.O.    0    I.V. (mL/kg)   50 (0.5) 950 (10.4)    Total Intake(mL/kg)   50 (0.5) 950 (10.4)    Urine (mL/kg/hr)    100 (0.2)    Stool    0    Total Output    100    Net   +50 +850            Urine Unmeasured Occurrence   1 x 1 x    Stool Unmeasured Occurrence   1 x 1 x        Lines, Drains & Airways    Active LDAs     Name:   Placement date:   Placement time:   Site:   Days:    Peripheral IV 06/27/19 1800 Right Antecubital   06/27/19    1800    Antecubital   36    Peripheral IV 08/02/19 1620 Left Wrist   08/02/19    1620    Wrist   less than 1    Peripheral IV 08/02/19 1800 Anterior;Proximal;Right Forearm   08/02/19    1800    Forearm   less than 1    Gastrostomy/Enterostomy Jejunostomy   07/16/19 2050    --   17    Gastrostomy/Enterostomy Gastrostomy   07/16/19 2055    --   17                Physical Exam:    Physical Exam "   Constitutional: He is oriented to person, place, and time. He appears well-developed and well-nourished. No distress.   HENT:   Head: Normocephalic and atraumatic.   Right Ear: External ear normal.   Left Ear: External ear normal.   Nose: Nose normal.   Mouth/Throat: Oropharynx is clear and moist. No oropharyngeal exudate.   Eyes: Conjunctivae and EOM are normal. Pupils are equal, round, and reactive to light. Right eye exhibits no discharge. Left eye exhibits no discharge. No scleral icterus.   Neck: Normal range of motion. No JVD present. No tracheal deviation present. No thyromegaly present.   Cardiovascular: Normal rate, regular rhythm, normal heart sounds and intact distal pulses. Exam reveals no gallop and no friction rub.   No murmur heard.  Pulmonary/Chest: Effort normal and breath sounds normal. No stridor. No respiratory distress. He has no wheezes. He has no rales. He exhibits no tenderness.   Abdominal: Soft. Bowel sounds are normal. He exhibits no distension and no mass. There is tenderness. There is no rebound and no guarding. No hernia.   J-tube and G-tube present.  Staples present from recent open laparotomy   Musculoskeletal: Normal range of motion. He exhibits no edema, tenderness or deformity.   Lymphadenopathy:     He has no cervical adenopathy.   Neurological: He is alert and oriented to person, place, and time. No cranial nerve deficit or sensory deficit. He exhibits normal muscle tone. Coordination normal.   Skin: Skin is warm and dry. No rash noted. He is not diaphoretic. No erythema.   Psychiatric: He has a normal mood and affect. His behavior is normal.   Nursing note and vitals reviewed.            Results Review:    I reviewed the patient's new clinical results.    Results from last 7 days   Lab Units 08/03/19  0247 08/02/19  1555   WBC 10*3/mm3 7.40 6.20   HEMOGLOBIN g/dL 9.0* 9.4*   HEMATOCRIT % 26.6* 28.6*   PLATELETS 10*3/mm3 436 579*     Results from last 7 days   Lab Units  08/03/19  0247 08/02/19  1608 08/02/19  1555   SODIUM mmol/L 133*  --  134*   POTASSIUM mmol/L 4.4  --  3.6   CHLORIDE mmol/L 102  --  99*   CO2 mmol/L 24.0  --  23.0   BUN mg/dL 5*  --  8   CREATININE mg/dL 0.70 0.60 0.80   CALCIUM mg/dL 8.2*  --  8.3*   BILIRUBIN mg/dL  --   --  0.5   ALK PHOS U/L  --   --  80   ALT (SGPT) U/L  --   --  24   AST (SGOT) U/L  --   --  21   GLUCOSE mg/dL 112*  --  103*     Results from last 7 days   Lab Units 08/03/19  0247 08/02/19  1555   MAGNESIUM mg/dL 2.0 1.5*     Lab Results   Component Value Date    CALCIUM 8.2 (L) 08/03/2019    PHOS 4.5 07/16/2019     No results found for: HGBA1C  Results from last 7 days   Lab Units 08/02/19  1555   INR  1.00               Microbiology Results (last 10 days)     ** No results found for the last 240 hours. **          ECG/EMG Results (most recent)     Procedure Component Value Units Date/Time    ECG 12 Lead [[id number]] Collected:  08/02/19 1616     Updated:  08/02/19 1617    Narrative:       HEART RATE= 81  bpm  RR Interval= 740  ms  DC Interval= 155  ms  P Horizontal Axis= 25  deg  P Front Axis= 38  deg  QRSD Interval= 92  ms  QT Interval= 364  ms  QRS Axis= 30  deg  T Wave Axis= 45  deg  - BORDERLINE ECG -  Sinus rhythm  Consider left ventricular hypertrophy  Electronically Signed By:   Date and Time of Study: 2019-08-02 16:16:17               Results for orders placed during the hospital encounter of 06/27/19   Adult Transthoracic Echo Complete W/ Cont if Necessary Per Protocol    Narrative · Left ventricular systolic function is normal.  · Left ventricular wall thickness is consistent with moderate concentric   hypertrophy.  · Left ventricular diastolic dysfunction (grade I) consistent with   impaired relaxation.  · Estimated EF appears to be in the range of 61 - 65%.          Ct Abdomen Pelvis With Contrast    Result Date: 8/2/2019   1. Abnormal appearance of the gastroduodenal junction may represent sequelae of recent surgical  intervention with residual inflammatory process. Air-fluid collection is seen at the medial margin of the gastroduodenal junction which is not definitely intraluminal, and could represent recurrent or residual ulceration or abscess or contained leak. 2. 10 mm fluid density lesion within the pancreatic neck nonspecific and could represent pancreatic cyst or pseudocyst. Likely secondary reactive inflammatory changes within the pancreatic head and neck, as well. 3. Abnormal thickening and enhancement of the rectum. Correlate for proctitis symptoms. 4. New 5 mm groundglass nodule in the left lower lobe. Benign infectious/inflammatory nodule is favored.    Electronically Signed By-Dr. Yoon Garcia MD On:8/2/2019 4:55 PM This report was finalized on [id number] by Dr. Yoon Garcia MD.    Xr Chest 1 View    Result Date: 8/2/2019  No acute cardiopulmonary disease is seen radiographically.   Electronically Signed By-Dr. Olaf Fisher MD On:8/2/2019 3:43 PM This report was finalized on [id number] by Dr. Olaf Fisher MD.    Ct Chest Pulmonary Embolism With Contrast    Result Date: 8/2/2019   1. Abnormal appearance of the gastroduodenal junction may represent sequelae of recent surgical intervention with residual inflammatory process. Air-fluid collection is seen at the medial margin of the gastroduodenal junction which is not definitely intraluminal, and could represent recurrent or residual ulceration or abscess or contained leak. 2. 10 mm fluid density lesion within the pancreatic neck nonspecific and could represent pancreatic cyst or pseudocyst. Likely secondary reactive inflammatory changes within the pancreatic head and neck, as well. 3. Abnormal thickening and enhancement of the rectum. Correlate for proctitis symptoms. 4. New 5 mm groundglass nodule in the left lower lobe. Benign infectious/inflammatory nodule is favored.    Electronically Signed By-Dr. Yoon Garcia MD On:8/2/2019 4:55 PM This report  was finalized on [id number] by Dr. Yoon Garcia MD.      Xrays, labs reviewed personally by physician.    Medication Review:   I have reviewed the patient's current medication list    Scheduled Meds    lisinopril 10 mg Oral Q24H   metoprolol tartrate 25 mg Oral Q12H   sertraline 50 mg Oral Daily   sodium chloride 3 mL Intravenous Q12H   traZODone 100 mg Oral Nightly       Meds Infusions    sodium chloride 100 mL/hr Last Rate: 100 mL/hr (08/03/19 1229)       Meds PRN  •  calcium gluconate IVPB **AND** calcium gluconate **AND** Calcium  •  magnesium sulfate **OR** magnesium sulfate **OR** magnesium sulfate  •  Morphine  •  nitroglycerin  •  ondansetron **OR** ondansetron  •  potassium chloride **OR** potassium chloride **OR** potassium chloride  •  [COMPLETED] Insert peripheral IV **AND** sodium chloride  •  sodium chloride  •  sodium chloride        Procedures:        Assessment:      Essential hypertension    Tobacco dependency    Alcohol dependence (CMS/HCC)    Medically noncompliant    Hepatitis C    NICM (nonischemic cardiomyopathy) (CMS/HCC)    Generalized abdominal pain    Dyspnea    Substance abuse (CMS/HCC)      Plan    Supportive treatment  No narcotics due to drug abuse history  Await GI input      Plan for disposition:    Krystle Abbott MD  08/03/19  1:43 PM

## 2019-08-03 NOTE — CONSULTS
"Adult Nutrition  Assessment/PES    Patient Name:  Asad Bethea  YOB: 1975  MRN: [id number]  Admit Date:  8/2/2019    Assessment Date:  8/3/2019    Comments:  Nutren 1.5 @ 65 ml/hr with 10 ml/hr water flush.     Reason for Assessment     Row Name 08/03/19 1344          Reason for Assessment    Reason For Assessment Consult TF/PN     PMH: ADHD, alcohol dependence, anxiety, depression Acute upper GI bleed. Ex lap 7/16 -> oversew of gastroduodenal arterial bleed with G & J tubes placed.     Diagnosis Current Dx:   Abdnormal appearance of gastroduodenal juction possible leak?, Dyspnea, Anemia         Nutrition/Diet History     Row Name 08/03/19 1347          Nutrition/Diet History    Typical Food/Fluid Intake  Pt eating \"soft diet\" like rice, mashed potatoes at home. Confirmed weight loss.      Factors Affecting Nutritional Intake  abdominal pain         Anthropometrics     Row Name 08/03/19 1352 08/03/19 1348       Anthropometrics    Height  182.9 cm (72.01\")  182.9 cm (72.01\")    Weight  --  91.4 kg (201 lb 8 oz)       Admit Weight    Admit Weight  --  90.7 kg (199 lb 15.3 oz)       Ideal Body Weight (IBW)    Ideal Body Weight (IBW) (kg)  82.09  82.09    % Ideal Body Weight  --  111.34       Usual Body Weight (UBW)    Usual Body Weight  --  102 kg (225 lb)    % Usual Body Weight  --  89.56    Weight Loss  --  unintentional    Weight Loss Time Frame  --  Wt # (7/19)- 11% weight loss x 30 days        Body Mass Index (BMI)    BMI (kg/m2)  --  27.38    BMI Assessment  --  BMI 25-29.9: overweight                                       Labs/Tests/Procedures/Meds     Row Name 08/03/19 1349          Labs/Procedures/Meds    Lab Results Comments  Glucose 112 (H), Na 133 (L), BUN 5 (L), Crt .7 WNL, H/H 9/26.6 (L)        Medications    Pertinent Medications Comments  zoloft         Physical Findings     Row Name 08/03/19 1350          Physical Findings    Overall Physical Appearance  -- Appears well " "nourished     Gastrointestinal  feeding tube     Tubes  gastrostomy tube;jejunostomy tube     Oral/Mouth Cavity  -- No swallowing or chewing problems.      Skin  -- No skin breakdown. 2 surgical incisions         Estimated/Assessed Needs     Row Name 08/03/19 1357 08/03/19 1352       Calculation Measurements    Weight Used For Calculations  91.4 kg (201 lb 8 oz)  91.4 kg (201 lb 8 oz)    Height  --  182.9 cm (72.01\")       Estimated/Assessed Needs    Additional Documentation  --  KCAL/KG (Group);Protein Requirements (Group);Fluid Requirements (Group)       KCAL/KG    KCAL/KG  --  25 Kcal/Kg (kcal)    25 Kcal/Kg (kcal)  2285  2285       Protein Requirements    Est Protein Requirement Amount (gms/kg)  --  1.0 gm protein    Estimated Protein Requirements (gms/day)  --  91.4       Fluid Requirements    Estimated Fluid Requirements (mL/day)  --  91.4    Estimated Fluid Requirement Method  --  RDA Method    RDA Method (1 mL/kcal)  --  91.4      2285 ml/day based on hydration status.                                         Nutrition Prescription Ordered     Row Name 08/03/19 1353          Nutrition Prescription PO    Current PO Diet  NPO        Nutrition Prescription EN    Enteral Route  Jejunostomy     Product  -- -     Modulars  -- -     TF Delivery Method  -- -     Water flush (mL)   -- -         Evaluation of Received Nutrient/Fluid Intake     Row Name 08/03/19 1357 08/03/19 1356       Calculation Measurements    Weight Used For Calculations  91.4 kg (201 lb 8 oz)  --       PO Evaluation    Number of Days PO Intake Evaluated  --  -- -    Number of Meals  --  -- -    % PO Intake  --  -- -       EN Evaluation    TF Changes  --  -- -    TF Residual  --  -- -    TF Tolerance  --  -- -    Row Name 08/03/19 1352        Calculation Measurements    Weight Used For Calculations  91.4 kg (201 lb 8 oz)     Height  182.9 cm (72.01\")         Evaluation of Prescribed Nutrient/Fluid Intake     Row Name 08/03/19 1357        " Calculation Measurements    Weight Used For Calculations  91.4 kg (201 lb 8 oz)     Height  --        Evaluation of Prescribed Nutrient/Fluid Intake    Nutrition Prescribed  Calorie Evaluation;Protein Evaluation;Fluid Evaluation  --       Calories at Prescribed Goal    % Kcal Needs  -- -  --       Protein at Prescribed Goal    % Protein Needs  -- -  --       Fluid at Prescribed Goal    % Fluid Needs  -- -  --    Row Name 08/03/19 1348                            Problem/Interventions:  Problem 1     Row Name 08/03/19 1358          Nutrition Diagnoses Problem 1    Problem 1  -- Altered GI function     Etiology (related to)  -- possible gastroduodenal leak?     Signs/Symptoms (evidenced by)  -- NPO; TF consult.                 Intervention Goal     Row Name 08/03/19 1359          Intervention Goal    General  -- Tolerates TF; Advancement of diet     TF/PN  Inititiate TF/PN     Weight  Maintain weight         Nutrition Intervention     Row Name 08/03/19 1359          Nutrition Intervention    RD/Tech Action  -- Nutren 1.5 @ 65 ml/hr with 10 ml/hr flush (based on 22 hr). Provides 2145 kcal (93%), 97 gm PRO (103%), 1307 ml total fluid (220 ml water flush + 1087 ml free water)         Nutrition Prescription     Row Name 08/03/19 1400          Nutrition Prescription EN    Enteral Prescription  Enteral begin/change     Enteral Route  Jejunostomy     Product  -- Nutren 1.5      TF Delivery Method  Continuous     Continuous TF Goal Rate (mL/hr)  65 mL/hr     Water flush (mL)   10 mL     Water Flush Frequency  Per hour         Education/Evaluation     Row Name 08/03/19 1402          Monitor/Evaluation    Monitor  TF delivery/tolerance;Skin status;Pertinent labs;Weight Diet advancement           Electronically signed by:  Karla Barry RD  08/03/19 2:03 PM

## 2019-08-03 NOTE — PLAN OF CARE
Problem: Fall Risk (Adult)  Goal: Absence of Fall  Outcome: Ongoing (interventions implemented as appropriate)   08/02/19 6064   Fall Risk (Adult)   Absence of Fall making progress toward outcome

## 2019-08-03 NOTE — PLAN OF CARE
Problem: Patient Care Overview  Goal: Plan of Care Review  Outcome: Ongoing (interventions implemented as appropriate)      Problem: Fall Risk (Adult)  Goal: Identify Related Risk Factors and Signs and Symptoms  Outcome: Outcome(s) achieved Date Met: 08/03/19    Goal: Absence of Fall  Outcome: Outcome(s) achieved Date Met: 08/03/19      Problem: Pain, Chronic (Adult)  Goal: Identify Related Risk Factors and Signs and Symptoms  Outcome: Outcome(s) achieved Date Met: 08/03/19    Goal: Acceptable Pain/Comfort Level and Functional Ability  Outcome: Ongoing (interventions implemented as appropriate)

## 2019-08-03 NOTE — PLAN OF CARE
Problem: Pain, Chronic (Adult)  Goal: Acceptable Pain/Comfort Level and Functional Ability  Outcome: Ongoing (interventions implemented as appropriate)   08/02/19 6445   Pain, Chronic (Adult)   Acceptable Pain/Comfort Level and Functional Ability making progress toward outcome

## 2019-08-04 ENCOUNTER — ON CAMPUS - OUTPATIENT (OUTPATIENT)
Dept: URBAN - METROPOLITAN AREA HOSPITAL 85 | Facility: HOSPITAL | Age: 44
End: 2019-08-04
Payer: COMMERCIAL

## 2019-08-04 DIAGNOSIS — I10 ESSENTIAL (PRIMARY) HYPERTENSION: ICD-10-CM

## 2019-08-04 DIAGNOSIS — D64.9 ANEMIA, UNSPECIFIED: ICD-10-CM

## 2019-08-04 DIAGNOSIS — R19.7 DIARRHEA, UNSPECIFIED: ICD-10-CM

## 2019-08-04 DIAGNOSIS — F10.10 ALCOHOL ABUSE, UNCOMPLICATED: ICD-10-CM

## 2019-08-04 PROBLEM — E87.1 HYPONATREMIA: Status: ACTIVE | Noted: 2019-08-04

## 2019-08-04 LAB
ANION GAP SERPL CALCULATED.3IONS-SCNC: 10.9 MMOL/L (ref 5–15)
BASOPHILS # BLD AUTO: 0 10*3/MM3 (ref 0–0.2)
BASOPHILS NFR BLD AUTO: 0.7 % (ref 0–1.5)
BUN BLD-MCNC: 4 MG/DL (ref 8–20)
BUN/CREAT SERPL: 5 (ref 6.2–20.3)
CALCIUM SPEC-SCNC: 7.9 MG/DL (ref 8.9–10.3)
CHLORIDE SERPL-SCNC: 99 MMOL/L (ref 101–111)
CO2 SERPL-SCNC: 24 MMOL/L (ref 22–32)
CREAT BLD-MCNC: 0.8 MG/DL (ref 0.7–1.2)
DEPRECATED RDW RBC AUTO: 44.2 FL (ref 37–54)
EOSINOPHIL # BLD AUTO: 0.1 10*3/MM3 (ref 0–0.4)
EOSINOPHIL NFR BLD AUTO: 2.1 % (ref 0.3–6.2)
ERYTHROCYTE [DISTWIDTH] IN BLOOD BY AUTOMATED COUNT: 14.6 % (ref 12.3–15.4)
GFR SERPL CREATININE-BSD FRML MDRD: 105 ML/MIN/1.73
GLUCOSE BLD-MCNC: 109 MG/DL (ref 65–99)
HCT VFR BLD AUTO: 25.4 % (ref 37.5–51)
HGB BLD-MCNC: 8.2 G/DL (ref 13–17.7)
LYMPHOCYTES # BLD AUTO: 1.5 10*3/MM3 (ref 0.7–3.1)
LYMPHOCYTES NFR BLD AUTO: 26 % (ref 19.6–45.3)
MAGNESIUM SERPL-MCNC: 1.7 MG/DL (ref 1.8–2.5)
MCH RBC QN AUTO: 27.5 PG (ref 26.6–33)
MCHC RBC AUTO-ENTMCNC: 32.4 G/DL (ref 31.5–35.7)
MCV RBC AUTO: 84.9 FL (ref 79–97)
MONOCYTES # BLD AUTO: 1 10*3/MM3 (ref 0.1–0.9)
MONOCYTES NFR BLD AUTO: 17.3 % (ref 5–12)
NEUTROPHILS # BLD AUTO: 3.2 10*3/MM3 (ref 1.7–7)
NEUTROPHILS NFR BLD AUTO: 53.9 % (ref 42.7–76)
NRBC BLD AUTO-RTO: 0.1 /100 WBC (ref 0–0.2)
PLATELET # BLD AUTO: 394 10*3/MM3 (ref 140–450)
PMV BLD AUTO: 7.3 FL (ref 6–12)
POTASSIUM BLD-SCNC: 3.9 MMOL/L (ref 3.6–5.1)
RBC # BLD AUTO: 2.99 10*6/MM3 (ref 4.14–5.8)
SODIUM BLD-SCNC: 130 MMOL/L (ref 136–144)
WBC NRBC COR # BLD: 5.9 10*3/MM3 (ref 3.4–10.8)

## 2019-08-04 PROCEDURE — 80048 BASIC METABOLIC PNL TOTAL CA: CPT | Performed by: NURSE PRACTITIONER

## 2019-08-04 PROCEDURE — 96376 TX/PRO/DX INJ SAME DRUG ADON: CPT

## 2019-08-04 PROCEDURE — 99225 PR SBSQ OBSERVATION CARE/DAY 25 MINUTES: CPT | Performed by: INTERNAL MEDICINE

## 2019-08-04 PROCEDURE — 85025 COMPLETE CBC W/AUTO DIFF WBC: CPT | Performed by: NURSE PRACTITIONER

## 2019-08-04 PROCEDURE — 83735 ASSAY OF MAGNESIUM: CPT | Performed by: NURSE PRACTITIONER

## 2019-08-04 PROCEDURE — 25010000002 MORPHINE PER 10 MG: Performed by: NURSE PRACTITIONER

## 2019-08-04 PROCEDURE — 99212 OFFICE O/P EST SF 10 MIN: CPT | Performed by: NURSE PRACTITIONER

## 2019-08-04 PROCEDURE — G0378 HOSPITAL OBSERVATION PER HR: HCPCS

## 2019-08-04 RX ORDER — HYDROCODONE BITARTRATE AND ACETAMINOPHEN 5; 325 MG/1; MG/1
1 TABLET ORAL EVERY 6 HOURS PRN
Status: DISCONTINUED | OUTPATIENT
Start: 2019-08-04 | End: 2019-08-05 | Stop reason: HOSPADM

## 2019-08-04 RX ADMIN — METOPROLOL TARTRATE 25 MG: 25 TABLET, FILM COATED ORAL at 20:09

## 2019-08-04 RX ADMIN — SERTRALINE HYDROCHLORIDE 50 MG: 50 TABLET ORAL at 08:03

## 2019-08-04 RX ADMIN — SODIUM CHLORIDE, PRESERVATIVE FREE 3 ML: 5 INJECTION INTRAVENOUS at 20:11

## 2019-08-04 RX ADMIN — MORPHINE SULFATE 4 MG: 4 INJECTION INTRAVENOUS at 15:30

## 2019-08-04 RX ADMIN — MORPHINE SULFATE 4 MG: 4 INJECTION INTRAVENOUS at 06:26

## 2019-08-04 RX ADMIN — SUCRALFATE 1 G: 1 TABLET ORAL at 12:01

## 2019-08-04 RX ADMIN — SUCRALFATE 1 G: 1 TABLET ORAL at 08:02

## 2019-08-04 RX ADMIN — MORPHINE SULFATE 4 MG: 4 INJECTION INTRAVENOUS at 10:24

## 2019-08-04 RX ADMIN — MORPHINE SULFATE 4 MG: 4 INJECTION INTRAVENOUS at 01:57

## 2019-08-04 RX ADMIN — TRAZODONE HYDROCHLORIDE 100 MG: 100 TABLET ORAL at 20:09

## 2019-08-04 RX ADMIN — HYDROCODONE BITARTRATE AND ACETAMINOPHEN 1 TABLET: 5; 325 TABLET ORAL at 18:15

## 2019-08-04 RX ADMIN — SODIUM CHLORIDE, PRESERVATIVE FREE 3 ML: 5 INJECTION INTRAVENOUS at 09:00

## 2019-08-04 RX ADMIN — LISINOPRIL 10 MG: 5 TABLET ORAL at 08:02

## 2019-08-04 RX ADMIN — METOPROLOL TARTRATE 25 MG: 25 TABLET, FILM COATED ORAL at 08:03

## 2019-08-04 RX ADMIN — SUCRALFATE 1 G: 1 TABLET ORAL at 20:09

## 2019-08-04 RX ADMIN — PANTOPRAZOLE SODIUM 40 MG: 40 INJECTION, POWDER, FOR SOLUTION INTRAVENOUS at 06:26

## 2019-08-04 RX ADMIN — SUCRALFATE 1 G: 1 TABLET ORAL at 17:02

## 2019-08-04 NOTE — NURSING NOTE
Patient has made several requests for food despite being educated repeatedly that he must remain NPO until GI sees him. Patient states he is going to leave if he doesn't get food soon - put a call out to MD to advise.  
Pt has order for tube feeds. Patient is refusing tube feeds and states that he doesn't need them because he is eating.   
Borderline diabetes    Hypertension    Stroke

## 2019-08-04 NOTE — PLAN OF CARE
Problem: Patient Care Overview  Goal: Plan of Care Review  Outcome: Ongoing (interventions implemented as appropriate)    Goal: Individualization and Mutuality  Outcome: Ongoing (interventions implemented as appropriate)    Goal: Discharge Needs Assessment  Outcome: Ongoing (interventions implemented as appropriate)    Goal: Interprofessional Rounds/Family Conf  Outcome: Ongoing (interventions implemented as appropriate)      Problem: Pain, Chronic (Adult)  Goal: Acceptable Pain/Comfort Level and Functional Ability  Outcome: Ongoing (interventions implemented as appropriate)

## 2019-08-04 NOTE — PLAN OF CARE
Problem: Patient Care Overview  Goal: Plan of Care Review  Outcome: Ongoing (interventions implemented as appropriate)    Goal: Individualization and Mutuality  Outcome: Ongoing (interventions implemented as appropriate)      Problem: Pain, Chronic (Adult)  Goal: Acceptable Pain/Comfort Level and Functional Ability  Outcome: Ongoing (interventions implemented as appropriate)  Patient seemed to have pain under control however, late in the afternoon he stated it's wearing off fast and he is very uncomfortable. Advised Dr. Abbott and awaiting response.

## 2019-08-04 NOTE — CONSULTS
LOS: 0 days   Patient Care Team:  Provider, No Known as PCP - General    Reason for follow-up: Postop    Subjective   Patient was seen yesterday and a consult done but I cannot find it in the chart.  To reiterate about 2-1/2 weeks ago he underwent 3 different surgeries for bleeding duodenal ulcer.  Eventually had a duodenotomy with oversew of the gastroduodenal artery by Dr. Peng.  He was in the hospital about a week after that surgery and went home.  Apparently did not go home on his proton pump inhibitors and Carafate area presented back to the emergency room with epigastric pain with radiation to his back suspicious for worsening ulcer.  I asked GI to reconsult and to restart tube feeds being treated for his pain/ulcer.  Objective   Patient's medicines were restarted yesterday he states that he is feeling much better already.  He has refused enteral feeds    Vital Signs  Vitals:    08/03/19 2048 08/03/19 2300 08/04/19 0300 08/04/19 0500   BP: 148/75 126/75 122/66 103/48   BP Location:  Left arm Right arm Right arm   Patient Position:  Lying Lying Lying   Pulse: 74 79 64 70   Resp:  17 16 15   Temp:  98.6 °F (37 °C) 99.1 °F (37.3 °C) 98.7 °F (37.1 °C)   TempSrc:  Oral Oral Oral   SpO2:  100% 98% 96%   Weight:       Height:             Results Review:       Lab Results (last 24 hours)     Procedure Component Value Units Date/Time    Basic Metabolic Panel [[id number]]  (Abnormal) Collected:  08/04/19 0257    Specimen:  Blood Updated:  08/04/19 0408     Glucose 109 mg/dL      BUN 4 mg/dL      Creatinine 0.80 mg/dL      Sodium 130 mmol/L      Potassium 3.9 mmol/L      Chloride 99 mmol/L      CO2 24.0 mmol/L      Calcium 7.9 mg/dL      eGFR Non African Amer 105 mL/min/1.73      BUN/Creatinine Ratio 5.0     Anion Gap 10.9 mmol/L     Magnesium [[id number]]  (Abnormal) Collected:  08/04/19 0257    Specimen:  Blood Updated:  08/04/19 0408     Magnesium 1.7 mg/dL     CBC & Differential [[id number]] Collected:  08/04/19  0257    Specimen:  Blood Updated:  08/04/19 0356    Narrative:       The following orders were created for panel order CBC & Differential.  Procedure                               Abnormality         Status                     ---------                               -----------         ------                     CBC Auto Differential[[id number]]        Abnormal            Final result                 Please view results for these tests on the individual orders.    CBC Auto Differential [[id number]]  (Abnormal) Collected:  08/04/19 0257    Specimen:  Blood Updated:  08/04/19 0356     WBC 5.90 10*3/mm3      RBC 2.99 10*6/mm3      Hemoglobin 8.2 g/dL      Hematocrit 25.4 %      MCV 84.9 fL      MCH 27.5 pg      MCHC 32.4 g/dL      RDW 14.6 %      RDW-SD 44.2 fl      MPV 7.3 fL      Platelets 394 10*3/mm3      Neutrophil % 53.9 %      Lymphocyte % 26.0 %      Monocyte % 17.3 %      Eosinophil % 2.1 %      Basophil % 0.7 %      Neutrophils, Absolute 3.20 10*3/mm3      Lymphocytes, Absolute 1.50 10*3/mm3      Monocytes, Absolute 1.00 10*3/mm3      Eosinophils, Absolute 0.10 10*3/mm3      Basophils, Absolute 0.00 10*3/mm3      nRBC 0.1 /100 WBC     H. Pylori Antigen, Stool - Stool, Per Rectum [[id number]] Collected:  08/03/19 1840    Specimen:  Stool from Per Rectum Updated:  08/03/19 1856    Blood Culture - Blood, Arm, Right [[id number]] Collected:  08/02/19 1554    Specimen:  Blood from Arm, Right Updated:  08/03/19 1630     Blood Culture No growth at 24 hours    Blood Culture - Blood, Arm, Right [[id number]] Collected:  08/02/19 1540    Specimen:  Blood from Arm, Right Updated:  08/03/19 1600     Blood Culture No growth at 24 hours           Imaging Results (last 24 hours)     ** No results found for the last 24 hours. **          Medication Review:   Current Facility-Administered Medications:   •  calcium gluconate 1 g in sodium chloride 0.9 % 100 mL IVPB, 1 g, Intravenous, PRN **AND** calcium gluconate 2 g/100 mL NS IVPB,  2 g, Intravenous, PRN **AND** Calcium, , , PRN, Bee Le, APRN  •  lisinopril (PRINIVIL,ZESTRIL) tablet 10 mg, 10 mg, Oral, Q24H, Bee Le, APRN, 10 mg at 08/04/19 0802  •  Magnesium Sulfate 2 gram Bolus, followed by 8 gram infusion (total Mg dose 10 grams)- Mg less than or equal to 1mg/dL, 2 g, Intravenous, PRN **OR** Magnesium Sulfate 2 gram / 50mL Infusion (GIVE X 3 BAGS TO EQUAL 6GM TOTAL DOSE) - Mg 1.1 - 1.5 mg/dl, 2 g, Intravenous, PRN **OR** Magnesium Sulfate 4 gram infusion- Mg 1.6-1.9 mg/dL, 4 g, Intravenous, PRN, Bee Le, APRN  •  metoprolol tartrate (LOPRESSOR) tablet 25 mg, 25 mg, Oral, Q12H, Bee Le APRN, 25 mg at 08/04/19 0803  •  Morphine sulfate (PF) injection 4 mg, 4 mg, Intravenous, Q4H PRN, Bee Le APRN, 4 mg at 08/04/19 1024  •  nitroglycerin (NITROSTAT) SL tablet 0.4 mg, 0.4 mg, Sublingual, Q5 Min PRN, Bee Le, APRN  •  ondansetron (ZOFRAN) tablet 4 mg, 4 mg, Oral, Q6H PRN **OR** ondansetron (ZOFRAN) injection 4 mg, 4 mg, Intravenous, Q6H PRN, Bee Le, APRN  •  pantoprazole (PROTONIX) injection 40 mg, 40 mg, Intravenous, Q AM, Sue Grady APRNILO, 40 mg at 08/04/19 0626  •  potassium chloride (K-DUR,KLOR-CON) CR tablet 40 mEq, 40 mEq, Oral, PRN **OR** potassium chloride (KLOR-CON) packet 40 mEq, 40 mEq, Oral, PRN **OR** potassium chloride 10 mEq in 100 mL IVPB, 10 mEq, Intravenous, Q1H PRN, Bee Le APRN  •  sertraline (ZOLOFT) tablet 50 mg, 50 mg, Oral, Daily, Bee Le APRN, 50 mg at 08/04/19 0803  •  [COMPLETED] Insert peripheral IV, , , Once **AND** sodium chloride 0.9 % flush 10 mL, 10 mL, Intravenous, PRN, Zamzam Garcia APRN  •  sodium chloride 0.9 % flush 10 mL, 10 mL, Intravenous, PRN, Zamzam Garcia APRN  •  sodium chloride 0.9 % flush 3 mL, 3 mL, Intravenous, Q12H, Bee Le APRN, 3 mL at 08/04/19 0900  •  sodium chloride 0.9 % flush 3-10  mL, 3-10 mL, Intravenous, PRN, Bee Le APRN  •  sodium chloride 0.9 % infusion, 100 mL/hr, Intravenous, Continuous, Bee Le APRN, Stopped at 08/03/19 2042  •  sucralfate (CARAFATE) tablet 1 g, 1 g, Oral, 4x Daily AC & at Bedtime, Sue Grady APRN, 1 g at 08/04/19 0802  •  traZODone (DESYREL) tablet 100 mg, 100 mg, Oral, Nightly, Bee Le APRN, 100 mg at 08/03/19 2048    Assessment/Plan         Essential hypertension    Tobacco dependency    Alcohol dependence (CMS/HCC)    Medically noncompliant    Hepatitis C    NICM (nonischemic cardiomyopathy) (CMS/HCC)    Generalized abdominal pain    Dyspnea    Substance abuse (CMS/HCC)    Postop 2-1/2 weeks oversew gastroduodenal ulcer via duodenotomy  Worsening abdominal pain with radiation to the back, now improving    Plan: Dr. Mohr will return tomorrow.  Will leave removing his G-tube and J-tube up to her          Morgan Mason DO  08/04/19  10:31 AM

## 2019-08-04 NOTE — PROGRESS NOTES
Hospitalist Team      Patient Care Team:  Provider, No Known as PCP - General      Chief Complaint / Subjective    43 year old male presents to ED with complaints of abdominal pain past few days . He denies hematemesis , nausea vomiting, or changes in bowel or bladder . He also reports dyspnea, denies chest pain, fever, chills, cough , congestion. H was recently hospitalized here last month, initially for chest pain . Myoview was abnormal and he left AMA. Subsequent cardiac cath 7/9/19 showed no obstructive CAD.Subsequent he was re- admitted later in the month for syncope And was found to have bleeding ulcers that were cauterized during EGD. He subsequently deteriorated and was placed in ICU. He underwent an exploratory lap and was eventually discharged home with G tube. He reports he has not used G tube at all as no home health was set up but has been able to eat soft foods. In ED today CT abdomen and pelvis show some irregular air fluid density in abdomen and possible pancreatic cyst as well as 5 mm nodule LLL possibly infection. His temp was 100 F. Lactate 1.5, vitals stable, wbc normal. He was started on Iv Cefepime for possible HAP and GI and general surgery consulted for further evaluation. PMH Hep C, drug abuse ( denies current) , tobacco use, depression.       Interval History and ROS:     History taken from: patient    Review of Systems   Constitution: Negative.   HENT: Negative.    Eyes: Negative.    Cardiovascular: Negative.    Respiratory: Negative.    Endocrine: Negative.    Hematologic/Lymphatic: Negative.    Skin: Negative.    Musculoskeletal: Negative.    Gastrointestinal: Positive for abdominal pain.   Genitourinary: Negative.    Neurological: Negative.    Psychiatric/Behavioral: Negative.    Allergic/Immunologic: Negative.    All other systems reviewed and are negative.    Very poor historian.  Gives very vague answers to repeated questions even.    Family History   Problem Relation Age of Onset  "  • Heart disease Mother    • Heart attack Mother    • Colon cancer Mother    • Heart disease Brother    • Heart attack Brother    • Heart disease Brother    • Heart attack Brother        Past Medical History:   Diagnosis Date   • ADHD (attention deficit hyperactivity disorder)    • Alcohol dependence (CMS/[facility])    • Anxiety    • Depression    • Essential hypertension 2019   • Hepatitis C 2019   • NICM (nonischemic cardiomyopathy) (CMS/[facility]) 2019   • Substance abuse (CMS/[facility])    • Tobacco dependency        Social History     Socioeconomic History   • Marital status:      Spouse name: Not on file   • Number of children: Not on file   • Years of education: Not on file   • Highest education level: Not on file   Tobacco Use   • Smoking status: Former Smoker     Packs/day: 1.00     Years: 20.00     Pack years: 20.00     Types: Cigarettes     Last attempt to quit: 2019     Years since quittin.0   • Smokeless tobacco: Former User   Substance and Sexual Activity   • Alcohol use: Yes     Frequency: Never     Comment: use to drink half gallon of liquor per day; down to 1 pint of liquor per day; quit 2019   • Drug use: Yes     Types: Cocaine, Methamphetamines     Comment: last used meth about 2 months ago   • Sexual activity: Defer   Social History Narrative    He reports extensive history of depression, anxiety, and substance abuse. He's been diagnosed with ADD and bipolar disorder, and has been treated at [facility] and [facility]. Medications in the past have made him feel \"slow\" and \"dumb\". He reports that his mind is always going \"a million miles a minute\", and he \"can't get shit done\" because he can't focus. He reports very low self esteem. He reports his son's mother has told him they'd be better off if he was dead and he wonders if that's true, but states tearfully that he doesn't want to die. He has quit drinking and doing drugs in the past, and says he went to Religion a lot. He's " "never had a sponsor or really participated in AA. He did cocaine on and off for around 10 years but quit when his son was born 9 yrs ago. He says that cocaine calmed him down. In the past couple years, he started using meth which calms him down if he snorts it, but amps him up if he smokes it. Alcohol calms him down. He reports feeling very scared right now and worried that he's going to die. He seems ready to address his substance abuse and try to live a healthier lifestyle. His son is his main motivation.            Objective      Vital Signs  Temp:  [98.6 °F (37 °C)-99.6 °F (37.6 °C)] 98.7 °F (37.1 °C)  Heart Rate:  [64-79] 70  Resp:  [15-20] 15  BP: (103-148)/(48-75) 103/48  Oxygen Therapy  SpO2: 96 %  Pulse Oximetry Type: Intermittent  Device (Oxygen Therapy): room air  Flowsheet Rows      First Filed Value   Admission Height  177.8 cm (70\") Documented at 08/02/2019 1505   Admission Weight  90.7 kg (200 lb) Documented at 08/02/2019 1505        Intake & Output (last 3 days)       08/01 0701 - 08/02 0700 08/02 0701 - 08/03 0700 08/03 0701 - 08/04 0700 08/04 0701 - 08/05 0700    P.O.   1640 240    I.V. (mL/kg)  50 (0.5) 950 (10.4)     Total Intake(mL/kg)  50 (0.5) 2590 (28.3) 240 (2.6)    Urine (mL/kg/hr)   300 (0.1)     Stool   1     Total Output   301     Net  +50 +2289 +240            Urine Unmeasured Occurrence  1 x 3 x     Stool Unmeasured Occurrence  1 x 2 x         Lines, Drains & Airways    Active LDAs     Name:   Placement date:   Placement time:   Site:   Days:    Peripheral IV 06/27/19 1800 Right Antecubital   06/27/19    1800    Antecubital   36    Peripheral IV 08/02/19 1620 Left Wrist   08/02/19    1620    Wrist   less than 1    Peripheral IV 08/02/19 1800 Anterior;Proximal;Right Forearm   08/02/19    1800    Forearm   less than 1    Gastrostomy/Enterostomy Jejunostomy   07/16/19 2050    --   17    Gastrostomy/Enterostomy Gastrostomy   07/16/19 2055    --   17                Physical " Exam:    Physical Exam   Constitutional: He is oriented to person, place, and time. He appears well-developed and well-nourished. No distress.   HENT:   Head: Normocephalic and atraumatic.   Right Ear: External ear normal.   Left Ear: External ear normal.   Nose: Nose normal.   Mouth/Throat: Oropharynx is clear and moist. No oropharyngeal exudate.   Eyes: Conjunctivae and EOM are normal. Pupils are equal, round, and reactive to light. Right eye exhibits no discharge. Left eye exhibits no discharge. No scleral icterus.   Neck: Normal range of motion. No JVD present. No tracheal deviation present. No thyromegaly present.   Cardiovascular: Normal rate, regular rhythm, normal heart sounds and intact distal pulses. Exam reveals no gallop and no friction rub.   No murmur heard.  Pulmonary/Chest: Effort normal and breath sounds normal. No stridor. No respiratory distress. He has no wheezes. He has no rales. He exhibits no tenderness.   Abdominal: Soft. Bowel sounds are normal. He exhibits no distension and no mass. There is tenderness. There is no rebound and no guarding. No hernia.   J-tube and G-tube present.  Staples present from recent open laparotomy   Musculoskeletal: Normal range of motion. He exhibits no edema, tenderness or deformity.   Lymphadenopathy:     He has no cervical adenopathy.   Neurological: He is alert and oriented to person, place, and time. No cranial nerve deficit or sensory deficit. He exhibits normal muscle tone. Coordination normal.   Skin: Skin is warm and dry. No rash noted. He is not diaphoretic. No erythema.   Psychiatric: He has a normal mood and affect. His behavior is normal.   Nursing note and vitals reviewed.            Results Review:    I reviewed the patient's new clinical results.    Results from last 7 days   Lab Units 08/04/19  0257 08/03/19  0247 08/02/19  1555   WBC 10*3/mm3 5.90 7.40 6.20   HEMOGLOBIN g/dL 8.2* 9.0* 9.4*   HEMATOCRIT % 25.4* 26.6* 28.6*   PLATELETS 10*3/mm3 394  436 579*     Results from last 7 days   Lab Units 08/04/19  0257 08/03/19  0247 08/02/19  1608 08/02/19  1555   SODIUM mmol/L 130* 133*  --  134*   POTASSIUM mmol/L 3.9 4.4  --  3.6   CHLORIDE mmol/L 99* 102  --  99*   CO2 mmol/L 24.0 24.0  --  23.0   BUN mg/dL 4* 5*  --  8   CREATININE mg/dL 0.80 0.70 0.60 0.80   CALCIUM mg/dL 7.9* 8.2*  --  8.3*   BILIRUBIN mg/dL  --   --   --  0.5   ALK PHOS U/L  --   --   --  80   ALT (SGPT) U/L  --   --   --  24   AST (SGOT) U/L  --   --   --  21   GLUCOSE mg/dL 109* 112*  --  103*     Results from last 7 days   Lab Units 08/04/19  0257 08/03/19  0247 08/02/19  1555   MAGNESIUM mg/dL 1.7* 2.0 1.5*     Lab Results   Component Value Date    CALCIUM 7.9 (L) 08/04/2019    PHOS 4.5 07/16/2019     No results found for: HGBA1C  Results from last 7 days   Lab Units 08/02/19  1555   INR  1.00               Microbiology Results (last 10 days)     Procedure Component Value - Date/Time    Blood Culture - Blood, Arm, Right [[id number]] Collected:  08/02/19 1554    Lab Status:  Preliminary result Specimen:  Blood from Arm, Right Updated:  08/03/19 1630     Blood Culture No growth at 24 hours    Blood Culture - Blood, Arm, Right [[id number]] Collected:  08/02/19 1540    Lab Status:  Preliminary result Specimen:  Blood from Arm, Right Updated:  08/03/19 1600     Blood Culture No growth at 24 hours          ECG/EMG Results (most recent)     Procedure Component Value Units Date/Time    ECG 12 Lead [[id number]] Collected:  08/02/19 1616     Updated:  08/04/19 0705    Narrative:       HEART RATE= 81  bpm  RR Interval= 740  ms  ME Interval= 155  ms  P Horizontal Axis= 25  deg  P Front Axis= 38  deg  QRSD Interval= 92  ms  QT Interval= 364  ms  QRS Axis= 30  deg  T Wave Axis= 45  deg  - BORDERLINE ECG -  Sinus rhythm  Consider left ventricular hypertrophy  Electronically Signed By: Fco Joseph (Everardo) 04-Aug-2019 07:05:43  Date and Time of Study: 2019-08-02 16:16:17               Results for orders  placed during the hospital encounter of 06/27/19   Adult Transthoracic Echo Complete W/ Cont if Necessary Per Protocol    Narrative · Left ventricular systolic function is normal.  · Left ventricular wall thickness is consistent with moderate concentric   hypertrophy.  · Left ventricular diastolic dysfunction (grade I) consistent with   impaired relaxation.  · Estimated EF appears to be in the range of 61 - 65%.          Ct Abdomen Pelvis With Contrast    Result Date: 8/2/2019   1. Abnormal appearance of the gastroduodenal junction may represent sequelae of recent surgical intervention with residual inflammatory process. Air-fluid collection is seen at the medial margin of the gastroduodenal junction which is not definitely intraluminal, and could represent recurrent or residual ulceration or abscess or contained leak. 2. 10 mm fluid density lesion within the pancreatic neck nonspecific and could represent pancreatic cyst or pseudocyst. Likely secondary reactive inflammatory changes within the pancreatic head and neck, as well. 3. Abnormal thickening and enhancement of the rectum. Correlate for proctitis symptoms. 4. New 5 mm groundglass nodule in the left lower lobe. Benign infectious/inflammatory nodule is favored.    Electronically Signed By-Dr. Yoon Garcia MD On:8/2/2019 4:55 PM This report was finalized on [id number] by Dr. Yoon Garcia MD.    Xr Chest 1 View    Result Date: 8/2/2019  No acute cardiopulmonary disease is seen radiographically.   Electronically Signed By-Dr. Olaf Fisher MD On:8/2/2019 3:43 PM This report was finalized on [id number] by Dr. Olaf Fisher MD.    Ct Chest Pulmonary Embolism With Contrast    Result Date: 8/2/2019   1. Abnormal appearance of the gastroduodenal junction may represent sequelae of recent surgical intervention with residual inflammatory process. Air-fluid collection is seen at the medial margin of the gastroduodenal junction which is not definitely  intraluminal, and could represent recurrent or residual ulceration or abscess or contained leak. 2. 10 mm fluid density lesion within the pancreatic neck nonspecific and could represent pancreatic cyst or pseudocyst. Likely secondary reactive inflammatory changes within the pancreatic head and neck, as well. 3. Abnormal thickening and enhancement of the rectum. Correlate for proctitis symptoms. 4. New 5 mm groundglass nodule in the left lower lobe. Benign infectious/inflammatory nodule is favored.    Electronically Signed By-Dr. Yoon Garcia MD On:8/2/2019 4:55 PM This report was finalized on [id number] by Dr. Yoon Garcia MD.      Xrays, labs reviewed personally by physician.    Medication Review:   I have reviewed the patient's current medication list    Scheduled Meds    lisinopril 10 mg Oral Q24H   metoprolol tartrate 25 mg Oral Q12H   pantoprazole 40 mg Intravenous Q AM   sertraline 50 mg Oral Daily   sodium chloride 3 mL Intravenous Q12H   sucralfate 1 g Oral 4x Daily AC & at Bedtime   traZODone 100 mg Oral Nightly       Meds Infusions    sodium chloride 100 mL/hr Last Rate: Stopped (08/03/19 2042)       Meds PRN  •  calcium gluconate IVPB **AND** calcium gluconate **AND** Calcium  •  magnesium sulfate **OR** magnesium sulfate **OR** magnesium sulfate  •  Morphine  •  ondansetron **OR** ondansetron  •  potassium chloride **OR** potassium chloride **OR** potassium chloride  •  [COMPLETED] Insert peripheral IV **AND** sodium chloride  •  sodium chloride  •  sodium chloride        Procedures:        Assessment:      Essential hypertension    Tobacco dependency    Alcohol dependence (CMS/HCC)    Medically noncompliant    Hepatitis C    NICM (nonischemic cardiomyopathy) (CMS/HCC)    Generalized abdominal pain    Dyspnea    Substance abuse (CMS/HCC)      Plan    Supportive treatment  No narcotics due to drug abuse history  Await GI input  Labs noted  DC IV fluid    Plan for disposition:    Krystle Moore  MD Milena  08/04/19  2:36 PM

## 2019-08-04 NOTE — PROGRESS NOTES
LOS: 0 days   Patient Care Team:  Provider, No Known as PCP - General      Subjective  Feels much better. Tolerating soft diet. No nausea, vomiting or abdominal pain.     Interval History: Unremarkable        ROS: No nausea, vomiting, abdominal pain.  No chest pain, shortness of breath, or cough.        Medication Review:     Current Facility-Administered Medications:   •  calcium gluconate 1 g in sodium chloride 0.9 % 100 mL IVPB, 1 g, Intravenous, PRN **AND** calcium gluconate 2 g/100 mL NS IVPB, 2 g, Intravenous, PRN **AND** Calcium, , , PRN, Bee Le, APRN  •  lisinopril (PRINIVIL,ZESTRIL) tablet 10 mg, 10 mg, Oral, Q24H, Bee Le APRN, 10 mg at 08/04/19 0802  •  Magnesium Sulfate 2 gram Bolus, followed by 8 gram infusion (total Mg dose 10 grams)- Mg less than or equal to 1mg/dL, 2 g, Intravenous, PRN **OR** Magnesium Sulfate 2 gram / 50mL Infusion (GIVE X 3 BAGS TO EQUAL 6GM TOTAL DOSE) - Mg 1.1 - 1.5 mg/dl, 2 g, Intravenous, PRN **OR** Magnesium Sulfate 4 gram infusion- Mg 1.6-1.9 mg/dL, 4 g, Intravenous, PRN, Bee Le, APRN  •  metoprolol tartrate (LOPRESSOR) tablet 25 mg, 25 mg, Oral, Q12H, Bee Le APRN, 25 mg at 08/04/19 0803  •  Morphine sulfate (PF) injection 4 mg, 4 mg, Intravenous, Q4H PRN, Bee Le APRN, 4 mg at 08/04/19 1024  •  nitroglycerin (NITROSTAT) SL tablet 0.4 mg, 0.4 mg, Sublingual, Q5 Min PRN, Bee Le, APRN  •  ondansetron (ZOFRAN) tablet 4 mg, 4 mg, Oral, Q6H PRN **OR** ondansetron (ZOFRAN) injection 4 mg, 4 mg, Intravenous, Q6H PRN, Bee Le, APRN  •  pantoprazole (PROTONIX) injection 40 mg, 40 mg, Intravenous, Q AM, Sue Grady, ZACK, 40 mg at 08/04/19 0626  •  potassium chloride (K-DUR,KLOR-CON) CR tablet 40 mEq, 40 mEq, Oral, PRN **OR** potassium chloride (KLOR-CON) packet 40 mEq, 40 mEq, Oral, PRN **OR** potassium chloride 10 mEq in 100 mL IVPB, 10 mEq, Intravenous, Q1H PRN,  Bee Le APRN  •  sertraline (ZOLOFT) tablet 50 mg, 50 mg, Oral, Daily, Bee Le APRN, 50 mg at 08/04/19 0803  •  [COMPLETED] Insert peripheral IV, , , Once **AND** sodium chloride 0.9 % flush 10 mL, 10 mL, Intravenous, PRN, Zamzam Garcia APRN  •  sodium chloride 0.9 % flush 10 mL, 10 mL, Intravenous, PRN, Zamzam Garcia APRN  •  sodium chloride 0.9 % flush 3 mL, 3 mL, Intravenous, Q12H, Bee Le APRN, 3 mL at 08/04/19 0900  •  sodium chloride 0.9 % flush 3-10 mL, 3-10 mL, Intravenous, PRN, Bee Le APRN  •  sodium chloride 0.9 % infusion, 100 mL/hr, Intravenous, Continuous, Bee Le APRN, Stopped at 08/03/19 2042  •  sucralfate (CARAFATE) tablet 1 g, 1 g, Oral, 4x Daily AC & at Bedtime, Sue Grady APRN, 1 g at 08/04/19 1201  •  traZODone (DESYREL) tablet 100 mg, 100 mg, Oral, Nightly, Bee Le APRN, 100 mg at 08/03/19 2048      Objective  Sitting up in bed. Rediscussed need for PPI for 90 days.     Vital Signs  Temp:  [98.6 °F (37 °C)-99.6 °F (37.6 °C)] 98.7 °F (37.1 °C)  Heart Rate:  [64-79] 70  Resp:  [15-20] 15  BP: (103-148)/(48-75) 103/48  Physical Exam:    General Appearance:    Awake and alert, in no acute distress   Head:    Normocephalic, without obvious abnormality   Eyes:          Conjunctivae normal, anicteric sclera   Ears:    Hearing intact   Throat:   No oral lesions, no thrush, oral mucosa moist   Neck:   No adenopathy, supple, no JVD   Lungs:     Clear to auscultation bilaterally, respirations regular, even and unlabored    Heart:    Regular rhythm and normal rate, normal S1 and S2, no            murmur, no gallop, no rub   Abdomen:     Normal bowel sounds, soft, non-tender, no rebound or guarding, non-distended, no hepatosplenomegaly   Rectal:     Deferred   Extremities:   No edema, no cyanosis, no redness   Skin:   No bleeding, bruising or rash, no jaundice   Neurologic:   Cranial nerves 2 - 12  grossly intact, no asterixis, sensation   intact        Results Review:    Lab Results (last 24 hours)     Procedure Component Value Units Date/Time    Basic Metabolic Panel [[id number]]  (Abnormal) Collected:  08/04/19 0257    Specimen:  Blood Updated:  08/04/19 0408     Glucose 109 mg/dL      BUN 4 mg/dL      Creatinine 0.80 mg/dL      Sodium 130 mmol/L      Potassium 3.9 mmol/L      Chloride 99 mmol/L      CO2 24.0 mmol/L      Calcium 7.9 mg/dL      eGFR Non African Amer 105 mL/min/1.73      BUN/Creatinine Ratio 5.0     Anion Gap 10.9 mmol/L     Magnesium [[id number]]  (Abnormal) Collected:  08/04/19 0257    Specimen:  Blood Updated:  08/04/19 0408     Magnesium 1.7 mg/dL     CBC & Differential [[id number]] Collected:  08/04/19 0257    Specimen:  Blood Updated:  08/04/19 0356    Narrative:       The following orders were created for panel order CBC & Differential.  Procedure                               Abnormality         Status                     ---------                               -----------         ------                     CBC Auto Differential[[id number]]        Abnormal            Final result                 Please view results for these tests on the individual orders.    CBC Auto Differential [[id number]]  (Abnormal) Collected:  08/04/19 0257    Specimen:  Blood Updated:  08/04/19 0356     WBC 5.90 10*3/mm3      RBC 2.99 10*6/mm3      Hemoglobin 8.2 g/dL      Hematocrit 25.4 %      MCV 84.9 fL      MCH 27.5 pg      MCHC 32.4 g/dL      RDW 14.6 %      RDW-SD 44.2 fl      MPV 7.3 fL      Platelets 394 10*3/mm3      Neutrophil % 53.9 %      Lymphocyte % 26.0 %      Monocyte % 17.3 %      Eosinophil % 2.1 %      Basophil % 0.7 %      Neutrophils, Absolute 3.20 10*3/mm3      Lymphocytes, Absolute 1.50 10*3/mm3      Monocytes, Absolute 1.00 10*3/mm3      Eosinophils, Absolute 0.10 10*3/mm3      Basophils, Absolute 0.00 10*3/mm3      nRBC 0.1 /100 WBC     H. Pylori Antigen, Stool - Stool, Per Rectum  [[id number]] Collected:  08/03/19 1840    Specimen:  Stool from Per Rectum Updated:  08/03/19 1856    Blood Culture - Blood, Arm, Right [[id number]] Collected:  08/02/19 1554    Specimen:  Blood from Arm, Right Updated:  08/03/19 1630     Blood Culture No growth at 24 hours    Blood Culture - Blood, Arm, Right [[id number]] Collected:  08/02/19 1540    Specimen:  Blood from Arm, Right Updated:  08/03/19 1600     Blood Culture No growth at 24 hours          Imaging Results (last 24 hours)     ** No results found for the last 24 hours. **            Assessment/Plan    Anemia related duodenal bulb ulcer with repair   Diarrhea post surgical   History of hepatitis C  Alcohol abuse  Hypertension    7/15/19 LAPAROTOMY EXPLORATORY with Dr Peng Oversewing of gastroduodenal artery. Blas gastrostomy. Witzel jejunostomy. Placement of gastric & jejunal tube.      7/14/19 EGD (Dr Myers) 3 cm duodenal bulb ulcer which was cratered with a pulsating visible vessel with oozing around the base of the vessel.  This was injected with epinephrine and alcohol, cauterized with gold probe cautery, and hemospray.      PLAN:   Tolerating soft diet  PPI & Carafate (RX sent to [facility] in [place] electronically)  Reiterated he can use ensure down J-tube at home to supplement.   Needs  for evaluate for home environment-ensure he has the resources at home to eat & care for himself  Patient requested to stay until seen by Dr. Mohr tomorrow.  Pending H. pylori      ZACK De Leon  08/04/19  2:08 PM

## 2019-08-05 VITALS
BODY MASS INDEX: 27.29 KG/M2 | SYSTOLIC BLOOD PRESSURE: 130 MMHG | HEART RATE: 70 BPM | RESPIRATION RATE: 18 BRPM | DIASTOLIC BLOOD PRESSURE: 66 MMHG | WEIGHT: 201.5 LBS | HEIGHT: 72 IN | TEMPERATURE: 98 F | OXYGEN SATURATION: 100 %

## 2019-08-05 LAB
ANION GAP SERPL CALCULATED.3IONS-SCNC: 12.8 MMOL/L (ref 5–15)
BASOPHILS # BLD AUTO: 0 10*3/MM3 (ref 0–0.2)
BASOPHILS NFR BLD AUTO: 0.8 % (ref 0–1.5)
BUN BLD-MCNC: 5 MG/DL (ref 8–20)
BUN/CREAT SERPL: 7.1 (ref 6.2–20.3)
CALCIUM SPEC-SCNC: 8.4 MG/DL (ref 8.9–10.3)
CHLORIDE SERPL-SCNC: 104 MMOL/L (ref 101–111)
CO2 SERPL-SCNC: 25 MMOL/L (ref 22–32)
CREAT BLD-MCNC: 0.7 MG/DL (ref 0.7–1.2)
DEPRECATED RDW RBC AUTO: 44.2 FL (ref 37–54)
EOSINOPHIL # BLD AUTO: 0.2 10*3/MM3 (ref 0–0.4)
EOSINOPHIL NFR BLD AUTO: 3.7 % (ref 0.3–6.2)
ERYTHROCYTE [DISTWIDTH] IN BLOOD BY AUTOMATED COUNT: 14.7 % (ref 12.3–15.4)
GFR SERPL CREATININE-BSD FRML MDRD: 123 ML/MIN/1.73
GLUCOSE BLD-MCNC: 111 MG/DL (ref 65–99)
HCT VFR BLD AUTO: 26.4 % (ref 37.5–51)
HGB BLD-MCNC: 8.4 G/DL (ref 13–17.7)
LYMPHOCYTES # BLD AUTO: 2 10*3/MM3 (ref 0.7–3.1)
LYMPHOCYTES NFR BLD AUTO: 35.4 % (ref 19.6–45.3)
MAGNESIUM SERPL-MCNC: 1.8 MG/DL (ref 1.8–2.5)
MCH RBC QN AUTO: 27.1 PG (ref 26.6–33)
MCHC RBC AUTO-ENTMCNC: 32 G/DL (ref 31.5–35.7)
MCV RBC AUTO: 84.8 FL (ref 79–97)
MONOCYTES # BLD AUTO: 0.9 10*3/MM3 (ref 0.1–0.9)
MONOCYTES NFR BLD AUTO: 16.6 % (ref 5–12)
NEUTROPHILS # BLD AUTO: 2.4 10*3/MM3 (ref 1.7–7)
NEUTROPHILS NFR BLD AUTO: 43.5 % (ref 42.7–76)
NRBC BLD AUTO-RTO: 0.1 /100 WBC (ref 0–0.2)
PLATELET # BLD AUTO: 389 10*3/MM3 (ref 140–450)
PMV BLD AUTO: 7.2 FL (ref 6–12)
POTASSIUM BLD-SCNC: 3.8 MMOL/L (ref 3.6–5.1)
RBC # BLD AUTO: 3.12 10*6/MM3 (ref 4.14–5.8)
SODIUM BLD-SCNC: 138 MMOL/L (ref 136–144)
WBC NRBC COR # BLD: 5.6 10*3/MM3 (ref 3.4–10.8)

## 2019-08-05 PROCEDURE — G0378 HOSPITAL OBSERVATION PER HR: HCPCS

## 2019-08-05 PROCEDURE — 99217 PR OBSERVATION CARE DISCHARGE MANAGEMENT: CPT | Performed by: INTERNAL MEDICINE

## 2019-08-05 PROCEDURE — 80048 BASIC METABOLIC PNL TOTAL CA: CPT | Performed by: NURSE PRACTITIONER

## 2019-08-05 PROCEDURE — 83735 ASSAY OF MAGNESIUM: CPT | Performed by: NURSE PRACTITIONER

## 2019-08-05 PROCEDURE — 85025 COMPLETE CBC W/AUTO DIFF WBC: CPT | Performed by: NURSE PRACTITIONER

## 2019-08-05 PROCEDURE — 96376 TX/PRO/DX INJ SAME DRUG ADON: CPT

## 2019-08-05 RX ORDER — SUCRALFATE 1 G/1
1 TABLET ORAL
Qty: 120 TABLET | Refills: 0 | Status: SHIPPED | OUTPATIENT
Start: 2019-08-05 | End: 2019-09-04

## 2019-08-05 RX ORDER — PANTOPRAZOLE SODIUM 40 MG/1
40 TABLET, DELAYED RELEASE ORAL 2 TIMES DAILY
Qty: 60 TABLET | Refills: 0 | Status: SHIPPED | OUTPATIENT
Start: 2019-08-05 | End: 2019-09-04

## 2019-08-05 RX ADMIN — HYDROCODONE BITARTRATE AND ACETAMINOPHEN 1 TABLET: 5; 325 TABLET ORAL at 11:56

## 2019-08-05 RX ADMIN — SUCRALFATE 1 G: 1 TABLET ORAL at 08:19

## 2019-08-05 RX ADMIN — LISINOPRIL 10 MG: 5 TABLET ORAL at 08:19

## 2019-08-05 RX ADMIN — HYDROCODONE BITARTRATE AND ACETAMINOPHEN 1 TABLET: 5; 325 TABLET ORAL at 17:47

## 2019-08-05 RX ADMIN — PANTOPRAZOLE SODIUM 40 MG: 40 INJECTION, POWDER, FOR SOLUTION INTRAVENOUS at 05:34

## 2019-08-05 RX ADMIN — SERTRALINE HYDROCHLORIDE 50 MG: 50 TABLET ORAL at 08:19

## 2019-08-05 RX ADMIN — SUCRALFATE 1 G: 1 TABLET ORAL at 11:12

## 2019-08-05 RX ADMIN — SUCRALFATE 1 G: 1 TABLET ORAL at 17:47

## 2019-08-05 RX ADMIN — HYDROCODONE BITARTRATE AND ACETAMINOPHEN 1 TABLET: 5; 325 TABLET ORAL at 00:14

## 2019-08-05 RX ADMIN — SODIUM CHLORIDE, PRESERVATIVE FREE 3 ML: 5 INJECTION INTRAVENOUS at 08:19

## 2019-08-05 RX ADMIN — HYDROCODONE BITARTRATE AND ACETAMINOPHEN 1 TABLET: 5; 325 TABLET ORAL at 06:14

## 2019-08-05 RX ADMIN — METOPROLOL TARTRATE 25 MG: 25 TABLET, FILM COATED ORAL at 08:20

## 2019-08-05 NOTE — PROGRESS NOTES
Discharge Planning Assessment  [facility]     Patient Name: Asad Bethea  MRN: [id number]  Today's Date: 8/5/2019    Admit Date: 8/2/2019    Discharge Needs Assessment     Row Name 08/05/19 1401       Living Environment    Lives With  significant other    Current Living Arrangements  home/apartment/condo    Primary Care Provided by  self;spouse/significant other    Provides Primary Care For  no one    Family Caregiver if Needed  significant other    Quality of Family Relationships  supportive    Able to Return to Prior Arrangements  yes       Resource/Environmental Concerns    Resource/Environmental Concerns  none    Transportation Concerns  car, none       Transition Planning    Patient/Family Anticipates Transition to  home with family    Patient/Family Anticipated Services at Transition  none    Transportation Anticipated  family or friend will provide       Discharge Needs Assessment    Readmission Within the Last 30 Days  previous discharge plan unsuccessful;lack of support    Equipment Currently Used at Home  none    Anticipated Changes Related to Illness  none    Equipment Needed After Discharge  none    Discharge Coordination/Progress  Anticipate routine D/C at this time. Will continue to follow.         Discharge Plan     Row Name 08/05/19 3372       Plan    Plan  Anticipate routine D/C at this time. Will continue to follow.             Demographic Summary     Row Name 08/05/19 8219       General Information    Admission Type  inpatient    Arrived From  emergency department    Referral Source  other (see comments)    Reason for Consult  discharge planning    Preferred Language  English     Used During This Interaction  no       Contact Information    Permission Granted to Share Info With      Contact Information Obtained for             Name,   A. Naegele RN    Phone,   [phone]        Functional Status     Row Name  08/05/19 1355       Functional Status    Usual Activity Tolerance  good    Current Activity Tolerance  fair       Employment/    Employment Status  disabled        Psychosocial     Row Name 08/05/19 1351       Values/Beliefs    Spiritual, Cultural Beliefs, Rastafarian Practices, Values that Affect Care  no       Coping/Stress    Major Change/Loss/Stressor  medical condition/diagnosis    Patient Personal Strengths  expressive of emotions;expressive of needs    Sources of Support  spouse    Reaction to Health Status  anger       C-SSRS (Recent)    Wish to be Dead  no    Suicidal Thoughts  no    Suicide Behavior  no       Violence Risk    Feels Like Hurting Others  no    Previous Attempt to Harm Others  no        Abuse/Neglect     Row Name 08/05/19 1357       Personal Safety    Feels Unsafe at Home or Work/School  no    Feels Threatened by Someone  no    Does Anyone Try to Keep You From Having Contact with Others or Doing Things Outside Your Home?  no    Physical Signs of Abuse Present  no        Legal     Row Name 08/05/19 1352       Financial/Legal    Source of Income  disability        Substance Abuse     Row Name 08/05/19 1358       AUDIT-C (Alcohol Use Disorders ID Test)    Alcohol Use In Past Year  4-->four or more times a week    Alcohol Amount Per Day In Past Year  2-->five or six    More Than 6 Drinks On One Occasion  4-->daily or almost daily    Total AUDIT-C Score  10        Patient Forms    No documentation.           Anna L Naegele, RN

## 2019-08-05 NOTE — PROGRESS NOTES
General Surgery Progress Note     LOS: 0 days   Patient Care Team:  Annmarie, No Known as PCP - General    Subjective     Interval History:   Patient doing well overall.  Tolerating a diet.  Abdominal pain resolved.  Not using J-tube or G-tube at this time.    Objective     Vital Signs  Temp:  [98 °F (36.7 °C)-98.7 °F (37.1 °C)] 98 °F (36.7 °C)  Heart Rate:  [66-71] 70  Resp:  [15-18] 18  BP: (109-130)/(63-68) 130/66    Physical Exam   Constitutional: He is oriented to person, place, and time. He appears well-developed and well-nourished.   HENT:   Head: Normocephalic and atraumatic.   Eyes: EOM are normal.   Neck: Normal range of motion.   Cardiovascular: Normal rate.   Pulmonary/Chest: Effort normal.   Abdominal: Soft.   Vision clean and dry.  G-tube and J-tube sites intact.   Genitourinary:   Genitourinary Comments: Deferred   Musculoskeletal: Normal range of motion.   Neurological: He is alert and oriented to person, place, and time.   Skin: Skin is warm and dry.   Psychiatric: He has a normal mood and affect.          Results Review:    Lab Results (last 24 hours)     Procedure Component Value Units Date/Time    Blood Culture - Blood, Arm, Right [[id number]] Collected:  08/02/19 1540    Specimen:  Blood from Arm, Right Updated:  08/05/19 1600     Blood Culture No growth at 3 days    Basic Metabolic Panel [[id number]]  (Abnormal) Collected:  08/05/19 0403    Specimen:  Blood Updated:  08/05/19 0453     Glucose 111 mg/dL      BUN 5 mg/dL      Creatinine 0.70 mg/dL      Sodium 138 mmol/L      Potassium 3.8 mmol/L      Chloride 104 mmol/L      CO2 25.0 mmol/L      Calcium 8.4 mg/dL      eGFR Non African Amer 123 mL/min/1.73      BUN/Creatinine Ratio 7.1     Anion Gap 12.8 mmol/L     Magnesium [[id number]]  (Normal) Collected:  08/05/19 0403    Specimen:  Blood Updated:  08/05/19 0453     Magnesium 1.8 mg/dL     CBC & Differential [[id number]] Collected:  08/05/19 0403    Specimen:  Blood Updated:  08/05/19 0434     Narrative:       The following orders were created for panel order CBC & Differential.  Procedure                               Abnormality         Status                     ---------                               -----------         ------                     CBC Auto Differential[[id number]]        Abnormal            Final result                 Please view results for these tests on the individual orders.    CBC Auto Differential [[id number]]  (Abnormal) Collected:  08/05/19 0403    Specimen:  Blood Updated:  08/05/19 0438     WBC 5.60 10*3/mm3      RBC 3.12 10*6/mm3      Hemoglobin 8.4 g/dL      Hematocrit 26.4 %      MCV 84.8 fL      MCH 27.1 pg      MCHC 32.0 g/dL      RDW 14.7 %      RDW-SD 44.2 fl      MPV 7.2 fL      Platelets 389 10*3/mm3      Neutrophil % 43.5 %      Lymphocyte % 35.4 %      Monocyte % 16.6 %      Eosinophil % 3.7 %      Basophil % 0.8 %      Neutrophils, Absolute 2.40 10*3/mm3      Lymphocytes, Absolute 2.00 10*3/mm3      Monocytes, Absolute 0.90 10*3/mm3      Eosinophils, Absolute 0.20 10*3/mm3      Basophils, Absolute 0.00 10*3/mm3      nRBC 0.1 /100 WBC     Blood Culture - Blood, Arm, Right [[id number]] Collected:  08/02/19 1554    Specimen:  Blood from Arm, Right Updated:  08/04/19 1630     Blood Culture No growth at 2 days        Imaging Results (last 24 hours)     ** No results found for the last 24 hours. **          I reviewed the patient's new clinical results.    Medication Review:    Current Facility-Administered Medications:   •  calcium gluconate 1 g in sodium chloride 0.9 % 100 mL IVPB, 1 g, Intravenous, PRN **AND** calcium gluconate 2 g/100 mL NS IVPB, 2 g, Intravenous, PRN **AND** Calcium, , , PRN, Bee Le APRN  •  HYDROcodone-acetaminophen (NORCO) 5-325 MG per tablet 1 tablet, 1 tablet, Oral, Q6H PRN, Oscar Abbott MD, 1 tablet at 08/05/19 1156  •  lisinopril (PRINIVIL,ZESTRIL) tablet 10 mg, 10 mg, Oral, Q24H, Bee Le APRN, 10 mg at 08/05/19  0819  •  Magnesium Sulfate 2 gram Bolus, followed by 8 gram infusion (total Mg dose 10 grams)- Mg less than or equal to 1mg/dL, 2 g, Intravenous, PRN **OR** Magnesium Sulfate 2 gram / 50mL Infusion (GIVE X 3 BAGS TO EQUAL 6GM TOTAL DOSE) - Mg 1.1 - 1.5 mg/dl, 2 g, Intravenous, PRN **OR** Magnesium Sulfate 4 gram infusion- Mg 1.6-1.9 mg/dL, 4 g, Intravenous, PRN, Bee Le, APRN  •  metoprolol tartrate (LOPRESSOR) tablet 25 mg, 25 mg, Oral, Q12H, Bee Le APRN, 25 mg at 08/05/19 0820  •  ondansetron (ZOFRAN) tablet 4 mg, 4 mg, Oral, Q6H PRN **OR** ondansetron (ZOFRAN) injection 4 mg, 4 mg, Intravenous, Q6H PRN, Bee Le APRNILO  •  pantoprazole (PROTONIX) injection 40 mg, 40 mg, Intravenous, Q AM, Sue Grady APRN, 40 mg at 08/05/19 0534  •  potassium chloride (K-DUR,KLOR-CON) CR tablet 40 mEq, 40 mEq, Oral, PRN **OR** potassium chloride (KLOR-CON) packet 40 mEq, 40 mEq, Oral, PRN **OR** potassium chloride 10 mEq in 100 mL IVPB, 10 mEq, Intravenous, Q1H PRN, Bee Le APRNILO  •  sertraline (ZOLOFT) tablet 50 mg, 50 mg, Oral, Daily, Bee Le APRN, 50 mg at 08/05/19 0819  •  [COMPLETED] Insert peripheral IV, , , Once **AND** sodium chloride 0.9 % flush 10 mL, 10 mL, Intravenous, PRN, Zamzam Garcia, APRN  •  sodium chloride 0.9 % flush 10 mL, 10 mL, Intravenous, PRN, Zamzam Garcia, APRN  •  sodium chloride 0.9 % flush 3 mL, 3 mL, Intravenous, Q12H, Bee Le APRN, 3 mL at 08/05/19 0819  •  sodium chloride 0.9 % flush 3-10 mL, 3-10 mL, Intravenous, PRN, Bee Le APRN  •  sucralfate (CARAFATE) tablet 1 g, 1 g, Oral, 4x Daily AC & at Bedtime, Sue Grady APRN, 1 g at 08/05/19 1112  •  traZODone (DESYREL) tablet 100 mg, 100 mg, Oral, Nightly, Bee Le APRN, 100 mg at 08/04/19 2009    Assessment/Plan     Active Problems:    Essential hypertension    Tobacco dependency    Alcohol dependence (CMS/HCC)     Medically noncompliant    Hepatitis C    NICM (nonischemic cardiomyopathy) (CMS/HCC)    Generalized abdominal pain    Dyspnea    Substance abuse (CMS/HCC)    Hyponatremia      Status post repair of bleeding duodenal ulcer.  Doing well from surgical standpoint.  Will remove staples.  Need to leave G-tube and J-tube in for a total of 8 weeks.  Instructed him on outpatient follow-up with me to have these removed.    Plan for disposition: Discharged to home per primary team    Patience Peng MD  08/05/19  4:24 PM

## 2019-08-05 NOTE — PROGRESS NOTES
"Adult Nutrition  Assessment/PES    Patient Name:  Asad Bethea  YOB: 1975  MRN: [id number]  Admit Date:  8/2/2019    Assessment Date:  8/5/2019    Comments:  Continue to monitor po intakes, pt has order of Nutren 1.5 to 65 mL/hr + 10 mL/hr flush if tube feed desired/accepted. RDN will continue to follow.    Reason for Assessment     Row Name         Reason for Assessment    Reason For Assessment Follow-Up Protocol    Consult TF/PN     PMH: ADHD, alcohol dependence, anxiety, depression Acute upper GI bleed. Ex lap 7/16 -> oversew of gastroduodenal arterial bleed with G & J tubes placed.       Diagnosis Current Dx:   Abdnormal appearance of gastroduodenal juction possible leak?, Dyspnea, Anemia    8/5: Anemia related duodenal bulb ulcer with repair, Tolerating soft diet, Per RN pt refused TF initiation, possible J/G tube removal today         Nutrition/Diet History     Row Name         Nutrition/Diet History    Typical Food/Fluid Intake 8/5: Pt walking back to room to speak with  during visit attempt, spoke with RN who states pt refused TF to start, states pt has been eating well    8/3: Pt eating \"soft diet\" like rice, mashed potatoes at home. Confirmed weight loss.      Factors Affecting Nutritional Intake  abdominal pain         Anthropometrics     Row Name        Anthropometrics    Height  182.9 cm (72.01\")    Weight  91.4 kg (201 lb 8 oz)    No new wt at this time       Admit Weight    Admit Weight  90.7 kg (199 lb 15.3 oz)       Ideal Body Weight (IBW)    Ideal Body Weight (IBW) (kg)  82.09    % Ideal Body Weight  111.34       Usual Body Weight (UBW)    Usual Body Weight  102 kg (225 lb)    % Usual Body Weight  89.56    Weight Loss  unintentional    Weight Loss Time Frame  Wt # (7/19)- 11% weight loss x 30 days        Body Mass Index (BMI)    BMI (kg/m2)  27.38    BMI Assessment  BMI 25-29.9: overweight        Labs/Tests/Procedures/Meds     Row Name         " "Labs/Procedures/Meds    Lab Results Comments Gluc 111, Na 138, K 3.8, Crt 0.7, BUN 5 L, Ca 8.4 L, Mg 1.8, Hgb 8.4 L, Hct 26.4 L        Medications    Pertinent Medications Comments  protonix, zoloft, norco         Physical Findings     Row Name         Physical Findings    Overall Physical Appearance 8/3: Appears well nourished     Gastrointestinal  Unable to review N/V at this time, + BM 8/3 (x2 days)     Tubes  gastrostomy tube;jejunostomy tube     Oral/Mouth Cavity 8/3: No swallowing or chewing problems.      Skin No skin breakdown. 2 surgical incisions         Estimated/Assessed Needs     Row Name         Calculation Measurements    Weight Used For Calculations  91.4 kg (201 lb 8 oz)  91.4 kg (201 lb 8 oz)    Height  --  182.9 cm (72.01\")       Estimated/Assessed Needs    Additional Documentation  --  KCAL/KG (Group);Protein Requirements (Group);Fluid Requirements (Group)       KCAL/KG    KCAL/KG  --  25 Kcal/Kg (kcal)    25 Kcal/Kg (kcal)  2285  2285       Protein Requirements    Est Protein Requirement Amount (gms/kg)  --  1.0 gm protein    Estimated Protein Requirements (gms/day)  --  91.4       Fluid Requirements    Estimated Fluid Requirements (mL/day)  --  91.4    Estimated Fluid Requirement Method  --  RDA Method    RDA Method (1 mL/kcal)  --  91.4      2285 ml/day based on hydration status.      Nutrition Prescription Ordered     Row Name         Nutrition Prescription PO    Current PO Diet  NPO        Nutrition Prescription EN    Enteral Route  Jejunostomy     Product  Nutren 1.5 to 65 mL/hr     Modulars  -- -     TF Delivery Method  Continuous     Water flush (mL)  10 mL/hr flush         Evaluation of Received Nutrient/Fluid Intake     Row Name         PO Evaluation    % PO Intake  83% x 3 meals - ordering full meals  -- -       EN Evaluation    TF Changes TF not started r/t pt refusing  -- -    TF Residual  --  -- -    TF Tolerance  --  -- -       Problem/Interventions:  Problem 1     Row Name         " Nutrition Diagnoses Problem 1    Problem 1 Inadequate oral intake     Etiology (related to) Decreased ability to consume sufficient energy needs     Signs/Symptoms (evidenced by) Severe weight loss per EMR wt hx.           Intervention Goal     Row Name         Intervention Goal    General Maintain nutrition, Continue PO diet, Recommend supplemental nutrition if po intakes do not continue to be adequate.     PO intake >75% po intake     TF/PN --      Weight  Maintain weight         Nutrition Intervention     Row Name         Nutrition Intervention    RD/Tech Action Will continue to monitor po intakes, recommend TF below if pt accepts:    Nutren 1.5 @ 65 ml/hr with 10 ml/hr flush (based on 22 hr). Provides 2145 kcal (93%), 97 gm PRO (103%), 1307 ml total fluid (220 ml water flush + 1087 ml free water)         Nutrition Prescription     Row Name         Nutrition Prescription EN    Enteral Prescription  Enteral begin/change     Enteral Route  Jejunostomy     Product  Nutren 1.5      TF Delivery Method  Continuous     Continuous TF Goal Rate (mL/hr)  65 mL/hr     Water flush (mL)   10 mL     Water Flush Frequency  Per hour         Education/Evaluation     Row Name         Monitor/Evaluation    Monitor M/E po intake (? Need ONS), weight, labs, meds, skin, and BMs           Electronically signed by:  Anna Menard RD  08/05/19 10:36 AM

## 2019-08-05 NOTE — DISCHARGE SUMMARY
Date of Admission: 8/2/2019    Date of Discharge:  8/5/2019    Length of stay:  LOS: 0 days     Discharge Diagnosis:     Abdominal pain-recently diagnosed with bleeding duodenal ulcer status post repair  Recent EGD 07/14/2019-3 cm duodenal bulb ulcer which was cratered with a pulsating visible vessel with oozing around the base of the vessel.  Status post cauterization  07/15/2019-exploratory  Laparotomy -oversewn of the gastrojejunal artery and placement of gastric/jejunal tube  Patient stated not being on PPI and Carafate due to prescription not sent to pharmacy at discharge  Was seen by gastroenterologist during this admission who stated to be discharged home on PPI and Carafate-no intervention recommended  Patient was also evaluated by the general surgeon and stated to be doing well from surgical standpoint.  Staples were removed.  G-tube/J-tube to be left for a total of 8 weeks  Patients will follow-up with the general surgeon as outpatient                 Presenting Problem/History of Present illness  Active Hospital Problems    Diagnosis  POA   • Hyponatremia [E87.1]  Unknown   • Substance abuse (CMS/HCC) [F19.10]  Yes   • Generalized abdominal pain [R10.84]  Yes   • Dyspnea [R06.00]  Unknown   • NICM (nonischemic cardiomyopathy) (CMS/HCC) [I42.8]  Yes   • Tobacco dependency [F17.200]  Yes   • Medically noncompliant [Z91.19]  Not Applicable   • Hepatitis C [B19.20]  Yes   • Alcohol dependence (CMS/HCC) [F10.20]  Yes   • Essential hypertension [I10]  Yes      Resolved Hospital Problems   No resolved problems to display.          Hospital Course    43-year-old man who recently had a surgical intervention for repair of bleeding duodenal ulcer.  Presented to the emergency room with complaints of abdominal pain and was admitted for further evaluation and management.      Abdominal pain-recently diagnosed with bleeding duodenal ulcer status post repair  Recent EGD 07/14/2019-3 cm duodenal bulb ulcer which was  cratered with a pulsating visible vessel with oozing around the base of the vessel.  Status post cauterization  07/15/2019-exploratory  Laparotomy -oversewn of the gastrojejunal artery and placement of gastric/jejunal tube  Patient stated not being on PPI and Carafate due to prescription not sent to pharmacy at discharge  Was seen by gastroenterologist during this admission who stated to be discharged home on PPI and Carafate-no intervention recommended  Patient was also evaluated by the general surgeon and stated to be doing well from surgical standpoint.  Staples were removed.  G-tube/J-tube to be left for a total of 8 weeks  Patients will follow-up with the general surgeon as outpatient          Past Medical History:   Diagnosis Date   • ADHD (attention deficit hyperactivity disorder)    • Alcohol dependence (CMS/HCC)    • Anxiety    • Depression    • Essential hypertension 6/28/2019   • Hepatitis C 06/2019   • NICM (nonischemic cardiomyopathy) (CMS/HCC) 7/14/2019   • Substance abuse (CMS/HCC)    • Tobacco dependency        Past Surgical History:   Procedure Laterality Date   • CARDIAC CATHETERIZATION     • ENDOSCOPY N/A 7/14/2019    Procedure: ESOPHAGOGASTRODUODENOSCOPY WITH SCLEROTHERAPY, BICAP CAUTERY OF DUODENAL ULCER AND HEMOSTASIS SPRAY OF DUODENAL ULCER;  Surgeon: Wesly Myers MD;  Location: [facility] ENDOSCOPY;  Service: Gastroenterology   • EXPLORATORY LAPAROTOMY N/A 7/16/2019    Procedure: LAPAROTOMY EXPLORATORY;  Surgeon: Patience Peng MD;  Location: [facility] MAIN OR;  Service: General   • WRIST SURGERY N/A        Social History     Socioeconomic History   • Marital status:      Spouse name: Not on file   • Number of children: Not on file   • Years of education: Not on file   • Highest education level: Not on file   Tobacco Use   • Smoking status: Former Smoker     Packs/day: 1.00     Years: 20.00     Pack years: 20.00     Types: Cigarettes     Last attempt to quit: 7/1/2019     Years since  "quittin.0   • Smokeless tobacco: Former User   Substance and Sexual Activity   • Alcohol use: Yes     Frequency: Never     Comment: use to drink half gallon of liquor per day; down to 1 pint of liquor per day; quit 2019   • Drug use: Yes     Types: Cocaine, Methamphetamines     Comment: last used meth about 2 months ago   • Sexual activity: Defer   Social History Narrative    He reports extensive history of depression, anxiety, and substance abuse. He's been diagnosed with ADD and bipolar disorder, and has been treated at [facility] and [facility]. Medications in the past have made him feel \"slow\" and \"dumb\". He reports that his mind is always going \"a million miles a minute\", and he \"can't get shit done\" because he can't focus. He reports very low self esteem. He reports his son's mother has told him they'd be better off if he was dead and he wonders if that's true, but states tearfully that he doesn't want to die. He has quit drinking and doing drugs in the past, and says he went to [employer] a lot. He's never had a sponsor or really participated in AA. He did cocaine on and off for around 10 years but quit when his son was born 9 yrs ago. He says that cocaine calmed him down. In the past couple years, he started using meth which calms him down if he snorts it, but amps him up if he smokes it. Alcohol calms him down. He reports feeling very scared right now and worried that he's going to die. He seems ready to address his substance abuse and try to live a healthier lifestyle. His son is his main motivation.        Procedures Performed         Consults:   Consults     Date and Time Order Name Status Description    8/3/2019 1039 Inpatient General Surgery Consult      2019 0446 Inpatient Gastroenterology Consult      2019 1915 Hospitalist (on-call MD unless specified) Completed     7/15/2019 1222 Inpatient Pulmonology Consult Completed     2019 1132 Inpatient Psychiatrist Consult Completed     " 7/13/2019 1619 Inpatient Cardiology Consult Completed     7/13/2019 1318 Cardiology (on-call MD unless specified) Completed     7/8/2019 1212 Hospitalist (on-call MD unless specified) Completed           Pertinent Test Results:     Lab Results (last 72 hours)     Procedure Component Value Units Date/Time    Blood Culture - Blood, Arm, Right [[id number]] Collected:  08/02/19 1554    Specimen:  Blood from Arm, Right Updated:  08/05/19 1630     Blood Culture No growth at 3 days    Blood Culture - Blood, Arm, Right [[id number]] Collected:  08/02/19 1540    Specimen:  Blood from Arm, Right Updated:  08/05/19 1600     Blood Culture No growth at 3 days    Basic Metabolic Panel [[id number]]  (Abnormal) Collected:  08/05/19 0403    Specimen:  Blood Updated:  08/05/19 0453     Glucose 111 mg/dL      BUN 5 mg/dL      Creatinine 0.70 mg/dL      Sodium 138 mmol/L      Potassium 3.8 mmol/L      Chloride 104 mmol/L      CO2 25.0 mmol/L      Calcium 8.4 mg/dL      eGFR Non African Amer 123 mL/min/1.73      BUN/Creatinine Ratio 7.1     Anion Gap 12.8 mmol/L     Magnesium [[id number]]  (Normal) Collected:  08/05/19 0403    Specimen:  Blood Updated:  08/05/19 0453     Magnesium 1.8 mg/dL     CBC & Differential [[id number]] Collected:  08/05/19 0403    Specimen:  Blood Updated:  08/05/19 0438    Narrative:       The following orders were created for panel order CBC & Differential.  Procedure                               Abnormality         Status                     ---------                               -----------         ------                     CBC Auto Differential[[id number]]        Abnormal            Final result                 Please view results for these tests on the individual orders.    CBC Auto Differential [[id number]]  (Abnormal) Collected:  08/05/19 0403    Specimen:  Blood Updated:  08/05/19 0438     WBC 5.60 10*3/mm3      RBC 3.12 10*6/mm3      Hemoglobin 8.4 g/dL      Hematocrit 26.4 %      MCV 84.8 fL      MCH 27.1  pg      MCHC 32.0 g/dL      RDW 14.7 %      RDW-SD 44.2 fl      MPV 7.2 fL      Platelets 389 10*3/mm3      Neutrophil % 43.5 %      Lymphocyte % 35.4 %      Monocyte % 16.6 %      Eosinophil % 3.7 %      Basophil % 0.8 %      Neutrophils, Absolute 2.40 10*3/mm3      Lymphocytes, Absolute 2.00 10*3/mm3      Monocytes, Absolute 0.90 10*3/mm3      Eosinophils, Absolute 0.20 10*3/mm3      Basophils, Absolute 0.00 10*3/mm3      nRBC 0.1 /100 WBC     Basic Metabolic Panel [[id number]]  (Abnormal) Collected:  08/04/19 0257    Specimen:  Blood Updated:  08/04/19 0408     Glucose 109 mg/dL      BUN 4 mg/dL      Creatinine 0.80 mg/dL      Sodium 130 mmol/L      Potassium 3.9 mmol/L      Chloride 99 mmol/L      CO2 24.0 mmol/L      Calcium 7.9 mg/dL      eGFR Non African Amer 105 mL/min/1.73      BUN/Creatinine Ratio 5.0     Anion Gap 10.9 mmol/L     Magnesium [[id number]]  (Abnormal) Collected:  08/04/19 0257    Specimen:  Blood Updated:  08/04/19 0408     Magnesium 1.7 mg/dL     CBC & Differential [[id number]] Collected:  08/04/19 0257    Specimen:  Blood Updated:  08/04/19 0356    Narrative:       The following orders were created for panel order CBC & Differential.  Procedure                               Abnormality         Status                     ---------                               -----------         ------                     CBC Auto Differential[[id number]]        Abnormal            Final result                 Please view results for these tests on the individual orders.    CBC Auto Differential [[id number]]  (Abnormal) Collected:  08/04/19 0257    Specimen:  Blood Updated:  08/04/19 0356     WBC 5.90 10*3/mm3      RBC 2.99 10*6/mm3      Hemoglobin 8.2 g/dL      Hematocrit 25.4 %      MCV 84.9 fL      MCH 27.5 pg      MCHC 32.4 g/dL      RDW 14.6 %      RDW-SD 44.2 fl      MPV 7.3 fL      Platelets 394 10*3/mm3      Neutrophil % 53.9 %      Lymphocyte % 26.0 %      Monocyte % 17.3 %      Eosinophil % 2.1 %       Basophil % 0.7 %      Neutrophils, Absolute 3.20 10*3/mm3      Lymphocytes, Absolute 1.50 10*3/mm3      Monocytes, Absolute 1.00 10*3/mm3      Eosinophils, Absolute 0.10 10*3/mm3      Basophils, Absolute 0.00 10*3/mm3      nRBC 0.1 /100 WBC     H. Pylori Antigen, Stool - Stool, Per Rectum [[id number]] Collected:  08/03/19 1840    Specimen:  Stool from Per Rectum Updated:  08/03/19 1856    Magnesium [[id number]]  (Normal) Collected:  08/03/19 0247    Specimen:  Blood Updated:  08/03/19 0322     Magnesium 2.0 mg/dL     CBC & Differential [[id number]] Collected:  08/03/19 0247    Specimen:  Blood Updated:  08/03/19 0322    Narrative:       The following orders were created for panel order CBC & Differential.  Procedure                               Abnormality         Status                     ---------                               -----------         ------                     CBC Auto Differential[[id number]]        Abnormal            Final result                 Please view results for these tests on the individual orders.    CBC Auto Differential [[id number]]  (Abnormal) Collected:  08/03/19 0247    Specimen:  Blood Updated:  08/03/19 0322     WBC 7.40 10*3/mm3      RBC 3.14 10*6/mm3      Hemoglobin 9.0 g/dL      Comment: Result checked        Hematocrit 26.6 %      MCV 84.5 fL      MCH 28.7 pg      MCHC 33.9 g/dL      RDW 14.7 %      RDW-SD 45.1 fl      MPV 7.2 fL      Platelets 436 10*3/mm3      Neutrophil % 65.6 %      Lymphocyte % 19.7 %      Monocyte % 12.3 %      Eosinophil % 1.4 %      Basophil % 1.0 %      Neutrophils, Absolute 4.90 10*3/mm3      Lymphocytes, Absolute 1.50 10*3/mm3      Monocytes, Absolute 0.90 10*3/mm3      Eosinophils, Absolute 0.10 10*3/mm3      Basophils, Absolute 0.10 10*3/mm3      nRBC 0.0 /100 WBC     Basic Metabolic Panel [[id number]]  (Abnormal) Collected:  08/03/19 0247    Specimen:  Blood Updated:  08/03/19 0320     Glucose 112 mg/dL      BUN 5 mg/dL      Creatinine 0.70 mg/dL       Sodium 133 mmol/L      Potassium 4.4 mmol/L      Chloride 102 mmol/L      CO2 24.0 mmol/L      Calcium 8.2 mg/dL      eGFR Non African Amer 123 mL/min/1.73      BUN/Creatinine Ratio 7.1     Anion Gap 11.4 mmol/L                  Results for orders placed during the hospital encounter of 06/27/19   Adult Transthoracic Echo Complete W/ Cont if Necessary Per Protocol    Narrative · Left ventricular systolic function is normal.  · Left ventricular wall thickness is consistent with moderate concentric   hypertrophy.  · Left ventricular diastolic dysfunction (grade I) consistent with   impaired relaxation.  · Estimated EF appears to be in the range of 61 - 65%.          Imaging Results (all)     Procedure Component Value Units Date/Time    CT Chest Pulmonary Embolism With Contrast [[id number]] Collected:  08/02/19 1644     Updated:  08/02/19 1657    Narrative:       CT CHEST PULMONARY EMBOLISM W CONTRAST-, CT ABDOMEN PELVIS W CONTRAST-     Date of Exam: 8/2/2019 4:29 PM     Indication: Shortness of breath.  HISTORY of duodenal repair. HISTORY of  gastroduodenal artery embolization procedure on 15 2019.     Comparison: Upper GI series 07/21/2019. CT chest 06/27/2019.     Technique: Serial and axial CT images of the chest were obtained  following the uneventful intravenous administration of 100 cc Isovue-300  contrast. Reconstructions in the coronal and sagittal planes were also  performed.  Automated exposure control and iterative reconstruction  methods were used.     FINDINGS:     CHEST: The study is mildly degraded by respiratory motion. No pulmonary  embolism. No thoracic aortic aneurysm or aortic dissection. Mild  concentric thickening of the lower thoracic esophagus without discrete  mass lesion. Tiny distal paraesophageal node measuring 7 mm, thought to  be stable. No pericardial effusion. No pleural effusion. Imaged thyroid  gland is normal.     The lungs are free of acute airspace disease. Benign calcified  granuloma  in the left upper lobe. 5 mm non-calcified or groundglass nodular  density in the posterior pleural margin of the left lower lobe (image  88), new since the prior examination.     Degenerative change of the cervical spine. No acute or suspicious  osseous lesions.           ABDOMEN AND PELVIS: Abnormal mucosal thickening and hyperenhancement of  the gastric antrum and proximal duodenum in keeping with provided  history of wide no repair. Presumed embolization coils are seen near the  posterior inferior margin of the pancreatic head. Surgical clips or  coils are seen the medial margin of the gastroduodenal junction.     There is an irregular air-fluid density collection at the  gastric-duodenal junction which does not clearly appear intraluminal but  may:  The lumen. This collection measures 1.7 x 3.8 x 3.1 cm, and could  potentially represent a contained abscess or new or residual ulcer.     A small round fluid collection is located within the pancreatic neck  measuring 10 mm. It could represent a pancreatic cyst or pseudocyst, but  is not definitely seen on the previous CT chest study.     Tiny low-density lesions represent cysts are seen within the left  hepatic lobe, each measuring less than 5 mm. Gallbladder is contracted.  Spleen, adrenals and kidneys are normal. Pancreas can't appears  thickened and edematous likely representing secondary reactive  inflammatory change. Jejunostomy tube appears appropriately positioned  within the bowel lumen.     Uncomplicated diverticular changes within the descending and sigmoid  colon. The appendix is normal.     There is abnormal thickening and enhancement of the rectum, raising the  possibility of proctitis in the appropriate clinical context. The  urinary bladder is decompressed. Prostate gland is normal. No pelvic  free fluid.     Mild lumbar dextroscoliosis and degenerative changes.          Impression:          1. Abnormal appearance of the gastroduodenal  junction may represent  sequelae of recent surgical intervention with residual inflammatory  process. Air-fluid collection is seen at the medial margin of the  gastroduodenal junction which is not definitely intraluminal, and could  represent recurrent or residual ulceration or abscess or contained leak.  2. 10 mm fluid density lesion within the pancreatic neck nonspecific and  could represent pancreatic cyst or pseudocyst. Likely secondary reactive  inflammatory changes within the pancreatic head and neck, as well.  3. Abnormal thickening and enhancement of the rectum. Correlate for  proctitis symptoms.  4. New 5 mm groundglass nodule in the left lower lobe. Benign  infectious/inflammatory nodule is favored.           Electronically Signed By-Dr. Yoon Garcia MD On:8/2/2019 4:55 PM  This report was finalized on [id number] by Dr. Yoon Garcia MD.    CT Abdomen Pelvis With Contrast [[id number]] Collected:  08/02/19 1644     Updated:  08/02/19 1657    Narrative:       CT CHEST PULMONARY EMBOLISM W CONTRAST-, CT ABDOMEN PELVIS W CONTRAST-     Date of Exam: 8/2/2019 4:29 PM     Indication: Shortness of breath.  HISTORY of duodenal repair. HISTORY of  gastroduodenal artery embolization procedure on 15 2019.     Comparison: Upper GI series 07/21/2019. CT chest 06/27/2019.     Technique: Serial and axial CT images of the chest were obtained  following the uneventful intravenous administration of 100 cc Isovue-300  contrast. Reconstructions in the coronal and sagittal planes were also  performed.  Automated exposure control and iterative reconstruction  methods were used.     FINDINGS:     CHEST: The study is mildly degraded by respiratory motion. No pulmonary  embolism. No thoracic aortic aneurysm or aortic dissection. Mild  concentric thickening of the lower thoracic esophagus without discrete  mass lesion. Tiny distal paraesophageal node measuring 7 mm, thought to  be stable. No pericardial effusion. No pleural  effusion. Imaged thyroid  gland is normal.     The lungs are free of acute airspace disease. Benign calcified granuloma  in the left upper lobe. 5 mm non-calcified or groundglass nodular  density in the posterior pleural margin of the left lower lobe (image  88), new since the prior examination.     Degenerative change of the cervical spine. No acute or suspicious  osseous lesions.           ABDOMEN AND PELVIS: Abnormal mucosal thickening and hyperenhancement of  the gastric antrum and proximal duodenum in keeping with provided  history of wide no repair. Presumed embolization coils are seen near the  posterior inferior margin of the pancreatic head. Surgical clips or  coils are seen the medial margin of the gastroduodenal junction.     There is an irregular air-fluid density collection at the  gastric-duodenal junction which does not clearly appear intraluminal but  may:  The lumen. This collection measures 1.7 x 3.8 x 3.1 cm, and could  potentially represent a contained abscess or new or residual ulcer.     A small round fluid collection is located within the pancreatic neck  measuring 10 mm. It could represent a pancreatic cyst or pseudocyst, but  is not definitely seen on the previous CT chest study.     Tiny low-density lesions represent cysts are seen within the left  hepatic lobe, each measuring less than 5 mm. Gallbladder is contracted.  Spleen, adrenals and kidneys are normal. Pancreas can't appears  thickened and edematous likely representing secondary reactive  inflammatory change. Jejunostomy tube appears appropriately positioned  within the bowel lumen.     Uncomplicated diverticular changes within the descending and sigmoid  colon. The appendix is normal.     There is abnormal thickening and enhancement of the rectum, raising the  possibility of proctitis in the appropriate clinical context. The  urinary bladder is decompressed. Prostate gland is normal. No pelvic  free fluid.     Mild lumbar  dextroscoliosis and degenerative changes.          Impression:          1. Abnormal appearance of the gastroduodenal junction may represent  sequelae of recent surgical intervention with residual inflammatory  process. Air-fluid collection is seen at the medial margin of the  gastroduodenal junction which is not definitely intraluminal, and could  represent recurrent or residual ulceration or abscess or contained leak.  2. 10 mm fluid density lesion within the pancreatic neck nonspecific and  could represent pancreatic cyst or pseudocyst. Likely secondary reactive  inflammatory changes within the pancreatic head and neck, as well.  3. Abnormal thickening and enhancement of the rectum. Correlate for  proctitis symptoms.  4. New 5 mm groundglass nodule in the left lower lobe. Benign  infectious/inflammatory nodule is favored.           Electronically Signed By-Dr. Yoon Garcia MD On:8/2/2019 4:55 PM  This report was finalized on [id number] by Dr. Yoon Garcia MD.    XR Chest 1 View [[id number]] Collected:  08/02/19 1541     Updated:  08/02/19 1545    Narrative:       DATE OF EXAM:  8/2/2019 3:38 PM     PROCEDURE:  XR CHEST 1 VW-     INDICATIONS:  Simple Sepsis Protocol     COMPARISON:  7/16/2019.     TECHNIQUE:   Single radiographic AP view of the chest was obtained.     FINDINGS:  A single view of the chest reveals no cardiac enlargement and no focal  infiltrate. No pneumothorax is identified. There is suspected chronic  calcified granulomatous disease of the chest. The patient has been  extubated since 7/16/2019. The right IJ central venous line has been  removed. The nasogastric tube has also been removed (as well as the  endotracheal tube).       Impression:       No acute cardiopulmonary disease is seen radiographically.        Electronically Signed By-Dr. Olaf Fisher MD On:8/2/2019 3:43 PM  This report was finalized on [id number] by Dr. Olaf Fisher MD.            Condition on Discharge:  Hemodynamically decreased stable  Vital Signs  Temp:  [98 °F (36.7 °C)-98.7 °F (37.1 °C)] 98 °F (36.7 °C)  Heart Rate:  [66-71] 70  Resp:  [16-18] 18  BP: (111-130)/(66-68) 130/66    Physical Exam:  Physical Exam   Constitutional: Patient appears well-developed and in no acute distress     HEENT:   Head: Normocephalic and atraumatic.   Eyes:  Pupils are equal, round, and reactive to light. EOM are intact.  Mouth and Throat: Patient has moist mucous membranes.  Neck: Neck supple.    Cardiovascular:  Regular rate, regular rhythm, S1 normal and S2 normal.     Pulmonary/Chest:  Lungs are clear to auscultation bilaterally.    Abdomen /Gastrointestinal: Inspection: no distension. Palpation: no masses, no organomegaly. Soft. There is no tenderness. Bowel sounds are normal.  G-tube/G-tube intact with no surrounding erythema     musculoskeletal: Normal Muscle tone.     Neurological: Patient is alert and oriented to person, place, and time. Cranial nerves II-XII are grossly intact with no focal deficits.    Skin: Skin is warm.     Discharge Disposition  Home or Self Care    Discharge Medications     Discharge Medications      New Medications      Instructions Start Date   pantoprazole 40 MG EC tablet  Commonly known as:  PROTONIX   40 mg, Oral, 2 Times Daily      sucralfate 1 g tablet  Commonly known as:  CARAFATE   1 g, Oral, 4 Times Daily Before Meals & Nightly         Continue These Medications      Instructions Start Date   lisinopril 10 MG tablet  Commonly known as:  PRINIVIL,ZESTRIL   10 mg, Oral, Every 24 Hours Scheduled      metoprolol tartrate 25 MG tablet  Commonly known as:  LOPRESSOR   25 mg, Oral, Every 12 Hours Scheduled      sertraline 50 MG tablet  Commonly known as:  ZOLOFT   50 mg, Oral, Daily      traZODone 100 MG tablet  Commonly known as:  DESYREL   100 mg, Oral, Nightly             Discharge Diet:   Diet Instructions     Diet: Soft Texture; Thin Liquids, No Restrictions; Ground      Discharge Diet:  Soft  Texture    Fluid Consistency:  Thin Liquids, No Restrictions    Soft Options:  Ground          Activity at Discharge:   Activity Instructions     Activity as Tolerated            Follow-up Appointments  Future Appointments   Date Time Provider Department Center   8/15/2019  3:30 PM Cassy Rios APRN [facility] None   9/4/2019  2:45 PM Solomon Lowe MD [facility] NA         Test Results Pending at Discharge       Risk for Readmission (LACE) Score: 9 (8/5/2019  6:00 AM)          Alonso Shields MD  08/05/19  6:22 PM    Time: Discharge 32 min

## 2019-08-06 ENCOUNTER — READMISSION MANAGEMENT (OUTPATIENT)
Dept: CALL CENTER | Facility: HOSPITAL | Age: 44
End: 2019-08-06

## 2019-08-06 LAB — H PYLORI AG STL QL IA: NEGATIVE

## 2019-08-06 NOTE — OUTREACH NOTE
Prep Survey      Responses   Facility patient discharged from?  [facility]   Is patient eligible?  Yes   Discharge diagnosis  Abdominal pain   Does the patient have one of the following disease processes/diagnoses(primary or secondary)?  Other   Comments regarding appointments  Follow up with General surgeon 9/16 and Cassy Rios Aug 15   Medication alerts for this patient  Protonix, sucralfate   General alerts for this patient  Pt has g-tube/j-tube   Prep survey completed?  Yes          Yoana Bishop LPN

## 2019-08-06 NOTE — PROGRESS NOTES
Case Management Discharge Note    Final Note: Routine D/C Home with Carafate and PPI per primary team.

## 2019-08-07 ENCOUNTER — READMISSION MANAGEMENT (OUTPATIENT)
Dept: CALL CENTER | Facility: HOSPITAL | Age: 44
End: 2019-08-07

## 2019-08-07 LAB
BACTERIA SPEC AEROBE CULT: NORMAL
BACTERIA SPEC AEROBE CULT: NORMAL

## 2019-08-07 NOTE — OUTREACH NOTE
Medical Week 1 Survey      Responses   Facility patient discharged from?  [facility]   Does the patient have one of the following disease processes/diagnoses(primary or secondary)?  Other   Is there a successful TCM telephone encounter documented?  No   Week 1 attempt successful?  No   Unsuccessful attempts  Attempt 4 [NO ANSWER, LEFT VM]      RETURN CALL FROM PATIENT. PATIENT DID NOT WANT TO ENGAGE IN DISCUSSION ABOUT HOW HE IS DOING POST DISCHARGE FROM THIS ADMISSION. PATIENT VERY BELLIGERENT AND NASTY STATING HIS DISSATISFACTION WITH HIS DISCHARGE FROM HIS PREVIOUS ADMISSION. PATIENT MADE IT CLEAR HE DID NOT WANT CONTACT AGAIN AND ABRUPTLY HUNG UP THE PHONE. NEXT SCHEDULED CALL DUE TO HIGH LACE WILL BE REVOKED,     Sudha Holly LPN

## 2019-08-07 NOTE — OUTREACH NOTE
Medical Week 2 Survey      Responses   Facility patient discharged from?  [facility]   Does the patient have one of the following disease processes/diagnoses(primary or secondary)?  Other   Week 2 attempt successful?  No   Rescheduled  Revoked   Revoke  Decline to participate [PATIENT RETURNED CALL LAST WEEK (AFTER WEEK 1 CALL). PATIENT WAS VERY BELLIGERENT AND VOICED HE DID NOT WANT ANY MORE CALLS AND HUNG UP THE PHONE. ]          Sudha Holly LPN

## 2019-08-12 RX ORDER — GABAPENTIN 600 MG/1
TABLET ORAL
Qty: 90 TABLET | Refills: 0 | OUTPATIENT
Start: 2019-08-12

## 2019-08-12 RX ORDER — DIVALPROEX SODIUM 500 MG/1
TABLET, EXTENDED RELEASE ORAL
Qty: 60 TABLET | Refills: 0 | OUTPATIENT
Start: 2019-08-12

## 2019-08-16 ENCOUNTER — TELEPHONE (OUTPATIENT)
Dept: FAMILY MEDICINE CLINIC | Facility: CLINIC | Age: 44
End: 2019-08-16

## 2019-08-16 NOTE — TELEPHONE ENCOUNTER
PT NO CALL NO SHOW HIS NEW PATIENT APPT ON 8.15.19. HE CALLED IN TO RESCHEDULE. I LET HIM KNOW THAT OUR [facility] POLICY IS THAT IF YOU NO SHOW YOUR NEW PATIENT APPT, WE CANNOT R/S FOR THIS PRACTICE. I OFFERED TO ASK MY MANAGER, BUT DID STRESS THAT IT IS A POLICY. HE TOLD ME NEVERMIND HE WOULD JUST FIND A NEW DOCTOR.

## 2019-08-19 PROCEDURE — 93458 L HRT ARTERY/VENTRICLE ANGIO: CPT | Performed by: INTERNAL MEDICINE

## 2019-08-29 ENCOUNTER — OFFICE (OUTPATIENT)
Dept: URBAN - METROPOLITAN AREA CLINIC 64 | Facility: CLINIC | Age: 44
End: 2019-08-29
Payer: COMMERCIAL

## 2019-08-29 ENCOUNTER — TRANSCRIBE ORDERS (OUTPATIENT)
Dept: ADMINISTRATIVE | Facility: HOSPITAL | Age: 44
End: 2019-08-29

## 2019-08-29 ENCOUNTER — HOSPITAL ENCOUNTER (OUTPATIENT)
Dept: GENERAL RADIOLOGY | Facility: HOSPITAL | Age: 44
Discharge: HOME | End: 2019-08-29
Admitting: NURSE PRACTITIONER

## 2019-08-29 VITALS
SYSTOLIC BLOOD PRESSURE: 114 MMHG | DIASTOLIC BLOOD PRESSURE: 80 MMHG | HEART RATE: 79 BPM | WEIGHT: 209 LBS | HEIGHT: 73 IN

## 2019-08-29 DIAGNOSIS — K59.00 CONSTIPATION, UNSPECIFIED: ICD-10-CM

## 2019-08-29 DIAGNOSIS — K59.00 CONSTIPATION, UNSPECIFIED CONSTIPATION TYPE: ICD-10-CM

## 2019-08-29 DIAGNOSIS — K59.00 CONSTIPATION, UNSPECIFIED CONSTIPATION TYPE: Primary | ICD-10-CM

## 2019-08-29 DIAGNOSIS — K26.9 DUODENAL ULCER, UNSPECIFIED AS ACUTE OR CHRONIC, WITHOUT HEM: ICD-10-CM

## 2019-08-29 DIAGNOSIS — R10.30 LOWER ABDOMINAL PAIN, UNSPECIFIED: ICD-10-CM

## 2019-08-29 PROCEDURE — 74018 RADEX ABDOMEN 1 VIEW: CPT

## 2019-08-29 PROCEDURE — 99214 OFFICE O/P EST MOD 30 MIN: CPT | Performed by: NURSE PRACTITIONER

## 2019-09-18 PROBLEM — F41.8 DEPRESSION WITH ANXIETY: Chronic | Status: RESOLVED | Noted: 2019-07-14 | Resolved: 2019-09-18

## 2019-09-18 PROBLEM — K92.1 MELENA: Status: RESOLVED | Noted: 2019-07-14 | Resolved: 2019-09-18

## 2019-09-18 PROBLEM — F17.200 TOBACCO DEPENDENCY: Chronic | Status: RESOLVED | Noted: 2019-07-08 | Resolved: 2019-09-18

## 2019-09-18 PROBLEM — E87.1 HYPONATREMIA: Status: RESOLVED | Noted: 2019-08-04 | Resolved: 2019-09-18

## 2019-09-18 PROBLEM — B19.20 HEPATITIS C: Chronic | Status: RESOLVED | Noted: 2019-07-08 | Resolved: 2019-09-18

## 2019-09-18 PROBLEM — R11.2 INTRACTABLE VOMITING WITH NAUSEA: Status: RESOLVED | Noted: 2019-07-13 | Resolved: 2019-09-18

## 2019-09-18 PROBLEM — M54.9 ACUTE BACK PAIN: Status: RESOLVED | Noted: 2019-07-08 | Resolved: 2019-09-18

## 2019-09-18 PROBLEM — R10.84 GENERALIZED ABDOMINAL PAIN: Status: RESOLVED | Noted: 2019-08-02 | Resolved: 2019-09-18

## 2019-09-18 PROBLEM — F19.10 SUBSTANCE ABUSE (HCC): Chronic | Status: RESOLVED | Noted: 2019-08-03 | Resolved: 2019-09-18

## 2019-09-18 PROBLEM — Z91.199 MEDICALLY NONCOMPLIANT: Chronic | Status: RESOLVED | Noted: 2019-07-08 | Resolved: 2019-09-18

## 2019-09-18 PROBLEM — I16.0 HYPERTENSIVE URGENCY: Status: RESOLVED | Noted: 2019-07-08 | Resolved: 2019-09-18

## 2019-09-18 PROBLEM — F10.20 ALCOHOL DEPENDENCE (HCC): Chronic | Status: RESOLVED | Noted: 2019-07-08 | Resolved: 2019-09-18

## 2019-09-19 PROBLEM — R94.39 ABNORMAL NUCLEAR STRESS TEST: Status: RESOLVED | Noted: 2019-06-28 | Resolved: 2019-09-19

## 2020-01-01 ENCOUNTER — READMISSION MANAGEMENT (OUTPATIENT)
Dept: CALL CENTER | Facility: HOSPITAL | Age: 45
End: 2020-01-01

## 2020-01-01 ENCOUNTER — APPOINTMENT (OUTPATIENT)
Dept: CT IMAGING | Facility: HOSPITAL | Age: 45
End: 2020-01-01

## 2020-01-01 ENCOUNTER — TELEPHONE (OUTPATIENT)
Dept: CARDIAC REHAB | Facility: HOSPITAL | Age: 45
End: 2020-01-01

## 2020-01-01 ENCOUNTER — APPOINTMENT (OUTPATIENT)
Dept: CARDIOLOGY | Facility: HOSPITAL | Age: 45
End: 2020-01-01

## 2020-01-01 ENCOUNTER — APPOINTMENT (OUTPATIENT)
Dept: GENERAL RADIOLOGY | Facility: HOSPITAL | Age: 45
End: 2020-01-01

## 2020-01-01 ENCOUNTER — TELEMEDICINE (OUTPATIENT)
Dept: CARDIOLOGY | Facility: CLINIC | Age: 45
End: 2020-01-01

## 2020-01-01 ENCOUNTER — HOSPITAL ENCOUNTER (INPATIENT)
Facility: HOSPITAL | Age: 45
LOS: 7 days | End: 2020-11-21
Attending: EMERGENCY MEDICINE | Admitting: INTERNAL MEDICINE

## 2020-01-01 ENCOUNTER — OFFICE VISIT (OUTPATIENT)
Dept: FAMILY MEDICINE CLINIC | Facility: CLINIC | Age: 45
End: 2020-01-01

## 2020-01-01 ENCOUNTER — APPOINTMENT (OUTPATIENT)
Dept: ULTRASOUND IMAGING | Facility: HOSPITAL | Age: 45
End: 2020-01-01

## 2020-01-01 ENCOUNTER — LAB (OUTPATIENT)
Dept: LAB | Facility: HOSPITAL | Age: 45
End: 2020-01-01
Attending: INTERNAL MEDICINE

## 2020-01-01 ENCOUNTER — HOSPITAL ENCOUNTER (INPATIENT)
Facility: HOSPITAL | Age: 45
LOS: 3 days | Discharge: HOME | End: 2020-03-25
Attending: EMERGENCY MEDICINE | Admitting: INTERNAL MEDICINE

## 2020-01-01 VITALS
TEMPERATURE: 97.6 F | HEIGHT: 73 IN | WEIGHT: 206.5 LBS | RESPIRATION RATE: 18 BRPM | SYSTOLIC BLOOD PRESSURE: 104 MMHG | BODY MASS INDEX: 27.37 KG/M2 | DIASTOLIC BLOOD PRESSURE: 72 MMHG | OXYGEN SATURATION: 99 % | HEART RATE: 87 BPM

## 2020-01-01 VITALS — BODY MASS INDEX: 26.9 KG/M2 | HEIGHT: 73 IN | WEIGHT: 203 LBS

## 2020-01-01 VITALS
BODY MASS INDEX: 26.91 KG/M2 | RESPIRATION RATE: 18 BRPM | TEMPERATURE: 98.5 F | HEIGHT: 73 IN | WEIGHT: 203.04 LBS | HEART RATE: 98 BPM | DIASTOLIC BLOOD PRESSURE: 77 MMHG | SYSTOLIC BLOOD PRESSURE: 117 MMHG | OXYGEN SATURATION: 98 %

## 2020-01-01 VITALS
WEIGHT: 225.53 LBS | HEIGHT: 73 IN | RESPIRATION RATE: 62 BRPM | TEMPERATURE: 91.6 F | DIASTOLIC BLOOD PRESSURE: 17 MMHG | BODY MASS INDEX: 29.89 KG/M2 | SYSTOLIC BLOOD PRESSURE: 72 MMHG | OXYGEN SATURATION: 33 %

## 2020-01-01 DIAGNOSIS — I50.23 CHF (CONGESTIVE HEART FAILURE), NYHA CLASS III, ACUTE ON CHRONIC, SYSTOLIC (HCC): ICD-10-CM

## 2020-01-01 DIAGNOSIS — I10 ESSENTIAL HYPERTENSION: Chronic | ICD-10-CM

## 2020-01-01 DIAGNOSIS — I42.0 CARDIOMYOPATHY, DILATED (HCC): ICD-10-CM

## 2020-01-01 DIAGNOSIS — J96.01 ACUTE RESPIRATORY FAILURE WITH HYPOXIA (HCC): ICD-10-CM

## 2020-01-01 DIAGNOSIS — I25.10 CORONARY ARTERY DISEASE INVOLVING NATIVE CORONARY ARTERY OF NATIVE HEART WITHOUT ANGINA PECTORIS: ICD-10-CM

## 2020-01-01 DIAGNOSIS — I10 ESSENTIAL HYPERTENSION: ICD-10-CM

## 2020-01-01 DIAGNOSIS — D64.9 ANEMIA, UNSPECIFIED TYPE: ICD-10-CM

## 2020-01-01 DIAGNOSIS — R07.89 CHEST PAIN, ATYPICAL: ICD-10-CM

## 2020-01-01 DIAGNOSIS — F32.A ANXIETY AND DEPRESSION: ICD-10-CM

## 2020-01-01 DIAGNOSIS — F19.10 DRUG ABUSE (HCC): ICD-10-CM

## 2020-01-01 DIAGNOSIS — I46.9 CARDIOPULMONARY ARREST WITH SUCCESSFUL RESUSCITATION (HCC): ICD-10-CM

## 2020-01-01 DIAGNOSIS — I21.4 NON-ST ELEVATION MYOCARDIAL INFARCTION (NSTEMI) (HCC): ICD-10-CM

## 2020-01-01 DIAGNOSIS — N17.9 AKI (ACUTE KIDNEY INJURY) (HCC): ICD-10-CM

## 2020-01-01 DIAGNOSIS — R65.21 SEPTIC SHOCK (HCC): ICD-10-CM

## 2020-01-01 DIAGNOSIS — I10 ESSENTIAL HYPERTENSION: Primary | Chronic | ICD-10-CM

## 2020-01-01 DIAGNOSIS — I42.8 NICM (NONISCHEMIC CARDIOMYOPATHY) (HCC): ICD-10-CM

## 2020-01-01 DIAGNOSIS — R07.2 PRECORDIAL PAIN: ICD-10-CM

## 2020-01-01 DIAGNOSIS — R07.9 CHEST PAIN, UNSPECIFIED TYPE: Primary | ICD-10-CM

## 2020-01-01 DIAGNOSIS — A41.9 SEPTIC SHOCK (HCC): ICD-10-CM

## 2020-01-01 DIAGNOSIS — E87.5 HYPERKALEMIA: Chronic | ICD-10-CM

## 2020-01-01 DIAGNOSIS — J69.0 ASPIRATION PNEUMONIA DUE TO VOMIT, UNSPECIFIED LATERALITY, UNSPECIFIED PART OF LUNG (HCC): ICD-10-CM

## 2020-01-01 DIAGNOSIS — F10.20 UNCOMPLICATED ALCOHOL DEPENDENCE (HCC): Chronic | ICD-10-CM

## 2020-01-01 DIAGNOSIS — B18.2 HEP C W/ COMA, CHRONIC: ICD-10-CM

## 2020-01-01 DIAGNOSIS — F41.9 ANXIETY AND DEPRESSION: ICD-10-CM

## 2020-01-01 LAB
ABO + RH BLD: NORMAL
ABO + RH BLD: NORMAL
ABO GROUP BLD: NORMAL
ACT BLD: 147 SECONDS (ref 89–137)
ADV 40+41 DNA STL QL NAA+NON-PROBE: NOT DETECTED
ALBUMIN SERPL-MCNC: 1.8 G/DL (ref 3.5–5.2)
ALBUMIN SERPL-MCNC: 2.1 G/DL (ref 3.5–5.2)
ALBUMIN SERPL-MCNC: 2.2 G/DL (ref 3.5–5.2)
ALBUMIN SERPL-MCNC: 2.2 G/DL (ref 3.5–5.2)
ALBUMIN SERPL-MCNC: 2.6 G/DL (ref 3.5–5.2)
ALBUMIN SERPL-MCNC: 2.7 G/DL (ref 3.5–5.2)
ALBUMIN SERPL-MCNC: 2.9 G/DL (ref 3.5–5.2)
ALBUMIN SERPL-MCNC: 3 G/DL (ref 3.5–5.2)
ALBUMIN SERPL-MCNC: 3 G/DL (ref 3.5–5.2)
ALBUMIN SERPL-MCNC: 3.1 G/DL (ref 3.5–5.2)
ALBUMIN SERPL-MCNC: 3.2 G/DL (ref 3.5–5.2)
ALBUMIN SERPL-MCNC: 3.2 G/DL (ref 3.5–5.2)
ALBUMIN SERPL-MCNC: 3.5 G/DL (ref 3.5–5.2)
ALBUMIN/GLOB SERPL: 0.7 G/DL
ALBUMIN/GLOB SERPL: 0.8 G/DL
ALBUMIN/GLOB SERPL: 0.8 G/DL
ALBUMIN/GLOB SERPL: 0.9 G/DL
ALBUMIN/GLOB SERPL: 1 G/DL
ALBUMIN/GLOB SERPL: 1.1 G/DL
ALBUMIN/GLOB SERPL: 1.2 G/DL
ALP SERPL-CCNC: 102 U/L (ref 39–117)
ALP SERPL-CCNC: 113 U/L (ref 39–117)
ALP SERPL-CCNC: 124 U/L (ref 39–117)
ALP SERPL-CCNC: 135 U/L (ref 39–117)
ALP SERPL-CCNC: 137 U/L (ref 39–117)
ALP SERPL-CCNC: 140 U/L (ref 39–117)
ALP SERPL-CCNC: 143 U/L (ref 39–117)
ALP SERPL-CCNC: 145 U/L (ref 39–117)
ALP SERPL-CCNC: 172 U/L (ref 39–117)
ALP SERPL-CCNC: 197 U/L (ref 39–117)
ALP SERPL-CCNC: 77 U/L (ref 39–117)
ALP SERPL-CCNC: 92 U/L (ref 39–117)
ALP SERPL-CCNC: 98 U/L (ref 39–117)
ALT SERPL W P-5'-P-CCNC: 123 U/L (ref 1–41)
ALT SERPL W P-5'-P-CCNC: 157 U/L (ref 1–41)
ALT SERPL W P-5'-P-CCNC: 16 U/L (ref 1–41)
ALT SERPL W P-5'-P-CCNC: 165 U/L (ref 1–41)
ALT SERPL W P-5'-P-CCNC: 205 U/L (ref 1–41)
ALT SERPL W P-5'-P-CCNC: 223 U/L (ref 1–41)
ALT SERPL W P-5'-P-CCNC: 241 U/L (ref 1–41)
ALT SERPL W P-5'-P-CCNC: 279 U/L (ref 1–41)
ALT SERPL W P-5'-P-CCNC: 331 U/L (ref 1–41)
ALT SERPL W P-5'-P-CCNC: 393 U/L (ref 1–41)
ALT SERPL W P-5'-P-CCNC: 584 U/L (ref 1–41)
ALT SERPL W P-5'-P-CCNC: 642 U/L (ref 1–41)
ALT SERPL W P-5'-P-CCNC: 646 U/L (ref 1–41)
AMORPH URATE CRY URNS QL MICRO: ABNORMAL /HPF
AMPHET+METHAMPHET UR QL: POSITIVE
AMPHET+METHAMPHET UR QL: POSITIVE
AMYLASE SERPL-CCNC: 36 U/L (ref 28–100)
ANION GAP SERPL CALCULATED.3IONS-SCNC: 10 MMOL/L (ref 5–15)
ANION GAP SERPL CALCULATED.3IONS-SCNC: 12 MMOL/L (ref 5–15)
ANION GAP SERPL CALCULATED.3IONS-SCNC: 13 MMOL/L (ref 5–15)
ANION GAP SERPL CALCULATED.3IONS-SCNC: 15 MMOL/L (ref 5–15)
ANION GAP SERPL CALCULATED.3IONS-SCNC: 18 MMOL/L (ref 5–15)
ANION GAP SERPL CALCULATED.3IONS-SCNC: 19 MMOL/L (ref 5–15)
ANION GAP SERPL CALCULATED.3IONS-SCNC: 20 MMOL/L (ref 5–15)
ANION GAP SERPL CALCULATED.3IONS-SCNC: 21 MMOL/L (ref 5–15)
ANION GAP SERPL CALCULATED.3IONS-SCNC: 29 MMOL/L (ref 5–15)
ANION GAP SERPL CALCULATED.3IONS-SCNC: 30 MMOL/L (ref 5–15)
ANION GAP SERPL CALCULATED.3IONS-SCNC: 7 MMOL/L (ref 5–15)
ANION GAP SERPL CALCULATED.3IONS-SCNC: 9 MMOL/L (ref 5–15)
ANISOCYTOSIS BLD QL: ABNORMAL
ANISOCYTOSIS BLD QL: NORMAL
APTT PPP: 116.7 SECONDS (ref 24–31)
APTT PPP: 118.4 SECONDS (ref 61–76.5)
APTT PPP: 23.8 SECONDS (ref 24–31)
APTT PPP: 27.5 SECONDS (ref 24–31)
APTT PPP: 45.1 SECONDS (ref 24–31)
APTT PPP: 60.6 SECONDS (ref 24–31)
APTT PPP: >139 SECONDS (ref 24–31)
ARTERIAL PATENCY WRIST A: ABNORMAL
ARTERIAL PATENCY WRIST A: NEGATIVE
AST SERPL-CCNC: 121 U/L (ref 1–40)
AST SERPL-CCNC: 1485 U/L (ref 1–40)
AST SERPL-CCNC: 176 U/L (ref 1–40)
AST SERPL-CCNC: 23 U/L (ref 1–40)
AST SERPL-CCNC: 286 U/L (ref 1–40)
AST SERPL-CCNC: 35 U/L (ref 1–40)
AST SERPL-CCNC: 360 U/L (ref 1–40)
AST SERPL-CCNC: 404 U/L (ref 1–40)
AST SERPL-CCNC: 46 U/L (ref 1–40)
AST SERPL-CCNC: 480 U/L (ref 1–40)
AST SERPL-CCNC: 540 U/L (ref 1–40)
AST SERPL-CCNC: 57 U/L (ref 1–40)
AST SERPL-CCNC: 86 U/L (ref 1–40)
ASTRO TYP 1-8 RNA STL QL NAA+NON-PROBE: NOT DETECTED
ATMOSPHERIC PRESS: ABNORMAL MM[HG]
B PARAPERT DNA SPEC QL NAA+PROBE: NOT DETECTED
B PERT DNA SPEC QL NAA+PROBE: NOT DETECTED
BACTERIA SPEC AEROBE CULT: NO GROWTH
BACTERIA SPEC AEROBE CULT: NORMAL
BACTERIA SPEC AEROBE CULT: NORMAL
BACTERIA UR QL AUTO: ABNORMAL /HPF
BACTERIA UR QL AUTO: ABNORMAL /HPF
BARBITURATES UR QL SCN: NEGATIVE
BARBITURATES UR QL SCN: NEGATIVE
BASE EXCESS BLDA CALC-SCNC: -0.3 MMOL/L (ref 0–3)
BASE EXCESS BLDA CALC-SCNC: -10.3 MMOL/L (ref 0–3)
BASE EXCESS BLDA CALC-SCNC: -10.9 MMOL/L (ref 0–3)
BASE EXCESS BLDA CALC-SCNC: -13 MMOL/L (ref 0–3)
BASE EXCESS BLDA CALC-SCNC: -13.5 MMOL/L (ref 0–3)
BASE EXCESS BLDA CALC-SCNC: -14.1 MMOL/L (ref 0–3)
BASE EXCESS BLDA CALC-SCNC: -14.8 MMOL/L (ref 0–3)
BASE EXCESS BLDA CALC-SCNC: -15.6 MMOL/L (ref 0–3)
BASE EXCESS BLDA CALC-SCNC: -18.2 MMOL/L (ref 0–3)
BASE EXCESS BLDA CALC-SCNC: -6.7 MMOL/L (ref 0–3)
BASE EXCESS BLDA CALC-SCNC: -7.9 MMOL/L (ref 0–3)
BASOPHILS # BLD AUTO: 0 10*3/MM3 (ref 0–0.2)
BASOPHILS # BLD AUTO: 0.1 10*3/MM3 (ref 0–0.2)
BASOPHILS # BLD AUTO: 0.2 10*3/MM3 (ref 0–0.2)
BASOPHILS NFR BLD AUTO: 0.4 % (ref 0–1.5)
BASOPHILS NFR BLD AUTO: 0.5 % (ref 0–1.5)
BASOPHILS NFR BLD AUTO: 0.7 % (ref 0–1.5)
BASOPHILS NFR BLD AUTO: 0.8 % (ref 0–1.5)
BASOPHILS NFR BLD AUTO: 0.8 % (ref 0–1.5)
BASOPHILS NFR BLD AUTO: 0.9 % (ref 0–1.5)
BASOPHILS NFR BLD AUTO: 1 % (ref 0–1.5)
BASOPHILS NFR BLD AUTO: 1.1 % (ref 0–1.5)
BASOPHILS NFR BLD AUTO: 1.4 % (ref 0–1.5)
BDY SITE: ABNORMAL
BENZODIAZ UR QL SCN: NEGATIVE
BENZODIAZ UR QL SCN: NEGATIVE
BH BB BLOOD EXPIRATION DATE: NORMAL
BH BB BLOOD EXPIRATION DATE: NORMAL
BH BB BLOOD TYPE BARCODE: 9500
BH BB BLOOD TYPE BARCODE: NORMAL
BH BB DISPENSE STATUS: NORMAL
BH BB DISPENSE STATUS: NORMAL
BH BB PRODUCT CODE: NORMAL
BH BB PRODUCT CODE: NORMAL
BH BB UNIT NUMBER: NORMAL
BH BB UNIT NUMBER: NORMAL
BH CV ECHO MEAS - ACS: 2.3 CM
BH CV ECHO MEAS - ACS: 2.5 CM
BH CV ECHO MEAS - AO MAX PG (FULL): 0.51 MMHG
BH CV ECHO MEAS - AO MAX PG (FULL): 1.4 MMHG
BH CV ECHO MEAS - AO MAX PG: 3.3 MMHG
BH CV ECHO MEAS - AO MAX PG: 3.8 MMHG
BH CV ECHO MEAS - AO MEAN PG (FULL): 0.55 MMHG
BH CV ECHO MEAS - AO MEAN PG (FULL): 0.81 MMHG
BH CV ECHO MEAS - AO MEAN PG: 1.8 MMHG
BH CV ECHO MEAS - AO MEAN PG: 2.4 MMHG
BH CV ECHO MEAS - AO ROOT AREA (BSA CORRECTED): 1.6
BH CV ECHO MEAS - AO ROOT AREA (BSA CORRECTED): 1.7
BH CV ECHO MEAS - AO ROOT AREA: 10.1 CM^2
BH CV ECHO MEAS - AO ROOT AREA: 11 CM^2
BH CV ECHO MEAS - AO ROOT DIAM: 3.6 CM
BH CV ECHO MEAS - AO ROOT DIAM: 3.8 CM
BH CV ECHO MEAS - AO V2 MAX: 90.4 CM/SEC
BH CV ECHO MEAS - AO V2 MAX: 97.8 CM/SEC
BH CV ECHO MEAS - AO V2 MEAN: 63.6 CM/SEC
BH CV ECHO MEAS - AO V2 MEAN: 73.6 CM/SEC
BH CV ECHO MEAS - AO V2 VTI: 14.8 CM
BH CV ECHO MEAS - AO V2 VTI: 15.7 CM
BH CV ECHO MEAS - AORTIC HR: 87.6 BPM
BH CV ECHO MEAS - AORTIC HR: 92.7 BPM
BH CV ECHO MEAS - AORTIC R-R: 0.65 SEC
BH CV ECHO MEAS - AORTIC R-R: 0.69 SEC
BH CV ECHO MEAS - ASC AORTA: 2.6 CM
BH CV ECHO MEAS - AVA(I,A): 3.5 CM^2
BH CV ECHO MEAS - AVA(I,A): 3.9 CM^2
BH CV ECHO MEAS - AVA(I,D): 3.5 CM^2
BH CV ECHO MEAS - AVA(I,D): 3.9 CM^2
BH CV ECHO MEAS - AVA(V,A): 3.6 CM^2
BH CV ECHO MEAS - AVA(V,A): 3.9 CM^2
BH CV ECHO MEAS - AVA(V,D): 3.6 CM^2
BH CV ECHO MEAS - AVA(V,D): 3.9 CM^2
BH CV ECHO MEAS - BSA(HAYCOCK): 2.2 M^2
BH CV ECHO MEAS - BSA(HAYCOCK): 2.3 M^2
BH CV ECHO MEAS - BSA: 2.2 M^2
BH CV ECHO MEAS - BSA: 2.3 M^2
BH CV ECHO MEAS - BZI_BMI: 27.4 KILOGRAMS/M^2
BH CV ECHO MEAS - BZI_BMI: 29.6 KILOGRAMS/M^2
BH CV ECHO MEAS - BZI_METRIC_HEIGHT: 185.4 CM
BH CV ECHO MEAS - BZI_METRIC_HEIGHT: 185.4 CM
BH CV ECHO MEAS - BZI_METRIC_WEIGHT: 101.6 KG
BH CV ECHO MEAS - BZI_METRIC_WEIGHT: 94.3 KG
BH CV ECHO MEAS - CI(AO): 6.3 L/MIN/M^2
BH CV ECHO MEAS - CI(AO): 6.7 L/MIN/M^2
BH CV ECHO MEAS - CI(LVOT): 2.1 L/MIN/M^2
BH CV ECHO MEAS - CI(LVOT): 2.5 L/MIN/M^2
BH CV ECHO MEAS - CO(AO): 13.9 L/MIN
BH CV ECHO MEAS - CO(AO): 15.1 L/MIN
BH CV ECHO MEAS - CO(LVOT): 4.8 L/MIN
BH CV ECHO MEAS - CO(LVOT): 5.4 L/MIN
BH CV ECHO MEAS - EDV(CUBED): 286.4 ML
BH CV ECHO MEAS - EDV(CUBED): 340.6 ML
BH CV ECHO MEAS - EDV(MOD-SP4): 205.2 ML
BH CV ECHO MEAS - EDV(MOD-SP4): 222.4 ML
BH CV ECHO MEAS - EDV(TEICH): 223 ML
BH CV ECHO MEAS - EDV(TEICH): 254.1 ML
BH CV ECHO MEAS - EF(CUBED): 14 %
BH CV ECHO MEAS - EF(CUBED): 37 %
BH CV ECHO MEAS - EF(MOD-BP): 12 %
BH CV ECHO MEAS - EF(MOD-SP4): 11.9 %
BH CV ECHO MEAS - EF(MOD-SP4): 46.6 %
BH CV ECHO MEAS - EF(TEICH): 10.8 %
BH CV ECHO MEAS - EF(TEICH): 29.6 %
BH CV ECHO MEAS - ESV(CUBED): 180.4 ML
BH CV ECHO MEAS - ESV(CUBED): 292.8 ML
BH CV ECHO MEAS - ESV(MOD-SP4): 118.7 ML
BH CV ECHO MEAS - ESV(MOD-SP4): 180.8 ML
BH CV ECHO MEAS - ESV(TEICH): 156.9 ML
BH CV ECHO MEAS - ESV(TEICH): 226.7 ML
BH CV ECHO MEAS - FS: 14.3 %
BH CV ECHO MEAS - FS: 4.9 %
BH CV ECHO MEAS - IVS/LVPW: 0.91
BH CV ECHO MEAS - IVS/LVPW: 1.7
BH CV ECHO MEAS - IVSD: 1 CM
BH CV ECHO MEAS - IVSD: 1.3 CM
BH CV ECHO MEAS - LA DIMENSION(2D): 5.1 CM
BH CV ECHO MEAS - LA DIMENSION(2D): 5.3 CM
BH CV ECHO MEAS - LV DIASTOLIC VOL/BSA (35-75): 101.7 ML/M^2
BH CV ECHO MEAS - LV DIASTOLIC VOL/BSA (35-75): 90.9 ML/M^2
BH CV ECHO MEAS - LV MASS(C)D: 311 GRAMS
BH CV ECHO MEAS - LV MASS(C)D: 323.6 GRAMS
BH CV ECHO MEAS - LV MASS(C)DI: 142.2 GRAMS/M^2
BH CV ECHO MEAS - LV MASS(C)DI: 143.3 GRAMS/M^2
BH CV ECHO MEAS - LV MAX PG: 2.4 MMHG
BH CV ECHO MEAS - LV MAX PG: 2.8 MMHG
BH CV ECHO MEAS - LV MEAN PG: 1.3 MMHG
BH CV ECHO MEAS - LV MEAN PG: 1.6 MMHG
BH CV ECHO MEAS - LV SYSTOLIC VOL/BSA (12-30): 54.3 ML/M^2
BH CV ECHO MEAS - LV SYSTOLIC VOL/BSA (12-30): 80.1 ML/M^2
BH CV ECHO MEAS - LV V1 MAX: 78.2 CM/SEC
BH CV ECHO MEAS - LV V1 MAX: 83.1 CM/SEC
BH CV ECHO MEAS - LV V1 MEAN: 50.7 CM/SEC
BH CV ECHO MEAS - LV V1 MEAN: 60.7 CM/SEC
BH CV ECHO MEAS - LV V1 VTI: 12.2 CM
BH CV ECHO MEAS - LV V1 VTI: 13.8 CM
BH CV ECHO MEAS - LVIDD: 6.6 CM
BH CV ECHO MEAS - LVIDD: 7 CM
BH CV ECHO MEAS - LVIDS: 5.7 CM
BH CV ECHO MEAS - LVIDS: 6.6 CM
BH CV ECHO MEAS - LVOT AREA: 4.3 CM^2
BH CV ECHO MEAS - LVOT AREA: 4.5 CM^2
BH CV ECHO MEAS - LVOT DIAM: 2.3 CM
BH CV ECHO MEAS - LVOT DIAM: 2.4 CM
BH CV ECHO MEAS - LVPWD: 0.74 CM
BH CV ECHO MEAS - LVPWD: 1.1 CM
BH CV ECHO MEAS - MR MAX PG: 41.9 MMHG
BH CV ECHO MEAS - MR MAX VEL: 323.5 CM/SEC
BH CV ECHO MEAS - MV A MAX VEL: 52 CM/SEC
BH CV ECHO MEAS - MV A MAX VEL: 64.1 CM/SEC
BH CV ECHO MEAS - MV DEC SLOPE: 901.6 CM/SEC^2
BH CV ECHO MEAS - MV DEC SLOPE: 997.9 CM/SEC^2
BH CV ECHO MEAS - MV DEC TIME: 0.08 SEC
BH CV ECHO MEAS - MV DEC TIME: 0.11 SEC
BH CV ECHO MEAS - MV E MAX VEL: 84 CM/SEC
BH CV ECHO MEAS - MV E MAX VEL: 95.9 CM/SEC
BH CV ECHO MEAS - MV E/A: 1.3
BH CV ECHO MEAS - MV E/A: 1.8
BH CV ECHO MEAS - MV MAX PG: 3.9 MMHG
BH CV ECHO MEAS - MV MAX PG: 7.3 MMHG
BH CV ECHO MEAS - MV MEAN PG: 1.5 MMHG
BH CV ECHO MEAS - MV MEAN PG: 2.4 MMHG
BH CV ECHO MEAS - MV V2 MAX: 135.2 CM/SEC
BH CV ECHO MEAS - MV V2 MAX: 99.3 CM/SEC
BH CV ECHO MEAS - MV V2 MEAN: 55.1 CM/SEC
BH CV ECHO MEAS - MV V2 MEAN: 63.6 CM/SEC
BH CV ECHO MEAS - MV V2 VTI: 11.7 CM
BH CV ECHO MEAS - MV V2 VTI: 19.8 CM
BH CV ECHO MEAS - MVA(VTI): 3.1 CM^2
BH CV ECHO MEAS - MVA(VTI): 4.4 CM^2
BH CV ECHO MEAS - PA ACC TIME: 0.09 SEC
BH CV ECHO MEAS - PA MAX PG (FULL): 0.02 MMHG
BH CV ECHO MEAS - PA MAX PG (FULL): 1.5 MMHG
BH CV ECHO MEAS - PA MAX PG: 1.8 MMHG
BH CV ECHO MEAS - PA MAX PG: 3.3 MMHG
BH CV ECHO MEAS - PA MEAN PG (FULL): 0.19 MMHG
BH CV ECHO MEAS - PA MEAN PG: 0.92 MMHG
BH CV ECHO MEAS - PA PR(ACCEL): 39.6 MMHG
BH CV ECHO MEAS - PA V2 MAX: 66.6 CM/SEC
BH CV ECHO MEAS - PA V2 MAX: 91.4 CM/SEC
BH CV ECHO MEAS - PA V2 MEAN: 43.8 CM/SEC
BH CV ECHO MEAS - PA V2 VTI: 9.2 CM
BH CV ECHO MEAS - PI END-D VEL: 103.2 CM/SEC
BH CV ECHO MEAS - PI MAX PG: 18.8 MMHG
BH CV ECHO MEAS - PI MAX VEL: 216.9 CM/SEC
BH CV ECHO MEAS - PULM DIAS VEL: 65.2 CM/SEC
BH CV ECHO MEAS - PULM DIAS VEL: 86.9 CM/SEC
BH CV ECHO MEAS - PULM S/D: 0.54
BH CV ECHO MEAS - PULM S/D: 0.62
BH CV ECHO MEAS - PULM SYS VEL: 35.4 CM/SEC
BH CV ECHO MEAS - PULM SYS VEL: 54.3 CM/SEC
BH CV ECHO MEAS - PVA(I,A): 6 CM^2
BH CV ECHO MEAS - PVA(I,D): 6 CM^2
BH CV ECHO MEAS - PVA(V,A): 3.5 CM^2
BH CV ECHO MEAS - PVA(V,A): 5.7 CM^2
BH CV ECHO MEAS - PVA(V,D): 3.5 CM^2
BH CV ECHO MEAS - PVA(V,D): 5.7 CM^2
BH CV ECHO MEAS - QP/QS: 0.93
BH CV ECHO MEAS - QP/QS: 1.1
BH CV ECHO MEAS - RAP SYSTOLE: 3 MMHG
BH CV ECHO MEAS - RAP SYSTOLE: 3 MMHG
BH CV ECHO MEAS - RV MAX PG: 1.8 MMHG
BH CV ECHO MEAS - RV MAX PG: 1.8 MMHG
BH CV ECHO MEAS - RV MEAN PG: 0.72 MMHG
BH CV ECHO MEAS - RV MEAN PG: 0.93 MMHG
BH CV ECHO MEAS - RV V1 MAX: 66.3 CM/SEC
BH CV ECHO MEAS - RV V1 MAX: 67.4 CM/SEC
BH CV ECHO MEAS - RV V1 MEAN: 37.5 CM/SEC
BH CV ECHO MEAS - RV V1 MEAN: 44.9 CM/SEC
BH CV ECHO MEAS - RV V1 VTI: 12.1 CM
BH CV ECHO MEAS - RV V1 VTI: 9.7 CM
BH CV ECHO MEAS - RVDD: 3.5 CM
BH CV ECHO MEAS - RVDD: 3.6 CM
BH CV ECHO MEAS - RVOT AREA: 4.8 CM^2
BH CV ECHO MEAS - RVOT AREA: 5.7 CM^2
BH CV ECHO MEAS - RVOT DIAM: 2.5 CM
BH CV ECHO MEAS - RVOT DIAM: 2.7 CM
BH CV ECHO MEAS - RVSP: 25.4 MMHG
BH CV ECHO MEAS - RVSP: 28 MMHG
BH CV ECHO MEAS - SI(AO): 72.3 ML/M^2
BH CV ECHO MEAS - SI(AO): 72.5 ML/M^2
BH CV ECHO MEAS - SI(CUBED): 21.2 ML/M^2
BH CV ECHO MEAS - SI(CUBED): 48.4 ML/M^2
BH CV ECHO MEAS - SI(LVOT): 23 ML/M^2
BH CV ECHO MEAS - SI(LVOT): 28.3 ML/M^2
BH CV ECHO MEAS - SI(MOD-SP4): 10.8 ML/M^2
BH CV ECHO MEAS - SI(MOD-SP4): 47.4 ML/M^2
BH CV ECHO MEAS - SI(TEICH): 12.1 ML/M^2
BH CV ECHO MEAS - SI(TEICH): 30.2 ML/M^2
BH CV ECHO MEAS - SV(AO): 158.6 ML
BH CV ECHO MEAS - SV(AO): 163.2 ML
BH CV ECHO MEAS - SV(CUBED): 106 ML
BH CV ECHO MEAS - SV(CUBED): 47.8 ML
BH CV ECHO MEAS - SV(LVOT): 51.9 ML
BH CV ECHO MEAS - SV(LVOT): 61.9 ML
BH CV ECHO MEAS - SV(MOD-SP4): 103.7 ML
BH CV ECHO MEAS - SV(MOD-SP4): 24.5 ML
BH CV ECHO MEAS - SV(RVOT): 55.4 ML
BH CV ECHO MEAS - SV(RVOT): 57.8 ML
BH CV ECHO MEAS - SV(TEICH): 27.4 ML
BH CV ECHO MEAS - SV(TEICH): 66.1 ML
BH CV ECHO MEAS - TR MAX VEL: 231.4 CM/SEC
BH CV ECHO MEAS - TR MAX VEL: 249.8 CM/SEC
BILIRUB SERPL-MCNC: 0.3 MG/DL (ref 0.2–1.2)
BILIRUB SERPL-MCNC: 0.4 MG/DL (ref 0–1.2)
BILIRUB SERPL-MCNC: 0.6 MG/DL (ref 0–1.2)
BILIRUB SERPL-MCNC: 0.8 MG/DL (ref 0–1.2)
BILIRUB SERPL-MCNC: 0.9 MG/DL (ref 0–1.2)
BILIRUB SERPL-MCNC: 0.9 MG/DL (ref 0–1.2)
BILIRUB SERPL-MCNC: 1 MG/DL (ref 0–1.2)
BILIRUB SERPL-MCNC: 1.3 MG/DL (ref 0–1.2)
BILIRUB UR QL STRIP: ABNORMAL
BILIRUB UR QL STRIP: ABNORMAL
BLASTS NFR BLD MANUAL: 3 % (ref 0–0)
BLD GP AB SCN SERPL QL: NEGATIVE
BODY TEMPERATURE: 90.5 C
BODY TEMPERATURE: 90.5 C
BUN BLD-MCNC: 13 MG/DL (ref 6–20)
BUN BLD-MCNC: 14 MG/DL (ref 6–20)
BUN BLD-MCNC: 15 MG/DL (ref 6–20)
BUN BLD-MCNC: 16 MG/DL (ref 6–20)
BUN BLD-MCNC: 16 MG/DL (ref 6–20)
BUN SERPL-MCNC: 20 MG/DL (ref 6–20)
BUN SERPL-MCNC: 23 MG/DL (ref 6–20)
BUN SERPL-MCNC: 24 MG/DL (ref 6–20)
BUN SERPL-MCNC: 27 MG/DL (ref 6–20)
BUN SERPL-MCNC: 34 MG/DL (ref 6–20)
BUN SERPL-MCNC: 37 MG/DL (ref 6–20)
BUN SERPL-MCNC: 39 MG/DL (ref 6–20)
BUN SERPL-MCNC: 40 MG/DL (ref 6–20)
BUN SERPL-MCNC: 49 MG/DL (ref 6–20)
BUN SERPL-MCNC: 52 MG/DL (ref 6–20)
BUN SERPL-MCNC: 56 MG/DL (ref 6–20)
BUN SERPL-MCNC: 56 MG/DL (ref 6–20)
BUN/CREAT SERPL: 14 (ref 7–25)
BUN/CREAT SERPL: 14 (ref 7–25)
BUN/CREAT SERPL: 16.3 (ref 7–25)
BUN/CREAT SERPL: 16.8 (ref 7–25)
BUN/CREAT SERPL: 17.6 (ref 7–25)
BUN/CREAT SERPL: 17.6 (ref 7–25)
BUN/CREAT SERPL: 19.8 (ref 7–25)
BUN/CREAT SERPL: 20.4 (ref 7–25)
BUN/CREAT SERPL: 21.1 (ref 7–25)
BUN/CREAT SERPL: 21.5 (ref 7–25)
BUN/CREAT SERPL: 25.2 (ref 7–25)
BUN/CREAT SERPL: 25.7 (ref 7–25)
BUN/CREAT SERPL: 26 (ref 7–25)
BUN/CREAT SERPL: 26.9 (ref 7–25)
BUN/CREAT SERPL: 27.8 (ref 7–25)
BUN/CREAT SERPL: 28.4 (ref 7–25)
BUN/CREAT SERPL: 31 (ref 7–25)
BURR CELLS BLD QL SMEAR: ABNORMAL
C CAYETANENSIS DNA STL QL NAA+NON-PROBE: NOT DETECTED
C PNEUM DNA NPH QL NAA+NON-PROBE: NOT DETECTED
CA-I BLDA-SCNC: 0.79 MMOL/L (ref 1.15–1.33)
CA-I BLDA-SCNC: 2.76 MMOL/L (ref 1.15–1.33)
CA-I SERPL ISE-MCNC: 0.98 MMOL/L (ref 1.2–1.3)
CA-I SERPL ISE-MCNC: 1.03 MMOL/L (ref 1.2–1.3)
CA-I SERPL ISE-MCNC: 1.08 MMOL/L (ref 1.2–1.3)
CA-I SERPL ISE-MCNC: 1.11 MMOL/L (ref 1.2–1.3)
CA-I SERPL ISE-MCNC: 1.14 MMOL/L (ref 1.2–1.3)
CA-I SERPL ISE-MCNC: 1.2 MMOL/L (ref 1.2–1.3)
CALCIUM SPEC-SCNC: 7 MG/DL (ref 8.6–10.5)
CALCIUM SPEC-SCNC: 7.3 MG/DL (ref 8.6–10.5)
CALCIUM SPEC-SCNC: 7.7 MG/DL (ref 8.6–10.5)
CALCIUM SPEC-SCNC: 7.8 MG/DL (ref 8.6–10.5)
CALCIUM SPEC-SCNC: 8.1 MG/DL (ref 8.6–10.5)
CALCIUM SPEC-SCNC: 8.2 MG/DL (ref 8.6–10.5)
CALCIUM SPEC-SCNC: 8.2 MG/DL (ref 8.6–10.5)
CALCIUM SPEC-SCNC: 8.3 MG/DL (ref 8.6–10.5)
CALCIUM SPEC-SCNC: 8.3 MG/DL (ref 8.6–10.5)
CALCIUM SPEC-SCNC: 8.4 MG/DL (ref 8.6–10.5)
CALCIUM SPEC-SCNC: 8.5 MG/DL (ref 8.6–10.5)
CALCIUM SPEC-SCNC: 8.5 MG/DL (ref 8.6–10.5)
CALCIUM SPEC-SCNC: 8.6 MG/DL (ref 8.6–10.5)
CALCIUM SPEC-SCNC: 8.7 MG/DL (ref 8.6–10.5)
CALCIUM SPEC-SCNC: 9 MG/DL (ref 8.6–10.5)
CALCIUM SPEC-SCNC: 9.4 MG/DL (ref 8.6–10.5)
CALCIUM SPEC-SCNC: 9.5 MG/DL (ref 8.6–10.5)
CAMPY SP DNA.DIARRHEA STL QL NAA+PROBE: NOT DETECTED
CANNABINOIDS SERPL QL: POSITIVE
CANNABINOIDS SERPL QL: POSITIVE
CHLORIDE SERPL-SCNC: 100 MMOL/L (ref 98–107)
CHLORIDE SERPL-SCNC: 100 MMOL/L (ref 98–107)
CHLORIDE SERPL-SCNC: 101 MMOL/L (ref 98–107)
CHLORIDE SERPL-SCNC: 102 MMOL/L (ref 98–107)
CHLORIDE SERPL-SCNC: 104 MMOL/L (ref 98–107)
CHLORIDE SERPL-SCNC: 105 MMOL/L (ref 98–107)
CHLORIDE SERPL-SCNC: 91 MMOL/L (ref 98–107)
CHLORIDE SERPL-SCNC: 96 MMOL/L (ref 98–107)
CHLORIDE SERPL-SCNC: 97 MMOL/L (ref 98–107)
CHLORIDE SERPL-SCNC: 97 MMOL/L (ref 98–107)
CHLORIDE SERPL-SCNC: 98 MMOL/L (ref 98–107)
CHLORIDE SERPL-SCNC: 98 MMOL/L (ref 98–107)
CHLORIDE SERPL-SCNC: 99 MMOL/L (ref 98–107)
CHOLEST SERPL-MCNC: 44 MG/DL (ref 0–200)
CHOLEST SERPL-MCNC: 88 MG/DL (ref 0–200)
CK MB SERPL-CCNC: 106.7 NG/ML
CK MB SERPL-CCNC: 21.49 NG/ML
CK MB SERPL-CCNC: 75.9 NG/ML
CK MB SERPL-CCNC: 90.4 NG/ML
CK SERPL-CCNC: 3487 U/L (ref 20–200)
CK SERPL-CCNC: 3678 U/L (ref 20–200)
CK SERPL-CCNC: 4649 U/L (ref 20–200)
CK SERPL-CCNC: 839 U/L (ref 20–200)
CLARITY UR: ABNORMAL
CLARITY UR: CLEAR
CO2 BLDA-SCNC: 14.8 MMOL/L (ref 22–29)
CO2 BLDA-SCNC: 15.8 MMOL/L (ref 22–29)
CO2 BLDA-SCNC: 16.4 MMOL/L (ref 22–29)
CO2 BLDA-SCNC: 18.4 MMOL/L (ref 22–29)
CO2 BLDA-SCNC: 20.1 MMOL/L (ref 22–29)
CO2 BLDA-SCNC: 21.6 MMOL/L (ref 22–29)
CO2 BLDA-SCNC: 21.9 MMOL/L (ref 22–29)
CO2 BLDA-SCNC: 24.1 MMOL/L (ref 22–29)
CO2 BLDA-SCNC: 25 MMOL/L (ref 22–29)
CO2 SERPL-SCNC: 13 MMOL/L (ref 22–29)
CO2 SERPL-SCNC: 15 MMOL/L (ref 22–29)
CO2 SERPL-SCNC: 16 MMOL/L (ref 22–29)
CO2 SERPL-SCNC: 18 MMOL/L (ref 22–29)
CO2 SERPL-SCNC: 20 MMOL/L (ref 22–29)
CO2 SERPL-SCNC: 21 MMOL/L (ref 22–29)
CO2 SERPL-SCNC: 23 MMOL/L (ref 22–29)
CO2 SERPL-SCNC: 23 MMOL/L (ref 22–29)
CO2 SERPL-SCNC: 24 MMOL/L (ref 22–29)
CO2 SERPL-SCNC: 26 MMOL/L (ref 22–29)
CO2 SERPL-SCNC: 27 MMOL/L (ref 22–29)
CO2 SERPL-SCNC: 27 MMOL/L (ref 22–29)
COCAINE UR QL: NEGATIVE
COCAINE UR QL: NEGATIVE
COLOR UR: ABNORMAL
COLOR UR: ABNORMAL
CREAT BLD-MCNC: 0.8 MG/DL (ref 0.76–1.27)
CREAT BLD-MCNC: 0.91 MG/DL (ref 0.76–1.27)
CREAT BLD-MCNC: 0.95 MG/DL (ref 0.76–1.27)
CREAT BLD-MCNC: 1 MG/DL (ref 0.76–1.27)
CREAT BLD-MCNC: 1.07 MG/DL (ref 0.76–1.27)
CREAT SERPL-MCNC: 0.98 MG/DL (ref 0.76–1.27)
CREAT SERPL-MCNC: 1.07 MG/DL (ref 0.76–1.27)
CREAT SERPL-MCNC: 1.16 MG/DL (ref 0.76–1.27)
CREAT SERPL-MCNC: 1.26 MG/DL (ref 0.76–1.27)
CREAT SERPL-MCNC: 1.33 MG/DL (ref 0.76–1.27)
CREAT SERPL-MCNC: 1.36 MG/DL (ref 0.76–1.27)
CREAT SERPL-MCNC: 1.54 MG/DL (ref 0.76–1.27)
CREAT SERPL-MCNC: 1.61 MG/DL (ref 0.76–1.27)
CREAT SERPL-MCNC: 1.97 MG/DL (ref 0.76–1.27)
CREAT SERPL-MCNC: 2.02 MG/DL (ref 0.76–1.27)
CREAT SERPL-MCNC: 2.08 MG/DL (ref 0.76–1.27)
CREAT SERPL-MCNC: 2.28 MG/DL (ref 0.76–1.27)
CREAT UR-MCNC: 58.3 MG/DL
CRP SERPL-MCNC: 4.92 MG/DL (ref 0–0.5)
CRYPTOSP STL CULT: NOT DETECTED
D DIMER PPP FEU-MCNC: 1.04 MCGFEU/ML (ref 0.17–0.59)
D-LACTATE SERPL-SCNC: 10.9 MMOL/L (ref 0.5–2)
D-LACTATE SERPL-SCNC: 12 MMOL/L (ref 0.5–2)
D-LACTATE SERPL-SCNC: 17.7 MMOL/L (ref 0.5–2)
D-LACTATE SERPL-SCNC: 18.2 MMOL/L (ref 0.5–2)
D-LACTATE SERPL-SCNC: 23.1 MMOL/L (ref 0.5–2)
D-LACTATE SERPL-SCNC: 8.4 MMOL/L (ref 0.5–2)
D-LACTATE SERPL-SCNC: 9 MMOL/L (ref 0.5–2)
D-LACTATE SERPL-SCNC: >20 MMOL/L (ref 0.5–2)
DEPRECATED RDW RBC AUTO: 44.2 FL (ref 37–54)
DEPRECATED RDW RBC AUTO: 45.5 FL (ref 37–54)
DEPRECATED RDW RBC AUTO: 46.4 FL (ref 37–54)
DEPRECATED RDW RBC AUTO: 46.8 FL (ref 37–54)
DEPRECATED RDW RBC AUTO: 47.3 FL (ref 37–54)
DEPRECATED RDW RBC AUTO: 47.7 FL (ref 37–54)
DEPRECATED RDW RBC AUTO: 48.1 FL (ref 37–54)
DEPRECATED RDW RBC AUTO: 48.6 FL (ref 37–54)
DEPRECATED RDW RBC AUTO: 50.3 FL (ref 37–54)
DEPRECATED RDW RBC AUTO: 50.8 FL (ref 37–54)
DEPRECATED RDW RBC AUTO: 52.1 FL (ref 37–54)
DEPRECATED RDW RBC AUTO: 52.1 FL (ref 37–54)
DEPRECATED RDW RBC AUTO: 56.9 FL (ref 37–54)
E COLI DNA SPEC QL NAA+PROBE: NOT DETECTED
E HISTOLYT AG STL-ACNC: NOT DETECTED
EAEC PAA PLAS AGGR+AATA ST NAA+NON-PRB: NOT DETECTED
EC STX1 + STX2 GENES STL NAA+PROBE: NOT DETECTED
EOSINOPHIL # BLD AUTO: 0 10*3/MM3 (ref 0–0.4)
EOSINOPHIL # BLD AUTO: 0 10*3/MM3 (ref 0–0.4)
EOSINOPHIL # BLD AUTO: 0.1 10*3/MM3 (ref 0–0.4)
EOSINOPHIL # BLD AUTO: 0.2 10*3/MM3 (ref 0–0.4)
EOSINOPHIL # BLD MANUAL: 0.2 10*3/MM3 (ref 0–0.4)
EOSINOPHIL # BLD MANUAL: 0.23 10*3/MM3 (ref 0–0.4)
EOSINOPHIL NFR BLD AUTO: 0.2 % (ref 0.3–6.2)
EOSINOPHIL NFR BLD AUTO: 0.2 % (ref 0.3–6.2)
EOSINOPHIL NFR BLD AUTO: 0.4 % (ref 0.3–6.2)
EOSINOPHIL NFR BLD AUTO: 0.9 % (ref 0.3–6.2)
EOSINOPHIL NFR BLD AUTO: 1.2 % (ref 0.3–6.2)
EOSINOPHIL NFR BLD AUTO: 1.4 % (ref 0.3–6.2)
EOSINOPHIL NFR BLD AUTO: 1.5 % (ref 0.3–6.2)
EOSINOPHIL NFR BLD AUTO: 1.7 % (ref 0.3–6.2)
EOSINOPHIL NFR BLD AUTO: 2 % (ref 0.3–6.2)
EOSINOPHIL NFR BLD MANUAL: 2 % (ref 0.3–6.2)
EOSINOPHIL NFR BLD MANUAL: 6 % (ref 0.3–6.2)
EPEC EAE GENE STL QL NAA+NON-PROBE: NOT DETECTED
ERYTHROCYTE [DISTWIDTH] IN BLOOD BY AUTOMATED COUNT: 17 % (ref 12.3–15.4)
ERYTHROCYTE [DISTWIDTH] IN BLOOD BY AUTOMATED COUNT: 17.1 % (ref 12.3–15.4)
ERYTHROCYTE [DISTWIDTH] IN BLOOD BY AUTOMATED COUNT: 17.2 % (ref 12.3–15.4)
ERYTHROCYTE [DISTWIDTH] IN BLOOD BY AUTOMATED COUNT: 17.2 % (ref 12.3–15.4)
ERYTHROCYTE [DISTWIDTH] IN BLOOD BY AUTOMATED COUNT: 17.3 % (ref 12.3–15.4)
ERYTHROCYTE [DISTWIDTH] IN BLOOD BY AUTOMATED COUNT: 17.5 % (ref 12.3–15.4)
ERYTHROCYTE [DISTWIDTH] IN BLOOD BY AUTOMATED COUNT: 17.5 % (ref 12.3–15.4)
ERYTHROCYTE [DISTWIDTH] IN BLOOD BY AUTOMATED COUNT: 17.6 % (ref 12.3–15.4)
ERYTHROCYTE [DISTWIDTH] IN BLOOD BY AUTOMATED COUNT: 17.7 % (ref 12.3–15.4)
ERYTHROCYTE [DISTWIDTH] IN BLOOD BY AUTOMATED COUNT: 17.8 % (ref 12.3–15.4)
ERYTHROCYTE [DISTWIDTH] IN BLOOD BY AUTOMATED COUNT: 20.1 % (ref 12.3–15.4)
ERYTHROCYTE [DISTWIDTH] IN BLOOD BY AUTOMATED COUNT: 22 % (ref 12.3–15.4)
ERYTHROCYTE [DISTWIDTH] IN BLOOD BY AUTOMATED COUNT: 22.4 % (ref 12.3–15.4)
ERYTHROCYTE [DISTWIDTH] IN BLOOD BY AUTOMATED COUNT: 23.2 % (ref 12.3–15.4)
ERYTHROCYTE [DISTWIDTH] IN BLOOD BY AUTOMATED COUNT: 26.5 % (ref 12.3–15.4)
ERYTHROCYTE [SEDIMENTATION RATE] IN BLOOD: 5 MM/HR (ref 0–15)
ETEC LTA+ST1A+ST1B TOX ST NAA+NON-PROBE: NOT DETECTED
FERRITIN SERPL-MCNC: 12.66 NG/ML (ref 30–400)
FINE GRAN CASTS URNS QL MICRO: ABNORMAL /LPF
FLUAV H1 2009 PAND RNA NPH QL NAA+PROBE: NOT DETECTED
FLUAV H1 HA GENE NPH QL NAA+PROBE: NOT DETECTED
FLUAV H3 RNA NPH QL NAA+PROBE: NOT DETECTED
FLUAV SUBTYP SPEC NAA+PROBE: NOT DETECTED
FLUBV RNA ISLT QL NAA+PROBE: NOT DETECTED
FOLATE SERPL-MCNC: 7.63 NG/ML (ref 4.78–24.2)
G LAMBLIA DNA SPEC QL NAA+PROBE: NOT DETECTED
GFR SERPL CREATININE-BSD FRML MDRD: 105 ML/MIN/1.73
GFR SERPL CREATININE-BSD FRML MDRD: 31 ML/MIN/1.73
GFR SERPL CREATININE-BSD FRML MDRD: 35 ML/MIN/1.73
GFR SERPL CREATININE-BSD FRML MDRD: 36 ML/MIN/1.73
GFR SERPL CREATININE-BSD FRML MDRD: 37 ML/MIN/1.73
GFR SERPL CREATININE-BSD FRML MDRD: 47 ML/MIN/1.73
GFR SERPL CREATININE-BSD FRML MDRD: 49 ML/MIN/1.73
GFR SERPL CREATININE-BSD FRML MDRD: 57 ML/MIN/1.73
GFR SERPL CREATININE-BSD FRML MDRD: 58 ML/MIN/1.73
GFR SERPL CREATININE-BSD FRML MDRD: 62 ML/MIN/1.73
GFR SERPL CREATININE-BSD FRML MDRD: 68 ML/MIN/1.73
GFR SERPL CREATININE-BSD FRML MDRD: 75 ML/MIN/1.73
GFR SERPL CREATININE-BSD FRML MDRD: 75 ML/MIN/1.73
GFR SERPL CREATININE-BSD FRML MDRD: 81 ML/MIN/1.73
GFR SERPL CREATININE-BSD FRML MDRD: 83 ML/MIN/1.73
GFR SERPL CREATININE-BSD FRML MDRD: 86 ML/MIN/1.73
GFR SERPL CREATININE-BSD FRML MDRD: 90 ML/MIN/1.73
GIANT PLATELETS: ABNORMAL
GIANT PLATELETS: NORMAL
GLOBULIN UR ELPH-MCNC: 1.5 GM/DL
GLOBULIN UR ELPH-MCNC: 2.4 GM/DL
GLOBULIN UR ELPH-MCNC: 2.5 GM/DL
GLOBULIN UR ELPH-MCNC: 2.6 GM/DL
GLOBULIN UR ELPH-MCNC: 2.6 GM/DL
GLOBULIN UR ELPH-MCNC: 2.7 GM/DL
GLOBULIN UR ELPH-MCNC: 2.8 GM/DL
GLOBULIN UR ELPH-MCNC: 2.8 GM/DL
GLOBULIN UR ELPH-MCNC: 2.9 GM/DL
GLOBULIN UR ELPH-MCNC: 3.1 GM/DL
GLOBULIN UR ELPH-MCNC: 3.3 GM/DL
GLOBULIN UR ELPH-MCNC: 3.5 GM/DL
GLOBULIN UR ELPH-MCNC: 4.8 GM/DL
GLUCOSE BLD-MCNC: 132 MG/DL (ref 65–99)
GLUCOSE BLD-MCNC: 139 MG/DL (ref 65–99)
GLUCOSE BLD-MCNC: 148 MG/DL (ref 65–99)
GLUCOSE BLD-MCNC: 155 MG/DL (ref 65–99)
GLUCOSE BLD-MCNC: 215 MG/DL (ref 65–99)
GLUCOSE BLDC GLUCOMTR-MCNC: 105 MG/DL (ref 74–100)
GLUCOSE BLDC GLUCOMTR-MCNC: 119 MG/DL (ref 70–105)
GLUCOSE BLDC GLUCOMTR-MCNC: 131 MG/DL (ref 74–100)
GLUCOSE BLDC GLUCOMTR-MCNC: 138 MG/DL (ref 70–105)
GLUCOSE BLDC GLUCOMTR-MCNC: 144 MG/DL (ref 70–105)
GLUCOSE BLDC GLUCOMTR-MCNC: 158 MG/DL (ref 70–105)
GLUCOSE BLDC GLUCOMTR-MCNC: 161 MG/DL (ref 70–105)
GLUCOSE BLDC GLUCOMTR-MCNC: 167 MG/DL (ref 70–105)
GLUCOSE BLDC GLUCOMTR-MCNC: 168 MG/DL (ref 70–105)
GLUCOSE BLDC GLUCOMTR-MCNC: 168 MG/DL (ref 70–105)
GLUCOSE BLDC GLUCOMTR-MCNC: 170 MG/DL (ref 70–105)
GLUCOSE BLDC GLUCOMTR-MCNC: 193 MG/DL (ref 74–100)
GLUCOSE BLDC GLUCOMTR-MCNC: 205 MG/DL (ref 74–100)
GLUCOSE BLDC GLUCOMTR-MCNC: 205 MG/DL (ref 74–100)
GLUCOSE BLDC GLUCOMTR-MCNC: 209 MG/DL (ref 74–100)
GLUCOSE BLDC GLUCOMTR-MCNC: 209 MG/DL (ref 74–100)
GLUCOSE BLDC GLUCOMTR-MCNC: 55 MG/DL (ref 70–105)
GLUCOSE BLDC GLUCOMTR-MCNC: 76 MG/DL (ref 70–105)
GLUCOSE BLDC GLUCOMTR-MCNC: 84 MG/DL (ref 70–105)
GLUCOSE SERPL-MCNC: 101 MG/DL (ref 65–99)
GLUCOSE SERPL-MCNC: 116 MG/DL (ref 65–99)
GLUCOSE SERPL-MCNC: 117 MG/DL (ref 65–99)
GLUCOSE SERPL-MCNC: 122 MG/DL (ref 65–99)
GLUCOSE SERPL-MCNC: 129 MG/DL (ref 65–99)
GLUCOSE SERPL-MCNC: 143 MG/DL (ref 65–99)
GLUCOSE SERPL-MCNC: 155 MG/DL (ref 65–99)
GLUCOSE SERPL-MCNC: 157 MG/DL (ref 65–99)
GLUCOSE SERPL-MCNC: 159 MG/DL (ref 65–99)
GLUCOSE SERPL-MCNC: 175 MG/DL (ref 65–99)
GLUCOSE SERPL-MCNC: 190 MG/DL (ref 65–99)
GLUCOSE SERPL-MCNC: 75 MG/DL (ref 65–99)
GLUCOSE UR STRIP-MCNC: NEGATIVE MG/DL
GLUCOSE UR STRIP-MCNC: NEGATIVE MG/DL
HADV DNA SPEC NAA+PROBE: NOT DETECTED
HAV IGM SERPL QL IA: ABNORMAL
HAV IGM SERPL QL IA: NEGATIVE
HBV CORE IGM SERPL QL IA: ABNORMAL
HBV CORE IGM SERPL QL IA: NEGATIVE
HBV SURFACE AG SERPL QL IA: ABNORMAL
HBV SURFACE AG SERPL QL IA: NEGATIVE
HCO3 BLDA-SCNC: 13.1 MMOL/L (ref 21–28)
HCO3 BLDA-SCNC: 14.3 MMOL/L (ref 21–28)
HCO3 BLDA-SCNC: 14.3 MMOL/L (ref 21–28)
HCO3 BLDA-SCNC: 14.8 MMOL/L (ref 21–28)
HCO3 BLDA-SCNC: 16.7 MMOL/L (ref 21–28)
HCO3 BLDA-SCNC: 18.4 MMOL/L (ref 21–28)
HCO3 BLDA-SCNC: 19.2 MMOL/L (ref 21–28)
HCO3 BLDA-SCNC: 19.7 MMOL/L (ref 21–28)
HCO3 BLDA-SCNC: 22.3 MMOL/L (ref 21–28)
HCO3 BLDA-SCNC: 22.8 MMOL/L (ref 21–28)
HCO3 BLDA-SCNC: 29.5 MMOL/L (ref 21–28)
HCOV 229E RNA SPEC QL NAA+PROBE: NOT DETECTED
HCOV HKU1 RNA SPEC QL NAA+PROBE: NOT DETECTED
HCOV NL63 RNA SPEC QL NAA+PROBE: NOT DETECTED
HCOV OC43 RNA SPEC QL NAA+PROBE: NOT DETECTED
HCT VFR BLD AUTO: 24.1 % (ref 37.5–51)
HCT VFR BLD AUTO: 25.5 % (ref 37.5–51)
HCT VFR BLD AUTO: 25.7 % (ref 37.5–51)
HCT VFR BLD AUTO: 26.8 % (ref 37.5–51)
HCT VFR BLD AUTO: 28.2 % (ref 37.5–51)
HCT VFR BLD AUTO: 28.7 % (ref 37.5–51)
HCT VFR BLD AUTO: 30.6 % (ref 37.5–51)
HCT VFR BLD AUTO: 33 % (ref 37.5–51)
HCT VFR BLD AUTO: 34.3 % (ref 37.5–51)
HCT VFR BLD AUTO: 35.1 % (ref 37.5–51)
HCT VFR BLD AUTO: 35.2 % (ref 37.5–51)
HCT VFR BLD AUTO: 38.1 % (ref 37.5–51)
HCT VFR BLD AUTO: 39.2 % (ref 37.5–51)
HCT VFR BLD AUTO: 39.6 % (ref 37.5–51)
HCT VFR BLD AUTO: 39.6 % (ref 37.5–51)
HCT VFR BLD AUTO: 40.9 % (ref 37.5–51)
HCT VFR BLD AUTO: 41.5 % (ref 37.5–51)
HCT VFR BLD AUTO: 42.3 % (ref 37.5–51)
HCT VFR BLDA CALC: 25 % (ref 38–51)
HCT VFR BLDA CALC: 27 % (ref 38–51)
HCV AB SER DONR QL: REACTIVE
HDLC SERPL-MCNC: 23 MG/DL (ref 40–60)
HDLC SERPL-MCNC: 25 MG/DL (ref 40–60)
HEMOCCULT STL QL IA: NEGATIVE
HEMODILUTION: NO
HEMODILUTION: YES
HGB BLD-MCNC: 10.4 G/DL (ref 13–17.7)
HGB BLD-MCNC: 10.8 G/DL (ref 13–17.7)
HGB BLD-MCNC: 10.9 G/DL (ref 13–17.7)
HGB BLD-MCNC: 10.9 G/DL (ref 13–17.7)
HGB BLD-MCNC: 11.4 G/DL (ref 13–17.7)
HGB BLD-MCNC: 11.8 G/DL (ref 13–17.7)
HGB BLD-MCNC: 11.9 G/DL (ref 13–17.7)
HGB BLD-MCNC: 12.1 G/DL (ref 13–17.7)
HGB BLD-MCNC: 12.2 G/DL (ref 13–17.7)
HGB BLD-MCNC: 12.4 G/DL (ref 13–17.7)
HGB BLD-MCNC: 13.2 G/DL (ref 13–17.7)
HGB BLD-MCNC: 7.2 G/DL (ref 13–17.7)
HGB BLD-MCNC: 7.4 G/DL (ref 13–17.7)
HGB BLD-MCNC: 7.8 G/DL (ref 13–17.7)
HGB BLD-MCNC: 7.8 G/DL (ref 13–17.7)
HGB BLD-MCNC: 8.8 G/DL (ref 13–17.7)
HGB BLD-MCNC: 8.8 G/DL (ref 13–17.7)
HGB BLD-MCNC: 8.9 G/DL (ref 13–17.7)
HGB BLDA-MCNC: 8.5 G/DL (ref 12–17)
HGB BLDA-MCNC: 9 G/DL (ref 12–17)
HGB UR QL STRIP.AUTO: ABNORMAL
HGB UR QL STRIP.AUTO: NEGATIVE
HIV1+2 AB SER QL: NORMAL
HMPV RNA NPH QL NAA+NON-PROBE: NOT DETECTED
HOLD SPECIMEN: NORMAL
HOLD SPECIMEN: NORMAL
HPIV1 RNA SPEC QL NAA+PROBE: NOT DETECTED
HPIV2 RNA SPEC QL NAA+PROBE: NOT DETECTED
HPIV3 RNA NPH QL NAA+PROBE: NOT DETECTED
HPIV4 P GENE NPH QL NAA+PROBE: NOT DETECTED
HYALINE CASTS UR QL AUTO: ABNORMAL /LPF
HYALINE CASTS UR QL AUTO: ABNORMAL /LPF
HYPOCHROMIA BLD QL: ABNORMAL
INHALED O2 CONCENTRATION: 100 %
INHALED O2 CONCENTRATION: 90 %
INR PPP: 1.09 (ref 0.9–1.1)
INR PPP: 1.13 (ref 0.9–1.1)
INR PPP: 1.49 (ref 0.93–1.1)
INR PPP: 1.51 (ref 0.93–1.1)
IRON 24H UR-MRATE: 15 MCG/DL (ref 59–158)
IRON 24H UR-MRATE: 18 MCG/DL (ref 59–158)
IRON SATN MFR SERPL: 2 % (ref 20–50)
IRON SATN MFR SERPL: 3 % (ref 20–50)
KETONES UR QL STRIP: ABNORMAL
KETONES UR QL STRIP: NEGATIVE
LAB AP CASE REPORT: NORMAL
LACTATE HOLD SPECIMEN: NORMAL
LARGE PLATELETS: ABNORMAL
LDLC SERPL CALC-MCNC: 54 MG/DL (ref 0–100)
LDLC SERPL CALC-MCNC: 9 MG/DL (ref 0–100)
LDLC/HDLC SERPL: 0.62 {RATIO}
LDLC/HDLC SERPL: 2.37 {RATIO}
LEUKOCYTE ESTERASE UR QL STRIP.AUTO: ABNORMAL
LEUKOCYTE ESTERASE UR QL STRIP.AUTO: NEGATIVE
LIPASE SERPL-CCNC: 9 U/L (ref 13–60)
LYMPHOCYTES # BLD AUTO: 0.9 10*3/MM3 (ref 0.7–3.1)
LYMPHOCYTES # BLD AUTO: 1.7 10*3/MM3 (ref 0.7–3.1)
LYMPHOCYTES # BLD AUTO: 1.8 10*3/MM3 (ref 0.7–3.1)
LYMPHOCYTES # BLD AUTO: 2 10*3/MM3 (ref 0.7–3.1)
LYMPHOCYTES # BLD AUTO: 2.3 10*3/MM3 (ref 0.7–3.1)
LYMPHOCYTES # BLD AUTO: 2.9 10*3/MM3 (ref 0.7–3.1)
LYMPHOCYTES # BLD AUTO: 3 10*3/MM3 (ref 0.7–3.1)
LYMPHOCYTES # BLD MANUAL: 0.44 10*3/MM3 (ref 0.7–3.1)
LYMPHOCYTES # BLD MANUAL: 0.5 10*3/MM3 (ref 0.7–3.1)
LYMPHOCYTES # BLD MANUAL: 0.63 10*3/MM3 (ref 0.7–3.1)
LYMPHOCYTES # BLD MANUAL: 0.99 10*3/MM3 (ref 0.7–3.1)
LYMPHOCYTES # BLD MANUAL: 1.24 10*3/MM3 (ref 0.7–3.1)
LYMPHOCYTES # BLD MANUAL: 2.47 10*3/MM3 (ref 0.7–3.1)
LYMPHOCYTES NFR BLD AUTO: 15.8 % (ref 19.6–45.3)
LYMPHOCYTES NFR BLD AUTO: 18.7 % (ref 19.6–45.3)
LYMPHOCYTES NFR BLD AUTO: 19 % (ref 19.6–45.3)
LYMPHOCYTES NFR BLD AUTO: 19.9 % (ref 19.6–45.3)
LYMPHOCYTES NFR BLD AUTO: 21 % (ref 19.6–45.3)
LYMPHOCYTES NFR BLD AUTO: 22.3 % (ref 19.6–45.3)
LYMPHOCYTES NFR BLD AUTO: 26.4 % (ref 19.6–45.3)
LYMPHOCYTES NFR BLD AUTO: 27.5 % (ref 19.6–45.3)
LYMPHOCYTES NFR BLD AUTO: 6.9 % (ref 19.6–45.3)
LYMPHOCYTES NFR BLD MANUAL: 11 % (ref 19.6–45.3)
LYMPHOCYTES NFR BLD MANUAL: 15 % (ref 19.6–45.3)
LYMPHOCYTES NFR BLD MANUAL: 17 % (ref 19.6–45.3)
LYMPHOCYTES NFR BLD MANUAL: 19 % (ref 19.6–45.3)
LYMPHOCYTES NFR BLD MANUAL: 24 % (ref 19.6–45.3)
LYMPHOCYTES NFR BLD MANUAL: 29 % (ref 19.6–45.3)
LYMPHOCYTES NFR BLD MANUAL: 6 % (ref 5–12)
LYMPHOCYTES NFR BLD MANUAL: 7 % (ref 5–12)
LYMPHOCYTES NFR BLD MANUAL: 8 % (ref 5–12)
LYMPHOCYTES NFR BLD MANUAL: 9 % (ref 5–12)
M PNEUMO IGG SER IA-ACNC: NOT DETECTED
MAGNESIUM SERPL-MCNC: 1.8 MG/DL (ref 1.6–2.6)
MAGNESIUM SERPL-MCNC: 1.8 MG/DL (ref 1.6–2.6)
MAGNESIUM SERPL-MCNC: 1.9 MG/DL (ref 1.6–2.6)
MAGNESIUM SERPL-MCNC: 1.9 MG/DL (ref 1.6–2.6)
MAGNESIUM SERPL-MCNC: 2 MG/DL (ref 1.6–2.6)
MAGNESIUM SERPL-MCNC: 2.1 MG/DL (ref 1.6–2.6)
MAGNESIUM SERPL-MCNC: 2.7 MG/DL (ref 1.6–2.6)
MAGNESIUM SERPL-MCNC: 3 MG/DL (ref 1.6–2.6)
MAGNESIUM SERPL-MCNC: 3.1 MG/DL (ref 1.6–2.6)
MAGNESIUM SERPL-MCNC: 3.4 MG/DL (ref 1.6–2.6)
MAGNESIUM SERPL-MCNC: 3.6 MG/DL (ref 1.6–2.6)
MAGNESIUM SERPL-MCNC: 3.9 MG/DL (ref 1.6–2.6)
MAGNESIUM SERPL-MCNC: 4.4 MG/DL (ref 1.6–2.6)
MCH RBC QN AUTO: 17.3 PG (ref 26.6–33)
MCH RBC QN AUTO: 17.9 PG (ref 26.6–33)
MCH RBC QN AUTO: 18.5 PG (ref 26.6–33)
MCH RBC QN AUTO: 18.7 PG (ref 26.6–33)
MCH RBC QN AUTO: 20.2 PG (ref 26.6–33)
MCH RBC QN AUTO: 23.6 PG (ref 26.6–33)
MCH RBC QN AUTO: 23.7 PG (ref 26.6–33)
MCH RBC QN AUTO: 23.8 PG (ref 26.6–33)
MCH RBC QN AUTO: 23.8 PG (ref 26.6–33)
MCH RBC QN AUTO: 24 PG (ref 26.6–33)
MCH RBC QN AUTO: 24 PG (ref 26.6–33)
MCH RBC QN AUTO: 24.1 PG (ref 26.6–33)
MCH RBC QN AUTO: 24.2 PG (ref 26.6–33)
MCH RBC QN AUTO: 24.3 PG (ref 26.6–33)
MCH RBC QN AUTO: 24.3 PG (ref 26.6–33)
MCHC RBC AUTO-ENTMCNC: 28.3 G/DL (ref 31.5–35.7)
MCHC RBC AUTO-ENTMCNC: 29 G/DL (ref 31.5–35.7)
MCHC RBC AUTO-ENTMCNC: 29 G/DL (ref 31.5–35.7)
MCHC RBC AUTO-ENTMCNC: 29.5 G/DL (ref 31.5–35.7)
MCHC RBC AUTO-ENTMCNC: 29.8 G/DL (ref 31.5–35.7)
MCHC RBC AUTO-ENTMCNC: 29.9 G/DL (ref 31.5–35.7)
MCHC RBC AUTO-ENTMCNC: 30 G/DL (ref 31.5–35.7)
MCHC RBC AUTO-ENTMCNC: 30.2 G/DL (ref 31.5–35.7)
MCHC RBC AUTO-ENTMCNC: 30.3 G/DL (ref 31.5–35.7)
MCHC RBC AUTO-ENTMCNC: 30.7 G/DL (ref 31.5–35.7)
MCHC RBC AUTO-ENTMCNC: 30.7 G/DL (ref 31.5–35.7)
MCHC RBC AUTO-ENTMCNC: 31 G/DL (ref 31.5–35.7)
MCHC RBC AUTO-ENTMCNC: 31.1 G/DL (ref 31.5–35.7)
MCHC RBC AUTO-ENTMCNC: 31.1 G/DL (ref 31.5–35.7)
MCHC RBC AUTO-ENTMCNC: 31.3 G/DL (ref 31.5–35.7)
MCHC RBC AUTO-ENTMCNC: 31.4 G/DL (ref 31.5–35.7)
MCHC RBC AUTO-ENTMCNC: 31.5 G/DL (ref 31.5–35.7)
MCV RBC AUTO: 60.9 FL (ref 79–97)
MCV RBC AUTO: 61 FL (ref 79–97)
MCV RBC AUTO: 61.1 FL (ref 79–97)
MCV RBC AUTO: 61.7 FL (ref 79–97)
MCV RBC AUTO: 65.2 FL (ref 79–97)
MCV RBC AUTO: 75.9 FL (ref 79–97)
MCV RBC AUTO: 76.5 FL (ref 79–97)
MCV RBC AUTO: 76.9 FL (ref 79–97)
MCV RBC AUTO: 77.2 FL (ref 79–97)
MCV RBC AUTO: 77.6 FL (ref 79–97)
MCV RBC AUTO: 78.1 FL (ref 79–97)
MCV RBC AUTO: 78.6 FL (ref 79–97)
MCV RBC AUTO: 79 FL (ref 79–97)
MCV RBC AUTO: 79.2 FL (ref 79–97)
MCV RBC AUTO: 80.3 FL (ref 79–97)
MCV RBC AUTO: 81.2 FL (ref 79–97)
MCV RBC AUTO: 82 FL (ref 79–97)
METAMYELOCYTES NFR BLD MANUAL: 10 % (ref 0–0)
METAMYELOCYTES NFR BLD MANUAL: 19 % (ref 0–0)
METAMYELOCYTES NFR BLD MANUAL: 25 % (ref 0–0)
METAMYELOCYTES NFR BLD MANUAL: 8 % (ref 0–0)
METAMYELOCYTES NFR BLD MANUAL: 8 % (ref 0–0)
METHADONE UR QL SCN: NEGATIVE
METHADONE UR QL SCN: NEGATIVE
MICROCYTES BLD QL: ABNORMAL
MICROCYTES BLD QL: NORMAL
MODALITY: ABNORMAL
MONOCYTES # BLD AUTO: 0.17 10*3/MM3 (ref 0.1–0.9)
MONOCYTES # BLD AUTO: 0.21 10*3/MM3 (ref 0.1–0.9)
MONOCYTES # BLD AUTO: 0.25 10*3/MM3 (ref 0.1–0.9)
MONOCYTES # BLD AUTO: 0.31 10*3/MM3 (ref 0.1–0.9)
MONOCYTES # BLD AUTO: 0.5 10*3/MM3 (ref 0.1–0.9)
MONOCYTES # BLD AUTO: 0.5 10*3/MM3 (ref 0.1–0.9)
MONOCYTES # BLD AUTO: 0.9 10*3/MM3 (ref 0.1–0.9)
MONOCYTES # BLD AUTO: 0.91 10*3/MM3 (ref 0.1–0.9)
MONOCYTES # BLD AUTO: 1.1 10*3/MM3 (ref 0.1–0.9)
MONOCYTES # BLD AUTO: 1.4 10*3/MM3 (ref 0.1–0.9)
MONOCYTES # BLD AUTO: 1.5 10*3/MM3 (ref 0.1–0.9)
MONOCYTES # BLD AUTO: 1.6 10*3/MM3 (ref 0.1–0.9)
MONOCYTES # BLD AUTO: 1.6 10*3/MM3 (ref 0.1–0.9)
MONOCYTES # BLD AUTO: 1.7 10*3/MM3 (ref 0.1–0.9)
MONOCYTES # BLD AUTO: 2 10*3/MM3 (ref 0.1–0.9)
MONOCYTES NFR BLD AUTO: 10 % (ref 5–12)
MONOCYTES NFR BLD AUTO: 11.5 % (ref 5–12)
MONOCYTES NFR BLD AUTO: 14 % (ref 5–12)
MONOCYTES NFR BLD AUTO: 14.8 % (ref 5–12)
MONOCYTES NFR BLD AUTO: 15.6 % (ref 5–12)
MONOCYTES NFR BLD AUTO: 16.4 % (ref 5–12)
MONOCYTES NFR BLD AUTO: 16.8 % (ref 5–12)
MONOCYTES NFR BLD AUTO: 3.7 % (ref 5–12)
MONOCYTES NFR BLD AUTO: 5.2 % (ref 5–12)
MRSA DNA SPEC QL NAA+PROBE: NORMAL
MYELOCYTES NFR BLD MANUAL: 1 % (ref 0–0)
MYELOCYTES NFR BLD MANUAL: 1 % (ref 0–0)
MYELOCYTES NFR BLD MANUAL: 2 % (ref 0–0)
MYELOCYTES NFR BLD MANUAL: 6 % (ref 0–0)
MYELOCYTES NFR BLD MANUAL: 9 % (ref 0–0)
NEUTROPHILS # BLD AUTO: 0.75 10*3/MM3 (ref 1.7–7)
NEUTROPHILS # BLD AUTO: 1.16 10*3/MM3 (ref 1.7–7)
NEUTROPHILS # BLD AUTO: 1.46 10*3/MM3 (ref 1.7–7)
NEUTROPHILS # BLD AUTO: 1.93 10*3/MM3 (ref 1.7–7)
NEUTROPHILS # BLD AUTO: 6.4 10*3/MM3 (ref 1.7–7)
NEUTROPHILS # BLD AUTO: 7.6 10*3/MM3 (ref 1.7–7)
NEUTROPHILS # BLD AUTO: 8.06 10*3/MM3 (ref 1.7–7)
NEUTROPHILS # BLD AUTO: 8.81 10*3/MM3 (ref 1.7–7)
NEUTROPHILS NFR BLD AUTO: 11.3 10*3/MM3 (ref 1.7–7)
NEUTROPHILS NFR BLD AUTO: 4.6 10*3/MM3 (ref 1.7–7)
NEUTROPHILS NFR BLD AUTO: 5.8 10*3/MM3 (ref 1.7–7)
NEUTROPHILS NFR BLD AUTO: 54 % (ref 42.7–76)
NEUTROPHILS NFR BLD AUTO: 59.7 % (ref 42.7–76)
NEUTROPHILS NFR BLD AUTO: 6.1 10*3/MM3 (ref 1.7–7)
NEUTROPHILS NFR BLD AUTO: 6.8 10*3/MM3 (ref 1.7–7)
NEUTROPHILS NFR BLD AUTO: 60.4 % (ref 42.7–76)
NEUTROPHILS NFR BLD AUTO: 60.6 % (ref 42.7–76)
NEUTROPHILS NFR BLD AUTO: 64.9 % (ref 42.7–76)
NEUTROPHILS NFR BLD AUTO: 66.7 % (ref 42.7–76)
NEUTROPHILS NFR BLD AUTO: 68.4 % (ref 42.7–76)
NEUTROPHILS NFR BLD AUTO: 75.5 % (ref 42.7–76)
NEUTROPHILS NFR BLD AUTO: 8.3 10*3/MM3 (ref 1.7–7)
NEUTROPHILS NFR BLD AUTO: 87.2 % (ref 42.7–76)
NEUTROPHILS NFR BLD AUTO: 9.4 10*3/MM3 (ref 1.7–7)
NEUTROPHILS NFR BLD MANUAL: 12 % (ref 42.7–76)
NEUTROPHILS NFR BLD MANUAL: 13 % (ref 42.7–76)
NEUTROPHILS NFR BLD MANUAL: 16 % (ref 42.7–76)
NEUTROPHILS NFR BLD MANUAL: 25 % (ref 42.7–76)
NEUTROPHILS NFR BLD MANUAL: 5 % (ref 42.7–76)
NEUTROPHILS NFR BLD MANUAL: 70 % (ref 42.7–76)
NEUTS BAND NFR BLD MANUAL: 18 % (ref 0–5)
NEUTS BAND NFR BLD MANUAL: 24 % (ref 0–5)
NEUTS BAND NFR BLD MANUAL: 30 % (ref 0–5)
NEUTS BAND NFR BLD MANUAL: 43 % (ref 0–5)
NEUTS BAND NFR BLD MANUAL: 49 % (ref 0–5)
NEUTS BAND NFR BLD MANUAL: 8 % (ref 0–5)
NITRITE UR QL STRIP: NEGATIVE
NITRITE UR QL STRIP: POSITIVE
NOROVIRUS GI+II RNA STL QL NAA+NON-PROBE: NOT DETECTED
NRBC BLD AUTO-RTO: 0.5 /100 WBC (ref 0–0.2)
NRBC BLD AUTO-RTO: 0.8 /100 WBC (ref 0–0.2)
NRBC BLD AUTO-RTO: 1 /100 WBC (ref 0–0.2)
NRBC BLD AUTO-RTO: 1.2 /100 WBC (ref 0–0.2)
NRBC BLD AUTO-RTO: 1.2 /100 WBC (ref 0–0.2)
NRBC BLD AUTO-RTO: 1.6 /100 WBC (ref 0–0.2)
NRBC SPEC MANUAL: 1 /100 WBC (ref 0–0.2)
NRBC SPEC MANUAL: 1 /100 WBC (ref 0–0.2)
NRBC SPEC MANUAL: 2 /100 WBC (ref 0–0.2)
NT-PROBNP SERPL-MCNC: 2548 PG/ML (ref 0–450)
NT-PROBNP SERPL-MCNC: 4378 PG/ML (ref 0–450)
NT-PROBNP SERPL-MCNC: 5399 PG/ML (ref 0–450)
NT-PROBNP SERPL-MCNC: ABNORMAL PG/ML (ref 5–450)
OPIATES UR QL: NEGATIVE
OPIATES UR QL: NEGATIVE
OSMOLALITY SERPL: 298 MOSM/KG (ref 280–300)
OSMOLALITY UR: 481 MOSM/KG (ref 300–800)
OTHER CELLS %: 3 % (ref 0–0)
OVALOCYTES BLD QL SMEAR: ABNORMAL
OXYCODONE UR QL SCN: NEGATIVE
OXYCODONE UR QL SCN: NEGATIVE
P SHIGELLOIDES DNA STL QL NAA+PROBE: NOT DETECTED
PATH REPORT.FINAL DX SPEC: NORMAL
PATHOLOGY REVIEW: YES
PCO2 BLDA: 40.1 MM HG (ref 35–48)
PCO2 BLDA: 48.8 MM HG (ref 35–48)
PCO2 BLDA: 50.9 MM HG (ref 35–48)
PCO2 BLDA: 53.7 MM HG (ref 35–48)
PCO2 BLDA: 54.5 MM HG (ref 35–48)
PCO2 BLDA: 55.2 MM HG (ref 35–48)
PCO2 BLDA: 59.5 MM HG (ref 35–48)
PCO2 BLDA: 61.9 MM HG (ref 35–48)
PCO2 BLDA: 72 MM HG (ref 35–48)
PCO2 BLDA: 84.2 MM HG (ref 35–48)
PCO2 BLDA: 88 MM HG (ref 35–48)
PCO2 TEMP ADJ BLD: 44.7 MM HG (ref 35–48)
PCO2 TEMP ADJ BLD: 69.2 MM HG (ref 35–48)
PEEP RESPIRATORY: 10 CM[H2O]
PEEP RESPIRATORY: 14 CM[H2O]
PEEP RESPIRATORY: 5 CM[H2O]
PEEP RESPIRATORY: 5 CM[H2O]
PH BLDA: 6.95 PH UNITS (ref 7.35–7.45)
PH BLDA: 6.99 PH UNITS (ref 7.35–7.45)
PH BLDA: 7.07 PH UNITS (ref 7.35–7.45)
PH BLDA: 7.08 PH UNITS (ref 7.35–7.45)
PH BLDA: 7.1 PH UNITS (ref 7.35–7.45)
PH BLDA: 7.11 PH UNITS (ref 7.35–7.45)
PH BLDA: 7.11 PH UNITS (ref 7.35–7.45)
PH BLDA: 7.13 PH UNITS (ref 7.35–7.45)
PH BLDA: 7.15 PH UNITS (ref 7.35–7.45)
PH BLDA: 7.16 PH UNITS (ref 7.35–7.45)
PH BLDA: 7.18 PH UNITS (ref 7.35–7.45)
PH UR STRIP.AUTO: 5.5 [PH] (ref 5–8)
PH UR STRIP.AUTO: 5.5 [PH] (ref 5–8)
PH, TEMP CORRECTED: 7.04 PH UNITS (ref 7.35–7.45)
PH, TEMP CORRECTED: 7.21 PH UNITS (ref 7.35–7.45)
PHOSPHATE SERPL-MCNC: 10.4 MG/DL (ref 2.5–4.5)
PHOSPHATE SERPL-MCNC: 10.6 MG/DL (ref 2.5–4.5)
PHOSPHATE SERPL-MCNC: 11.4 MG/DL (ref 2.5–4.5)
PHOSPHATE SERPL-MCNC: 7.8 MG/DL (ref 2.5–4.5)
PHOSPHATE SERPL-MCNC: 8.1 MG/DL (ref 2.5–4.5)
PHOSPHATE SERPL-MCNC: 8.3 MG/DL (ref 2.5–4.5)
PLAT MORPH BLD: NORMAL
PLATELET # BLD AUTO: 138 10*3/MM3 (ref 140–450)
PLATELET # BLD AUTO: 214 10*3/MM3 (ref 140–450)
PLATELET # BLD AUTO: 242 10*3/MM3 (ref 140–450)
PLATELET # BLD AUTO: 255 10*3/MM3 (ref 140–450)
PLATELET # BLD AUTO: 273 10*3/MM3 (ref 140–450)
PLATELET # BLD AUTO: 282 10*3/MM3 (ref 140–450)
PLATELET # BLD AUTO: 302 10*3/MM3 (ref 140–450)
PLATELET # BLD AUTO: 309 10*3/MM3 (ref 140–450)
PLATELET # BLD AUTO: 316 10*3/MM3 (ref 140–450)
PLATELET # BLD AUTO: 334 10*3/MM3 (ref 140–450)
PLATELET # BLD AUTO: 375 10*3/MM3 (ref 140–450)
PLATELET # BLD AUTO: 547 10*3/MM3 (ref 140–450)
PLATELET # BLD AUTO: 611 10*3/MM3 (ref 140–450)
PLATELET # BLD AUTO: 620 10*3/MM3 (ref 140–450)
PLATELET # BLD AUTO: 651 10*3/MM3 (ref 140–450)
PLATELET # BLD AUTO: 673 10*3/MM3 (ref 140–450)
PLATELET # BLD AUTO: 97 10*3/MM3 (ref 140–450)
PMV BLD AUTO: 6.6 FL (ref 6–12)
PMV BLD AUTO: 6.8 FL (ref 6–12)
PMV BLD AUTO: 6.9 FL (ref 6–12)
PMV BLD AUTO: 7 FL (ref 6–12)
PMV BLD AUTO: 7.3 FL (ref 6–12)
PMV BLD AUTO: 7.8 FL (ref 6–12)
PMV BLD AUTO: 7.9 FL (ref 6–12)
PMV BLD AUTO: 8 FL (ref 6–12)
PMV BLD AUTO: 8.1 FL (ref 6–12)
PMV BLD AUTO: 8.1 FL (ref 6–12)
PMV BLD AUTO: 8.2 FL (ref 6–12)
PMV BLD AUTO: 8.6 FL (ref 6–12)
PMV BLD AUTO: 9 FL (ref 6–12)
PO2 BLDA: 156.5 MM HG (ref 83–108)
PO2 BLDA: 52.5 MM HG (ref 83–108)
PO2 BLDA: 54.3 MM HG (ref 83–108)
PO2 BLDA: 58.7 MM HG (ref 83–108)
PO2 BLDA: 59.7 MM HG (ref 83–108)
PO2 BLDA: 60.1 MM HG (ref 83–108)
PO2 BLDA: 64 MM HG (ref 83–108)
PO2 BLDA: 65.8 MM HG (ref 83–108)
PO2 BLDA: 67.7 MM HG (ref 83–108)
PO2 BLDA: 69.9 MM HG (ref 83–108)
PO2 BLDA: 84.1 MM HG (ref 83–108)
PO2 TEMP ADJ BLD: 43 MM HG (ref 83–108)
PO2 TEMP ADJ BLD: 49.8 MM HG (ref 83–108)
POIKILOCYTOSIS BLD QL SMEAR: ABNORMAL
POIKILOCYTOSIS BLD QL SMEAR: ABNORMAL
POIKILOCYTOSIS BLD QL SMEAR: NORMAL
POLYCHROMASIA BLD QL SMEAR: ABNORMAL
POLYCHROMASIA BLD QL SMEAR: ABNORMAL
POLYCHROMASIA BLD QL SMEAR: NORMAL
POTASSIUM BLD-SCNC: 4 MMOL/L (ref 3.5–5.2)
POTASSIUM BLD-SCNC: 4.2 MMOL/L (ref 3.5–5.2)
POTASSIUM BLD-SCNC: 4.4 MMOL/L (ref 3.5–5.2)
POTASSIUM BLD-SCNC: 4.4 MMOL/L (ref 3.5–5.2)
POTASSIUM BLD-SCNC: 4.7 MMOL/L (ref 3.5–5.2)
POTASSIUM BLDA-SCNC: 5.2 MMOL/L (ref 3.5–4.5)
POTASSIUM BLDA-SCNC: 6 MMOL/L (ref 3.5–4.5)
POTASSIUM SERPL-SCNC: 4.1 MMOL/L (ref 3.5–5.2)
POTASSIUM SERPL-SCNC: 4.2 MMOL/L (ref 3.5–5.2)
POTASSIUM SERPL-SCNC: 4.4 MMOL/L (ref 3.5–5.2)
POTASSIUM SERPL-SCNC: 4.5 MMOL/L (ref 3.5–5.2)
POTASSIUM SERPL-SCNC: 4.5 MMOL/L (ref 3.5–5.2)
POTASSIUM SERPL-SCNC: 4.7 MMOL/L (ref 3.5–5.2)
POTASSIUM SERPL-SCNC: 4.8 MMOL/L (ref 3.5–5.2)
POTASSIUM SERPL-SCNC: 5.1 MMOL/L (ref 3.5–5.2)
POTASSIUM SERPL-SCNC: 5.1 MMOL/L (ref 3.5–5.2)
POTASSIUM SERPL-SCNC: 5.2 MMOL/L (ref 3.5–5.2)
POTASSIUM SERPL-SCNC: 5.3 MMOL/L (ref 3.5–5.2)
POTASSIUM SERPL-SCNC: 5.5 MMOL/L (ref 3.5–5.2)
POTASSIUM SERPL-SCNC: 5.6 MMOL/L (ref 3.5–5.2)
POTASSIUM SERPL-SCNC: 6.4 MMOL/L (ref 3.5–5.2)
POTASSIUM SERPL-SCNC: 6.5 MMOL/L (ref 3.5–5.2)
PROCALCITONIN SERPL-MCNC: 0.14 NG/ML (ref 0–0.25)
PROCALCITONIN SERPL-MCNC: 44.06 NG/ML (ref 0–0.25)
PROMYELOCYTES NFR BLD MANUAL: 3 % (ref 0–0)
PROT SERPL-MCNC: 3.3 G/DL (ref 6–8.5)
PROT SERPL-MCNC: 4.6 G/DL (ref 6–8.5)
PROT SERPL-MCNC: 4.8 G/DL (ref 6–8.5)
PROT SERPL-MCNC: 4.8 G/DL (ref 6–8.5)
PROT SERPL-MCNC: 5.1 G/DL (ref 6–8.5)
PROT SERPL-MCNC: 5.5 G/DL (ref 6–8.5)
PROT SERPL-MCNC: 5.8 G/DL (ref 6–8.5)
PROT SERPL-MCNC: 5.8 G/DL (ref 6–8.5)
PROT SERPL-MCNC: 5.9 G/DL (ref 6–8.5)
PROT SERPL-MCNC: 6.1 G/DL (ref 6–8.5)
PROT SERPL-MCNC: 6.5 G/DL (ref 6–8.5)
PROT SERPL-MCNC: 6.8 G/DL (ref 6–8.5)
PROT SERPL-MCNC: 8 G/DL (ref 6–8.5)
PROT UR QL STRIP: ABNORMAL
PROT UR QL STRIP: ABNORMAL
PROTHROMBIN TIME: 11.3 SECONDS (ref 9.6–11.7)
PROTHROMBIN TIME: 11.6 SECONDS (ref 9.6–11.7)
PROTHROMBIN TIME: 16.1 SECONDS (ref 9.6–11.7)
PROTHROMBIN TIME: 16.3 SECONDS (ref 9.6–11.7)
QT INTERVAL: 367 MS
QT INTERVAL: 372 MS
QT INTERVAL: 395 MS
QT INTERVAL: 421 MS
RBC # BLD AUTO: 3.73 10*6/MM3 (ref 4.14–5.8)
RBC # BLD AUTO: 3.96 10*6/MM3 (ref 4.14–5.8)
RBC # BLD AUTO: 4.19 10*6/MM3 (ref 4.14–5.8)
RBC # BLD AUTO: 4.21 10*6/MM3 (ref 4.14–5.8)
RBC # BLD AUTO: 4.29 10*6/MM3 (ref 4.14–5.8)
RBC # BLD AUTO: 4.33 10*6/MM3 (ref 4.14–5.8)
RBC # BLD AUTO: 4.34 10*6/MM3 (ref 4.14–5.8)
RBC # BLD AUTO: 4.52 10*6/MM3 (ref 4.14–5.8)
RBC # BLD AUTO: 4.54 10*6/MM3 (ref 4.14–5.8)
RBC # BLD AUTO: 4.54 10*6/MM3 (ref 4.14–5.8)
RBC # BLD AUTO: 4.82 10*6/MM3 (ref 4.14–5.8)
RBC # BLD AUTO: 5 10*6/MM3 (ref 4.14–5.8)
RBC # BLD AUTO: 5.02 10*6/MM3 (ref 4.14–5.8)
RBC # BLD AUTO: 5.04 10*6/MM3 (ref 4.14–5.8)
RBC # BLD AUTO: 5.1 10*6/MM3 (ref 4.14–5.8)
RBC # BLD AUTO: 5.11 10*6/MM3 (ref 4.14–5.8)
RBC # BLD AUTO: 5.52 10*6/MM3 (ref 4.14–5.8)
RBC # UR: ABNORMAL /HPF
RBC # UR: ABNORMAL /HPF
REF LAB TEST METHOD: ABNORMAL
REF LAB TEST METHOD: ABNORMAL
REF LAB TEST METHOD: NORMAL
RESPIRATORY RATE: 20
RESPIRATORY RATE: 28
RESPIRATORY RATE: 30
RESPIRATORY RATE: 32
RESPIRATORY RATE: 550
RH BLD: POSITIVE
RHINOVIRUS RNA SPEC NAA+PROBE: NOT DETECTED
RSV RNA NPH QL NAA+NON-PROBE: NOT DETECTED
RV RNA STL NAA+PROBE: NOT DETECTED
SALMONELLA DNA SPEC QL NAA+PROBE: NOT DETECTED
SAO2 % BLDCOA: 76.9 % (ref 94–98)
SAO2 % BLDCOA: 78.4 % (ref 94–98)
SAO2 % BLDCOA: 78.6 % (ref 94–98)
SAO2 % BLDCOA: 79.7 % (ref 94–98)
SAO2 % BLDCOA: 80.1 % (ref 94–98)
SAO2 % BLDCOA: 80.3 % (ref 94–98)
SAO2 % BLDCOA: 82.7 % (ref 94–98)
SAO2 % BLDCOA: 84.9 % (ref 94–98)
SAO2 % BLDCOA: 85.3 % (ref 94–98)
SAO2 % BLDCOA: 91.1 % (ref 94–98)
SAO2 % BLDCOA: 97.5 % (ref 94–98)
SAPO I+II+IV+V RNA STL QL NAA+NON-PROBE: NOT DETECTED
SARS-COV-2 RNA PNL SPEC NAA+PROBE: NOT DETECTED
SARS-COV-2 RNA RESP QL NAA+PROBE: NOT DETECTED
SCAN SLIDE: NORMAL
SHIGELLA SP+EIEC IPAH STL QL NAA+PROBE: NOT DETECTED
SMALL PLATELETS BLD QL SMEAR: ABNORMAL
SODIUM BLD-SCNC: 136 MMOL/L (ref 136–145)
SODIUM BLD-SCNC: 136 MMOL/L (ref 136–145)
SODIUM BLD-SCNC: 137 MMOL/L (ref 136–145)
SODIUM BLD-SCNC: 137 MMOL/L (ref 136–145)
SODIUM BLD-SCNC: 138 MMOL/L (ref 136–145)
SODIUM BLD-SCNC: 138 MMOL/L (ref 138–146)
SODIUM BLD-SCNC: 155 MMOL/L (ref 138–146)
SODIUM SERPL-SCNC: 128 MMOL/L (ref 136–145)
SODIUM SERPL-SCNC: 133 MMOL/L (ref 136–145)
SODIUM SERPL-SCNC: 133 MMOL/L (ref 136–145)
SODIUM SERPL-SCNC: 134 MMOL/L (ref 136–145)
SODIUM SERPL-SCNC: 135 MMOL/L (ref 136–145)
SODIUM SERPL-SCNC: 138 MMOL/L (ref 136–145)
SODIUM SERPL-SCNC: 138 MMOL/L (ref 136–145)
SODIUM SERPL-SCNC: 147 MMOL/L (ref 136–145)
SP GR UR STRIP: 1.02 (ref 1–1.03)
SP GR UR STRIP: 1.03 (ref 1–1.03)
SQUAMOUS #/AREA URNS HPF: ABNORMAL /HPF
SQUAMOUS #/AREA URNS HPF: ABNORMAL /HPF
T&S EXPIRATION DATE: NORMAL
TIBC SERPL-MCNC: 527 MCG/DL (ref 298–536)
TIBC SERPL-MCNC: 639 MCG/DL (ref 298–536)
TRANSFERRIN SERPL-MCNC: 354 MG/DL (ref 200–360)
TRANSFERRIN SERPL-MCNC: 429 MG/DL (ref 200–360)
TRIGL SERPL-MCNC: 18 MG/DL (ref 0–150)
TRIGL SERPL-MCNC: 53 MG/DL (ref 0–150)
TROPONIN T SERPL-MCNC: 0.01 NG/ML (ref 0–0.03)
TROPONIN T SERPL-MCNC: 0.01 NG/ML (ref 0–0.03)
TROPONIN T SERPL-MCNC: 0.02 NG/ML (ref 0–0.03)
TROPONIN T SERPL-MCNC: 0.02 NG/ML (ref 0–0.03)
TROPONIN T SERPL-MCNC: 0.35 NG/ML (ref 0–0.03)
TROPONIN T SERPL-MCNC: 0.4 NG/ML (ref 0–0.03)
TROPONIN T SERPL-MCNC: 0.47 NG/ML (ref 0–0.03)
TROPONIN T SERPL-MCNC: 0.74 NG/ML (ref 0–0.03)
TROPONIN T SERPL-MCNC: 0.74 NG/ML (ref 0–0.03)
TROPONIN T SERPL-MCNC: 0.93 NG/ML (ref 0–0.03)
TROPONIN T SERPL-MCNC: <0.01 NG/ML (ref 0–0.03)
UNIT  ABO: NORMAL
UNIT  ABO: NORMAL
UNIT  RH: NORMAL
UNIT  RH: NORMAL
UROBILINOGEN UR QL STRIP: ABNORMAL
UROBILINOGEN UR QL STRIP: ABNORMAL
V CHOLERAE DNA SPEC QL NAA+PROBE: NOT DETECTED
VANCOMYCIN SERPL-MCNC: 19.5 MCG/ML (ref 5–40)
VANCOMYCIN SERPL-MCNC: 20.8 MCG/ML (ref 5–40)
VARIANT LYMPHS NFR BLD MANUAL: 1 % (ref 0–5)
VARIANT LYMPHS NFR BLD MANUAL: 2 % (ref 0–5)
VARIANT LYMPHS NFR BLD MANUAL: 3 % (ref 0–5)
VARIANT LYMPHS NFR BLD MANUAL: 5 % (ref 0–5)
VARIANT LYMPHS NFR BLD MANUAL: 9 % (ref 0–5)
VENTILATOR MODE: ABNORMAL
VIBRIO DNA SPEC NAA+PROBE: NOT DETECTED
VIT B12 BLD-MCNC: 706 PG/ML (ref 211–946)
VLDLC SERPL-MCNC: 10 MG/DL (ref 5–40)
VLDLC SERPL-MCNC: 10.6 MG/DL
VT ON VENT VENT: 32 ML
VT ON VENT VENT: 480 ML
VT ON VENT VENT: 480 ML
VT ON VENT VENT: 500 ML
VT ON VENT VENT: 500 ML
VT ON VENT VENT: 550 ML
WBC # BLD AUTO: 10.2 10*3/MM3 (ref 3.4–10.8)
WBC # BLD AUTO: 12.2 10*3/MM3 (ref 3.4–10.8)
WBC # BLD AUTO: 12.9 10*3/MM3 (ref 3.4–10.8)
WBC # BLD AUTO: 13 10*3/MM3 (ref 3.4–10.8)
WBC # BLD AUTO: 14.6 10*3/MM3 (ref 3.4–10.8)
WBC # BLD AUTO: 2.1 10*3/MM3 (ref 3.4–10.8)
WBC # BLD AUTO: 2.6 10*3/MM3 (ref 3.4–10.8)
WBC # BLD AUTO: 3.4 10*3/MM3 (ref 3.4–10.8)
WBC # BLD AUTO: 4.2 10*3/MM3 (ref 3.4–10.8)
WBC # BLD AUTO: 8.6 10*3/MM3 (ref 3.4–10.8)
WBC # BLD AUTO: 8.9 10*3/MM3 (ref 3.4–10.8)
WBC # BLD AUTO: 9.7 10*3/MM3 (ref 3.4–10.8)
WBC MORPH BLD: NORMAL
WBC NRBC COR # BLD: 10.8 10*3/MM3 (ref 3.4–10.8)
WBC NRBC COR # BLD: 11.3 10*3/MM3 (ref 3.4–10.8)
WBC NRBC COR # BLD: 11.4 10*3/MM3 (ref 3.4–10.8)
WBC NRBC COR # BLD: 11.4 10*3/MM3 (ref 3.4–10.8)
WBC NRBC COR # BLD: 9.1 10*3/MM3 (ref 3.4–10.8)
WBC UR QL AUTO: ABNORMAL /HPF
WBC UR QL AUTO: ABNORMAL /HPF
WHOLE BLOOD HOLD SPECIMEN: NORMAL
YERSINIA STL CULT: NOT DETECTED

## 2020-01-01 PROCEDURE — 93005 ELECTROCARDIOGRAM TRACING: CPT | Performed by: INTERNAL MEDICINE

## 2020-01-01 PROCEDURE — 25010000002 HYDROCORTISONE SODIUM SUCCINATE 100 MG RECONSTITUTED SOLUTION: Performed by: NURSE PRACTITIONER

## 2020-01-01 PROCEDURE — 25010000002 FUROSEMIDE PER 20 MG: Performed by: PHYSICIAN ASSISTANT

## 2020-01-01 PROCEDURE — 25010000002 CEFEPIME PER 500 MG: Performed by: NURSE PRACTITIONER

## 2020-01-01 PROCEDURE — G0378 HOSPITAL OBSERVATION PER HR: HCPCS

## 2020-01-01 PROCEDURE — 86709 HEPATITIS A IGM ANTIBODY: CPT | Performed by: PHYSICIAN ASSISTANT

## 2020-01-01 PROCEDURE — 85651 RBC SED RATE NONAUTOMATED: CPT | Performed by: PHYSICIAN ASSISTANT

## 2020-01-01 PROCEDURE — C1894 INTRO/SHEATH, NON-LASER: HCPCS | Performed by: INTERNAL MEDICINE

## 2020-01-01 PROCEDURE — 92950 HEART/LUNG RESUSCITATION CPR: CPT

## 2020-01-01 PROCEDURE — 81001 URINALYSIS AUTO W/SCOPE: CPT | Performed by: NURSE PRACTITIONER

## 2020-01-01 PROCEDURE — 94799 UNLISTED PULMONARY SVC/PX: CPT

## 2020-01-01 PROCEDURE — 80053 COMPREHEN METABOLIC PANEL: CPT | Performed by: PHYSICIAN ASSISTANT

## 2020-01-01 PROCEDURE — 76705 ECHO EXAM OF ABDOMEN: CPT

## 2020-01-01 PROCEDURE — 25010000002 DOBUTAMINE PER 250 MG: Performed by: NURSE PRACTITIONER

## 2020-01-01 PROCEDURE — 94002 VENT MGMT INPAT INIT DAY: CPT

## 2020-01-01 PROCEDURE — C1769 GUIDE WIRE: HCPCS | Performed by: INTERNAL MEDICINE

## 2020-01-01 PROCEDURE — 93306 TTE W/DOPPLER COMPLETE: CPT

## 2020-01-01 PROCEDURE — 85379 FIBRIN DEGRADATION QUANT: CPT | Performed by: EMERGENCY MEDICINE

## 2020-01-01 PROCEDURE — 84484 ASSAY OF TROPONIN QUANT: CPT | Performed by: INTERNAL MEDICINE

## 2020-01-01 PROCEDURE — 25010000002 MIDAZOLAM 50 MG/10ML SOLUTION: Performed by: NURSE PRACTITIONER

## 2020-01-01 PROCEDURE — 83540 ASSAY OF IRON: CPT | Performed by: PHYSICIAN ASSISTANT

## 2020-01-01 PROCEDURE — 82553 CREATINE MB FRACTION: CPT | Performed by: NURSE PRACTITIONER

## 2020-01-01 PROCEDURE — 99232 SBSQ HOSP IP/OBS MODERATE 35: CPT | Performed by: INTERNAL MEDICINE

## 2020-01-01 PROCEDURE — 25010000002 ENOXAPARIN PER 10 MG: Performed by: INTERNAL MEDICINE

## 2020-01-01 PROCEDURE — 84466 ASSAY OF TRANSFERRIN: CPT | Performed by: INTERNAL MEDICINE

## 2020-01-01 PROCEDURE — 80307 DRUG TEST PRSMV CHEM ANLYZR: CPT | Performed by: EMERGENCY MEDICINE

## 2020-01-01 PROCEDURE — 25010000003 MILRINONE LACTATE IN DEXTROSE 20-5 MG/100ML-% SOLUTION: Performed by: NURSE PRACTITIONER

## 2020-01-01 PROCEDURE — 84484 ASSAY OF TROPONIN QUANT: CPT | Performed by: NURSE PRACTITIONER

## 2020-01-01 PROCEDURE — 99233 SBSQ HOSP IP/OBS HIGH 50: CPT | Performed by: INTERNAL MEDICINE

## 2020-01-01 PROCEDURE — 85007 BL SMEAR W/DIFF WBC COUNT: CPT | Performed by: NURSE PRACTITIONER

## 2020-01-01 PROCEDURE — 83930 ASSAY OF BLOOD OSMOLALITY: CPT | Performed by: PHYSICIAN ASSISTANT

## 2020-01-01 PROCEDURE — 85730 THROMBOPLASTIN TIME PARTIAL: CPT | Performed by: NURSE PRACTITIONER

## 2020-01-01 PROCEDURE — 99222 1ST HOSP IP/OBS MODERATE 55: CPT | Performed by: INTERNAL MEDICINE

## 2020-01-01 PROCEDURE — 63710000001 DIPHENHYDRAMINE PER 50 MG: Performed by: INTERNAL MEDICINE

## 2020-01-01 PROCEDURE — 83690 ASSAY OF LIPASE: CPT | Performed by: NURSE PRACTITIONER

## 2020-01-01 PROCEDURE — 25010000002 ONDANSETRON PER 1 MG: Performed by: INTERNAL MEDICINE

## 2020-01-01 PROCEDURE — 99284 EMERGENCY DEPT VISIT MOD MDM: CPT

## 2020-01-01 PROCEDURE — 85025 COMPLETE CBC W/AUTO DIFF WBC: CPT | Performed by: PHYSICIAN ASSISTANT

## 2020-01-01 PROCEDURE — 83735 ASSAY OF MAGNESIUM: CPT | Performed by: NURSE PRACTITIONER

## 2020-01-01 PROCEDURE — 80202 ASSAY OF VANCOMYCIN: CPT | Performed by: NURSE PRACTITIONER

## 2020-01-01 PROCEDURE — 71275 CT ANGIOGRAPHY CHEST: CPT

## 2020-01-01 PROCEDURE — C9600 PERC DRUG-EL COR STENT SING: HCPCS | Performed by: INTERNAL MEDICINE

## 2020-01-01 PROCEDURE — 84466 ASSAY OF TRANSFERRIN: CPT | Performed by: PHYSICIAN ASSISTANT

## 2020-01-01 PROCEDURE — 04HK33Z INSERTION OF INFUSION DEVICE INTO RIGHT FEMORAL ARTERY, PERCUTANEOUS APPROACH: ICD-10-PCS | Performed by: INTERNAL MEDICINE

## 2020-01-01 PROCEDURE — 93458 L HRT ARTERY/VENTRICLE ANGIO: CPT | Performed by: INTERNAL MEDICINE

## 2020-01-01 PROCEDURE — 99153 MOD SED SAME PHYS/QHP EA: CPT | Performed by: INTERNAL MEDICINE

## 2020-01-01 PROCEDURE — 99221 1ST HOSP IP/OBS SF/LOW 40: CPT | Performed by: PSYCHIATRY & NEUROLOGY

## 2020-01-01 PROCEDURE — 87635 SARS-COV-2 COVID-19 AMP PRB: CPT | Performed by: EMERGENCY MEDICINE

## 2020-01-01 PROCEDURE — 99152 MOD SED SAME PHYS/QHP 5/>YRS: CPT | Performed by: INTERNAL MEDICINE

## 2020-01-01 PROCEDURE — 85730 THROMBOPLASTIN TIME PARTIAL: CPT | Performed by: INTERNAL MEDICINE

## 2020-01-01 PROCEDURE — P9047 ALBUMIN (HUMAN), 25%, 50ML: HCPCS | Performed by: NURSE PRACTITIONER

## 2020-01-01 PROCEDURE — 80053 COMPREHEN METABOLIC PANEL: CPT | Performed by: INTERNAL MEDICINE

## 2020-01-01 PROCEDURE — 94003 VENT MGMT INPAT SUBQ DAY: CPT

## 2020-01-01 PROCEDURE — 87206 SMEAR FLUORESCENT/ACID STAI: CPT | Performed by: INTERNAL MEDICINE

## 2020-01-01 PROCEDURE — 25010000002 LORAZEPAM PER 2 MG: Performed by: PHYSICIAN ASSISTANT

## 2020-01-01 PROCEDURE — 84484 ASSAY OF TROPONIN QUANT: CPT | Performed by: PHYSICIAN ASSISTANT

## 2020-01-01 PROCEDURE — 82803 BLOOD GASES ANY COMBINATION: CPT

## 2020-01-01 PROCEDURE — 25010000002 LORAZEPAM PER 2 MG: Performed by: INTERNAL MEDICINE

## 2020-01-01 PROCEDURE — 25010000002 MIDAZOLAM PER 1 MG: Performed by: INTERNAL MEDICINE

## 2020-01-01 PROCEDURE — 71045 X-RAY EXAM CHEST 1 VIEW: CPT

## 2020-01-01 PROCEDURE — 83605 ASSAY OF LACTIC ACID: CPT

## 2020-01-01 PROCEDURE — 93005 ELECTROCARDIOGRAM TRACING: CPT | Performed by: PHYSICIAN ASSISTANT

## 2020-01-01 PROCEDURE — 25010000002 EPINEPHRINE 1 MG/10ML SOLUTION PREFILLED SYRINGE: Performed by: NURSE PRACTITIONER

## 2020-01-01 PROCEDURE — 80048 BASIC METABOLIC PNL TOTAL CA: CPT | Performed by: INTERNAL MEDICINE

## 2020-01-01 PROCEDURE — 82746 ASSAY OF FOLIC ACID SERUM: CPT | Performed by: INTERNAL MEDICINE

## 2020-01-01 PROCEDURE — B2151ZZ FLUOROSCOPY OF LEFT HEART USING LOW OSMOLAR CONTRAST: ICD-10-PCS | Performed by: INTERNAL MEDICINE

## 2020-01-01 PROCEDURE — 85018 HEMOGLOBIN: CPT | Performed by: INTERNAL MEDICINE

## 2020-01-01 PROCEDURE — 25010000002 DOPAMINE PER 40 MG: Performed by: NURSE PRACTITIONER

## 2020-01-01 PROCEDURE — 25010000002 EPOPROSTENOL PER 0.5 MG: Performed by: INTERNAL MEDICINE

## 2020-01-01 PROCEDURE — 25010000002 HEPARIN (PORCINE) PER 1000 UNITS: Performed by: INTERNAL MEDICINE

## 2020-01-01 PROCEDURE — G0432 EIA HIV-1/HIV-2 SCREEN: HCPCS | Performed by: PHYSICIAN ASSISTANT

## 2020-01-01 PROCEDURE — 25010000002 ENOXAPARIN PER 10 MG: Performed by: PHYSICIAN ASSISTANT

## 2020-01-01 PROCEDURE — 82330 ASSAY OF CALCIUM: CPT | Performed by: NURSE PRACTITIONER

## 2020-01-01 PROCEDURE — P9040 RBC LEUKOREDUCED IRRADIATED: HCPCS

## 2020-01-01 PROCEDURE — 83605 ASSAY OF LACTIC ACID: CPT | Performed by: NURSE PRACTITIONER

## 2020-01-01 PROCEDURE — 87116 MYCOBACTERIA CULTURE: CPT | Performed by: INTERNAL MEDICINE

## 2020-01-01 PROCEDURE — 85025 COMPLETE CBC W/AUTO DIFF WBC: CPT | Performed by: INTERNAL MEDICINE

## 2020-01-01 PROCEDURE — 93005 ELECTROCARDIOGRAM TRACING: CPT | Performed by: EMERGENCY MEDICINE

## 2020-01-01 PROCEDURE — 80053 COMPREHEN METABOLIC PANEL: CPT | Performed by: NURSE PRACTITIONER

## 2020-01-01 PROCEDURE — 87641 MR-STAPH DNA AMP PROBE: CPT | Performed by: NURSE PRACTITIONER

## 2020-01-01 PROCEDURE — 83735 ASSAY OF MAGNESIUM: CPT | Performed by: PHYSICIAN ASSISTANT

## 2020-01-01 PROCEDURE — 87496 CYTOMEG DNA AMP PROBE: CPT | Performed by: INTERNAL MEDICINE

## 2020-01-01 PROCEDURE — 87040 BLOOD CULTURE FOR BACTERIA: CPT | Performed by: NURSE PRACTITIONER

## 2020-01-01 PROCEDURE — 63710000001 INSULIN REGULAR HUMAN PER 5 UNITS: Performed by: NURSE PRACTITIONER

## 2020-01-01 PROCEDURE — 93005 ELECTROCARDIOGRAM TRACING: CPT | Performed by: NURSE PRACTITIONER

## 2020-01-01 PROCEDURE — 25010000002 THIAMINE PER 100 MG: Performed by: NURSE PRACTITIONER

## 2020-01-01 PROCEDURE — 25010000002 MIDAZOLAM PER 1 MG: Performed by: NURSE PRACTITIONER

## 2020-01-01 PROCEDURE — 25010000003 LEVETIRACETAM IN NACL 0.54% 1500 MG/100ML SOLUTION: Performed by: NURSE PRACTITIONER

## 2020-01-01 PROCEDURE — 25010000002 DOPAMINE PER 40 MG: Performed by: INTERNAL MEDICINE

## 2020-01-01 PROCEDURE — 99222 1ST HOSP IP/OBS MODERATE 55: CPT | Performed by: PHYSICIAN ASSISTANT

## 2020-01-01 PROCEDURE — P9016 RBC LEUKOCYTES REDUCED: HCPCS

## 2020-01-01 PROCEDURE — 63710000001 DIPHENHYDRAMINE PER 50 MG: Performed by: PHYSICIAN ASSISTANT

## 2020-01-01 PROCEDURE — 80053 COMPREHEN METABOLIC PANEL: CPT | Performed by: EMERGENCY MEDICINE

## 2020-01-01 PROCEDURE — 25010000002 FUROSEMIDE PER 20 MG: Performed by: INTERNAL MEDICINE

## 2020-01-01 PROCEDURE — 88108 CYTOPATH CONCENTRATE TECH: CPT | Performed by: INTERNAL MEDICINE

## 2020-01-01 PROCEDURE — 86705 HEP B CORE ANTIBODY IGM: CPT | Performed by: PHYSICIAN ASSISTANT

## 2020-01-01 PROCEDURE — 93010 ELECTROCARDIOGRAM REPORT: CPT | Performed by: INTERNAL MEDICINE

## 2020-01-01 PROCEDURE — 85018 HEMOGLOBIN: CPT

## 2020-01-01 PROCEDURE — 83880 ASSAY OF NATRIURETIC PEPTIDE: CPT | Performed by: EMERGENCY MEDICINE

## 2020-01-01 PROCEDURE — 85347 COAGULATION TIME ACTIVATED: CPT

## 2020-01-01 PROCEDURE — 25010000002 PHENYLEPHRINE 10 MG/ML SOLUTION: Performed by: NURSE PRACTITIONER

## 2020-01-01 PROCEDURE — 87118 MYCOBACTERIC IDENTIFICATION: CPT

## 2020-01-01 PROCEDURE — 87102 FUNGUS ISOLATION CULTURE: CPT | Performed by: INTERNAL MEDICINE

## 2020-01-01 PROCEDURE — 0B9C8ZX DRAINAGE OF RIGHT UPPER LUNG LOBE, VIA NATURAL OR ARTIFICIAL OPENING ENDOSCOPIC, DIAGNOSTIC: ICD-10-PCS | Performed by: INTERNAL MEDICINE

## 2020-01-01 PROCEDURE — 027034Z DILATION OF CORONARY ARTERY, ONE ARTERY WITH DRUG-ELUTING INTRALUMINAL DEVICE, PERCUTANEOUS APPROACH: ICD-10-PCS | Performed by: INTERNAL MEDICINE

## 2020-01-01 PROCEDURE — 85027 COMPLETE CBC AUTOMATED: CPT | Performed by: INTERNAL MEDICINE

## 2020-01-01 PROCEDURE — 83735 ASSAY OF MAGNESIUM: CPT | Performed by: INTERNAL MEDICINE

## 2020-01-01 PROCEDURE — 36592 COLLECT BLOOD FROM PICC: CPT

## 2020-01-01 PROCEDURE — C1752 CATH,HEMODIALYSIS,SHORT-TERM: HCPCS

## 2020-01-01 PROCEDURE — 0097U HC BIOFIRE FILMARRAY GI PANEL: CPT | Performed by: INTERNAL MEDICINE

## 2020-01-01 PROCEDURE — 82150 ASSAY OF AMYLASE: CPT | Performed by: NURSE PRACTITIONER

## 2020-01-01 PROCEDURE — 0BH17EZ INSERTION OF ENDOTRACHEAL AIRWAY INTO TRACHEA, VIA NATURAL OR ARTIFICIAL OPENING: ICD-10-PCS | Performed by: INTERNAL MEDICINE

## 2020-01-01 PROCEDURE — 87798 DETECT AGENT NOS DNA AMP: CPT | Performed by: INTERNAL MEDICINE

## 2020-01-01 PROCEDURE — 84132 ASSAY OF SERUM POTASSIUM: CPT | Performed by: NURSE PRACTITIONER

## 2020-01-01 PROCEDURE — 85007 BL SMEAR W/DIFF WBC COUNT: CPT | Performed by: INTERNAL MEDICINE

## 2020-01-01 PROCEDURE — 82330 ASSAY OF CALCIUM: CPT

## 2020-01-01 PROCEDURE — 25010000002 EPINEPHRINE 1 MG/ML SOLUTION 30 ML VIAL: Performed by: NURSE PRACTITIONER

## 2020-01-01 PROCEDURE — C1887 CATHETER, GUIDING: HCPCS | Performed by: INTERNAL MEDICINE

## 2020-01-01 PROCEDURE — 99223 1ST HOSP IP/OBS HIGH 75: CPT | Performed by: INTERNAL MEDICINE

## 2020-01-01 PROCEDURE — 85025 COMPLETE CBC W/AUTO DIFF WBC: CPT | Performed by: NURSE PRACTITIONER

## 2020-01-01 PROCEDURE — 86140 C-REACTIVE PROTEIN: CPT | Performed by: PHYSICIAN ASSISTANT

## 2020-01-01 PROCEDURE — 99221 1ST HOSP IP/OBS SF/LOW 40: CPT | Performed by: SURGERY

## 2020-01-01 PROCEDURE — 85025 COMPLETE CBC W/AUTO DIFF WBC: CPT | Performed by: EMERGENCY MEDICINE

## 2020-01-01 PROCEDURE — 25010000002 CALCIUM GLUCONATE 2-0.675 GM/100ML-% SOLUTION: Performed by: NURSE PRACTITIONER

## 2020-01-01 PROCEDURE — 80074 ACUTE HEPATITIS PANEL: CPT | Performed by: INTERNAL MEDICINE

## 2020-01-01 PROCEDURE — 25010000002 IRON SUCROSE PER 1 MG: Performed by: INTERNAL MEDICINE

## 2020-01-01 PROCEDURE — 74176 CT ABD & PELVIS W/O CONTRAST: CPT

## 2020-01-01 PROCEDURE — 0 IOPAMIDOL PER 1 ML: Performed by: INTERNAL MEDICINE

## 2020-01-01 PROCEDURE — 82330 ASSAY OF CALCIUM: CPT | Performed by: INTERNAL MEDICINE

## 2020-01-01 PROCEDURE — 25010000002 ONDANSETRON PER 1 MG: Performed by: PHYSICIAN ASSISTANT

## 2020-01-01 PROCEDURE — 85014 HEMATOCRIT: CPT | Performed by: INTERNAL MEDICINE

## 2020-01-01 PROCEDURE — 86923 COMPATIBILITY TEST ELECTRIC: CPT

## 2020-01-01 PROCEDURE — 02HV33Z INSERTION OF INFUSION DEVICE INTO SUPERIOR VENA CAVA, PERCUTANEOUS APPROACH: ICD-10-PCS | Performed by: INTERNAL MEDICINE

## 2020-01-01 PROCEDURE — 5A1945Z RESPIRATORY VENTILATION, 24-96 CONSECUTIVE HOURS: ICD-10-PCS | Performed by: INTERNAL MEDICINE

## 2020-01-01 PROCEDURE — 80051 ELECTROLYTE PANEL: CPT

## 2020-01-01 PROCEDURE — 84484 ASSAY OF TROPONIN QUANT: CPT | Performed by: EMERGENCY MEDICINE

## 2020-01-01 PROCEDURE — 83880 ASSAY OF NATRIURETIC PEPTIDE: CPT | Performed by: INTERNAL MEDICINE

## 2020-01-01 PROCEDURE — 25010000003 MAGNESIUM SULFATE 4 GM/100ML SOLUTION: Performed by: INTERNAL MEDICINE

## 2020-01-01 PROCEDURE — 80061 LIPID PANEL: CPT | Performed by: NURSE PRACTITIONER

## 2020-01-01 PROCEDURE — 25010000002 FENTANYL CITRATE (PF) 100 MCG/2ML SOLUTION: Performed by: INTERNAL MEDICINE

## 2020-01-01 PROCEDURE — C1751 CATH, INF, PER/CENT/MIDLINE: HCPCS

## 2020-01-01 PROCEDURE — 85730 THROMBOPLASTIN TIME PARTIAL: CPT | Performed by: EMERGENCY MEDICINE

## 2020-01-01 PROCEDURE — 85610 PROTHROMBIN TIME: CPT | Performed by: EMERGENCY MEDICINE

## 2020-01-01 PROCEDURE — 82550 ASSAY OF CK (CPK): CPT | Performed by: NURSE PRACTITIONER

## 2020-01-01 PROCEDURE — 83935 ASSAY OF URINE OSMOLALITY: CPT | Performed by: PHYSICIAN ASSISTANT

## 2020-01-01 PROCEDURE — B2111ZZ FLUOROSCOPY OF MULTIPLE CORONARY ARTERIES USING LOW OSMOLAR CONTRAST: ICD-10-PCS | Performed by: INTERNAL MEDICINE

## 2020-01-01 PROCEDURE — 25010000002 LEVETIRACETAM IN NACL 0.82% 500 MG/100ML SOLUTION: Performed by: NURSE PRACTITIONER

## 2020-01-01 PROCEDURE — 84145 PROCALCITONIN (PCT): CPT | Performed by: PHYSICIAN ASSISTANT

## 2020-01-01 PROCEDURE — 87340 HEPATITIS B SURFACE AG IA: CPT | Performed by: PHYSICIAN ASSISTANT

## 2020-01-01 PROCEDURE — 86900 BLOOD TYPING SEROLOGIC ABO: CPT

## 2020-01-01 PROCEDURE — 99443 PR PHYS/QHP TELEPHONE EVALUATION 21-30 MIN: CPT | Performed by: INTERNAL MEDICINE

## 2020-01-01 PROCEDURE — 25010000002 FENTANYL CITRATE (PF) 2500 MCG/50ML SOLUTION: Performed by: NURSE PRACTITIONER

## 2020-01-01 PROCEDURE — 81001 URINALYSIS AUTO W/SCOPE: CPT | Performed by: EMERGENCY MEDICINE

## 2020-01-01 PROCEDURE — 25010000002 AMIODARONE IN DEXTROSE 5% 150-4.21 MG/100ML-% SOLUTION: Performed by: NURSE PRACTITIONER

## 2020-01-01 PROCEDURE — 86900 BLOOD TYPING SEROLOGIC ABO: CPT | Performed by: EMERGENCY MEDICINE

## 2020-01-01 PROCEDURE — 82570 ASSAY OF URINE CREATININE: CPT | Performed by: PHYSICIAN ASSISTANT

## 2020-01-01 PROCEDURE — 86850 RBC ANTIBODY SCREEN: CPT | Performed by: EMERGENCY MEDICINE

## 2020-01-01 PROCEDURE — 84145 PROCALCITONIN (PCT): CPT | Performed by: NURSE PRACTITIONER

## 2020-01-01 PROCEDURE — 94760 N-INVAS EAR/PLS OXIMETRY 1: CPT

## 2020-01-01 PROCEDURE — C1874 STENT, COATED/COV W/DEL SYS: HCPCS | Performed by: INTERNAL MEDICINE

## 2020-01-01 PROCEDURE — 82962 GLUCOSE BLOOD TEST: CPT

## 2020-01-01 PROCEDURE — 0 IOPAMIDOL PER 1 ML: Performed by: EMERGENCY MEDICINE

## 2020-01-01 PROCEDURE — 0100U HC BIOFIRE FILMARRAY RESP PANEL 2: CPT | Performed by: INTERNAL MEDICINE

## 2020-01-01 PROCEDURE — 31500 INSERT EMERGENCY AIRWAY: CPT | Performed by: NURSE PRACTITIONER

## 2020-01-01 PROCEDURE — 25010000002 PHENYLEPHRINE 10 MG/ML SOLUTION: Performed by: INTERNAL MEDICINE

## 2020-01-01 PROCEDURE — 93306 TTE W/DOPPLER COMPLETE: CPT | Performed by: INTERNAL MEDICINE

## 2020-01-01 PROCEDURE — 87070 CULTURE OTHR SPECIMN AEROBIC: CPT | Performed by: INTERNAL MEDICINE

## 2020-01-01 PROCEDURE — 82607 VITAMIN B-12: CPT | Performed by: INTERNAL MEDICINE

## 2020-01-01 PROCEDURE — 87205 SMEAR GRAM STAIN: CPT | Performed by: INTERNAL MEDICINE

## 2020-01-01 PROCEDURE — 4A023N7 MEASUREMENT OF CARDIAC SAMPLING AND PRESSURE, LEFT HEART, PERCUTANEOUS APPROACH: ICD-10-PCS | Performed by: INTERNAL MEDICINE

## 2020-01-01 PROCEDURE — 82274 ASSAY TEST FOR BLOOD FECAL: CPT | Performed by: INTERNAL MEDICINE

## 2020-01-01 PROCEDURE — 36430 TRANSFUSION BLD/BLD COMPNT: CPT

## 2020-01-01 PROCEDURE — 25010000002 ONDANSETRON PER 1 MG: Performed by: EMERGENCY MEDICINE

## 2020-01-01 PROCEDURE — U0004 COV-19 TEST NON-CDC HGH THRU: HCPCS

## 2020-01-01 PROCEDURE — 80061 LIPID PANEL: CPT | Performed by: INTERNAL MEDICINE

## 2020-01-01 PROCEDURE — 25010000002 VANCOMYCIN 10 G RECONSTITUTED SOLUTION: Performed by: INTERNAL MEDICINE

## 2020-01-01 PROCEDURE — 82728 ASSAY OF FERRITIN: CPT | Performed by: INTERNAL MEDICINE

## 2020-01-01 PROCEDURE — 70450 CT HEAD/BRAIN W/O DYE: CPT

## 2020-01-01 PROCEDURE — 87086 URINE CULTURE/COLONY COUNT: CPT | Performed by: NURSE PRACTITIONER

## 2020-01-01 PROCEDURE — 71250 CT THORAX DX C-: CPT

## 2020-01-01 PROCEDURE — 99232 SBSQ HOSP IP/OBS MODERATE 35: CPT | Performed by: HOSPITALIST

## 2020-01-01 PROCEDURE — 0B9D8ZX DRAINAGE OF RIGHT MIDDLE LUNG LOBE, VIA NATURAL OR ARTIFICIAL OPENING ENDOSCOPIC, DIAGNOSTIC: ICD-10-PCS | Performed by: INTERNAL MEDICINE

## 2020-01-01 PROCEDURE — 99239 HOSP IP/OBS DSCHRG MGMT >30: CPT | Performed by: INTERNAL MEDICINE

## 2020-01-01 PROCEDURE — 94640 AIRWAY INHALATION TREATMENT: CPT

## 2020-01-01 PROCEDURE — 25010000002 MIDAZOLAM PER 1 MG

## 2020-01-01 PROCEDURE — 0B9F8ZX DRAINAGE OF RIGHT LOWER LUNG LOBE, VIA NATURAL OR ARTIFICIAL OPENING ENDOSCOPIC, DIAGNOSTIC: ICD-10-PCS | Performed by: INTERNAL MEDICINE

## 2020-01-01 PROCEDURE — 85007 BL SMEAR W/DIFF WBC COUNT: CPT | Performed by: EMERGENCY MEDICINE

## 2020-01-01 PROCEDURE — 84132 ASSAY OF SERUM POTASSIUM: CPT | Performed by: PHYSICIAN ASSISTANT

## 2020-01-01 PROCEDURE — 86901 BLOOD TYPING SEROLOGIC RH(D): CPT | Performed by: EMERGENCY MEDICINE

## 2020-01-01 PROCEDURE — 84100 ASSAY OF PHOSPHORUS: CPT | Performed by: NURSE PRACTITIONER

## 2020-01-01 PROCEDURE — 83540 ASSAY OF IRON: CPT | Performed by: INTERNAL MEDICINE

## 2020-01-01 PROCEDURE — 85610 PROTHROMBIN TIME: CPT | Performed by: NURSE PRACTITIONER

## 2020-01-01 PROCEDURE — C9803 HOPD COVID-19 SPEC COLLECT: HCPCS

## 2020-01-01 PROCEDURE — 06HY33Z INSERTION OF INFUSION DEVICE INTO LOWER VEIN, PERCUTANEOUS APPROACH: ICD-10-PCS | Performed by: INTERNAL MEDICINE

## 2020-01-01 PROCEDURE — 80202 ASSAY OF VANCOMYCIN: CPT | Performed by: INTERNAL MEDICINE

## 2020-01-01 PROCEDURE — 99204 OFFICE O/P NEW MOD 45 MIN: CPT | Performed by: NURSE PRACTITIONER

## 2020-01-01 PROCEDURE — 25010000002 VANCOMYCIN 10 G RECONSTITUTED SOLUTION: Performed by: NURSE PRACTITIONER

## 2020-01-01 PROCEDURE — 83880 ASSAY OF NATRIURETIC PEPTIDE: CPT | Performed by: PHYSICIAN ASSISTANT

## 2020-01-01 PROCEDURE — 84132 ASSAY OF SERUM POTASSIUM: CPT | Performed by: INTERNAL MEDICINE

## 2020-01-01 PROCEDURE — 85610 PROTHROMBIN TIME: CPT | Performed by: INTERNAL MEDICINE

## 2020-01-01 PROCEDURE — 25010000002 CALCIUM GLUCONATE 2-0.675 GM/100ML-% SOLUTION: Performed by: INTERNAL MEDICINE

## 2020-01-01 PROCEDURE — 87153 DNA/RNA SEQUENCING: CPT

## 2020-01-01 PROCEDURE — 25010000003 MAGNESIUM SULFATE 4 GM/100ML SOLUTION: Performed by: PHYSICIAN ASSISTANT

## 2020-01-01 PROCEDURE — 87040 BLOOD CULTURE FOR BACTERIA: CPT | Performed by: PHYSICIAN ASSISTANT

## 2020-01-01 PROCEDURE — 92928 PRQ TCAT PLMT NTRAC ST 1 LES: CPT | Performed by: INTERNAL MEDICINE

## 2020-01-01 PROCEDURE — 25010000002 ALBUMIN HUMAN 25% PER 50 ML: Performed by: NURSE PRACTITIONER

## 2020-01-01 PROCEDURE — 25010000002 HEPARIN (PORCINE) 25000-0.45 UT/250ML-% SOLUTION: Performed by: INTERNAL MEDICINE

## 2020-01-01 DEVICE — XIENCE SIERRA™ EVEROLIMUS ELUTING CORONARY STENT SYSTEM 3.50 MM X 15 MM / RAPID-EXCHANGE
Type: IMPLANTABLE DEVICE | Status: FUNCTIONAL
Brand: XIENCE SIERRA™

## 2020-01-01 RX ORDER — HEPARIN SODIUM 10000 [USP'U]/100ML
18 INJECTION, SOLUTION INTRAVENOUS
Status: DISCONTINUED | OUTPATIENT
Start: 2020-01-01 | End: 2020-01-01

## 2020-01-01 RX ORDER — SODIUM CHLORIDE 9 MG/ML
250 INJECTION, SOLUTION INTRAVENOUS ONCE AS NEEDED
Status: DISCONTINUED | OUTPATIENT
Start: 2020-01-01 | End: 2020-01-01 | Stop reason: HOSPADM

## 2020-01-01 RX ORDER — ALUMINA, MAGNESIA, AND SIMETHICONE 2400; 2400; 240 MG/30ML; MG/30ML; MG/30ML
15 SUSPENSION ORAL EVERY 6 HOURS PRN
Status: DISCONTINUED | OUTPATIENT
Start: 2020-01-01 | End: 2020-01-01 | Stop reason: HOSPADM

## 2020-01-01 RX ORDER — ACETAMINOPHEN 650 MG/1
650 SUPPOSITORY RECTAL EVERY 4 HOURS PRN
Status: DISCONTINUED | OUTPATIENT
Start: 2020-01-01 | End: 2020-01-01 | Stop reason: HOSPADM

## 2020-01-01 RX ORDER — CLOPIDOGREL BISULFATE 75 MG/1
75 TABLET ORAL DAILY
Status: DISCONTINUED | OUTPATIENT
Start: 2020-01-01 | End: 2020-01-01 | Stop reason: HOSPADM

## 2020-01-01 RX ORDER — CALCIUM GLUCONATE 20 MG/ML
1 INJECTION, SOLUTION INTRAVENOUS ONCE AS NEEDED
Status: DISCONTINUED | OUTPATIENT
Start: 2020-01-01 | End: 2020-01-01 | Stop reason: HOSPADM

## 2020-01-01 RX ORDER — FUROSEMIDE 20 MG/1
20 TABLET ORAL DAILY
Qty: 30 TABLET | Refills: 0 | Status: SHIPPED | OUTPATIENT
Start: 2020-01-01 | End: 2020-01-01 | Stop reason: SDUPTHER

## 2020-01-01 RX ORDER — QUETIAPINE FUMARATE 25 MG/1
50 TABLET, FILM COATED ORAL NIGHTLY
Status: DISCONTINUED | OUTPATIENT
Start: 2020-01-01 | End: 2020-01-01 | Stop reason: HOSPADM

## 2020-01-01 RX ORDER — DEXTROSE MONOHYDRATE 25 G/50ML
25-50 INJECTION, SOLUTION INTRAVENOUS
Status: DISCONTINUED | OUTPATIENT
Start: 2020-01-01 | End: 2020-01-01 | Stop reason: HOSPADM

## 2020-01-01 RX ORDER — CLOPIDOGREL BISULFATE 75 MG/1
75 TABLET ORAL DAILY
Qty: 30 TABLET | Refills: 0 | Status: SHIPPED | OUTPATIENT
Start: 2020-01-01 | End: 2020-01-01 | Stop reason: SDUPTHER

## 2020-01-01 RX ORDER — EPINEPHRINE 0.1 MG/ML
SYRINGE (ML) INJECTION
Status: COMPLETED | OUTPATIENT
Start: 2020-01-01 | End: 2020-01-01

## 2020-01-01 RX ORDER — DIPHENHYDRAMINE HYDROCHLORIDE 50 MG/ML
25 INJECTION INTRAMUSCULAR; INTRAVENOUS ONCE
Status: DISCONTINUED | OUTPATIENT
Start: 2020-01-01 | End: 2020-01-01 | Stop reason: HOSPADM

## 2020-01-01 RX ORDER — CARVEDILOL 3.12 MG/1
3.12 TABLET ORAL 2 TIMES DAILY WITH MEALS
Qty: 60 TABLET | Refills: 0 | Status: SHIPPED | OUTPATIENT
Start: 2020-01-01 | End: 2020-01-01 | Stop reason: SDUPTHER

## 2020-01-01 RX ORDER — NITROGLYCERIN 0.4 MG/1
0.4 TABLET SUBLINGUAL
Status: DISCONTINUED | OUTPATIENT
Start: 2020-01-01 | End: 2020-01-01 | Stop reason: HOSPADM

## 2020-01-01 RX ORDER — METOPROLOL TARTRATE 50 MG/1
1 TABLET, FILM COATED ORAL 2 TIMES DAILY
COMMUNITY
Start: 2020-01-01 | End: 2020-01-01

## 2020-01-01 RX ORDER — HYDROCODONE BITARTRATE AND ACETAMINOPHEN 5; 325 MG/1; MG/1
1 TABLET ORAL EVERY 4 HOURS PRN
Status: DISCONTINUED | OUTPATIENT
Start: 2020-01-01 | End: 2020-01-01

## 2020-01-01 RX ORDER — MIDAZOLAM HYDROCHLORIDE 1 MG/ML
2 INJECTION INTRAMUSCULAR; INTRAVENOUS ONCE
Status: COMPLETED | OUTPATIENT
Start: 2020-01-01 | End: 2020-01-01

## 2020-01-01 RX ORDER — SERTRALINE HYDROCHLORIDE 100 MG/1
100 TABLET, FILM COATED ORAL DAILY
Qty: 90 TABLET | Refills: 1 | Status: SHIPPED | OUTPATIENT
Start: 2020-01-01 | End: 2020-01-01 | Stop reason: SDDI

## 2020-01-01 RX ORDER — BISACODYL 10 MG
10 SUPPOSITORY, RECTAL RECTAL DAILY PRN
Status: DISCONTINUED | OUTPATIENT
Start: 2020-01-01 | End: 2020-01-01

## 2020-01-01 RX ORDER — DOPAMINE HYDROCHLORIDE 160 MG/100ML
2-20 INJECTION, SOLUTION INTRAVENOUS
Status: DISCONTINUED | OUTPATIENT
Start: 2020-01-01 | End: 2020-01-01 | Stop reason: HOSPADM

## 2020-01-01 RX ORDER — PHENYLEPHRINE HYDROCHLORIDE 10 MG/ML
INJECTION INTRAVENOUS AS NEEDED
Status: DISCONTINUED | OUTPATIENT
Start: 2020-01-01 | End: 2020-01-01 | Stop reason: HOSPADM

## 2020-01-01 RX ORDER — PANTOPRAZOLE SODIUM 40 MG/1
40 TABLET, DELAYED RELEASE ORAL
Status: DISCONTINUED | OUTPATIENT
Start: 2020-01-01 | End: 2020-01-01 | Stop reason: HOSPADM

## 2020-01-01 RX ORDER — CARVEDILOL 3.12 MG/1
TABLET ORAL
Qty: 60 TABLET | Refills: 2 | Status: SHIPPED | OUTPATIENT
Start: 2020-01-01 | End: 2020-01-01 | Stop reason: SDDI

## 2020-01-01 RX ORDER — CARVEDILOL 3.12 MG/1
TABLET ORAL
Qty: 60 TABLET | Refills: 0 | Status: SHIPPED | OUTPATIENT
Start: 2020-01-01 | End: 2020-01-01

## 2020-01-01 RX ORDER — LORAZEPAM 1 MG/1
1 TABLET ORAL
Status: DISCONTINUED | OUTPATIENT
Start: 2020-01-01 | End: 2020-01-01

## 2020-01-01 RX ORDER — FUROSEMIDE 10 MG/ML
40 INJECTION INTRAMUSCULAR; INTRAVENOUS
Status: DISCONTINUED | OUTPATIENT
Start: 2020-01-01 | End: 2020-01-01

## 2020-01-01 RX ORDER — MIDAZOLAM HYDROCHLORIDE 1 MG/ML
1 INJECTION, SOLUTION INTRAVENOUS
Status: DISCONTINUED | OUTPATIENT
Start: 2020-01-01 | End: 2020-01-01 | Stop reason: HOSPADM

## 2020-01-01 RX ORDER — FUROSEMIDE 10 MG/ML
20 INJECTION INTRAMUSCULAR; INTRAVENOUS ONCE
Status: COMPLETED | OUTPATIENT
Start: 2020-01-01 | End: 2020-01-01

## 2020-01-01 RX ORDER — LISINOPRIL 5 MG/1
10 TABLET ORAL
Status: DISCONTINUED | OUTPATIENT
Start: 2020-01-01 | End: 2020-01-01

## 2020-01-01 RX ORDER — MAGNESIUM SULFATE HEPTAHYDRATE 40 MG/ML
2 INJECTION, SOLUTION INTRAVENOUS AS NEEDED
Status: DISCONTINUED | OUTPATIENT
Start: 2020-01-01 | End: 2020-01-01 | Stop reason: HOSPADM

## 2020-01-01 RX ORDER — CALCIUM GLUCONATE 20 MG/ML
2 INJECTION, SOLUTION INTRAVENOUS AS NEEDED
Status: DISCONTINUED | OUTPATIENT
Start: 2020-01-01 | End: 2020-01-01 | Stop reason: HOSPADM

## 2020-01-01 RX ORDER — LOSARTAN POTASSIUM 25 MG/1
25 TABLET ORAL
Status: DISCONTINUED | OUTPATIENT
Start: 2020-01-01 | End: 2020-01-01

## 2020-01-01 RX ORDER — LORAZEPAM 2 MG/ML
2 INJECTION INTRAMUSCULAR
Status: DISCONTINUED | OUTPATIENT
Start: 2020-01-01 | End: 2020-01-01

## 2020-01-01 RX ORDER — CALCIUM CARBONATE 200(500)MG
1 TABLET,CHEWABLE ORAL 2 TIMES DAILY PRN
Status: DISCONTINUED | OUTPATIENT
Start: 2020-01-01 | End: 2020-01-01 | Stop reason: HOSPADM

## 2020-01-01 RX ORDER — NITROGLYCERIN 0.4 MG/1
0.4 TABLET SUBLINGUAL
Status: DISCONTINUED | OUTPATIENT
Start: 2020-01-01 | End: 2020-01-01

## 2020-01-01 RX ORDER — FERROUS SULFATE TAB EC 324 MG (65 MG FE EQUIVALENT) 324 (65 FE) MG
324 TABLET DELAYED RESPONSE ORAL
Status: DISCONTINUED | OUTPATIENT
Start: 2020-01-01 | End: 2020-01-01

## 2020-01-01 RX ORDER — ASPIRIN 81 MG/1
81 TABLET, CHEWABLE ORAL DAILY
Status: DISCONTINUED | OUTPATIENT
Start: 2020-01-01 | End: 2020-01-01 | Stop reason: HOSPADM

## 2020-01-01 RX ORDER — MAGNESIUM SULFATE HEPTAHYDRATE 40 MG/ML
4 INJECTION, SOLUTION INTRAVENOUS AS NEEDED
Status: DISCONTINUED | OUTPATIENT
Start: 2020-01-01 | End: 2020-01-01 | Stop reason: HOSPADM

## 2020-01-01 RX ORDER — DEXTROSE MONOHYDRATE 25 G/50ML
50 INJECTION, SOLUTION INTRAVENOUS ONCE
Status: COMPLETED | OUTPATIENT
Start: 2020-01-01 | End: 2020-01-01

## 2020-01-01 RX ORDER — TRAZODONE HYDROCHLORIDE 100 MG/1
100 TABLET ORAL NIGHTLY
Status: DISCONTINUED | OUTPATIENT
Start: 2020-01-01 | End: 2020-01-01

## 2020-01-01 RX ORDER — SPIRONOLACTONE 25 MG/1
25 TABLET ORAL DAILY
Status: DISCONTINUED | OUTPATIENT
Start: 2020-01-01 | End: 2020-01-01 | Stop reason: HOSPADM

## 2020-01-01 RX ORDER — CARVEDILOL 3.12 MG/1
3.12 TABLET ORAL 2 TIMES DAILY WITH MEALS
Status: DISCONTINUED | OUTPATIENT
Start: 2020-01-01 | End: 2020-01-01

## 2020-01-01 RX ORDER — POTASSIUM CHLORIDE 20 MEQ/1
20 TABLET, EXTENDED RELEASE ORAL ONCE
Status: COMPLETED | OUTPATIENT
Start: 2020-01-01 | End: 2020-01-01

## 2020-01-01 RX ORDER — ONDANSETRON 2 MG/ML
4 INJECTION INTRAMUSCULAR; INTRAVENOUS EVERY 6 HOURS PRN
Status: DISCONTINUED | OUTPATIENT
Start: 2020-01-01 | End: 2020-01-01 | Stop reason: HOSPADM

## 2020-01-01 RX ORDER — LEVETIRACETAM 15 MG/ML
1500 INJECTION INTRAVASCULAR ONCE
Status: COMPLETED | OUTPATIENT
Start: 2020-01-01 | End: 2020-01-01

## 2020-01-01 RX ORDER — DIPHENHYDRAMINE HCL 25 MG
25 TABLET ORAL ONCE
Status: DISCONTINUED | OUTPATIENT
Start: 2020-01-01 | End: 2020-01-01 | Stop reason: HOSPADM

## 2020-01-01 RX ORDER — HEPARIN SODIUM 1000 [USP'U]/ML
INJECTION, SOLUTION INTRAVENOUS; SUBCUTANEOUS AS NEEDED
Status: DISCONTINUED | OUTPATIENT
Start: 2020-01-01 | End: 2020-01-01 | Stop reason: HOSPADM

## 2020-01-01 RX ORDER — NICOTINE 21 MG/24HR
1 PATCH, TRANSDERMAL 24 HOURS TRANSDERMAL NIGHTLY
Status: DISCONTINUED | OUTPATIENT
Start: 2020-01-01 | End: 2020-01-01 | Stop reason: HOSPADM

## 2020-01-01 RX ORDER — CARVEDILOL 3.12 MG/1
3.12 TABLET ORAL 2 TIMES DAILY WITH MEALS
Status: DISCONTINUED | OUTPATIENT
Start: 2020-01-01 | End: 2020-01-01 | Stop reason: HOSPADM

## 2020-01-01 RX ORDER — CALCIUM GLUCONATE 20 MG/ML
2 INJECTION, SOLUTION INTRAVENOUS ONCE AS NEEDED
Status: DISCONTINUED | OUTPATIENT
Start: 2020-01-01 | End: 2020-01-01 | Stop reason: HOSPADM

## 2020-01-01 RX ORDER — SODIUM CHLORIDE 0.9 % (FLUSH) 0.9 %
10 SYRINGE (ML) INJECTION EVERY 12 HOURS SCHEDULED
Status: DISCONTINUED | OUTPATIENT
Start: 2020-01-01 | End: 2020-01-01 | Stop reason: HOSPADM

## 2020-01-01 RX ORDER — LISINOPRIL 2.5 MG/1
2.5 TABLET ORAL
Qty: 90 TABLET | Refills: 3 | Status: SHIPPED | OUTPATIENT
Start: 2020-01-01 | End: 2020-01-01 | Stop reason: SDDI

## 2020-01-01 RX ORDER — CHOLECALCIFEROL (VITAMIN D3) 125 MCG
5 CAPSULE ORAL NIGHTLY PRN
Status: DISCONTINUED | OUTPATIENT
Start: 2020-01-01 | End: 2020-01-01

## 2020-01-01 RX ORDER — CARVEDILOL 6.25 MG/1
12.5 TABLET ORAL 2 TIMES DAILY WITH MEALS
Status: DISCONTINUED | OUTPATIENT
Start: 2020-01-01 | End: 2020-01-01

## 2020-01-01 RX ORDER — MIDAZOLAM HYDROCHLORIDE 1 MG/ML
INJECTION INTRAMUSCULAR; INTRAVENOUS AS NEEDED
Status: DISCONTINUED | OUTPATIENT
Start: 2020-01-01 | End: 2020-01-01 | Stop reason: HOSPADM

## 2020-01-01 RX ORDER — ACETAMINOPHEN 325 MG/1
650 TABLET ORAL EVERY 6 HOURS PRN
Status: DISCONTINUED | OUTPATIENT
Start: 2020-01-01 | End: 2020-01-01 | Stop reason: HOSPADM

## 2020-01-01 RX ORDER — MILRINONE LACTATE 0.2 MG/ML
.125-.75 INJECTION, SOLUTION INTRAVENOUS
Status: DISCONTINUED | OUTPATIENT
Start: 2020-01-01 | End: 2020-01-01 | Stop reason: HOSPADM

## 2020-01-01 RX ORDER — HYDROXYZINE HYDROCHLORIDE 25 MG/1
50 TABLET, FILM COATED ORAL 3 TIMES DAILY PRN
Status: DISCONTINUED | OUTPATIENT
Start: 2020-01-01 | End: 2020-01-01

## 2020-01-01 RX ORDER — CLOPIDOGREL BISULFATE 75 MG/1
TABLET ORAL AS NEEDED
Status: DISCONTINUED | OUTPATIENT
Start: 2020-01-01 | End: 2020-01-01 | Stop reason: HOSPADM

## 2020-01-01 RX ORDER — POTASSIUM CHLORIDE 1.5 G/1.77G
40 POWDER, FOR SOLUTION ORAL AS NEEDED
Status: DISCONTINUED | OUTPATIENT
Start: 2020-01-01 | End: 2020-01-01

## 2020-01-01 RX ORDER — OMEPRAZOLE 40 MG/1
40 CAPSULE, DELAYED RELEASE ORAL DAILY
Qty: 30 CAPSULE | Refills: 0 | Status: SHIPPED | OUTPATIENT
Start: 2020-01-01 | End: 2020-01-01

## 2020-01-01 RX ORDER — ATROPINE SULFATE 1 MG/ML
.5-1 INJECTION, SOLUTION INTRAMUSCULAR; INTRAVENOUS; SUBCUTANEOUS
Status: DISCONTINUED | OUTPATIENT
Start: 2020-01-01 | End: 2020-01-01 | Stop reason: HOSPADM

## 2020-01-01 RX ORDER — TRAZODONE HYDROCHLORIDE 100 MG/1
TABLET ORAL
Qty: 30 TABLET | Refills: 0 | OUTPATIENT
Start: 2020-01-01

## 2020-01-01 RX ORDER — OMEPRAZOLE 40 MG/1
CAPSULE, DELAYED RELEASE ORAL
Qty: 30 CAPSULE | Refills: 0 | OUTPATIENT
Start: 2020-01-01

## 2020-01-01 RX ORDER — LISINOPRIL 5 MG/1
5 TABLET ORAL
Status: DISCONTINUED | OUTPATIENT
Start: 2020-01-01 | End: 2020-01-01

## 2020-01-01 RX ORDER — SPIRONOLACTONE 25 MG/1
25 TABLET ORAL DAILY
Qty: 30 TABLET | Refills: 0 | Status: SHIPPED | OUTPATIENT
Start: 2020-01-01 | End: 2020-01-01 | Stop reason: SDUPTHER

## 2020-01-01 RX ORDER — MORPHINE SULFATE 4 MG/ML
4 INJECTION, SOLUTION INTRAMUSCULAR; INTRAVENOUS ONCE
Status: DISCONTINUED | OUTPATIENT
Start: 2020-01-01 | End: 2020-01-01

## 2020-01-01 RX ORDER — NITROGLYCERIN 20 MG/100ML
10-50 INJECTION INTRAVENOUS
Status: DISCONTINUED | OUTPATIENT
Start: 2020-01-01 | End: 2020-01-01

## 2020-01-01 RX ORDER — CALCIUM CHLORIDE 100 MG/ML
INJECTION INTRAVENOUS; INTRAVENTRICULAR
Status: COMPLETED | OUTPATIENT
Start: 2020-01-01 | End: 2020-01-01

## 2020-01-01 RX ORDER — QUETIAPINE FUMARATE 100 MG/1
100 TABLET, FILM COATED ORAL NIGHTLY
Qty: 30 TABLET | Refills: 1 | Status: SHIPPED | OUTPATIENT
Start: 2020-01-01 | End: 2020-01-01 | Stop reason: SDDI

## 2020-01-01 RX ORDER — DEXTROSE MONOHYDRATE 25 G/50ML
INJECTION, SOLUTION INTRAVENOUS
Status: COMPLETED
Start: 2020-01-01 | End: 2020-01-01

## 2020-01-01 RX ORDER — LOPERAMIDE HYDROCHLORIDE 2 MG/1
2 CAPSULE ORAL 3 TIMES DAILY PRN
Status: DISCONTINUED | OUTPATIENT
Start: 2020-01-01 | End: 2020-01-01 | Stop reason: HOSPADM

## 2020-01-01 RX ORDER — DIPHENHYDRAMINE HCL 25 MG
25 TABLET ORAL NIGHTLY PRN
Status: DISCONTINUED | OUTPATIENT
Start: 2020-01-01 | End: 2020-01-01 | Stop reason: HOSPADM

## 2020-01-01 RX ORDER — CALCIUM GLUCONATE 20 MG/ML
2 INJECTION, SOLUTION INTRAVENOUS ONCE
Status: COMPLETED | OUTPATIENT
Start: 2020-01-01 | End: 2020-01-01

## 2020-01-01 RX ORDER — DOPAMINE HYDROCHLORIDE 160 MG/100ML
INJECTION, SOLUTION INTRAVENOUS CONTINUOUS PRN
Status: COMPLETED | OUTPATIENT
Start: 2020-01-01 | End: 2020-01-01

## 2020-01-01 RX ORDER — ONDANSETRON 4 MG/1
4 TABLET, FILM COATED ORAL EVERY 6 HOURS PRN
Status: DISCONTINUED | OUTPATIENT
Start: 2020-01-01 | End: 2020-01-01 | Stop reason: HOSPADM

## 2020-01-01 RX ORDER — CLOPIDOGREL BISULFATE 75 MG/1
75 TABLET ORAL DAILY
Qty: 90 TABLET | Refills: 3 | Status: SHIPPED | OUTPATIENT
Start: 2020-01-01 | End: 2020-01-01 | Stop reason: SDDI

## 2020-01-01 RX ORDER — CALCIUM GLUCONATE 20 MG/ML
1 INJECTION, SOLUTION INTRAVENOUS AS NEEDED
Status: DISCONTINUED | OUTPATIENT
Start: 2020-01-01 | End: 2020-01-01 | Stop reason: HOSPADM

## 2020-01-01 RX ORDER — LIDOCAINE HYDROCHLORIDE 20 MG/ML
INJECTION, SOLUTION INFILTRATION; PERINEURAL AS NEEDED
Status: DISCONTINUED | OUTPATIENT
Start: 2020-01-01 | End: 2020-01-01 | Stop reason: HOSPADM

## 2020-01-01 RX ORDER — CARVEDILOL 6.25 MG/1
6.25 TABLET ORAL 2 TIMES DAILY WITH MEALS
Status: DISCONTINUED | OUTPATIENT
Start: 2020-01-01 | End: 2020-01-01

## 2020-01-01 RX ORDER — EPOPROSTENOL SODIUM 1.5 MG/1
50 INJECTION, POWDER, FOR SOLUTION INTRAVENOUS CONTINUOUS
Status: DISCONTINUED | OUTPATIENT
Start: 2020-01-01 | End: 2020-01-01 | Stop reason: HOSPADM

## 2020-01-01 RX ORDER — VECURONIUM BROMIDE FOR INJECTION 1 MG/ML
10 INJECTION, POWDER, LYOPHILIZED, FOR SOLUTION INTRAVENOUS
Status: DISCONTINUED | OUTPATIENT
Start: 2020-01-01 | End: 2020-01-01 | Stop reason: HOSPADM

## 2020-01-01 RX ORDER — ASPIRIN 325 MG
TABLET ORAL AS NEEDED
Status: DISCONTINUED | OUTPATIENT
Start: 2020-01-01 | End: 2020-01-01 | Stop reason: HOSPADM

## 2020-01-01 RX ORDER — SODIUM CHLORIDE 9 MG/ML
INJECTION, SOLUTION INTRAVENOUS
Status: COMPLETED | OUTPATIENT
Start: 2020-01-01 | End: 2020-01-01

## 2020-01-01 RX ORDER — MIDAZOLAM HYDROCHLORIDE 1 MG/ML
INJECTION INTRAMUSCULAR; INTRAVENOUS
Status: COMPLETED
Start: 2020-01-01 | End: 2020-01-01

## 2020-01-01 RX ORDER — DOBUTAMINE HYDROCHLORIDE 100 MG/100ML
2-10 INJECTION INTRAVENOUS
Status: DISCONTINUED | OUTPATIENT
Start: 2020-01-01 | End: 2020-01-01 | Stop reason: HOSPADM

## 2020-01-01 RX ORDER — SODIUM POLYSTYRENE SULFONATE 15 G/60ML
15 SUSPENSION ORAL; RECTAL ONCE
Status: COMPLETED | OUTPATIENT
Start: 2020-01-01 | End: 2020-01-01

## 2020-01-01 RX ORDER — CARVEDILOL 3.12 MG/1
TABLET ORAL
Qty: 60 TABLET | Refills: 0 | OUTPATIENT
Start: 2020-01-01

## 2020-01-01 RX ORDER — LORAZEPAM 1 MG/1
2 TABLET ORAL
Status: DISCONTINUED | OUTPATIENT
Start: 2020-01-01 | End: 2020-01-01

## 2020-01-01 RX ORDER — NITROGLYCERIN 20 MG/100ML
5-200 INJECTION INTRAVENOUS
Status: DISCONTINUED | OUTPATIENT
Start: 2020-01-01 | End: 2020-01-01 | Stop reason: HOSPADM

## 2020-01-01 RX ORDER — ACETAMINOPHEN 325 MG/1
650 TABLET ORAL ONCE
Status: DISCONTINUED | OUTPATIENT
Start: 2020-01-01 | End: 2020-01-01 | Stop reason: HOSPADM

## 2020-01-01 RX ORDER — ASPIRIN 81 MG/1
81 TABLET, CHEWABLE ORAL DAILY
COMMUNITY
End: 2020-01-01 | Stop reason: HOSPADM

## 2020-01-01 RX ORDER — CHLORHEXIDINE GLUCONATE 0.12 MG/ML
15 RINSE ORAL EVERY 12 HOURS SCHEDULED
Status: DISCONTINUED | OUTPATIENT
Start: 2020-01-01 | End: 2020-01-01 | Stop reason: HOSPADM

## 2020-01-01 RX ORDER — AMLODIPINE BESYLATE 5 MG/1
1 TABLET ORAL DAILY
COMMUNITY
Start: 2020-01-01 | End: 2020-01-01

## 2020-01-01 RX ORDER — IPRATROPIUM BROMIDE AND ALBUTEROL SULFATE 2.5; .5 MG/3ML; MG/3ML
3 SOLUTION RESPIRATORY (INHALATION)
Status: DISCONTINUED | OUTPATIENT
Start: 2020-01-01 | End: 2020-01-01 | Stop reason: HOSPADM

## 2020-01-01 RX ORDER — SODIUM CHLORIDE 0.9 % (FLUSH) 0.9 %
10 SYRINGE (ML) INJECTION AS NEEDED
Status: DISCONTINUED | OUTPATIENT
Start: 2020-01-01 | End: 2020-01-01 | Stop reason: HOSPADM

## 2020-01-01 RX ORDER — TRAMADOL HYDROCHLORIDE 50 MG/1
50 TABLET ORAL EVERY 12 HOURS PRN
Status: DISCONTINUED | OUTPATIENT
Start: 2020-01-01 | End: 2020-01-01

## 2020-01-01 RX ORDER — FUROSEMIDE 10 MG/ML
40 INJECTION INTRAMUSCULAR; INTRAVENOUS DAILY
Status: DISCONTINUED | OUTPATIENT
Start: 2020-01-01 | End: 2020-01-01

## 2020-01-01 RX ORDER — NITROGLYCERIN 0.4 MG/1
0.4 TABLET SUBLINGUAL
Status: DISCONTINUED | OUTPATIENT
Start: 2020-01-01 | End: 2020-01-01 | Stop reason: SDUPTHER

## 2020-01-01 RX ORDER — LISINOPRIL 10 MG/1
10 TABLET ORAL DAILY
COMMUNITY
End: 2020-01-01 | Stop reason: HOSPADM

## 2020-01-01 RX ORDER — LOPERAMIDE HYDROCHLORIDE 2 MG/1
4 CAPSULE ORAL 4 TIMES DAILY PRN
Status: DISCONTINUED | OUTPATIENT
Start: 2020-01-01 | End: 2020-01-01

## 2020-01-01 RX ORDER — OMEPRAZOLE 40 MG/1
40 CAPSULE, DELAYED RELEASE ORAL DAILY
Qty: 30 CAPSULE | Refills: 0 | Status: SHIPPED | OUTPATIENT
Start: 2020-01-01 | End: 2020-01-01 | Stop reason: SDUPTHER

## 2020-01-01 RX ORDER — BUMETANIDE 0.25 MG/ML
2 INJECTION INTRAMUSCULAR; INTRAVENOUS ONCE
Status: COMPLETED | OUTPATIENT
Start: 2020-01-01 | End: 2020-01-01

## 2020-01-01 RX ORDER — ALBUMIN (HUMAN) 12.5 G/50ML
25 SOLUTION INTRAVENOUS ONCE
Status: COMPLETED | OUTPATIENT
Start: 2020-01-01 | End: 2020-01-01

## 2020-01-01 RX ORDER — DOPAMINE HYDROCHLORIDE 160 MG/100ML
INJECTION, SOLUTION INTRAVENOUS
Status: COMPLETED | OUTPATIENT
Start: 2020-01-01 | End: 2020-01-01

## 2020-01-01 RX ORDER — POTASSIUM CHLORIDE 29.8 MG/ML
20 INJECTION INTRAVENOUS AS NEEDED
Status: DISCONTINUED | OUTPATIENT
Start: 2020-01-01 | End: 2020-01-01 | Stop reason: HOSPADM

## 2020-01-01 RX ORDER — ACETAMINOPHEN 325 MG/1
650 TABLET ORAL EVERY 4 HOURS PRN
Status: DISCONTINUED | OUTPATIENT
Start: 2020-01-01 | End: 2020-01-01 | Stop reason: SDUPTHER

## 2020-01-01 RX ORDER — POTASSIUM CHLORIDE 20 MEQ/1
40 TABLET, EXTENDED RELEASE ORAL AS NEEDED
Status: DISCONTINUED | OUTPATIENT
Start: 2020-01-01 | End: 2020-01-01

## 2020-01-01 RX ORDER — PANTOPRAZOLE SODIUM 40 MG/10ML
40 INJECTION, POWDER, LYOPHILIZED, FOR SOLUTION INTRAVENOUS
Status: DISCONTINUED | OUTPATIENT
Start: 2020-01-01 | End: 2020-01-01 | Stop reason: HOSPADM

## 2020-01-01 RX ORDER — ISOSORBIDE MONONITRATE 30 MG/1
30 TABLET, EXTENDED RELEASE ORAL
Status: DISCONTINUED | OUTPATIENT
Start: 2020-01-01 | End: 2020-01-01

## 2020-01-01 RX ORDER — OMEPRAZOLE 40 MG/1
40 CAPSULE, DELAYED RELEASE ORAL DAILY
Status: ON HOLD | COMMUNITY
End: 2020-01-01 | Stop reason: SDUPTHER

## 2020-01-01 RX ORDER — FERROUS SULFATE TAB EC 324 MG (65 MG FE EQUIVALENT) 324 (65 FE) MG
324 TABLET DELAYED RESPONSE ORAL 2 TIMES DAILY WITH MEALS
Qty: 180 TABLET | Refills: 0 | Status: SHIPPED | OUTPATIENT
Start: 2020-01-01 | End: 2020-01-01 | Stop reason: SDDI

## 2020-01-01 RX ORDER — QUETIAPINE FUMARATE 25 MG/1
50 TABLET, FILM COATED ORAL NIGHTLY
Status: DISCONTINUED | OUTPATIENT
Start: 2020-01-01 | End: 2020-01-01

## 2020-01-01 RX ORDER — ASPIRIN 81 MG/1
324 TABLET, CHEWABLE ORAL ONCE
Status: COMPLETED | OUTPATIENT
Start: 2020-01-01 | End: 2020-01-01

## 2020-01-01 RX ORDER — BUMETANIDE 1 MG/1
2 TABLET ORAL DAILY
Status: DISCONTINUED | OUTPATIENT
Start: 2020-01-01 | End: 2020-01-01

## 2020-01-01 RX ORDER — ACETAMINOPHEN 325 MG/1
650 TABLET ORAL EVERY 4 HOURS PRN
Status: DISCONTINUED | OUTPATIENT
Start: 2020-01-01 | End: 2020-01-01 | Stop reason: HOSPADM

## 2020-01-01 RX ORDER — ATORVASTATIN CALCIUM 40 MG/1
1 TABLET, FILM COATED ORAL DAILY
COMMUNITY
Start: 2020-01-01 | End: 2020-01-01

## 2020-01-01 RX ORDER — SODIUM CHLORIDE 0.9 % (FLUSH) 0.9 %
3 SYRINGE (ML) INJECTION EVERY 12 HOURS SCHEDULED
Status: DISCONTINUED | OUTPATIENT
Start: 2020-01-01 | End: 2020-01-01 | Stop reason: HOSPADM

## 2020-01-01 RX ORDER — LISINOPRIL 2.5 MG/1
2.5 TABLET ORAL
Qty: 30 TABLET | Refills: 0 | Status: SHIPPED | OUTPATIENT
Start: 2020-01-01 | End: 2020-01-01 | Stop reason: SDUPTHER

## 2020-01-01 RX ORDER — BUMETANIDE 0.25 MG/ML
1 INJECTION INTRAMUSCULAR; INTRAVENOUS EVERY 12 HOURS
Status: DISCONTINUED | OUTPATIENT
Start: 2020-01-01 | End: 2020-01-01

## 2020-01-01 RX ORDER — SODIUM BICARBONATE 650 MG/1
1300 TABLET ORAL 2 TIMES DAILY
Status: DISCONTINUED | OUTPATIENT
Start: 2020-01-01 | End: 2020-01-01

## 2020-01-01 RX ORDER — QUETIAPINE FUMARATE 50 MG/1
50 TABLET, FILM COATED ORAL NIGHTLY
Qty: 30 TABLET | Refills: 0 | Status: SHIPPED | OUTPATIENT
Start: 2020-01-01 | End: 2020-01-01

## 2020-01-01 RX ORDER — SPIRONOLACTONE 25 MG/1
25 TABLET ORAL DAILY
Qty: 90 TABLET | Refills: 0 | Status: SHIPPED | OUTPATIENT
Start: 2020-01-01 | End: 2020-01-01

## 2020-01-01 RX ORDER — NITROGLYCERIN 0.4 MG/1
0.4 TABLET SUBLINGUAL
Status: COMPLETED | OUTPATIENT
Start: 2020-01-01 | End: 2020-01-01

## 2020-01-01 RX ORDER — LISINOPRIL 20 MG/1
1 TABLET ORAL DAILY
COMMUNITY
Start: 2020-01-01 | End: 2020-01-01

## 2020-01-01 RX ORDER — OMEPRAZOLE 40 MG/1
CAPSULE, DELAYED RELEASE ORAL
Qty: 30 CAPSULE | Refills: 0 | Status: SHIPPED | OUTPATIENT
Start: 2020-01-01 | End: 2020-01-01 | Stop reason: SDDI

## 2020-01-01 RX ORDER — INSULIN GLARGINE 100 [IU]/ML
INJECTION, SOLUTION SUBCUTANEOUS TAKE AS DIRECTED
COMMUNITY
Start: 2020-01-01 | End: 2020-01-01

## 2020-01-01 RX ORDER — DOCUSATE SODIUM 100 MG/1
100 CAPSULE, LIQUID FILLED ORAL 2 TIMES DAILY PRN
Status: DISCONTINUED | OUTPATIENT
Start: 2020-01-01 | End: 2020-01-01 | Stop reason: HOSPADM

## 2020-01-01 RX ORDER — LEVETIRACETAM 5 MG/ML
500 INJECTION INTRAVASCULAR EVERY 12 HOURS
Status: DISCONTINUED | OUTPATIENT
Start: 2020-01-01 | End: 2020-01-01 | Stop reason: HOSPADM

## 2020-01-01 RX ORDER — FERROUS SULFATE TAB EC 324 MG (65 MG FE EQUIVALENT) 324 (65 FE) MG
324 TABLET DELAYED RESPONSE ORAL
Status: DISCONTINUED | OUTPATIENT
Start: 2020-01-01 | End: 2020-01-01 | Stop reason: HOSPADM

## 2020-01-01 RX ORDER — FENTANYL CITRATE-0.9 % NACL/PF 10 MCG/ML
50-300 PLASTIC BAG, INJECTION (ML) INTRAVENOUS CONTINUOUS PRN
Status: DISCONTINUED | OUTPATIENT
Start: 2020-01-01 | End: 2020-01-01 | Stop reason: HOSPADM

## 2020-01-01 RX ORDER — ACETAMINOPHEN 325 MG/1
325 TABLET ORAL EVERY 4 HOURS PRN
Status: DISCONTINUED | OUTPATIENT
Start: 2020-01-01 | End: 2020-01-01 | Stop reason: HOSPADM

## 2020-01-01 RX ORDER — TRAZODONE HYDROCHLORIDE 100 MG/1
100 TABLET ORAL NIGHTLY
Qty: 30 TABLET | Refills: 0 | Status: SHIPPED | OUTPATIENT
Start: 2020-01-01 | End: 2020-01-01 | Stop reason: SDUPTHER

## 2020-01-01 RX ORDER — LOPERAMIDE HYDROCHLORIDE 2 MG/1
2 CAPSULE ORAL 3 TIMES DAILY
Status: DISCONTINUED | OUTPATIENT
Start: 2020-01-01 | End: 2020-01-01

## 2020-01-01 RX ORDER — TRAZODONE HYDROCHLORIDE 100 MG/1
TABLET ORAL
Qty: 30 TABLET | Refills: 0 | Status: SHIPPED | OUTPATIENT
Start: 2020-01-01 | End: 2020-01-01 | Stop reason: SDDI

## 2020-01-01 RX ORDER — BISACODYL 10 MG
10 SUPPOSITORY, RECTAL RECTAL DAILY PRN
Status: DISCONTINUED | OUTPATIENT
Start: 2020-01-01 | End: 2020-01-01 | Stop reason: HOSPADM

## 2020-01-01 RX ORDER — SPIRONOLACTONE 25 MG/1
25 TABLET ORAL DAILY
Qty: 90 TABLET | Refills: 0 | Status: SHIPPED | OUTPATIENT
Start: 2020-01-01 | End: 2020-01-01 | Stop reason: SDDI

## 2020-01-01 RX ORDER — CARVEDILOL 3.12 MG/1
3.12 TABLET ORAL 2 TIMES DAILY WITH MEALS
Qty: 60 TABLET | Refills: 0 | Status: SHIPPED | OUTPATIENT
Start: 2020-01-01 | End: 2020-01-01

## 2020-01-01 RX ORDER — FUROSEMIDE 40 MG/1
40 TABLET ORAL DAILY
Status: DISCONTINUED | OUTPATIENT
Start: 2020-01-01 | End: 2020-01-01

## 2020-01-01 RX ORDER — CITALOPRAM 20 MG/1
1 TABLET ORAL DAILY
COMMUNITY
Start: 2020-01-01 | End: 2020-01-01

## 2020-01-01 RX ORDER — LIDOCAINE 50 MG/G
OINTMENT TOPICAL AS NEEDED
Status: DISCONTINUED | OUTPATIENT
Start: 2020-01-01 | End: 2020-01-01 | Stop reason: HOSPADM

## 2020-01-01 RX ORDER — HEPARIN SODIUM 10000 [USP'U]/100ML
14.7 INJECTION, SOLUTION INTRAVENOUS
Status: DISCONTINUED | OUTPATIENT
Start: 2020-01-01 | End: 2020-01-01 | Stop reason: HOSPADM

## 2020-01-01 RX ORDER — LISINOPRIL 5 MG/1
2.5 TABLET ORAL
Status: DISCONTINUED | OUTPATIENT
Start: 2020-01-01 | End: 2020-01-01 | Stop reason: HOSPADM

## 2020-01-01 RX ORDER — FENTANYL CITRATE 50 UG/ML
INJECTION, SOLUTION INTRAMUSCULAR; INTRAVENOUS AS NEEDED
Status: DISCONTINUED | OUTPATIENT
Start: 2020-01-01 | End: 2020-01-01 | Stop reason: HOSPADM

## 2020-01-01 RX ORDER — FERROUS SULFATE TAB EC 324 MG (65 MG FE EQUIVALENT) 324 (65 FE) MG
324 TABLET DELAYED RESPONSE ORAL 2 TIMES DAILY WITH MEALS
Qty: 60 TABLET | Refills: 0 | Status: SHIPPED | OUTPATIENT
Start: 2020-01-01 | End: 2020-01-01 | Stop reason: SDUPTHER

## 2020-01-01 RX ORDER — QUETIAPINE FUMARATE 100 MG/1
100 TABLET, FILM COATED ORAL NIGHTLY
Status: DISCONTINUED | OUTPATIENT
Start: 2020-01-01 | End: 2020-01-01

## 2020-01-01 RX ORDER — ASPIRIN 81 MG/1
81 TABLET ORAL DAILY
Status: DISCONTINUED | OUTPATIENT
Start: 2020-01-01 | End: 2020-01-01 | Stop reason: HOSPADM

## 2020-01-01 RX ORDER — ONDANSETRON 2 MG/ML
4 INJECTION INTRAMUSCULAR; INTRAVENOUS ONCE
Status: COMPLETED | OUTPATIENT
Start: 2020-01-01 | End: 2020-01-01

## 2020-01-01 RX ORDER — LOPERAMIDE HYDROCHLORIDE 2 MG/1
2 CAPSULE ORAL 3 TIMES DAILY PRN
Qty: 15 CAPSULE | Refills: 0 | Status: SHIPPED | OUTPATIENT
Start: 2020-01-01 | End: 2020-01-01 | Stop reason: SDDI

## 2020-01-01 RX ORDER — NICOTINE 21 MG/24HR
1 PATCH, TRANSDERMAL 24 HOURS TRANSDERMAL NIGHTLY
Status: DISCONTINUED | OUTPATIENT
Start: 2020-01-01 | End: 2020-01-01

## 2020-01-01 RX ORDER — FUROSEMIDE 20 MG/1
20 TABLET ORAL DAILY
Qty: 90 TABLET | Refills: 3 | Status: SHIPPED | OUTPATIENT
Start: 2020-01-01 | End: 2020-01-01 | Stop reason: SDDI

## 2020-01-01 RX ORDER — BISACODYL 5 MG/1
5 TABLET, DELAYED RELEASE ORAL DAILY PRN
Status: DISCONTINUED | OUTPATIENT
Start: 2020-01-01 | End: 2020-01-01

## 2020-01-01 RX ORDER — CHOLECALCIFEROL (VITAMIN D3) 125 MCG
5 CAPSULE ORAL NIGHTLY PRN
Status: DISCONTINUED | OUTPATIENT
Start: 2020-01-01 | End: 2020-01-01 | Stop reason: HOSPADM

## 2020-01-01 RX ORDER — LORAZEPAM 2 MG/ML
1 INJECTION INTRAMUSCULAR
Status: DISCONTINUED | OUTPATIENT
Start: 2020-01-01 | End: 2020-01-01

## 2020-01-01 RX ORDER — TRAZODONE HYDROCHLORIDE 100 MG/1
100 TABLET ORAL NIGHTLY
Qty: 30 TABLET | Refills: 0 | Status: SHIPPED | OUTPATIENT
Start: 2020-01-01 | End: 2020-01-01

## 2020-01-01 RX ORDER — POTASSIUM CHLORIDE 29.8 MG/ML
20 INJECTION INTRAVENOUS
Status: DISCONTINUED | OUTPATIENT
Start: 2020-01-01 | End: 2020-01-01 | Stop reason: SDUPTHER

## 2020-01-01 RX ORDER — SODIUM POLYSTYRENE SULFONATE 15 G/60ML
30 SUSPENSION ORAL; RECTAL ONCE
Status: DISCONTINUED | OUTPATIENT
Start: 2020-01-01 | End: 2020-01-01

## 2020-01-01 RX ADMIN — SODIUM BICARBONATE 150 MEQ: 84 INJECTION, SOLUTION INTRAVENOUS at 02:53

## 2020-01-01 RX ADMIN — ISOSORBIDE MONONITRATE 30 MG: 30 TABLET, EXTENDED RELEASE ORAL at 09:31

## 2020-01-01 RX ADMIN — FERROUS SULFATE TAB EC 324 MG (65 MG FE EQUIVALENT) 324 MG: 324 (65 FE) TABLET DELAYED RESPONSE at 09:07

## 2020-01-01 RX ADMIN — FUROSEMIDE 40 MG: 10 INJECTION, SOLUTION INTRAMUSCULAR; INTRAVENOUS at 10:11

## 2020-01-01 RX ADMIN — SPIRONOLACTONE 25 MG: 25 TABLET, FILM COATED ORAL at 15:35

## 2020-01-01 RX ADMIN — POTASSIUM CHLORIDE 20 MEQ: 1500 TABLET, EXTENDED RELEASE ORAL at 13:01

## 2020-01-01 RX ADMIN — DOBUTAMINE HYDROCHLORIDE 10 MCG/KG/MIN: 100 INJECTION INTRAVENOUS at 05:51

## 2020-01-01 RX ADMIN — ASPIRIN 81 MG CHEWABLE TABLET 81 MG: 81 TABLET CHEWABLE at 10:11

## 2020-01-01 RX ADMIN — SODIUM BICARBONATE 50 MEQ: 84 INJECTION, SOLUTION INTRAVENOUS at 06:17

## 2020-01-01 RX ADMIN — DEXTROSE MONOHYDRATE 50 ML: 25 INJECTION, SOLUTION INTRAVENOUS at 04:53

## 2020-01-01 RX ADMIN — PHENYLEPHRINE HYDROCHLORIDE 6 MCG/KG/MIN: 10 INJECTION INTRAVENOUS at 01:27

## 2020-01-01 RX ADMIN — DIPHENHYDRAMINE HCL 25 MG: 25 TABLET ORAL at 21:10

## 2020-01-01 RX ADMIN — Medication 10 ML: at 08:37

## 2020-01-01 RX ADMIN — Medication 1 MG: at 06:14

## 2020-01-01 RX ADMIN — Medication 10 ML: at 04:45

## 2020-01-01 RX ADMIN — ASPIRIN 324 MG: 81 TABLET, CHEWABLE ORAL at 19:20

## 2020-01-01 RX ADMIN — TRAZODONE HYDROCHLORIDE 100 MG: 100 TABLET ORAL at 20:06

## 2020-01-01 RX ADMIN — SODIUM CHLORIDE 0.5 MCG/KG/MIN: 9 INJECTION, SOLUTION INTRAVENOUS at 08:47

## 2020-01-01 RX ADMIN — SODIUM CHLORIDE 2 UNITS/HR: 9 INJECTION, SOLUTION INTRAVENOUS at 01:28

## 2020-01-01 RX ADMIN — Medication 10 ML: at 20:06

## 2020-01-01 RX ADMIN — Medication 10 ML: at 21:03

## 2020-01-01 RX ADMIN — DEXTROSE MONOHYDRATE 50 ML: 25 INJECTION, SOLUTION INTRAVENOUS at 01:00

## 2020-01-01 RX ADMIN — MIDAZOLAM 2 MG: 1 INJECTION INTRAMUSCULAR; INTRAVENOUS at 06:05

## 2020-01-01 RX ADMIN — MINERAL OIL, PETROLATUM: 425; 573 OINTMENT OPHTHALMIC at 03:36

## 2020-01-01 RX ADMIN — Medication 10 ML: at 13:01

## 2020-01-01 RX ADMIN — SODIUM CHLORIDE 0.5 MCG/KG/MIN: 9 INJECTION, SOLUTION INTRAVENOUS at 21:05

## 2020-01-01 RX ADMIN — SPIRONOLACTONE 25 MG: 25 TABLET, FILM COATED ORAL at 07:54

## 2020-01-01 RX ADMIN — Medication 1 MG: at 23:40

## 2020-01-01 RX ADMIN — CARVEDILOL 12.5 MG: 6.25 TABLET, FILM COATED ORAL at 08:06

## 2020-01-01 RX ADMIN — Medication 10 ML: at 08:21

## 2020-01-01 RX ADMIN — FUROSEMIDE 40 MG: 10 INJECTION, SOLUTION INTRAMUSCULAR; INTRAVENOUS at 08:43

## 2020-01-01 RX ADMIN — CALCIUM CHLORIDE 1 G: 100 INJECTION INTRAVENOUS; INTRAVENTRICULAR at 06:21

## 2020-01-01 RX ADMIN — FUROSEMIDE 40 MG: 10 INJECTION, SOLUTION INTRAMUSCULAR; INTRAVENOUS at 16:07

## 2020-01-01 RX ADMIN — ASPIRIN 81 MG: 81 TABLET, COATED ORAL at 08:06

## 2020-01-01 RX ADMIN — CALCIUM CHLORIDE 1 G: 100 INJECTION INTRAVENOUS; INTRAVENTRICULAR at 00:07

## 2020-01-01 RX ADMIN — LISINOPRIL 5 MG: 5 TABLET ORAL at 08:46

## 2020-01-01 RX ADMIN — CARVEDILOL 12.5 MG: 6.25 TABLET, FILM COATED ORAL at 16:06

## 2020-01-01 RX ADMIN — NITROGLYCERIN 10 MCG/MIN: 20 INJECTION INTRAVENOUS at 08:01

## 2020-01-01 RX ADMIN — DOBUTAMINE HYDROCHLORIDE 10 MCG/KG/MIN: 100 INJECTION INTRAVENOUS at 09:48

## 2020-01-01 RX ADMIN — CALCIUM CHLORIDE, MAGNESIUM CHLORIDE, SODIUM CHLORIDE, SODIUM BICARBONATE, POTASSIUM CHLORIDE AND SODIUM PHOSPHATE DIBASIC DIHYDRATE 1500 ML/HR: 3.68; 3.05; 6.34; 3.09; .314; .187 INJECTION INTRAVENOUS at 20:12

## 2020-01-01 RX ADMIN — Medication 1 MG: at 23:33

## 2020-01-01 RX ADMIN — MILRINONE LACTATE IN DEXTROSE 0.5 MCG/KG/MIN: 200 INJECTION, SOLUTION INTRAVENOUS at 03:22

## 2020-01-01 RX ADMIN — QUETIAPINE 50 MG: 25 TABLET, FILM COATED ORAL at 21:10

## 2020-01-01 RX ADMIN — ISOSORBIDE MONONITRATE 30 MG: 30 TABLET, EXTENDED RELEASE ORAL at 08:20

## 2020-01-01 RX ADMIN — NITROGLYCERIN 0.4 MG: 0.4 TABLET SUBLINGUAL at 06:48

## 2020-01-01 RX ADMIN — SODIUM CHLORIDE 0.03 UNITS/MIN: 9 INJECTION, SOLUTION INTRAVENOUS at 22:10

## 2020-01-01 RX ADMIN — IPRATROPIUM BROMIDE AND ALBUTEROL SULFATE 3 ML: 2.5; .5 SOLUTION RESPIRATORY (INHALATION) at 11:20

## 2020-01-01 RX ADMIN — INSULIN HUMAN 12 UNITS: 100 INJECTION, SOLUTION PARENTERAL at 00:58

## 2020-01-01 RX ADMIN — TRAZODONE HYDROCHLORIDE 100 MG: 100 TABLET ORAL at 20:26

## 2020-01-01 RX ADMIN — FERROUS SULFATE TAB EC 324 MG (65 MG FE EQUIVALENT) 324 MG: 324 (65 FE) TABLET DELAYED RESPONSE at 08:21

## 2020-01-01 RX ADMIN — CLOPIDOGREL BISULFATE 75 MG: 75 TABLET ORAL at 08:43

## 2020-01-01 RX ADMIN — SODIUM BICARBONATE: 84 INJECTION, SOLUTION INTRAVENOUS at 21:05

## 2020-01-01 RX ADMIN — DIPHENHYDRAMINE HCL 25 MG: 25 TABLET ORAL at 21:04

## 2020-01-01 RX ADMIN — CALCIUM CHLORIDE, MAGNESIUM CHLORIDE, SODIUM CHLORIDE, SODIUM BICARBONATE, POTASSIUM CHLORIDE AND SODIUM PHOSPHATE DIBASIC DIHYDRATE 1500 ML/HR: 3.68; 3.05; 6.34; 3.09; .314; .187 INJECTION INTRAVENOUS at 14:45

## 2020-01-01 RX ADMIN — Medication 1 MG: at 23:49

## 2020-01-01 RX ADMIN — FUROSEMIDE 40 MG: 10 INJECTION, SOLUTION INTRAMUSCULAR; INTRAVENOUS at 17:18

## 2020-01-01 RX ADMIN — Medication 10 ML: at 08:46

## 2020-01-01 RX ADMIN — TRAZODONE HYDROCHLORIDE 100 MG: 100 TABLET ORAL at 20:49

## 2020-01-01 RX ADMIN — SODIUM POLYSTYRENE SULFONATE 15 G: 15 SUSPENSION ORAL; RECTAL at 23:02

## 2020-01-01 RX ADMIN — ASPIRIN 81 MG CHEWABLE TABLET 81 MG: 81 TABLET CHEWABLE at 08:43

## 2020-01-01 RX ADMIN — SODIUM BICARBONATE 150 MEQ: 84 INJECTION, SOLUTION INTRAVENOUS at 13:57

## 2020-01-01 RX ADMIN — MIDAZOLAM HYDROCHLORIDE 1 MG/HR: 5 INJECTION, SOLUTION INTRAMUSCULAR; INTRAVENOUS at 02:52

## 2020-01-01 RX ADMIN — LOPERAMIDE HYDROCHLORIDE 2 MG: 2 CAPSULE ORAL at 15:35

## 2020-01-01 RX ADMIN — Medication 10 ML: at 21:13

## 2020-01-01 RX ADMIN — TRAZODONE HYDROCHLORIDE 100 MG: 100 TABLET ORAL at 23:03

## 2020-01-01 RX ADMIN — Medication 10 ML: at 21:32

## 2020-01-01 RX ADMIN — DEXTROSE MONOHYDRATE 50 ML: 25 INJECTION, SOLUTION INTRAVENOUS at 20:55

## 2020-01-01 RX ADMIN — QUETIAPINE 50 MG: 25 TABLET, FILM COATED ORAL at 21:04

## 2020-01-01 RX ADMIN — NICOTINE 1 PATCH: 21 PATCH TRANSDERMAL at 21:54

## 2020-01-01 RX ADMIN — DOBUTAMINE HYDROCHLORIDE 10 MCG/KG/MIN: 100 INJECTION INTRAVENOUS at 19:07

## 2020-01-01 RX ADMIN — WATER 50 NG/KG/MIN: 1 SOLUTION INTRAVENOUS at 14:40

## 2020-01-01 RX ADMIN — NITROGLYCERIN 1 INCH: 20 OINTMENT TOPICAL at 19:22

## 2020-01-01 RX ADMIN — SODIUM CHLORIDE 100 MG: 9 INJECTION, SOLUTION INTRAVENOUS at 03:37

## 2020-01-01 RX ADMIN — SODIUM CHLORIDE 0.3 MCG/KG/MIN: 9 INJECTION, SOLUTION INTRAVENOUS at 01:05

## 2020-01-01 RX ADMIN — CHLORHEXIDINE GLUCONATE 0.12% ORAL RINSE 15 ML: 1.2 LIQUID ORAL at 02:38

## 2020-01-01 RX ADMIN — MINERAL OIL, PETROLATUM: 425; 573 OINTMENT OPHTHALMIC at 01:33

## 2020-01-01 RX ADMIN — VANCOMYCIN HYDROCHLORIDE 1500 MG: 100 INJECTION, POWDER, LYOPHILIZED, FOR SOLUTION INTRAVENOUS at 03:09

## 2020-01-01 RX ADMIN — Medication 1 MG: at 00:15

## 2020-01-01 RX ADMIN — QUETIAPINE FUMARATE 50 MG: 25 TABLET ORAL at 20:49

## 2020-01-01 RX ADMIN — FUROSEMIDE 20 MG: 20 INJECTION, SOLUTION INTRAMUSCULAR; INTRAVENOUS at 23:03

## 2020-01-01 RX ADMIN — Medication 10 ML: at 20:54

## 2020-01-01 RX ADMIN — SODIUM BICARBONATE 100 MEQ: 84 INJECTION, SOLUTION INTRAVENOUS at 02:38

## 2020-01-01 RX ADMIN — CEFEPIME HYDROCHLORIDE 2 G: 2 INJECTION, POWDER, FOR SOLUTION INTRAVENOUS at 02:18

## 2020-01-01 RX ADMIN — LORAZEPAM 1 MG: 1 TABLET ORAL at 09:34

## 2020-01-01 RX ADMIN — CLOPIDOGREL BISULFATE 75 MG: 75 TABLET ORAL at 09:31

## 2020-01-01 RX ADMIN — CALCIUM CHLORIDE, MAGNESIUM CHLORIDE, SODIUM CHLORIDE, SODIUM BICARBONATE, POTASSIUM CHLORIDE AND SODIUM PHOSPHATE DIBASIC DIHYDRATE 1500 ML/HR: 3.68; 3.05; 6.34; 3.09; .314; .187 INJECTION INTRAVENOUS at 23:30

## 2020-01-01 RX ADMIN — Medication 1 MG: at 00:06

## 2020-01-01 RX ADMIN — SODIUM BICARBONATE 100 MEQ: 84 INJECTION, SOLUTION INTRAVENOUS at 20:27

## 2020-01-01 RX ADMIN — Medication 150 MEQ: at 22:26

## 2020-01-01 RX ADMIN — MINERAL OIL, PETROLATUM: 425; 573 OINTMENT OPHTHALMIC at 21:03

## 2020-01-01 RX ADMIN — Medication 10 ML: at 20:55

## 2020-01-01 RX ADMIN — Medication 1 MG: at 23:46

## 2020-01-01 RX ADMIN — LOPERAMIDE HYDROCHLORIDE 2 MG: 2 CAPSULE ORAL at 07:54

## 2020-01-01 RX ADMIN — BUMETANIDE 2 MG: 0.25 INJECTION INTRAMUSCULAR; INTRAVENOUS at 13:01

## 2020-01-01 RX ADMIN — ISOSORBIDE MONONITRATE 30 MG: 30 TABLET, EXTENDED RELEASE ORAL at 16:06

## 2020-01-01 RX ADMIN — IPRATROPIUM BROMIDE AND ALBUTEROL SULFATE 3 ML: 2.5; .5 SOLUTION RESPIRATORY (INHALATION) at 06:55

## 2020-01-01 RX ADMIN — MINERAL OIL, PETROLATUM: 425; 573 OINTMENT OPHTHALMIC at 05:40

## 2020-01-01 RX ADMIN — MINERAL OIL, PETROLATUM: 425; 573 OINTMENT OPHTHALMIC at 14:00

## 2020-01-01 RX ADMIN — NITROGLYCERIN 5 MCG/MIN: 20 INJECTION INTRAVENOUS at 11:14

## 2020-01-01 RX ADMIN — Medication 10 ML: at 08:06

## 2020-01-01 RX ADMIN — CALCIUM CHLORIDE, MAGNESIUM CHLORIDE, SODIUM CHLORIDE, SODIUM BICARBONATE, POTASSIUM CHLORIDE AND SODIUM PHOSPHATE DIBASIC DIHYDRATE 1500 ML/HR: 3.68; 3.05; 6.34; 3.09; .314; .187 INJECTION INTRAVENOUS at 01:44

## 2020-01-01 RX ADMIN — MINERAL OIL, PETROLATUM: 425; 573 OINTMENT OPHTHALMIC at 18:17

## 2020-01-01 RX ADMIN — QUETIAPINE FUMARATE 50 MG: 25 TABLET ORAL at 20:06

## 2020-01-01 RX ADMIN — WATER 50 NG/KG/MIN: 1 SOLUTION INTRAVENOUS at 20:48

## 2020-01-01 RX ADMIN — PANTOPRAZOLE SODIUM 40 MG: 40 TABLET, DELAYED RELEASE ORAL at 15:35

## 2020-01-01 RX ADMIN — DOBUTAMINE HYDROCHLORIDE 10 MCG/KG/MIN: 100 INJECTION INTRAVENOUS at 23:44

## 2020-01-01 RX ADMIN — SODIUM CHLORIDE 0.03 UNITS/MIN: 9 INJECTION, SOLUTION INTRAVENOUS at 10:44

## 2020-01-01 RX ADMIN — MINERAL OIL, PETROLATUM: 425; 573 OINTMENT OPHTHALMIC at 19:21

## 2020-01-01 RX ADMIN — FERROUS SULFATE TAB EC 324 MG (65 MG FE EQUIVALENT) 324 MG: 324 (65 FE) TABLET DELAYED RESPONSE at 09:31

## 2020-01-01 RX ADMIN — CARVEDILOL 6.25 MG: 6.25 TABLET, FILM COATED ORAL at 13:01

## 2020-01-01 RX ADMIN — FERROUS SULFATE TAB EC 324 MG (65 MG FE EQUIVALENT) 324 MG: 324 (65 FE) TABLET DELAYED RESPONSE at 15:56

## 2020-01-01 RX ADMIN — SODIUM BICARBONATE: 84 INJECTION, SOLUTION INTRAVENOUS at 02:57

## 2020-01-01 RX ADMIN — ONDANSETRON 4 MG: 2 INJECTION INTRAMUSCULAR; INTRAVENOUS at 20:54

## 2020-01-01 RX ADMIN — SODIUM CHLORIDE 0.03 UNITS/MIN: 9 INJECTION, SOLUTION INTRAVENOUS at 01:02

## 2020-01-01 RX ADMIN — ASPIRIN 81 MG 324 MG: 81 TABLET ORAL at 06:48

## 2020-01-01 RX ADMIN — ASPIRIN 81 MG CHEWABLE TABLET 81 MG: 81 TABLET CHEWABLE at 08:20

## 2020-01-01 RX ADMIN — SODIUM CHLORIDE 1000 ML: 0.9 INJECTION, SOLUTION INTRAVENOUS at 23:51

## 2020-01-01 RX ADMIN — PANTOPRAZOLE SODIUM 40 MG: 40 TABLET, DELAYED RELEASE ORAL at 06:19

## 2020-01-01 RX ADMIN — SERTRALINE HYDROCHLORIDE 50 MG: 50 TABLET ORAL at 08:06

## 2020-01-01 RX ADMIN — CARVEDILOL 12.5 MG: 6.25 TABLET, FILM COATED ORAL at 07:51

## 2020-01-01 RX ADMIN — MINERAL OIL, PETROLATUM: 425; 573 OINTMENT OPHTHALMIC at 08:55

## 2020-01-01 RX ADMIN — SERTRALINE HYDROCHLORIDE 50 MG: 50 TABLET ORAL at 08:10

## 2020-01-01 RX ADMIN — IPRATROPIUM BROMIDE AND ALBUTEROL SULFATE 3 ML: 2.5; .5 SOLUTION RESPIRATORY (INHALATION) at 23:47

## 2020-01-01 RX ADMIN — LISINOPRIL 5 MG: 5 TABLET ORAL at 08:21

## 2020-01-01 RX ADMIN — TRAZODONE HYDROCHLORIDE 100 MG: 100 TABLET ORAL at 21:01

## 2020-01-01 RX ADMIN — NICOTINE 1 PATCH: 21 PATCH TRANSDERMAL at 21:04

## 2020-01-01 RX ADMIN — ONDANSETRON 4 MG: 2 INJECTION INTRAMUSCULAR; INTRAVENOUS at 17:28

## 2020-01-01 RX ADMIN — PHENYLEPHRINE HYDROCHLORIDE 4 MCG/KG/MIN: 10 INJECTION INTRAVENOUS at 05:18

## 2020-01-01 RX ADMIN — INSULIN HUMAN 12 UNITS: 100 INJECTION, SOLUTION PARENTERAL at 04:53

## 2020-01-01 RX ADMIN — CARVEDILOL 12.5 MG: 6.25 TABLET, FILM COATED ORAL at 08:21

## 2020-01-01 RX ADMIN — MINERAL OIL, PETROLATUM: 425; 573 OINTMENT OPHTHALMIC at 12:00

## 2020-01-01 RX ADMIN — SODIUM CHLORIDE 0.5 MCG/KG/MIN: 9 INJECTION, SOLUTION INTRAVENOUS at 01:27

## 2020-01-01 RX ADMIN — Medication 10 ML: at 20:38

## 2020-01-01 RX ADMIN — LOPERAMIDE HYDROCHLORIDE 2 MG: 2 CAPSULE ORAL at 17:11

## 2020-01-01 RX ADMIN — SODIUM CHLORIDE 0.7 MCG/KG/MIN: 9 INJECTION, SOLUTION INTRAVENOUS at 01:43

## 2020-01-01 RX ADMIN — SODIUM BICARBONATE 650 MG TABLET 1300 MG: at 18:33

## 2020-01-01 RX ADMIN — MINERAL OIL, PETROLATUM: 425; 573 OINTMENT OPHTHALMIC at 05:51

## 2020-01-01 RX ADMIN — BUMETANIDE 1 MG: 0.25 INJECTION INTRAMUSCULAR; INTRAVENOUS at 05:15

## 2020-01-01 RX ADMIN — MIDAZOLAM 2 MG: 1 INJECTION INTRAMUSCULAR; INTRAVENOUS at 02:22

## 2020-01-01 RX ADMIN — SODIUM BICARBONATE 650 MG TABLET 1300 MG: at 07:54

## 2020-01-01 RX ADMIN — SODIUM BICARBONATE 150 MEQ: 84 INJECTION, SOLUTION INTRAVENOUS at 22:26

## 2020-01-01 RX ADMIN — LEVETIRACETAM 500 MG: 5 INJECTION INTRAVENOUS at 17:03

## 2020-01-01 RX ADMIN — DOBUTAMINE HYDROCHLORIDE 7 MCG/KG/MIN: 100 INJECTION INTRAVENOUS at 04:05

## 2020-01-01 RX ADMIN — CALCIUM CHLORIDE, MAGNESIUM CHLORIDE, SODIUM CHLORIDE, SODIUM BICARBONATE, POTASSIUM CHLORIDE AND SODIUM PHOSPHATE DIBASIC DIHYDRATE 1500 ML/HR: 3.68; 3.05; 6.34; 3.09; .314; .187 INJECTION INTRAVENOUS at 01:43

## 2020-01-01 RX ADMIN — Medication 10 ML: at 20:27

## 2020-01-01 RX ADMIN — VECURONIUM BROMIDE 10 MG: 10 INJECTION, POWDER, LYOPHILIZED, FOR SOLUTION INTRAVENOUS at 07:50

## 2020-01-01 RX ADMIN — SODIUM CHLORIDE 40 MG/HR: 9 INJECTION, SOLUTION INTRAVENOUS at 08:48

## 2020-01-01 RX ADMIN — ASPIRIN 81 MG CHEWABLE TABLET 81 MG: 81 TABLET CHEWABLE at 09:31

## 2020-01-01 RX ADMIN — SODIUM BICARBONATE 50 MEQ: 84 INJECTION, SOLUTION INTRAVENOUS at 00:14

## 2020-01-01 RX ADMIN — ALBUMIN HUMAN 25 G: 0.25 SOLUTION INTRAVENOUS at 23:29

## 2020-01-01 RX ADMIN — Medication 10 ML: at 23:04

## 2020-01-01 RX ADMIN — Medication 1 MG: at 06:19

## 2020-01-01 RX ADMIN — Medication 5 MG: at 23:03

## 2020-01-01 RX ADMIN — DOPAMINE HYDROCHLORIDE 7.5 MCG/KG/MIN: 160 INJECTION, SOLUTION INTRAVENOUS at 00:05

## 2020-01-01 RX ADMIN — Medication 1 MG: at 00:03

## 2020-01-01 RX ADMIN — SODIUM CHLORIDE 300 MG: 900 INJECTION, SOLUTION INTRAVENOUS at 15:56

## 2020-01-01 RX ADMIN — PANTOPRAZOLE SODIUM 40 MG: 40 TABLET, DELAYED RELEASE ORAL at 05:15

## 2020-01-01 RX ADMIN — DOCUSATE SODIUM 100 MG: 100 CAPSULE, LIQUID FILLED ORAL at 02:34

## 2020-01-01 RX ADMIN — FUROSEMIDE 40 MG: 10 INJECTION, SOLUTION INTRAMUSCULAR; INTRAVENOUS at 17:58

## 2020-01-01 RX ADMIN — SERTRALINE HYDROCHLORIDE 50 MG: 50 TABLET ORAL at 17:11

## 2020-01-01 RX ADMIN — Medication 1 PATCH: at 22:24

## 2020-01-01 RX ADMIN — IPRATROPIUM BROMIDE AND ALBUTEROL SULFATE 3 ML: 2.5; .5 SOLUTION RESPIRATORY (INHALATION) at 18:42

## 2020-01-01 RX ADMIN — LISINOPRIL 5 MG: 5 TABLET ORAL at 10:11

## 2020-01-01 RX ADMIN — CARVEDILOL 3.12 MG: 3.12 TABLET, FILM COATED ORAL at 23:03

## 2020-01-01 RX ADMIN — ENOXAPARIN SODIUM 40 MG: 40 INJECTION SUBCUTANEOUS at 23:03

## 2020-01-01 RX ADMIN — CALCIUM CHLORIDE, MAGNESIUM CHLORIDE, SODIUM CHLORIDE, SODIUM BICARBONATE, POTASSIUM CHLORIDE AND SODIUM PHOSPHATE DIBASIC DIHYDRATE 1500 ML/HR: 3.68; 3.05; 6.34; 3.09; .314; .187 INJECTION INTRAVENOUS at 23:31

## 2020-01-01 RX ADMIN — NITROGLYCERIN 0.4 MG: 0.4 TABLET SUBLINGUAL at 06:54

## 2020-01-01 RX ADMIN — PHENYLEPHRINE HYDROCHLORIDE 6 MCG/KG/MIN: 10 INJECTION INTRAVENOUS at 22:10

## 2020-01-01 RX ADMIN — ENOXAPARIN SODIUM 40 MG: 40 INJECTION SUBCUTANEOUS at 17:28

## 2020-01-01 RX ADMIN — BUMETANIDE 1 MG: 0.25 INJECTION INTRAMUSCULAR; INTRAVENOUS at 06:19

## 2020-01-01 RX ADMIN — CARVEDILOL 12.5 MG: 6.25 TABLET, FILM COATED ORAL at 17:58

## 2020-01-01 RX ADMIN — FUROSEMIDE 40 MG: 10 INJECTION, SOLUTION INTRAMUSCULAR; INTRAVENOUS at 08:45

## 2020-01-01 RX ADMIN — ONDANSETRON 4 MG: 2 INJECTION INTRAMUSCULAR; INTRAVENOUS at 19:19

## 2020-01-01 RX ADMIN — QUETIAPINE FUMARATE 50 MG: 25 TABLET ORAL at 20:26

## 2020-01-01 RX ADMIN — Medication 10 ML: at 20:49

## 2020-01-01 RX ADMIN — CLOPIDOGREL BISULFATE 75 MG: 75 TABLET ORAL at 09:07

## 2020-01-01 RX ADMIN — AMIODARONE HYDROCHLORIDE 150 MG: 1.5 INJECTION, SOLUTION INTRAVENOUS at 23:41

## 2020-01-01 RX ADMIN — PHENYLEPHRINE HYDROCHLORIDE 0.5 MCG/KG/MIN: 10 INJECTION INTRAVENOUS at 01:03

## 2020-01-01 RX ADMIN — SODIUM BICARBONATE 50 MEQ: 84 INJECTION, SOLUTION INTRAVENOUS at 23:34

## 2020-01-01 RX ADMIN — ONDANSETRON 4 MG: 2 INJECTION INTRAMUSCULAR; INTRAVENOUS at 08:37

## 2020-01-01 RX ADMIN — HYDROCORTISONE SODIUM SUCCINATE 100 MG: 100 INJECTION, POWDER, FOR SOLUTION INTRAMUSCULAR; INTRAVENOUS at 18:17

## 2020-01-01 RX ADMIN — Medication 1 MG: at 23:36

## 2020-01-01 RX ADMIN — LISINOPRIL 5 MG: 5 TABLET ORAL at 08:43

## 2020-01-01 RX ADMIN — MINERAL OIL, PETROLATUM: 425; 573 OINTMENT OPHTHALMIC at 23:30

## 2020-01-01 RX ADMIN — Medication 10 ML: at 08:57

## 2020-01-01 RX ADMIN — Medication 1 MG: at 23:43

## 2020-01-01 RX ADMIN — SODIUM CHLORIDE 0.5 MCG/KG/MIN: 9 INJECTION, SOLUTION INTRAVENOUS at 13:18

## 2020-01-01 RX ADMIN — FERROUS SULFATE TAB EC 324 MG (65 MG FE EQUIVALENT) 324 MG: 324 (65 FE) TABLET DELAYED RESPONSE at 08:45

## 2020-01-01 RX ADMIN — WATER 50 NG/KG/MIN: 1 SOLUTION INTRAVENOUS at 08:57

## 2020-01-01 RX ADMIN — CARVEDILOL 12.5 MG: 6.25 TABLET, FILM COATED ORAL at 17:28

## 2020-01-01 RX ADMIN — ENOXAPARIN SODIUM 40 MG: 40 INJECTION SUBCUTANEOUS at 17:18

## 2020-01-01 RX ADMIN — CARVEDILOL 12.5 MG: 6.25 TABLET, FILM COATED ORAL at 17:11

## 2020-01-01 RX ADMIN — DOBUTAMINE HYDROCHLORIDE 10 MCG/KG/MIN: 100 INJECTION INTRAVENOUS at 15:29

## 2020-01-01 RX ADMIN — LOSARTAN POTASSIUM 25 MG: 25 TABLET, FILM COATED ORAL at 13:01

## 2020-01-01 RX ADMIN — CEFEPIME HYDROCHLORIDE 2 G: 2 INJECTION, POWDER, FOR SOLUTION INTRAVENOUS at 15:30

## 2020-01-01 RX ADMIN — DOPAMINE HYDROCHLORIDE IN DEXTROSE 7.5 MCG/KG/MIN: 1.6 INJECTION, SOLUTION INTRAVENOUS at 01:50

## 2020-01-01 RX ADMIN — SODIUM CHLORIDE 0.35 MCG/KG/MIN: 9 INJECTION, SOLUTION INTRAVENOUS at 08:46

## 2020-01-01 RX ADMIN — CARVEDILOL 12.5 MG: 6.25 TABLET, FILM COATED ORAL at 08:43

## 2020-01-01 RX ADMIN — MILRINONE LACTATE IN DEXTROSE 0.12 MCG/KG/MIN: 200 INJECTION, SOLUTION INTRAVENOUS at 20:28

## 2020-01-01 RX ADMIN — CARVEDILOL 3.12 MG: 3.12 TABLET, FILM COATED ORAL at 09:07

## 2020-01-01 RX ADMIN — DEXTROSE MONOHYDRATE 150 MEQ: 5 INJECTION, SOLUTION INTRAVENOUS at 18:41

## 2020-01-01 RX ADMIN — FERROUS SULFATE TAB EC 324 MG (65 MG FE EQUIVALENT) 324 MG: 324 (65 FE) TABLET DELAYED RESPONSE at 08:06

## 2020-01-01 RX ADMIN — MIDAZOLAM HYDROCHLORIDE 2 MG/HR: 5 INJECTION, SOLUTION INTRAMUSCULAR; INTRAVENOUS at 10:44

## 2020-01-01 RX ADMIN — Medication 1 MG: at 06:17

## 2020-01-01 RX ADMIN — FOLIC ACID 1 MG: 5 INJECTION, SOLUTION INTRAMUSCULAR; INTRAVENOUS; SUBCUTANEOUS at 02:53

## 2020-01-01 RX ADMIN — DOBUTAMINE HYDROCHLORIDE 5 MCG/KG/MIN: 100 INJECTION INTRAVENOUS at 00:47

## 2020-01-01 RX ADMIN — FERROUS SULFATE TAB EC 324 MG (65 MG FE EQUIVALENT) 324 MG: 324 (65 FE) TABLET DELAYED RESPONSE at 08:43

## 2020-01-01 RX ADMIN — QUETIAPINE FUMARATE 50 MG: 25 TABLET ORAL at 21:01

## 2020-01-01 RX ADMIN — Medication 10 ML: at 20:28

## 2020-01-01 RX ADMIN — ISOSORBIDE MONONITRATE 30 MG: 30 TABLET, EXTENDED RELEASE ORAL at 08:43

## 2020-01-01 RX ADMIN — ONDANSETRON 4 MG: 2 INJECTION INTRAMUSCULAR; INTRAVENOUS at 12:55

## 2020-01-01 RX ADMIN — CHLORHEXIDINE GLUCONATE 0.12% ORAL RINSE 15 ML: 1.2 LIQUID ORAL at 20:28

## 2020-01-01 RX ADMIN — ISOSORBIDE MONONITRATE 30 MG: 30 TABLET, EXTENDED RELEASE ORAL at 08:46

## 2020-01-01 RX ADMIN — SODIUM CHLORIDE 0.7 MCG/KG/MIN: 9 INJECTION, SOLUTION INTRAVENOUS at 05:19

## 2020-01-01 RX ADMIN — FUROSEMIDE 40 MG: 10 INJECTION, SOLUTION INTRAMUSCULAR; INTRAVENOUS at 08:21

## 2020-01-01 RX ADMIN — DOPAMINE HYDROCHLORIDE 20 MCG/KG/MIN: 160 INJECTION, SOLUTION INTRAVENOUS at 05:18

## 2020-01-01 RX ADMIN — CARVEDILOL 12.5 MG: 6.25 TABLET, FILM COATED ORAL at 17:18

## 2020-01-01 RX ADMIN — SODIUM CHLORIDE 0.35 MCG/KG/MIN: 9 INJECTION, SOLUTION INTRAVENOUS at 17:50

## 2020-01-01 RX ADMIN — CARVEDILOL 12.5 MG: 6.25 TABLET, FILM COATED ORAL at 20:38

## 2020-01-01 RX ADMIN — LISINOPRIL 2.5 MG: 5 TABLET ORAL at 08:06

## 2020-01-01 RX ADMIN — LISINOPRIL 2.5 MG: 5 TABLET ORAL at 12:17

## 2020-01-01 RX ADMIN — Medication 1 MG: at 23:30

## 2020-01-01 RX ADMIN — QUETIAPINE FUMARATE 50 MG: 25 TABLET ORAL at 20:54

## 2020-01-01 RX ADMIN — IPRATROPIUM BROMIDE AND ALBUTEROL SULFATE 3 ML: 2.5; .5 SOLUTION RESPIRATORY (INHALATION) at 15:07

## 2020-01-01 RX ADMIN — ENOXAPARIN SODIUM 40 MG: 40 INJECTION SUBCUTANEOUS at 15:35

## 2020-01-01 RX ADMIN — MIDAZOLAM HYDROCHLORIDE 2 MG: 1 INJECTION INTRAMUSCULAR; INTRAVENOUS at 02:22

## 2020-01-01 RX ADMIN — BUMETANIDE 1 MG: 0.25 INJECTION INTRAMUSCULAR; INTRAVENOUS at 18:33

## 2020-01-01 RX ADMIN — DIPHENHYDRAMINE HCL 25 MG: 25 TABLET ORAL at 21:54

## 2020-01-01 RX ADMIN — HEPARIN SODIUM 18 UNITS/KG/HR: 10000 INJECTION, SOLUTION INTRAVENOUS at 09:49

## 2020-01-01 RX ADMIN — LOPERAMIDE HYDROCHLORIDE 4 MG: 2 CAPSULE ORAL at 22:34

## 2020-01-01 RX ADMIN — Medication 3 ML: at 08:07

## 2020-01-01 RX ADMIN — DOPAMINE HYDROCHLORIDE 5 MCG/KG/MIN: 160 INJECTION, SOLUTION INTRAVENOUS at 00:05

## 2020-01-01 RX ADMIN — NITROGLYCERIN 0.4 MG: 0.4 TABLET SUBLINGUAL at 09:56

## 2020-01-01 RX ADMIN — CALCIUM CHLORIDE, MAGNESIUM CHLORIDE, SODIUM CHLORIDE, SODIUM BICARBONATE, POTASSIUM CHLORIDE AND SODIUM PHOSPHATE DIBASIC DIHYDRATE 1500 ML/HR: 3.68; 3.05; 6.34; 3.09; .314; .187 INJECTION INTRAVENOUS at 01:42

## 2020-01-01 RX ADMIN — SODIUM CHLORIDE 0.35 MCG/KG/MIN: 9 INJECTION, SOLUTION INTRAVENOUS at 13:17

## 2020-01-01 RX ADMIN — PHENYLEPHRINE HYDROCHLORIDE 5 MCG/KG/MIN: 10 INJECTION INTRAVENOUS at 19:21

## 2020-01-01 RX ADMIN — LEVETIRACETAM 1500 MG: 1500 INJECTION, SOLUTION INTRAVENOUS at 04:21

## 2020-01-01 RX ADMIN — SODIUM CHLORIDE 300 MG: 900 INJECTION, SOLUTION INTRAVENOUS at 15:35

## 2020-01-01 RX ADMIN — VECURONIUM BROMIDE 10 MG: 10 INJECTION, POWDER, LYOPHILIZED, FOR SOLUTION INTRAVENOUS at 17:03

## 2020-01-01 RX ADMIN — PANTOPRAZOLE SODIUM 40 MG: 40 TABLET, DELAYED RELEASE ORAL at 06:20

## 2020-01-01 RX ADMIN — CALCIUM CHLORIDE, MAGNESIUM CHLORIDE, SODIUM CHLORIDE, SODIUM BICARBONATE, POTASSIUM CHLORIDE AND SODIUM PHOSPHATE DIBASIC DIHYDRATE 1500 ML/HR: 3.68; 3.05; 6.34; 3.09; .314; .187 INJECTION INTRAVENOUS at 20:13

## 2020-01-01 RX ADMIN — ASPIRIN 81 MG CHEWABLE TABLET 81 MG: 81 TABLET CHEWABLE at 08:45

## 2020-01-01 RX ADMIN — VANCOMYCIN HYDROCHLORIDE 1500 MG: 100 INJECTION, POWDER, LYOPHILIZED, FOR SOLUTION INTRAVENOUS at 16:19

## 2020-01-01 RX ADMIN — SODIUM CHLORIDE 50 MCG/HR: 9 INJECTION, SOLUTION INTRAVENOUS at 04:26

## 2020-01-01 RX ADMIN — HYDROCORTISONE SODIUM SUCCINATE 100 MG: 100 INJECTION, POWDER, FOR SOLUTION INTRAMUSCULAR; INTRAVENOUS at 09:48

## 2020-01-01 RX ADMIN — QUETIAPINE FUMARATE 50 MG: 25 TABLET ORAL at 23:03

## 2020-01-01 RX ADMIN — SODIUM BICARBONATE 50 MEQ: 84 INJECTION, SOLUTION INTRAVENOUS at 06:18

## 2020-01-01 RX ADMIN — Medication 1 MG: at 23:58

## 2020-01-01 RX ADMIN — FUROSEMIDE 40 MG: 10 INJECTION, SOLUTION INTRAMUSCULAR; INTRAVENOUS at 08:36

## 2020-01-01 RX ADMIN — Medication 1 MG: at 06:24

## 2020-01-01 RX ADMIN — IOPAMIDOL 100 ML: 755 INJECTION, SOLUTION INTRAVENOUS at 09:06

## 2020-01-01 RX ADMIN — SODIUM CHLORIDE 300 MG: 900 INJECTION, SOLUTION INTRAVENOUS at 14:15

## 2020-01-01 RX ADMIN — Medication 1 MG: at 00:12

## 2020-01-01 RX ADMIN — ENOXAPARIN SODIUM 40 MG: 40 INJECTION SUBCUTANEOUS at 15:54

## 2020-01-01 RX ADMIN — CLOPIDOGREL BISULFATE 75 MG: 75 TABLET ORAL at 08:20

## 2020-01-01 RX ADMIN — CALCIUM GLUCONATE 2 G: 20 INJECTION, SOLUTION INTRAVENOUS at 14:00

## 2020-01-01 RX ADMIN — LOPERAMIDE HYDROCHLORIDE 2 MG: 2 CAPSULE ORAL at 21:04

## 2020-01-01 RX ADMIN — Medication 1 MG: at 00:07

## 2020-01-01 RX ADMIN — ENOXAPARIN SODIUM 40 MG: 40 INJECTION SUBCUTANEOUS at 17:11

## 2020-01-01 RX ADMIN — ISOSORBIDE MONONITRATE 30 MG: 30 TABLET, EXTENDED RELEASE ORAL at 16:05

## 2020-01-01 RX ADMIN — ENOXAPARIN SODIUM 40 MG: 40 INJECTION SUBCUTANEOUS at 16:04

## 2020-01-01 RX ADMIN — BUMETANIDE 1 MG: 0.25 INJECTION INTRAMUSCULAR; INTRAVENOUS at 17:11

## 2020-01-01 RX ADMIN — LISINOPRIL 5 MG: 5 TABLET ORAL at 09:31

## 2020-01-01 RX ADMIN — CARVEDILOL 12.5 MG: 6.25 TABLET, FILM COATED ORAL at 08:46

## 2020-01-01 RX ADMIN — SODIUM CHLORIDE 0.3 MCG/KG/MIN: 9 INJECTION, SOLUTION INTRAVENOUS at 03:27

## 2020-01-01 RX ADMIN — CARVEDILOL 12.5 MG: 6.25 TABLET, FILM COATED ORAL at 09:31

## 2020-01-01 RX ADMIN — CEFEPIME HYDROCHLORIDE 2 G: 2 INJECTION, POWDER, FOR SOLUTION INTRAVENOUS at 03:34

## 2020-01-01 RX ADMIN — CARVEDILOL 3.12 MG: 3.12 TABLET, FILM COATED ORAL at 08:06

## 2020-01-01 RX ADMIN — PANTOPRAZOLE SODIUM 40 MG: 40 INJECTION, POWDER, FOR SOLUTION INTRAVENOUS at 02:38

## 2020-01-01 RX ADMIN — CALCIUM CHLORIDE 1 G: 100 INJECTION INTRAVENOUS; INTRAVENTRICULAR at 23:37

## 2020-01-01 RX ADMIN — CLOPIDOGREL BISULFATE 75 MG: 75 TABLET ORAL at 08:45

## 2020-01-01 RX ADMIN — NICOTINE 1 PATCH: 21 PATCH TRANSDERMAL at 21:12

## 2020-01-01 RX ADMIN — LORAZEPAM 2 MG: 2 INJECTION INTRAMUSCULAR; INTRAVENOUS at 04:44

## 2020-01-01 RX ADMIN — MAGNESIUM SULFATE HEPTAHYDRATE 4 G: 40 INJECTION, SOLUTION INTRAVENOUS at 05:19

## 2020-01-01 RX ADMIN — Medication 3 ML: at 21:13

## 2020-01-01 RX ADMIN — LOPERAMIDE HYDROCHLORIDE 2 MG: 2 CAPSULE ORAL at 20:38

## 2020-01-01 RX ADMIN — SODIUM CHLORIDE 0.5 MCG/KG/MIN: 9 INJECTION, SOLUTION INTRAVENOUS at 01:06

## 2020-01-01 RX ADMIN — SODIUM BICARBONATE 150 MEQ: 84 INJECTION, SOLUTION INTRAVENOUS at 07:02

## 2020-01-01 RX ADMIN — Medication 1 MG: at 23:57

## 2020-01-01 RX ADMIN — CHLORHEXIDINE GLUCONATE 0.12% ORAL RINSE 15 ML: 1.2 LIQUID ORAL at 09:00

## 2020-01-01 RX ADMIN — TRAZODONE HYDROCHLORIDE 100 MG: 100 TABLET ORAL at 20:54

## 2020-01-01 RX ADMIN — ONDANSETRON 4 MG: 2 INJECTION INTRAMUSCULAR; INTRAVENOUS at 13:04

## 2020-01-01 RX ADMIN — TRAMADOL HYDROCHLORIDE 50 MG: 50 TABLET, FILM COATED ORAL at 22:34

## 2020-01-01 RX ADMIN — CALCIUM GLUCONATE 2 G: 20 INJECTION, SOLUTION INTRAVENOUS at 04:53

## 2020-01-01 RX ADMIN — SODIUM CHLORIDE 0.5 MCG/KG/MIN: 9 INJECTION, SOLUTION INTRAVENOUS at 05:20

## 2020-01-01 RX ADMIN — LORAZEPAM 2 MG: 2 INJECTION INTRAMUSCULAR; INTRAVENOUS at 20:55

## 2020-01-01 RX ADMIN — Medication 1 MG: at 06:22

## 2020-01-01 RX ADMIN — HYDROCORTISONE SODIUM SUCCINATE 100 MG: 100 INJECTION, POWDER, FOR SOLUTION INTRAMUSCULAR; INTRAVENOUS at 02:18

## 2020-01-01 RX ADMIN — WATER 50 NG/KG/MIN: 1 SOLUTION INTRAVENOUS at 03:12

## 2020-01-01 RX ADMIN — CLOPIDOGREL BISULFATE 75 MG: 75 TABLET ORAL at 09:56

## 2020-01-01 RX ADMIN — HYDROXYZINE HYDROCHLORIDE 50 MG: 25 TABLET, FILM COATED ORAL at 20:27

## 2020-01-01 RX ADMIN — MINERAL OIL, PETROLATUM: 425; 573 OINTMENT OPHTHALMIC at 15:28

## 2020-01-01 RX ADMIN — LEVETIRACETAM 500 MG: 5 INJECTION INTRAVENOUS at 03:36

## 2020-01-01 RX ADMIN — Medication 10 ML: at 08:55

## 2020-01-01 RX ADMIN — IPRATROPIUM BROMIDE AND ALBUTEROL SULFATE 3 ML: 2.5; .5 SOLUTION RESPIRATORY (INHALATION) at 03:12

## 2020-01-01 RX ADMIN — Medication 10 ML: at 09:07

## 2020-01-01 RX ADMIN — SPIRONOLACTONE 25 MG: 25 TABLET, FILM COATED ORAL at 08:06

## 2020-01-01 RX ADMIN — LORAZEPAM 2 MG: 2 INJECTION INTRAMUSCULAR; INTRAVENOUS at 13:39

## 2020-01-01 RX ADMIN — HYDROXYZINE HYDROCHLORIDE 50 MG: 25 TABLET, FILM COATED ORAL at 02:21

## 2020-01-01 RX ADMIN — SODIUM CHLORIDE 40 MG/HR: 9 INJECTION, SOLUTION INTRAVENOUS at 11:23

## 2020-01-01 RX ADMIN — MELATONIN TAB 5 MG 5 MG: 5 TAB at 20:38

## 2020-03-22 PROBLEM — I50.23 CHF (CONGESTIVE HEART FAILURE), NYHA CLASS III, ACUTE ON CHRONIC, SYSTOLIC (HCC): Status: ACTIVE | Noted: 2020-01-01

## 2020-03-22 PROBLEM — R07.9 CHEST PAIN: Status: ACTIVE | Noted: 2020-01-01

## 2020-03-24 ENCOUNTER — INPATIENT HOSPITAL (OUTPATIENT)
Dept: URBAN - METROPOLITAN AREA HOSPITAL 84 | Facility: HOSPITAL | Age: 45
End: 2020-03-24
Payer: COMMERCIAL

## 2020-03-24 DIAGNOSIS — F17.200 NICOTINE DEPENDENCE, UNSPECIFIED, UNCOMPLICATED: ICD-10-CM

## 2020-03-24 DIAGNOSIS — F10.20 ALCOHOL DEPENDENCE, UNCOMPLICATED: ICD-10-CM

## 2020-03-24 DIAGNOSIS — K52.89 OTHER SPECIFIED NONINFECTIVE GASTROENTERITIS AND COLITIS: ICD-10-CM

## 2020-03-24 DIAGNOSIS — F19.20 OTHER PSYCHOACTIVE SUBSTANCE DEPENDENCE, UNCOMPLICATED: ICD-10-CM

## 2020-03-24 DIAGNOSIS — D50.9 IRON DEFICIENCY ANEMIA, UNSPECIFIED: ICD-10-CM

## 2020-03-24 DIAGNOSIS — Z91.19 PATIENT'S NONCOMPLIANCE WITH OTHER MEDICAL TREATMENT AND REG: ICD-10-CM

## 2020-03-24 PROBLEM — I21.4 NON-ST ELEVATION MYOCARDIAL INFARCTION (NSTEMI) (HCC): Status: ACTIVE | Noted: 2020-01-01

## 2020-03-24 PROBLEM — F19.10 DRUG ABUSE (HCC): Status: ACTIVE | Noted: 2020-01-01

## 2020-03-24 PROBLEM — R07.2 PRECORDIAL PAIN: Status: ACTIVE | Noted: 2020-01-01

## 2020-03-24 PROCEDURE — 99222 1ST HOSP IP/OBS MODERATE 55: CPT | Performed by: INTERNAL MEDICINE

## 2020-03-26 NOTE — OUTREACH NOTE
Prep Survey      Responses   [facility] facility patient discharged from?  [facility]   Is LACE score < 7 ?  No   Eligibility  Readm Mgmt   Discharge diagnosis  Acute systolic CHF   Does the patient have one of the following disease processes/diagnoses(primary or secondary)?  CHF   Does the patient have Home health ordered?  No   Is there a DME ordered?  No   Prep survey completed?  Yes          Miri Alcantara RN

## 2020-03-30 NOTE — OUTREACH NOTE
CHF Week 1 Survey      Responses   [facility] patient discharged from?  [facility]   Does the patient have one of the following disease processes/diagnoses(primary or secondary)?  CHF   Is there a successful TCM telephone encounter documented?  No   CHF Week 1 attempt successful?  No   Unsuccessful attempts  Attempt 1          Kathy Nascimento RN

## 2020-04-01 NOTE — OUTREACH NOTE
CHF Week 2 Survey      Responses   [facility] patient discharged from?  [facility]   Does the patient have one of the following disease processes/diagnoses(primary or secondary)?  CHF          Yoana Bishop LPN

## 2020-04-02 NOTE — OUTREACH NOTE
CHF Week 1 Survey      Responses   [facility] patient discharged from?  [facility]   Does the patient have one of the following disease processes/diagnoses(primary or secondary)?  CHF   Is there a successful TCM telephone encounter documented?  No   CHF Week 1 attempt successful?  No   Unsuccessful attempts  Attempt 3          Rhoda Pickens RN

## 2020-04-06 NOTE — TELEPHONE ENCOUNTER
Returned call. States he's compliant with medication. Still weak but slowly getting better. Will call Dr. Lowe to make follow up appointment. Appreciated the call.

## 2020-04-08 NOTE — OUTREACH NOTE
CHF Week 2 Survey      Responses   [facility] [place] patient discharged from?  [facility]   Does the patient have one of the following disease processes/diagnoses(primary or secondary)?  CHF   Week 2 attempt successful?  No   Unsuccessful attempts  Attempt 1          Radha Cedeno RN

## 2020-04-09 NOTE — OUTREACH NOTE
CHF Week 2 Survey      Responses   [facility] [place] patient discharged from?  [facility]   Does the patient have one of the following disease processes/diagnoses(primary or secondary)?  CHF   Week 2 attempt successful?  No   Unsuccessful attempts  Attempt 2          aTta Noel RN

## 2020-04-14 NOTE — OUTREACH NOTE
CHF Week 3 Survey      Responses   [facility] [place] patient discharged from?  [facility]   Does the patient have one of the following disease processes/diagnoses(primary or secondary)?  CHF   Week 3 attempt successful?  No   Unsuccessful attempts  Attempt 1          Kathy Nascimento RN

## 2020-04-15 NOTE — OUTREACH NOTE
CHF Week 3 Survey      Responses   [facility] patient discharged from?  [facility]   Does the patient have one of the following disease processes/diagnoses(primary or secondary)?  CHF   Week 3 attempt successful?  No   Unsuccessful attempts  Attempt 2          Tata Noel RN

## 2020-04-21 ENCOUNTER — OFFICE (OUTPATIENT)
Dept: URBAN - METROPOLITAN AREA CLINIC 64 | Facility: CLINIC | Age: 45
End: 2020-04-21
Payer: COMMERCIAL

## 2020-04-21 VITALS — HEIGHT: 73 IN

## 2020-04-21 DIAGNOSIS — K30 FUNCTIONAL DYSPEPSIA: ICD-10-CM

## 2020-04-21 DIAGNOSIS — R19.4 CHANGE IN BOWEL HABIT: ICD-10-CM

## 2020-04-21 DIAGNOSIS — D50.0 IRON DEFICIENCY ANEMIA SECONDARY TO BLOOD LOSS (CHRONIC): ICD-10-CM

## 2020-04-21 PROCEDURE — 99214 OFFICE O/P EST MOD 30 MIN: CPT | Mod: GT | Performed by: INTERNAL MEDICINE

## 2020-04-22 NOTE — PROGRESS NOTES
Date of Office Visit: 2020  Encounter Provider: Dr. Solomon Lowe  Place of Service: [facility] [place]  Patient Name: Asad Bethea  :1975  Provider, No Known     You have chosen to receive care through a telephone visit. Do you consent to use a telephone visit for your medical care today? Yes    Chief Complaint   Patient presents with   • Chest Pain     post heart cath pci x1   • Congestive Heart Failure   • Hypertension   • Syncope     History of Present Illness:    I am pleased to have telephone visit with Mr. Bethea in my office as a follow-up after his recent hospital admission and discharge.    As you know, patient is 44-year-old white gentleman whose past medical history significant for hypertension, hepatitis C, anxiety disorder, who had telephone visit today for follow-up.    This visit has been rescheduled as a phone visit to comply with patient safety concerns in accordance with CDC recommendations. Total time of discussion was 21 minutes.    In in 2019, patient was admitted at [facility] with symptom of chest pain and shortness of breath.  Patient was anemic.  Patient underwent upper endoscopy and was found to have bleeding ulcer.  Patient underwent cautery which was insufficient and subsequently coiling of visceral artery was done.  Patient underwent stress test which was abnormal but cardiac catheterization was deferred because of active GI bleeding.  Medical treatment was recommended.    In 2020, patient was presented with symptom of shortness of breath and chest pain.  He ruled in for myocardial infarction.  Echocardiogram showed EF of 30 to 35%.  Patient underwent cardiac catheterization and high-grade stenosis of LAD was noted.  Patient underwent successful stenting.  Patient was started on aspirin and Plavix.    Since the previous visit, patient is overall doing fairly well from cardiovascular standpoint.  He has occasional chest pain.  No  significant shortness of breath.  No orthopnea or PND.    Patient is doing well.  Patient is being considered for possible colonoscopy.  Patient can proceed with colonoscopy until unless patient does not stop his aspirin and Plavix.  At this stage I would say to defer the colonoscopy if it is not medically urgent.  Especially if aspirin and Plavix is needed to be stopped.  Patient had recent coronary artery stenting.      Past Medical History:   Diagnosis Date   • Abnormal nuclear stress test 6/28/2019   • Acute back pain 7/8/2019   • ADHD (attention deficit hyperactivity disorder)    • Alcohol dependence (CMS/[facility])    • Anxiety    • Depression    • Depression with anxiety 7/14/2019   • Essential hypertension 6/28/2019   • Generalized abdominal pain 8/2/2019   • Hepatitis C 06/2019   • Hypertensive urgency 7/8/2019   • Hyponatremia 8/4/2019   • Intractable vomiting with nausea 7/13/2019    Added automatically from request for surgery [id number]   • Medically noncompliant 7/8/2019   • Melena 7/14/2019   • NICM (nonischemic cardiomyopathy) (CMS/[facility]) 7/14/2019   • Non-ST elevation myocardial infarction (NSTEMI) (CMS/[facility]) 3/24/2020   • Substance abuse (CMS/[facility])    • Tobacco dependency          Past Surgical History:   Procedure Laterality Date   • CARDIAC CATHETERIZATION     • CARDIAC CATHETERIZATION N/A 7/9/2019    Procedure: LEFT HEART CATH with possible PCI;  Surgeon: PAL Johnson MD;  Location: [facility] CATH INVASIVE LOCATION;  Service: Cardiovascular   • CARDIAC CATHETERIZATION N/A 3/24/2020    Procedure: LEFT HEART CATH with possible PCI;  Surgeon: Solomon Lowe MD;  Location: [facility] CATH INVASIVE LOCATION;  Service: Cardiovascular;  Laterality: N/A;  Local and IV sedation   • CARDIAC CATHETERIZATION N/A 3/24/2020    Procedure: Stent ATUL coronary;  Surgeon: Solomon Lowe MD;  Location: [facility] CATH INVASIVE LOCATION;  Service: Cardiovascular;  Laterality: N/A;   • CARDIOVASCULAR STRESS TEST  2019   • ECHO - CONVERTED   2019   • ENDOSCOPY N/A 7/14/2019    Procedure: ESOPHAGOGASTRODUODENOSCOPY WITH SCLEROTHERAPY, BICAP CAUTERY OF DUODENAL ULCER AND HEMOSTASIS SPRAY OF DUODENAL ULCER;  Surgeon: Wesly Myers MD;  Location: [facility] ENDOSCOPY;  Service: Gastroenterology   • EXPLORATORY LAPAROTOMY N/A 7/16/2019    Procedure: LAPAROTOMY EXPLORATORY;  Surgeon: Patience Peng MD;  Location: [facility] MAIN OR;  Service: General   • WRIST SURGERY N/A            Current Outpatient Medications:   •  amLODIPine (NORVASC) 5 MG tablet, 1 tablet Daily., Disp: , Rfl:   •  atorvastatin (LIPITOR) 40 MG tablet, 1 tablet Daily., Disp: , Rfl:   •  carvedilol (COREG) 3.125 MG tablet, Take 1 tablet by mouth 2 (Two) Times a Day With Meals., Disp: 60 tablet, Rfl: 0  •  citalopram (CeleXA) 20 MG tablet, 1 tablet Daily., Disp: , Rfl:   •  clopidogrel (PLAVIX) 75 MG tablet, Take 1 tablet by mouth Daily., Disp: 30 tablet, Rfl: 0  •  ferrous sulfate 324 (65 Fe) MG tablet delayed-release EC tablet, Take 1 tablet by mouth 2 (Two) Times a Day With Meals., Disp: 60 tablet, Rfl: 0  •  furosemide (Lasix) 20 MG tablet, Take 1 tablet by mouth Daily., Disp: 30 tablet, Rfl: 0  •  LANTUS 100 UNIT/ML injection, Take As Directed., Disp: , Rfl:   •  lisinopril (PRINIVIL,ZESTRIL) 2.5 MG tablet, Take 1 tablet by mouth Daily., Disp: 30 tablet, Rfl: 0  •  lisinopril (PRINIVIL,ZESTRIL) 20 MG tablet, 1 tablet Daily., Disp: , Rfl:   •  loperamide (IMODIUM) 2 MG capsule, Take 1 capsule by mouth 3 (Three) Times a Day As Needed for Diarrhea., Disp: 15 capsule, Rfl: 0  •  metoprolol tartrate (LOPRESSOR) 50 MG tablet, 1 tablet 2 (Two) Times a Day., Disp: , Rfl:   •  omeprazole (priLOSEC) 40 MG capsule, Take 1 capsule by mouth Daily., Disp: 30 capsule, Rfl: 0  •  QUEtiapine (SEROquel) 50 MG tablet, Take 1 tablet by mouth Every Night., Disp: 30 tablet, Rfl: 0  •  sertraline (ZOLOFT) 50 MG tablet, Take 1 tablet by mouth Daily., Disp: 30 tablet, Rfl: 0  •  spironolactone (ALDACTONE) 25 MG  "tablet, Take 1 tablet by mouth Daily., Disp: 30 tablet, Rfl: 0  •  traZODone (DESYREL) 100 MG tablet, Take 1 tablet by mouth Every Night., Disp: 30 tablet, Rfl: 0      Social History     Socioeconomic History   • Marital status:      Spouse name: Not on file   • Number of children: Not on file   • Years of education: Not on file   • Highest education level: Not on file   Tobacco Use   • Smoking status: Former Smoker     Packs/day: 1.00     Years: 20.00     Pack years: 20.00     Types: Cigarettes     Last attempt to quit: 2019     Years since quittin.8   • Smokeless tobacco: Former User   Substance and Sexual Activity   • Alcohol use: Yes     Frequency: Never     Comment: use to drink half gallon of liquor per day; down to 1 pint of liquor per day; quit 2019   • Drug use: Yes     Types: Cocaine, Methamphetamines     Comment: last used meth about 2 months ago   • Sexual activity: Defer   Social History Narrative    He reports extensive history of depression, anxiety, and substance abuse. He's been diagnosed with ADD and bipolar disorder, and has been treated at [facility] and [facility]. Medications in the past have made him feel \"slow\" and \"dumb\". He reports that his mind is always going \"a million miles a minute\", and he \"can't get shit done\" because he can't focus. He reports very low self esteem. He reports his son's mother has told him they'd be better off if he was dead and he wonders if that's true, but states tearfully that he doesn't want to die. He has quit drinking and doing drugs in the past, and says he went to Voodoo a lot. He's never had a sponsor or really participated in AA. He did cocaine on and off for around 10 years but quit when his son was born 9 yrs ago. He says that cocaine calmed him down. In the past couple years, he started using meth which calms him down if he snorts it, but amps him up if he smokes it. Alcohol calms him down. He reports feeling very scared right now " "and worried that he's going to die. He seems ready to address his substance abuse and try to live a healthier lifestyle. His son is his main motivation.          Review of Systems   Constitution: Negative for chills and fever.   HENT: Negative for ear discharge and nosebleeds.    Eyes: Negative for discharge and redness.   Cardiovascular: Positive for chest pain. Negative for orthopnea, palpitations, paroxysmal nocturnal dyspnea and syncope.   Respiratory: Positive for shortness of breath. Negative for cough and wheezing.    Endocrine: Negative for heat intolerance.   Skin: Negative for rash.   Musculoskeletal: Positive for arthritis and joint pain. Negative for myalgias.   Gastrointestinal: Negative for abdominal pain, melena, nausea and vomiting.   Genitourinary: Negative for dysuria and hematuria.   Neurological: Negative for dizziness, light-headedness, numbness and tremors.   Psychiatric/Behavioral: Negative for depression. The patient is not nervous/anxious.        Procedures    Procedures    No orders to display           Objective:    Ht 185.4 cm (72.99\")   Wt 92.1 kg (203 lb)   BMI 26.79 kg/m²         Physical Exam   Constitutional: He is oriented to person, place, and time.   Musculoskeletal: He exhibits no edema.   Neurological: He is alert and oriented to person, place, and time.           Assessment:       Diagnosis Plan   1. Essential hypertension     2. CHF (congestive heart failure), NYHA class III, acute on chronic, systolic (CMS/[facility])     3. Chest pain, atypical              Plan:       At this stage, I would continue current treatment.  I will repeat echocardiogram on next visit.  Patient can only proceed with colonoscopy if aspirin and Plavix would not be stopped.  "

## 2020-05-14 NOTE — PATIENT INSTRUCTIONS
If increased depression or anxiety need to be seen.  Start the increased dose of your medicaitons.  Follow up with psych  Stop smoking  Talk to pharmacy about incontinent supplies (depends)

## 2020-05-14 NOTE — PROGRESS NOTES
Subjective   Asad Bethea is a 44 y.o. male.     Chief Complaint   Patient presents with   • Chest Pain     hospital followup       HPI  Patient is here for hospital follow up he was admitted for chest pain 3/22/2020. He has history of drug and alcohol abuse in the past he stopped 3 days prior to admission on 3/22.     Hospital notes reviewed:   Drug abuse  Hep C w coma chronic   Anemia  Chest pain   CHF class 2 acute on chronic systolic.    Primary discharge:  Acute systolic CHF  Possible acute vs chronic blood loss anemia vs malabsorption.  NSTEMI, type 1  Polysubstance abuse  Medical noncompliance   Chronic diarrhea, POA and resolved.   Secondary diagnosis:  Tobacco abuse  Hep C   ADHD  Remote h/o alcohol abuse  Bleeding ulcer leading to MOF 2019   H/o Takotsubo cardiomyopathy    Medications reconciled.    ADHD: he was on medications in the past he would like referral to Psych for evaluation.He was on vyvance in past . He has been clean from alcohol and drugs since march. He has always been alcholol was using meth until recent.     Alcohol abuse: he said no meetings he is praying god is his path. No alcohol since march 20,    Drug abuse: he is not using was using meth until 3/20    Stool incontinence: he said he has stool but has no sign is coming. He would like to wear depends at night as this is worse time for incontinence.     Tobacco abuse: he is still smoking one pack over 3 days. He said not quite ready.    Health screening: he is getting glasses  This week and full dental extraction he will get dentures next week. He is followed by gastroenterology . He had cardiology.     Bipolar- reports was treated in the past . Was restarted on medications with recent hospitalizations.    GERDs: he needs refill on omeprazole. He is having stomach pain.       The following portions of the patient's history were reviewed and updated as appropriate: allergies, current medications, past family history, past medical  history, past social history, past surgical history and problem list.      Current Outpatient Medications:   •  carvedilol (COREG) 3.125 MG tablet, Take 1 tablet by mouth 2 (Two) Times a Day With Meals., Disp: 60 tablet, Rfl: 0  •  clopidogrel (PLAVIX) 75 MG tablet, Take 1 tablet by mouth Daily., Disp: 90 tablet, Rfl: 3  •  ferrous sulfate 324 (65 Fe) MG tablet delayed-release EC tablet, Take 1 tablet by mouth 2 (Two) Times a Day With Meals., Disp: 180 tablet, Rfl: 0  •  furosemide (Lasix) 20 MG tablet, Take 1 tablet by mouth Daily., Disp: 90 tablet, Rfl: 3  •  lisinopril (PRINIVIL,ZESTRIL) 2.5 MG tablet, Take 1 tablet by mouth Daily., Disp: 90 tablet, Rfl: 3  •  loperamide (IMODIUM) 2 MG capsule, Take 1 capsule by mouth 3 (Three) Times a Day As Needed for Diarrhea., Disp: 15 capsule, Rfl: 0  •  omeprazole (priLOSEC) 40 MG capsule, Take 1 capsule by mouth Daily., Disp: 30 capsule, Rfl: 0  •  QUEtiapine (SEROquel) 100 MG tablet, Take 1 tablet by mouth Every Night., Disp: 30 tablet, Rfl: 1  •  sertraline (ZOLOFT) 100 MG tablet, Take 1 tablet by mouth Daily., Disp: 90 tablet, Rfl: 1  •  spironolactone (ALDACTONE) 25 MG tablet, Take 1 tablet by mouth Daily., Disp: 90 tablet, Rfl: 0  •  traZODone (DESYREL) 100 MG tablet, Take 1 tablet by mouth Every Night., Disp: 30 tablet, Rfl: 0    Recent Results (from the past 4032 hour(s))   Comprehensive Metabolic Panel    Collection Time: 03/22/20  6:31 AM   Result Value Ref Range    Glucose 148 (H) 65 - 99 mg/dL    BUN 16 6 - 20 mg/dL    Creatinine 0.91 0.76 - 1.27 mg/dL    Sodium 138 136 - 145 mmol/L    Potassium 4.7 3.5 - 5.2 mmol/L    Chloride 105 98 - 107 mmol/L    CO2 18.0 (L) 22.0 - 29.0 mmol/L    Calcium 9.0 8.6 - 10.5 mg/dL    Total Protein 8.0 6.0 - 8.5 g/dL    Albumin 3.20 (L) 3.50 - 5.20 g/dL    ALT (SGPT) 16 1 - 41 U/L    AST (SGOT) 23 1 - 40 U/L    Alkaline Phosphatase 113 39 - 117 U/L    Total Bilirubin 0.3 0.2 - 1.2 mg/dL    eGFR Non African Amer 90 >60 mL/min/1.73     Globulin 4.8 gm/dL    A/G Ratio 0.7 g/dL    BUN/Creatinine Ratio 17.6 7.0 - 25.0    Anion Gap 15.0 5.0 - 15.0 mmol/L   Protime-INR    Collection Time: 03/22/20  6:31 AM   Result Value Ref Range    Protime 11.3 9.6 - 11.7 Seconds    INR 1.09 0.90 - 1.10   aPTT    Collection Time: 03/22/20  6:31 AM   Result Value Ref Range    PTT 23.8 (L) 24.0 - 31.0 seconds   Troponin    Collection Time: 03/22/20  6:31 AM   Result Value Ref Range    Troponin T 0.403 (C) 0.000 - 0.030 ng/mL   D-dimer, Quantitative    Collection Time: 03/22/20  6:31 AM   Result Value Ref Range    D-Dimer, Quantitative 1.04 (H) 0.17 - 0.59 MCGFEU/mL   BNP    Collection Time: 03/22/20  6:31 AM   Result Value Ref Range    proBNP 14,195.0 (H) 5.0 - 450.0 pg/mL   CBC Auto Differential    Collection Time: 03/22/20  6:31 AM   Result Value Ref Range    WBC 10.80 3.40 - 10.80 10*3/mm3    RBC 4.19 4.14 - 5.80 10*6/mm3    Hemoglobin 7.2 (L) 13.0 - 17.7 g/dL    Hematocrit 25.5 (L) 37.5 - 51.0 %    MCV 61.0 (L) 79.0 - 97.0 fL    MCH 17.3 (L) 26.6 - 33.0 pg    MCHC 28.3 (L) 31.5 - 35.7 g/dL    RDW 20.1 (H) 12.3 - 15.4 %    RDW-SD 44.2 37.0 - 54.0 fl    MPV 6.8 6.0 - 12.0 fL    Platelets 673 (H) 140 - 450 10*3/mm3    Neutrophil % 59.7 42.7 - 76.0 %    Lymphocyte % 27.5 19.6 - 45.3 %    Monocyte % 10.0 5.0 - 12.0 %    Eosinophil % 1.4 0.3 - 6.2 %    Basophil % 1.4 0.0 - 1.5 %    Neutrophils, Absolute 6.40 1.70 - 7.00 10*3/mm3    Lymphocytes, Absolute 3.00 0.70 - 3.10 10*3/mm3    Monocytes, Absolute 1.10 (H) 0.10 - 0.90 10*3/mm3    Eosinophils, Absolute 0.20 0.00 - 0.40 10*3/mm3    Basophils, Absolute 0.20 0.00 - 0.20 10*3/mm3    nRBC 0.5 (H) 0.0 - 0.2 /100 WBC   Scan Slide    Collection Time: 03/22/20  6:31 AM   Result Value Ref Range    Anisocytosis Slight/1+ None Seen    Microcytes Slight/1+ None Seen    Poikilocytes Slight/1+ None Seen    Polychromasia Slight/1+ None Seen    WBC Morphology Normal Normal    Giant Platelets Slight/1+ None Seen   Iron Profile     Collection Time: 03/22/20  6:31 AM   Result Value Ref Range    Iron 15 (L) 59 - 158 mcg/dL    Iron Saturation 2 (L) 20 - 50 %    Transferrin 429 (H) 200 - 360 mg/dL    TIBC 639 (H) 298 - 536 mcg/dL   Ferritin    Collection Time: 03/22/20  6:31 AM   Result Value Ref Range    Ferritin 12.66 (L) 30.00 - 400.00 ng/mL   Urine Drug Screen - Urine, Clean Catch    Collection Time: 03/22/20  6:53 AM   Result Value Ref Range    Amphet/Methamphet, Screen Positive (A) Negative    Barbiturates Screen, Urine Negative Negative    Benzodiazepine Screen, Urine Negative Negative    Cocaine Screen, Urine Negative Negative    Opiate Screen Negative Negative    THC, Screen, Urine Positive (A) Negative    Methadone Screen, Urine Negative Negative    Oxycodone Screen, Urine Negative Negative   Type & Screen    Collection Time: 03/22/20  7:58 AM   Result Value Ref Range    ABO Type O     RH type Positive     Antibody Screen Negative     T&S Expiration Date 3/25/2020 11:59:59 PM    Troponin    Collection Time: 03/22/20  2:01 PM   Result Value Ref Range    Troponin T 0.465 (C) 0.000 - 0.030 ng/mL   Occult Blood, Fecal By Immunoassay - Stool, Per Rectum    Collection Time: 03/22/20  8:31 PM   Result Value Ref Range    Occult Blood, Fecal by Immunoassay Negative Negative   Gastrointestinal Panel, PCR - Stool, Per Rectum    Collection Time: 03/22/20  8:31 PM   Result Value Ref Range    Campylobacter Not Detected Not Detected    Plesiomonas shigelloides Not Detected Not Detected    Salmonella Not Detected Not Detected    Vibrio Not Detected Not Detected    Vibrio cholerae Not Detected Not Detected    Yersinia enterocolitica Not Detected Not Detected    Enteroaggregative E. coli (EAEC) Not Detected Not Detected    Enteropathogenic E. coli (EPEC) Not Detected Not Detected    Enterotoxigenic E. coli (ETEC) lt/st Not Detected Not Detected    Shiga-like toxin-producing E. coli (STEC) stx1/stx2 Not Detected Not Detected    E. coli O157 Not Detected  Not Detected    Shigella/Enteroinvasive E. coli (EIEC) Not Detected Not Detected    Cryptosporidium Not Detected Not Detected    Cyclospora cayetanensis Not Detected Not Detected    Entamoeba histolytica Not Detected Not Detected    Giardia lamblia Not Detected Not Detected    Adenovirus F40/41 Not Detected Not Detected    Astrovirus Not Detected Not Detected    Norovirus GI/GII Not Detected Not Detected    Rotavirus A Not Detected Not Detected    Sapovirus (I, II, IV or V) Not Detected Not Detected   Prepare RBC, 1 Units    Collection Time: 03/23/20  3:00 AM   Result Value Ref Range    Product Code K7114I37     Unit Number [id number]     UNIT  ABO O     UNIT  RH POS     Dispense Status PT     Blood Type OPOS     Blood Expiration Date [id number]     Blood Type Barcode     Adult Transthoracic Echo Complete W/ Cont if Necessary Per Protocol    Collection Time: 03/23/20  7:08 AM   Result Value Ref Range    BSA 2.2 m^2    RVIDd 3.6 cm    IVSd 1.0 cm    LVIDd 6.6 cm    LVIDs 5.7 cm    LVPWd 1.1 cm    IVS/LVPW 0.91     FS 14.3 %    EDV(Teich) 223.0 ml    ESV(Teich) 156.9 ml    EF(Teich) 29.6 %    EDV(cubed) 286.4 ml    ESV(cubed) 180.4 ml    EF(cubed) 37.0 %    LV mass(C)d 311.0 grams    LV mass(C)dI 142.2 grams/m^2    SV(Teich) 66.1 ml    SI(Teich) 30.2 ml/m^2    SV(cubed) 106.0 ml    SI(cubed) 48.4 ml/m^2    Ao root diam 3.6 cm    Ao root area 10.1 cm^2    ACS 2.3 cm    LVOT diam 2.4 cm    LVOT area 4.5 cm^2    RVOT diam 2.5 cm    RVOT area 4.8 cm^2    EDV(MOD-sp4) 222.4 ml    ESV(MOD-sp4) 118.7 ml    EF(MOD-sp4) 46.6 %    SV(MOD-sp4) 103.7 ml    SI(MOD-sp4) 47.4 ml/m^2    Ao root area (BSA corrected) 1.6     LV Sims Vol (BSA corrected) 101.7 ml/m^2    LV Sys Vol (BSA corrected) 54.3 ml/m^2    Aortic R-R 0.69 sec    Aortic HR 87.6 BPM    MV E max anthony 95.9 cm/sec    MV A max anthony 52.0 cm/sec    MV E/A 1.8     MV V2 max 135.2 cm/sec    MV max PG 7.3 mmHg    MV V2 mean 63.6 cm/sec    MV mean PG 2.4 mmHg    MV V2 VTI  19.8 cm    MVA(VTI) 3.1 cm^2    MV dec slope 901.6 cm/sec^2    MV dec time 0.11 sec    Ao pk osmar 97.8 cm/sec    Ao max PG 3.8 mmHg    Ao max PG (full) 1.4 mmHg    Ao V2 mean 73.6 cm/sec    Ao mean PG 2.4 mmHg    Ao mean PG (full) 0.81 mmHg    Ao V2 VTI 15.7 cm    MAXIM(I,A) 3.9 cm^2    MAXIM(I,D) 3.9 cm^2    MAXIM(V,A) 3.6 cm^2    MAXIM(V,D) 3.6 cm^2    LV V1 max PG 2.4 mmHg    LV V1 mean PG 1.6 mmHg    LV V1 max 78.2 cm/sec    LV V1 mean 60.7 cm/sec    LV V1 VTI 13.8 cm    MR max osmar 323.5 cm/sec    MR max PG 41.9 mmHg    CO(Ao) 13.9 l/min    CI(Ao) 6.3 l/min/m^2    SV(Ao) 158.6 ml    SI(Ao) 72.5 ml/m^2    CO(LVOT) 5.4 l/min    CI(LVOT) 2.5 l/min/m^2    SV(LVOT) 61.9 ml    SV(RVOT) 57.8 ml    SI(LVOT) 28.3 ml/m^2    PA V2 max 91.4 cm/sec    PA max PG 3.3 mmHg    PA max PG (full) 1.5 mmHg     CV ECHO PETER - PVA(V,A) 3.5 cm^2    BH CV ECHO PETER - PVA(V,D) 3.5 cm^2    PI end-d osmar 103.2 cm/sec    PI max osmar 216.9 cm/sec    PI max PG 18.8 mmHg    RV V1 max PG 1.8 mmHg    RV V1 mean PG 0.93 mmHg    RV V1 max 67.4 cm/sec    RV V1 mean 44.9 cm/sec    RV V1 VTI 12.1 cm    TR max osmar 231.4 cm/sec    RVSP(TR) 25.4 mmHg    RAP systole 3.0 mmHg    Pulm Sys Osmar 54.3 cm/sec    Pulm Sims Osmar 86.9 cm/sec    Pulm S/D 0.62     Qp/Qs 0.93      CV ECHO PETER - BZI_BMI 27.4 kilograms/m^2     CV ECHO PETER - BSA(HAYCOCK) 2.2 m^2     CV ECHO PETER - BZI_METRIC_WEIGHT 94.3 kg    BH CV ECHO PETER - BZI_METRIC_HEIGHT 185.4 cm    LA dimension(2D) 5.1 cm   Basic Metabolic Panel    Collection Time: 03/23/20  8:23 AM   Result Value Ref Range    Glucose 132 (H) 65 - 99 mg/dL    BUN 16 6 - 20 mg/dL    Creatinine 0.95 0.76 - 1.27 mg/dL    Sodium 137 136 - 145 mmol/L    Potassium 4.4 3.5 - 5.2 mmol/L    Chloride 101 98 - 107 mmol/L    CO2 24.0 22.0 - 29.0 mmol/L    Calcium 8.7 8.6 - 10.5 mg/dL    eGFR Non African Amer 86 >60 mL/min/1.73    BUN/Creatinine Ratio 16.8 7.0 - 25.0    Anion Gap 12.0 5.0 - 15.0 mmol/L   CBC (No Diff)    Collection Time:  03/23/20  8:23 AM   Result Value Ref Range    WBC 11.40 (H) 3.40 - 10.80 10*3/mm3    RBC 4.21 4.14 - 5.80 10*6/mm3    Hemoglobin 7.8 (L) 13.0 - 17.7 g/dL    Hematocrit 25.7 (L) 37.5 - 51.0 %    MCV 61.1 (L) 79.0 - 97.0 fL    MCH 18.5 (L) 26.6 - 33.0 pg    MCHC 30.2 (L) 31.5 - 35.7 g/dL    RDW 22.4 (H) 12.3 - 15.4 %    RDW-SD 48.1 37.0 - 54.0 fl    MPV 7.0 6.0 - 12.0 fL    Platelets 611 (H) 140 - 450 10*3/mm3   Troponin    Collection Time: 03/23/20  8:23 AM   Result Value Ref Range    Troponin T 0.928 (C) 0.000 - 0.030 ng/mL   Troponin    Collection Time: 03/23/20  3:53 PM   Result Value Ref Range    Troponin T 0.745 (C) 0.000 - 0.030 ng/mL   Vitamin B12    Collection Time: 03/24/20 12:32 AM   Result Value Ref Range    Vitamin B-12 706 211 - 946 pg/mL   Folate    Collection Time: 03/24/20 12:32 AM   Result Value Ref Range    Folate 7.63 4.78 - 24.20 ng/mL   Basic Metabolic Panel    Collection Time: 03/24/20 12:32 AM   Result Value Ref Range    Glucose 155 (H) 65 - 99 mg/dL    BUN 15 6 - 20 mg/dL    Creatinine 1.07 0.76 - 1.27 mg/dL    Sodium 136 136 - 145 mmol/L    Potassium 4.2 3.5 - 5.2 mmol/L    Chloride 99 98 - 107 mmol/L    CO2 24.0 22.0 - 29.0 mmol/L    Calcium 8.3 (L) 8.6 - 10.5 mg/dL    eGFR Non African Amer 75 >60 mL/min/1.73    BUN/Creatinine Ratio 14.0 7.0 - 25.0    Anion Gap 13.0 5.0 - 15.0 mmol/L   CBC Auto Differential    Collection Time: 03/24/20 12:32 AM   Result Value Ref Range    WBC 11.30 (H) 3.40 - 10.80 10*3/mm3    RBC 4.34 4.14 - 5.80 10*6/mm3    Hemoglobin 7.8 (L) 13.0 - 17.7 g/dL    Hematocrit 26.8 (L) 37.5 - 51.0 %    MCV 61.7 (L) 79.0 - 97.0 fL    MCH 17.9 (L) 26.6 - 33.0 pg    MCHC 29.0 (L) 31.5 - 35.7 g/dL    RDW 23.2 (H) 12.3 - 15.4 %    RDW-SD 50.3 37.0 - 54.0 fl    MPV 6.6 6.0 - 12.0 fL    Platelets 651 (H) 140 - 450 10*3/mm3   Troponin    Collection Time: 03/24/20 12:32 AM   Result Value Ref Range    Troponin T 0.745 (C) 0.000 - 0.030 ng/mL   Scan Slide    Collection Time: 03/24/20  12:32 AM   Result Value Ref Range    Scan Slide     Manual Differential    Collection Time: 03/24/20 12:32 AM   Result Value Ref Range    Neutrophil % 70.0 42.7 - 76.0 %    Lymphocyte % 11.0 (L) 19.6 - 45.3 %    Monocyte % 8.0 5.0 - 12.0 %    Eosinophil % 2.0 0.3 - 6.2 %    Bands %  8.0 (H) 0.0 - 5.0 %    Myelocyte % 1.0 (H) 0.0 - 0.0 %    Neutrophils Absolute 8.81 (H) 1.70 - 7.00 10*3/mm3    Lymphocytes Absolute 1.24 0.70 - 3.10 10*3/mm3    Monocytes Absolute 0.90 0.10 - 0.90 10*3/mm3    Eosinophils Absolute 0.23 0.00 - 0.40 10*3/mm3    nRBC 2.0 (H) 0.0 - 0.2 /100 WBC    Anisocytosis Slight/1+ None Seen    Microcytes Slight/1+ None Seen    Ovalocytes Slight/1+ None Seen    Poikilocytes Slight/1+ None Seen    Polychromasia Slight/1+ None Seen    WBC Morphology Normal Normal    Giant Platelets Slight/1+ None Seen   Magnesium    Collection Time: 03/24/20 12:32 AM   Result Value Ref Range    Magnesium 1.9 1.6 - 2.6 mg/dL   Troponin    Collection Time: 03/24/20 10:09 AM   Result Value Ref Range    Troponin T 0.355 (C) 0.000 - 0.030 ng/mL   Basic Metabolic Panel    Collection Time: 03/24/20 10:09 AM   Result Value Ref Range    Glucose 139 (H) 65 - 99 mg/dL    BUN 14 6 - 20 mg/dL    Creatinine 1.00 0.76 - 1.27 mg/dL    Sodium 136 136 - 145 mmol/L    Potassium 4.4 3.5 - 5.2 mmol/L    Chloride 101 98 - 107 mmol/L    CO2 23.0 22.0 - 29.0 mmol/L    Calcium 8.5 (L) 8.6 - 10.5 mg/dL    eGFR Non African Amer 81 >60 mL/min/1.73    BUN/Creatinine Ratio 14.0 7.0 - 25.0    Anion Gap 12.0 5.0 - 15.0 mmol/L   Protime-INR    Collection Time: 03/24/20 11:59 AM   Result Value Ref Range    Protime 11.6 9.6 - 11.7 Seconds    INR 1.13 (H) 0.90 - 1.10   aPTT    Collection Time: 03/24/20 11:59 AM   Result Value Ref Range    PTT 27.5 24.0 - 31.0 seconds   CBC Auto Differential    Collection Time: 03/24/20 12:00 PM   Result Value Ref Range    WBC 11.40 (H) 3.40 - 10.80 10*3/mm3    RBC 3.96 (L) 4.14 - 5.80 10*6/mm3    Hemoglobin 7.4 (L) 13.0 -  17.7 g/dL    Hematocrit 24.1 (L) 37.5 - 51.0 %    MCV 60.9 (L) 79.0 - 97.0 fL    MCH 18.7 (L) 26.6 - 33.0 pg    MCHC 30.7 (L) 31.5 - 35.7 g/dL    RDW 22.0 (H) 12.3 - 15.4 %    RDW-SD 46.4 37.0 - 54.0 fl    MPV 6.9 6.0 - 12.0 fL    Platelets 620 (H) 140 - 450 10*3/mm3    Neutrophil % 66.7 42.7 - 76.0 %    Lymphocyte % 15.8 (L) 19.6 - 45.3 %    Monocyte % 14.8 (H) 5.0 - 12.0 %    Eosinophil % 1.7 0.3 - 6.2 %    Basophil % 1.0 0.0 - 1.5 %    Neutrophils, Absolute 7.60 (H) 1.70 - 7.00 10*3/mm3    Lymphocytes, Absolute 1.80 0.70 - 3.10 10*3/mm3    Monocytes, Absolute 1.70 (H) 0.10 - 0.90 10*3/mm3    Eosinophils, Absolute 0.20 0.00 - 0.40 10*3/mm3    Basophils, Absolute 0.10 0.00 - 0.20 10*3/mm3    nRBC 1.6 (H) 0.0 - 0.2 /100 WBC   POC Activated Clotting Time    Collection Time: 03/24/20  3:10 PM   Result Value Ref Range    Activated Clotting Time  147 (H) 89 - 137 Seconds   Hemoglobin & Hematocrit, Blood    Collection Time: 03/24/20  6:52 PM   Result Value Ref Range    Hemoglobin 8.8 (L) 13.0 - 17.7 g/dL    Hematocrit 28.7 (L) 37.5 - 51.0 %   Prepare RBC, 1 Units    Collection Time: 03/25/20  3:00 AM   Result Value Ref Range    Product Code N2322M46     Unit Number [id number]     UNIT  ABO O     UNIT  RH NEG     Dispense Status PT     Blood Type ONEG     Blood Expiration Date [id number]     Blood Type Barcode 9500    CBC (No Diff)    Collection Time: 03/25/20  4:32 AM   Result Value Ref Range    WBC 9.10 3.40 - 10.80 10*3/mm3    RBC 4.33 4.14 - 5.80 10*6/mm3    Hemoglobin 8.8 (L) 13.0 - 17.7 g/dL    Hematocrit 28.2 (L) 37.5 - 51.0 %    MCV 65.2 (L) 79.0 - 97.0 fL    MCH 20.2 (L) 26.6 - 33.0 pg    MCHC 31.1 (L) 31.5 - 35.7 g/dL    RDW 26.5 (H) 12.3 - 15.4 %    RDW-SD 56.9 (H) 37.0 - 54.0 fl    MPV 7.3 6.0 - 12.0 fL    Platelets 547 (H) 140 - 450 10*3/mm3   Basic Metabolic Panel    Collection Time: 03/25/20  4:32 AM   Result Value Ref Range    Glucose 215 (H) 65 - 99 mg/dL    BUN 13 6 - 20 mg/dL    Creatinine 0.80  "0.76 - 1.27 mg/dL    Sodium 137 136 - 145 mmol/L    Potassium 4.0 3.5 - 5.2 mmol/L    Chloride 102 98 - 107 mmol/L    CO2 23.0 22.0 - 29.0 mmol/L    Calcium 8.5 (L) 8.6 - 10.5 mg/dL    eGFR Non African Amer 105 >60 mL/min/1.73    BUN/Creatinine Ratio 16.3 7.0 - 25.0    Anion Gap 12.0 5.0 - 15.0 mmol/L   Lipid Panel    Collection Time: 03/25/20  4:32 AM   Result Value Ref Range    Total Cholesterol 88 0 - 200 mg/dL    Triglycerides 53 0 - 150 mg/dL    HDL Cholesterol 23 (L) 40 - 60 mg/dL    LDL Cholesterol  54 0 - 100 mg/dL    VLDL Cholesterol 10.6 mg/dL    LDL/HDL Ratio 2.37          Review of Systems   Constitutional: Negative for chills and fever.   HENT: Negative for postnasal drip and sinus pressure.    Respiratory: Negative for cough, wheezing and stridor.    Gastrointestinal: Positive for diarrhea.   Genitourinary: Negative for dysuria and nocturia.   Musculoskeletal: Positive for arthralgias, back pain and myalgias.   Allergic/Immunologic: Negative.    Neurological: Negative for headache.   Psychiatric/Behavioral: Positive for sleep disturbance and stress. Negative for depressed mood. The patient is nervous/anxious.        Objective     /72 (BP Location: Left arm, Patient Position: Sitting, Cuff Size: Adult)   Pulse 87   Temp 97.6 °F (36.4 °C) (Temporal)   Resp 18   Ht 185.4 cm (72.99\")   Wt 93.7 kg (206 lb 8 oz)   SpO2 99%   BMI 27.25 kg/m²     Physical Exam   Constitutional: He appears well-developed and well-nourished.   HENT:   Head: Normocephalic and atraumatic.   Cardiovascular: Regular rhythm, normal heart sounds and normal pulses.   Pulmonary/Chest: Effort normal and breath sounds normal.   Abdominal: Soft. Normal appearance and bowel sounds are normal. There is generalized tenderness.   Psychiatric: Thought content normal. His mood appears anxious. His speech is rapid and/or pressured. He is hyperactive. Cognition and memory are normal. He expresses impulsivity.         Assessment/Plan "   Asad was seen today for chest pain.    Diagnoses and all orders for this visit:    Essential hypertension  Comments:  controlled well on current medicaitons followed by cardiology    NICM (nonischemic cardiomyopathy) (CMS/[facility])  Comments:  stable followed by cardiology    CHF (congestive heart failure), NYHA class III, acute on chronic, systolic (CMS/[facility])  Comments:  stable followed by cardiology    Non-ST elevation myocardial infarction (NSTEMI) (CMS/[facility])    Uncomplicated alcohol dependence (CMS/[facility])  Comments:  he denies wanting to attend meetings. says god is guiding him  Orders:  -     Ambulatory Referral to Psychiatry    Drug abuse (CMS/[facility])  Comments:  not involved in any treatment programs  Orders:  -     Ambulatory Referral to Psychiatry    Anxiety and depression  Comments:  refeerred to Psych for evaluation. refilled meds today.   Orders:  -     Ambulatory Referral to Psychiatry    Other orders  -     traZODone (DESYREL) 100 MG tablet; Take 1 tablet by mouth Every Night.  -     sertraline (ZOLOFT) 100 MG tablet; Take 1 tablet by mouth Daily.  -     QUEtiapine (SEROquel) 100 MG tablet; Take 1 tablet by mouth Every Night.  -     ferrous sulfate 324 (65 Fe) MG tablet delayed-release EC tablet; Take 1 tablet by mouth 2 (Two) Times a Day With Meals.  -     omeprazole (priLOSEC) 40 MG capsule; Take 1 capsule by mouth Daily.      Patient Instructions   If increased depression or anxiety need to be seen.  Start the increased dose of your medicaitons.  Follow up with psych  Stop smoking  Talk to pharmacy about incontinent supplies (depends)      Shelly Ribeiro, APRN    05/14/20      Answers for HPI/ROS submitted by the patient on 5/6/2020   What is the primary reason for your visit?: Other  Please describe your symptoms.: I need a complete physical..  Have you had these symptoms before?: Yes  How long have you been having these symptoms?: Other

## 2020-11-14 PROBLEM — E87.5 HYPERKALEMIA: Chronic | Status: ACTIVE | Noted: 2020-01-01

## 2020-11-14 PROBLEM — D64.9 ANEMIA: Status: ACTIVE | Noted: 2020-01-01

## 2020-11-14 PROBLEM — N17.9 AKI (ACUTE KIDNEY INJURY) (HCC): Status: ACTIVE | Noted: 2020-01-01

## 2020-11-14 PROBLEM — R74.01 TRANSAMINITIS: Status: ACTIVE | Noted: 2020-01-01

## 2020-11-14 PROBLEM — R07.9 CHEST PAIN: Status: ACTIVE | Noted: 2020-01-01

## 2020-11-15 NOTE — ED PROVIDER NOTES
Subjective   Chief complaint: Patient is a 44-year-old gentleman that presents with chest pain.  He states that this pain has been going on for 2 days.  Its been there more than its not been there.  The tightness across the midportion of his chest.  Is nonradiating.  He is short of breath with it.  He has had a cough.  This started suddenly couple days ago.  He has not had a fever.  He has been off of his cardiac medications that Dr. Lowe prescribes him for months.  He had a stent put in his LAD back in March.  He states that this pain feels similar to that.    Context: Patient is a heart patient out of his meds.    Duration: 2 days    Timing: Waxing and waning but persistently there for 2-day    Severity: Patient states severe discomfort at this point time.    Associated Symptoms: Negative except as noted above.  Appropriate PPE was used.        PCP:            Review of Systems   Constitutional: Negative for fever.   HENT: Negative.    Eyes: Negative.    Respiratory: Positive for cough and shortness of breath.    Cardiovascular: Positive for chest pain.   Gastrointestinal: Negative for abdominal pain.   Genitourinary: Negative.    Musculoskeletal: Negative.    Skin: Negative.    Neurological: Negative.    Psychiatric/Behavioral: Negative.        Past Medical History:   Diagnosis Date   • Abnormal nuclear stress test 6/28/2019   • Acute back pain 7/8/2019   • ADHD (attention deficit hyperactivity disorder)    • Alcohol dependence (CMS/HCC)    • Anxiety    • Arthritis    • Clotting disorder (CMS/HCC)    • Depression    • Depression with anxiety 7/14/2019   • Essential hypertension 6/28/2019   • Generalized abdominal pain 8/2/2019   • GERD (gastroesophageal reflux disease)    • H/O heart artery stent 03/24/2020   • Hepatitis C 06/2019   • Hypertensive urgency 7/8/2019   • Hyponatremia 8/4/2019   • IBS (irritable bowel syndrome)    • Intractable vomiting with nausea 7/13/2019    Added automatically from request for  surgery [id number]   • Medically noncompliant 7/8/2019   • Melena 7/14/2019   • NICM (nonischemic cardiomyopathy) (CMS/[facility]) 7/14/2019   • Non-ST elevation myocardial infarction (NSTEMI) (CMS/[facility]) 3/24/2020   • Substance abuse (CMS/HCC)    • Tobacco dependency        No Known Allergies    Past Surgical History:   Procedure Laterality Date   • CARDIAC CATHETERIZATION     • CARDIAC CATHETERIZATION N/A 7/9/2019    Procedure: LEFT HEART CATH with possible PCI;  Surgeon: PAL Johnson MD;  Location: [facility] CATH INVASIVE LOCATION;  Service: Cardiovascular   • CARDIAC CATHETERIZATION N/A 3/24/2020    Procedure: LEFT HEART CATH with possible PCI;  Surgeon: Solomon Lowe MD;  Location: [facility] CATH INVASIVE LOCATION;  Service: Cardiovascular;  Laterality: N/A;  Local and IV sedation   • CARDIAC CATHETERIZATION N/A 3/24/2020    Procedure: Stent ATUL coronary;  Surgeon: Solomon Lowe MD;  Location: [facility] CATH INVASIVE LOCATION;  Service: Cardiovascular;  Laterality: N/A;   • CARDIOVASCULAR STRESS TEST  2019   • ECHO - CONVERTED  2019   • ECHO - CONVERTED  2020   • ENDOSCOPY N/A 7/14/2019    Procedure: ESOPHAGOGASTRODUODENOSCOPY WITH SCLEROTHERAPY, BICAP CAUTERY OF DUODENAL ULCER AND HEMOSTASIS SPRAY OF DUODENAL ULCER;  Surgeon: Wesly Myers MD;  Location: [facility] ENDOSCOPY;  Service: Gastroenterology   • EXPLORATORY LAPAROTOMY N/A 7/16/2019    Procedure: LAPAROTOMY EXPLORATORY;  Surgeon: Patience Peng MD;  Location: [facility] MAIN OR;  Service: General   • WRIST SURGERY N/A        Family History   Problem Relation Age of Onset   • Heart disease Mother    • Heart attack Mother    • Colon cancer Mother    • Heart disease Brother    • Heart attack Brother    • Heart disease Brother    • Heart attack Brother        Social History     Socioeconomic History   • Marital status:      Spouse name: Not on file   • Number of children: Not on file   • Years of education: Not on file   • Highest education level: Not on file  "  Tobacco Use   • Smoking status: Former Smoker     Packs/day: 1.00     Years: 20.00     Pack years: 20.00     Types: Cigarettes     Quit date: 2019     Years since quittin.3   • Smokeless tobacco: Former User   Substance and Sexual Activity   • Alcohol use: Not Currently     Frequency: Never     Comment: use to drink half gallon of liquor per day; down to 1 pint of liquor per day; quit 2019   • Drug use: Not Currently     Types: Cocaine(coke), Methamphetamines     Comment: last used meth about 2 months ago   • Sexual activity: Not Currently     Partners: Male   Social History Narrative    He reports extensive history of depression, anxiety, and substance abuse. He's been diagnosed with ADD and bipolar disorder, and has been treated at [facility] and [facility]. Medications in the past have made him feel \"slow\" and \"dumb\". He reports that his mind is always going \"a million miles a minute\", and he \"can't get shit done\" because he can't focus. He reports very low self esteem. He reports his son's mother has told him they'd be better off if he was dead and he wonders if that's true, but states tearfully that he doesn't want to die. He has quit drinking and doing drugs in the past, and says he went to [employer] a lot. He's never had a sponsor or really participated in AA. He did cocaine on and off for around 10 years but quit when his son was born 9 yrs ago. He says that cocaine calmed him down. In the past couple years, he started using meth which calms him down if he snorts it, but amps him up if he smokes it. Alcohol calms him down. He reports feeling very scared right now and worried that he's going to die. He seems ready to address his substance abuse and try to live a healthier lifestyle. His son is his main motivation.            Objective   Physical Exam  Vitals signs and nursing note reviewed.   Constitutional:       General: He is in acute distress.      Appearance: He is well-developed.   HENT: "      Head: Normocephalic and atraumatic.   Eyes:      Extraocular Movements: Extraocular movements intact.   Neck:      Musculoskeletal: Neck supple.   Cardiovascular:      Rate and Rhythm: Normal rate and regular rhythm.      Heart sounds: Normal heart sounds.   Pulmonary:      Effort: Pulmonary effort is normal.      Breath sounds: Normal breath sounds.   Abdominal:      Palpations: Abdomen is soft.      Tenderness: There is no abdominal tenderness.   Musculoskeletal: Normal range of motion.   Skin:     General: Skin is warm and dry.      Capillary Refill: Capillary refill takes less than 2 seconds.   Neurological:      General: No focal deficit present.      Mental Status: He is alert and oriented to person, place, and time.   Psychiatric:         Mood and Affect: Mood normal.         Behavior: Behavior normal.         Procedures           ED Course  ED Course as of Nov 14 2124   Sat Nov 14, 2020 2124 Patient states he does have a history of hepatitis C. he has no abdominal tenderness on reexam    [LH]      ED Course User Index  [LH] Richard Diaz DO            Results for orders placed or performed during the hospital encounter of 11/14/20   COVID-19 [facility] IN-HOUSE,NP Swab (NO TRANSPORT MEDIA) 2 HR TAT - Swab, Nasopharynx    Specimen: Nasopharynx; Swab   Result Value Ref Range    COVID19 Not Detected Not Detected - Ref. Range   Comprehensive Metabolic Panel    Specimen: Blood   Result Value Ref Range    Glucose 122 (H) 65 - 99 mg/dL    BUN 37 (H) 6 - 20 mg/dL    Creatinine 1.33 (H) 0.76 - 1.27 mg/dL    Sodium 128 (L) 136 - 145 mmol/L    Potassium 5.3 (H) 3.5 - 5.2 mmol/L    Chloride 97 (L) 98 - 107 mmol/L    CO2 18.0 (L) 22.0 - 29.0 mmol/L    Calcium 8.4 (L) 8.6 - 10.5 mg/dL    Total Protein 6.5 6.0 - 8.5 g/dL    Albumin 3.00 (L) 3.50 - 5.20 g/dL    ALT (SGPT) 642 (H) 1 - 41 U/L    AST (SGOT) 480 (H) 1 - 40 U/L    Alkaline Phosphatase 197 (H) 39 - 117 U/L    Total Bilirubin 0.6 0.0 - 1.2 mg/dL     eGFR Non African Amer 58 (L) >60 mL/min/1.73    Globulin 3.5 gm/dL    A/G Ratio 0.9 g/dL    BUN/Creatinine Ratio 27.8 (H) 7.0 - 25.0    Anion Gap 13.0 5.0 - 15.0 mmol/L   Troponin    Specimen: Blood   Result Value Ref Range    Troponin T <0.010 0.000 - 0.030 ng/mL   Protime-INR    Specimen: Blood   Result Value Ref Range    Protime 16.3 (H) 9.6 - 11.7 Seconds    INR 1.51 (H) 0.93 - 1.10   BNP    Specimen: Blood   Result Value Ref Range    proBNP 5,399.0 (H) 0.0 - 450.0 pg/mL   CBC Auto Differential    Specimen: Blood   Result Value Ref Range    WBC 14.60 (H) 3.40 - 10.80 10*3/mm3    RBC 5.04 4.14 - 5.80 10*6/mm3    Hemoglobin 12.2 (L) 13.0 - 17.7 g/dL    Hematocrit 39.6 37.5 - 51.0 %    MCV 78.6 (L) 79.0 - 97.0 fL    MCH 24.2 (L) 26.6 - 33.0 pg    MCHC 30.7 (L) 31.5 - 35.7 g/dL    RDW 17.6 (H) 12.3 - 15.4 %    RDW-SD 48.6 37.0 - 54.0 fl    MPV 7.9 6.0 - 12.0 fL    Platelets 375 140 - 450 10*3/mm3    Neutrophil % 64.9 42.7 - 76.0 %    Lymphocyte % 19.9 19.6 - 45.3 %    Monocyte % 14.0 (H) 5.0 - 12.0 %    Eosinophil % 0.4 0.3 - 6.2 %    Basophil % 0.8 0.0 - 1.5 %    Neutrophils, Absolute 9.40 (H) 1.70 - 7.00 10*3/mm3    Lymphocytes, Absolute 2.90 0.70 - 3.10 10*3/mm3    Monocytes, Absolute 2.00 (H) 0.10 - 0.90 10*3/mm3    Eosinophils, Absolute 0.10 0.00 - 0.40 10*3/mm3    Basophils, Absolute 0.10 0.00 - 0.20 10*3/mm3    nRBC 1.2 (H) 0.0 - 0.2 /100 WBC   Urinalysis With Microscopic If Indicated (No Culture) - Urine, Clean Catch    Specimen: Urine, Clean Catch   Result Value Ref Range    Color, UA Dark Yellow (A) Yellow, Straw    Appearance, UA Clear Clear    pH, UA 5.5 5.0 - 8.0    Specific Gravity, UA 1.028 1.005 - 1.030    Glucose, UA Negative Negative    Ketones, UA Negative Negative    Bilirubin, UA Small (1+) (A) Negative    Blood, UA Negative Negative    Protein, UA 30 mg/dL (1+) (A) Negative    Leuk Esterase, UA Negative Negative    Nitrite, UA Negative Negative    Urobilinogen, UA 1.0 E.U./dL 0.2 - 1.0 E.U./dL    Urinalysis, Microscopic Only - Urine, Clean Catch    Specimen: Urine, Clean Catch   Result Value Ref Range    RBC, UA 0-2 (A) None Seen /HPF    WBC, UA 0-2 (A) None Seen /HPF    Bacteria, UA None Seen None Seen /HPF    Squamous Epithelial Cells, UA 0-2 None Seen, 0-2 /HPF    Hyaline Casts, UA 7-12 None Seen /LPF    Methodology Manual Light Microscopy    Urine Drug Screen - Urine, Clean Catch    Specimen: Urine, Clean Catch   Result Value Ref Range    Amphet/Methamphet, Screen Positive (A) Negative    Barbiturates Screen, Urine Negative Negative    Benzodiazepine Screen, Urine Negative Negative    Cocaine Screen, Urine Negative Negative    Opiate Screen Negative Negative    THC, Screen, Urine Positive (A) Negative    Methadone Screen, Urine Negative Negative    Oxycodone Screen, Urine Negative Negative   Light Blue Top   Result Value Ref Range    Extra Tube hold for add-on    Lavender Top   Result Value Ref Range    Extra Tube hold for add-on    Gold Top - SST   Result Value Ref Range    Extra Tube Hold for add-ons.             Xr Chest 1 View    Result Date: 11/14/2020  Cardiomegaly without active disease  Electronically Signed By-Ky Yusuf MD On:11/14/2020 7:41 PM This report was finalized on [id number] by  Ky Yusuf MD.                               MDM  Number of Diagnoses or Management Options  Diagnosis management comments: Patient originally would not hold still in triage.  Secondary to this there were some concerning potential ST changes but the baseline was to portably call.  He did not appear to be a STEMI but felt like I could not rule it out.  These were in the inferior and anterior leads.  It was sinus and you could tell that.  However when we got him back to the room we were able to get him still enough to obtain an EKG that shows sinus rhythm with a rate of 90bpm there was left atrial enlargement left ventricular hypertrophy and anterior Q waves.  But no signs of a STEMI on his EKG.  But at  this point time he does have stent in his LAD and he is complaining of persistent chest pain and has been off his meds.  I will bring him into the hospital for cardiac rule out.  Significant portion of his problem is drug use.  He states has been off meth for about a week and has tried to cut back on smoking is only had 2 cigarettes in a week but he does still appear to have some drug problems probably is exacerbating his heart disease.  However here in the ER he looks much more comfortable in the bed.  He is asking for food.  His pain is much better.       Amount and/or Complexity of Data Reviewed  Clinical lab tests: reviewed  Tests in the radiology section of CPT®: reviewed  Discussion of test results with the performing providers: yes  Review and summarize past medical records: yes  Discuss the patient with other providers: yes  Independent visualization of images, tracings, or specimens: yes    Patient Progress  Patient progress: stable      Final diagnoses:   None     Chest pain  Acute hepatitis     Richard Diaz, DO  11/14/20 2105       Richard Diaz, DO  11/14/20 2121       Richard Diaz, DO  11/14/20 2125

## 2020-11-15 NOTE — H&P
"      [facility]      Patient Name: Asad Bethea  : 1975  MRN: [id number]  Primary Care Physician: Shelly Ribeiro APRN  Date of admission: 2020    Patient Care Team:  Shelly Ribeiro APRN as PCP - General (Nurse Practitioner)          Subjective   History Present Illness     Chief Complaint:   Chief Complaint   Patient presents with   • Chest Pain         Mr. Bethea is a 44 y.o. male with past medical history of hypertension, substance-tobacco/alcohol abuse, heart failure with reduced ejection fraction and anemia who presents to [facility] complaining of chest pain.  Patient reports that he has a 3-day history of constant worsening left-sided chest pain which radiates to his left arm and back.  Pain is described as \"just really hurting\" rated 9/10 at its worst and 8/10 at the time of my exam.  Patient has a history of coronary artery disease underwent cardiac cath at this facility on 3/24/2020 with drug-eluting stent placed in the LAD.  Since that time patient has been minimally compliant with his scheduled medical therapy stating that he currently is taking his Coreg but has not had his Plavix or diuretics in some time.  Some associated orthopnea and PND is noted along with some nausea without vomiting, frequent episodes of tachycardia and palpitations, occasional peripheral edema and a recent subjective fever.  Patient reports that he has relapsed on his substance and alcohol abuse with his most recent use on 2020 when he states he smoked methamphetamine and drink alcohol.  He denies any history of IV drug use continues to smoke approximately 1 pack of cigarettes per day on average.  Urine output is noted as occurring somewhat less frequently and he does also report some chronic constipation.  Additionally patient notes a previous diagnosis of hepatitis C which he received over 1 year ago and has not been treated for.  He also reports having " previous bowel resection.    In the ED patient did have lab significant for troponin of less than 0.010, proBNP: 5399.0, sodium: One twenty-eight, potassium: 5.3, CO2: 18.0, anion gap: 13.0, creatinine: 1.33, BUN: Thirty-seven, alk phos: One ninety-seven, ALT: Six forty-two, AST: Forty-eight, albumin: 3.00.  WBCs: 14.60, hemoglobin: 12.2 with a decreased MCV and MCHC.  UA shows sterile pyuria with 0-2 WBCs, 0-2 RBCs, 1+ bilirubin, 1+ protein and a normal specific gravity of 1.028.  Urine tox positive for amphetamines and THC.  Chest x-ray shows cardiomegaly without evidence of active disease.  COVID-19 test was negative.  ED provider notes initial EKG had significant artifact and was difficult to interpret with subsequent EKG showing no acute ST changes.  This is not available for review at the time of my exam    History of Present Illness    Review of Systems   Constitution: Positive for fever. Negative for chills and decreased appetite.   HENT: Negative.    Eyes: Negative.    Cardiovascular: Positive for chest pain, dyspnea on exertion, leg swelling, orthopnea, palpitations and paroxysmal nocturnal dyspnea. Negative for irregular heartbeat and syncope.   Respiratory: Positive for cough and shortness of breath. Negative for sputum production.         Patient reports cough typically occurs if he attempts to lay flat.   Endocrine: Negative.    Skin: Negative.    Musculoskeletal: Negative.    Gastrointestinal: Positive for bloating and nausea. Negative for abdominal pain, hematemesis, hematochezia, melena and vomiting.   Genitourinary: Negative.    Neurological: Positive for light-headedness. Negative for focal weakness, headaches and weakness.        Lightheadedness on standing.   Psychiatric/Behavioral: Negative.            Personal History     Past Medical History:   Past Medical History:   Diagnosis Date   • Abnormal nuclear stress test 6/28/2019   • Acute back pain 7/8/2019   • ADHD (attention deficit  hyperactivity disorder)    • Alcohol dependence (CMS/HCC)    • Anxiety    • Arthritis    • Clotting disorder (CMS/HCC)    • Depression    • Depression with anxiety 7/14/2019   • Essential hypertension 6/28/2019   • Generalized abdominal pain 8/2/2019   • GERD (gastroesophageal reflux disease)    • H/O heart artery stent 03/24/2020   • Hepatitis C 06/2019   • Hypertensive urgency 7/8/2019   • Hyponatremia 8/4/2019   • IBS (irritable bowel syndrome)    • Intractable vomiting with nausea 7/13/2019    Added automatically from request for surgery [id number]   • Medically noncompliant 7/8/2019   • Melena 7/14/2019   • NICM (nonischemic cardiomyopathy) (CMS/HCC) 7/14/2019   • Non-ST elevation myocardial infarction (NSTEMI) (CMS/HCC) 3/24/2020   • Substance abuse (CMS/HCC)    • Tobacco dependency        Surgical History:      Past Surgical History:   Procedure Laterality Date   • CARDIAC CATHETERIZATION     • CARDIAC CATHETERIZATION N/A 7/9/2019    Procedure: LEFT HEART CATH with possible PCI;  Surgeon: PAL Johnson MD;  Location: [facility] CATH INVASIVE LOCATION;  Service: Cardiovascular   • CARDIAC CATHETERIZATION N/A 3/24/2020    Procedure: LEFT HEART CATH with possible PCI;  Surgeon: Solomon Lowe MD;  Location: [facility] CATH INVASIVE LOCATION;  Service: Cardiovascular;  Laterality: N/A;  Local and IV sedation   • CARDIAC CATHETERIZATION N/A 3/24/2020    Procedure: Stent ATUL coronary;  Surgeon: Solomon Lowe MD;  Location: [facility] CATH INVASIVE LOCATION;  Service: Cardiovascular;  Laterality: N/A;   • CARDIOVASCULAR STRESS TEST  2019   • ECHO - CONVERTED  2019   • ECHO - CONVERTED  2020   • ENDOSCOPY N/A 7/14/2019    Procedure: ESOPHAGOGASTRODUODENOSCOPY WITH SCLEROTHERAPY, BICAP CAUTERY OF DUODENAL ULCER AND HEMOSTASIS SPRAY OF DUODENAL ULCER;  Surgeon: Wesly Myers MD;  Location: [facility] ENDOSCOPY;  Service: Gastroenterology   • EXPLORATORY LAPAROTOMY N/A 7/16/2019    Procedure: LAPAROTOMY EXPLORATORY;  Surgeon:  Patience Peng MD;  Location: [facility] MAIN OR;  Service: General   • WRIST SURGERY N/A            Family History: family history includes Colon cancer in his mother; Heart attack in his brother, brother, and mother; Heart disease in his brother, brother, and mother. Otherwise pertinent FHx was reviewed and unremarkable.     Social History:  reports that he quit smoking about 16 months ago. His smoking use included cigarettes. He has a 20.00 pack-year smoking history. He has quit using smokeless tobacco. He reports previous alcohol use. He reports previous drug use. Drugs: Cocaine(coke) and Methamphetamines.      Medications:  Prior to Admission medications    Medication Sig Start Date End Date Taking? Authorizing Provider   carvedilol (COREG) 3.125 MG tablet TAKE 1 TABLET BY MOUTH TWICE DAILY WITH MEALS 10/28/20   Solomon Lowe MD   clopidogrel (PLAVIX) 75 MG tablet Take 1 tablet by mouth Daily.  Patient taking differently: Take 75 mg by mouth Daily. Dr Lowe -  Pt to continue his Plavix  for (EGD/colonoscopy 5/19) 4/22/20   Solomon Lowe MD   ferrous sulfate 324 (65 Fe) MG tablet delayed-release EC tablet Take 1 tablet by mouth 2 (Two) Times a Day With Meals. 5/14/20   Shelly Ribeiro APRN   furosemide (Lasix) 20 MG tablet Take 1 tablet by mouth Daily. 4/22/20   Solomon Lowe MD   lisinopril (PRINIVIL,ZESTRIL) 2.5 MG tablet Take 1 tablet by mouth Daily. 4/22/20   Solomon Lowe MD   loperamide (IMODIUM) 2 MG capsule Take 1 capsule by mouth 3 (Three) Times a Day As Needed for Diarrhea. 3/25/20   Nga Malhotra MD   omeprazole (priLOSEC) 40 MG capsule TAKE 1 CAPSULE BY MOUTH EVERY DAY 6/22/20   Segundo Rosario Jr., MD   QUEtiapine (SEROquel) 100 MG tablet Take 1 tablet by mouth Every Night. 5/14/20   Shelly Ribeiro APRN   sertraline (ZOLOFT) 100 MG tablet Take 1 tablet by mouth Daily. 5/14/20   Shelly Ribeiro APRN   spironolactone (ALDACTONE) 25 MG tablet TAKE 1 TABLET BY MOUTH DAILY 7/21/20   Solomon Lowe MD      traZODone (DESYREL) 100 MG tablet TAKE 1 TABLET BY MOUTH EVERY EVENING 6/22/20   Segundo Rosario Jr., MD       Allergies:  No Known Allergies    Objective   Objective     Vital Signs  Temp:  [97.7 °F (36.5 °C)] 97.7 °F (36.5 °C)  Heart Rate:  [92-98] 94  Resp:  [20] 20  BP: (112-162)/(86-98) 112/98  SpO2:  [96 %-100 %] 96 %  on   ;   Device (Oxygen Therapy): room air  Body mass index is 25.07 kg/m².    Physical Exam  Constitutional:       General: He is not in acute distress.     Appearance: Normal appearance. He is normal weight. He is not ill-appearing or toxic-appearing.   HENT:      Head: Normocephalic.      Right Ear: External ear normal.      Left Ear: External ear normal.      Nose: Nose normal.      Mouth/Throat:      Mouth: Mucous membranes are moist.   Eyes:      Extraocular Movements: Extraocular movements intact.      Conjunctiva/sclera: Conjunctivae normal.   Neck:      Musculoskeletal: Normal range of motion.   Cardiovascular:      Rate and Rhythm: Normal rate and regular rhythm.      Pulses: Normal pulses.      Heart sounds: Murmur present.      Comments: Murmur heard best at the fifth intercostal space midclavicular line  Pulmonary:      Effort: Pulmonary effort is normal.   Abdominal:      General: There is no distension.      Tenderness: There is no abdominal tenderness.      Comments: Hyperactive bowel sounds   Musculoskeletal: Normal range of motion.      Right lower leg: No edema.      Left lower leg: No edema.   Skin:     General: Skin is warm and dry.      Comments: No splinter hemorrhages, Osler nodes, Janeway lesions or unusual scarring noted on exam   Neurological:      General: No focal deficit present.      Mental Status: He is alert and oriented to person, place, and time.   Psychiatric:         Mood and Affect: Mood normal.         Behavior: Behavior normal.         Thought Content: Thought content normal.         Judgment: Judgment normal.         Results Review:  I have personally  reviewed most recent lab results and radiology images and interpretations and agree with findings, most notably: Troponin, proBNP, CBC, CMP, UA, urine tox, chest x-ray, EKG and COVID-19 test.    Results from last 7 days   Lab Units 11/14/20 1919   WBC 10*3/mm3 14.60*   HEMOGLOBIN g/dL 12.2*   HEMATOCRIT % 39.6   PLATELETS 10*3/mm3 375   INR  1.51*     Results from last 7 days   Lab Units 11/14/20 1919   SODIUM mmol/L 128*   POTASSIUM mmol/L 5.3*   CHLORIDE mmol/L 97*   CO2 mmol/L 18.0*   BUN mg/dL 37*   CREATININE mg/dL 1.33*   GLUCOSE mg/dL 122*   CALCIUM mg/dL 8.4*   ALT (SGPT) U/L 642*   AST (SGOT) U/L 480*   TROPONIN T ng/mL <0.010   PROBNP pg/mL 5,399.0*     Estimated Creatinine Clearance: 85.5 mL/min (A) (by C-G formula based on SCr of 1.33 mg/dL (H)).  Brief Urine Lab Results  (Last result in the past 365 days)      Color   Clarity   Blood   Leuk Est   Nitrite   Protein   CREAT   Urine HCG        11/14/20 1938 Dark Yellow  Comment:  Result checked  Clear Negative Negative Negative 30 mg/dL (1+)               Microbiology Results (last 10 days)     Procedure Component Value - Date/Time    COVID-19, ABBOTT IN-HOUSE,NP Swab (NO TRANSPORT MEDIA) 2 HR TAT - Swab, Nasopharynx [[id number]]  (Normal) Collected: 11/14/20 1922    Lab Status: Final result Specimen: Swab from Nasopharynx Updated: 11/14/20 2006     COVID19 Not Detected    Narrative:      Fact sheet for providers: https://www.fda.gov/media/639211/download     Fact sheet for patients: https://www.fda.gov/media/973238/download          ECG/EMG Results (most recent)     None               Results for orders placed during the hospital encounter of 03/22/20   Adult Transthoracic Echo Complete W/ Cont if Necessary Per Protocol    Narrative · Left ventricular systolic function is severely decreased.  · The following left ventricular wall segments are akinetic: basal   inferior, mid inferior, apical septal, basal inferoseptal and mid   inferoseptal.  · Left  atrial cavity size is mildly dilated.  · Mild mitral valve regurgitation is present          Xr Chest 1 View    Result Date: 11/14/2020  Cardiomegaly without active disease  Electronically Signed By-Ky Yusuf MD On:11/14/2020 7:41 PM This report was finalized on [id number] by  Ky Yusuf MD.        Estimated Creatinine Clearance: 85.5 mL/min (A) (by C-G formula based on SCr of 1.33 mg/dL (H)).    Assessment/Plan   Assessment/Plan       Active Hospital Problems    Diagnosis  POA   • Chest pain [R07.9]  Yes     Priority: High   • DENNIS (acute kidney injury) (CMS/HCC) [N17.9]  Yes     Priority: High   • Hyperkalemia [E87.5]  Yes     Priority: High   • Transaminitis [R74.01]  Yes     Priority: High   • Hyponatremia [E87.1]  Yes     Priority: High   • Anemia [D64.9]  Yes     Priority: Medium   • CHF (congestive heart failure), NYHA class III, acute on chronic, systolic (CMS/HCC) [I50.23]  Yes     Priority: Medium   • Essential hypertension [I10]  Yes     Priority: Medium   • Drug abuse (CMS/HCC) [F19.10]  Yes     Priority: Low      Resolved Hospital Problems   No resolved problems to display.     Chest pain  -Troponin: Less than 0.010, trend  -Chest x-ray shows cardiomegaly without evidence of active disease.  -EKG reported by ED provider as showing no acute ST changes, attempting to locate original copy for review  -Patient awaiting cardiac cath on 3/24/2020 with placement of one drug-eluting stent in the LAD at that time.  -In the ED patient given aspirin nitroglycerin  -N.p.o. after midnight  -Cardiology consulted    DENNIS  - Creatine: 1.33, BUN: Thirty-seven, BUN/creatinine ratio: 27.8  - Hold lisinopril  -Avoid nephrotoxic medication and IV dye unless urgently needed  -Monitor BMP, I's and O's while admitted  -Patient may benefit from nephrology consult especially if kidney function continues to worsen if patient continues to require diuresis    Hyperkalemia  -Potassium: 5.3  -EKG pending review  -Kayexalate  ordered  -Continue cardiac monitoring  -Monitor while admitted    Hyponatremia  -Sodium: 128  -Check urine and serum osmolality  -Hold Zoloft  -Monitor while admitted    Transaminitis  -ALT: S642 AST: 480 with an alk phos of of one ninety-seven and an albumin of 3.00 (levels were noted as normal on 3/22/2020)  -Check hepatitis panel, HIV, iron profile and ultrasound of right upper quadrant  -Monitor while admitted     Leukocytosis  -WBCs: 14.60 with an increased absolute neutrophil and monocyte count noted on differential  -Check blood cultures, lactic acid, procalcitonin, CRP and sed rate  -Monitor while admitted    Anemia  -Hemoglobin: 12.2 with a decreased MCV and MCHC  -Continue ferrous sulfate  -Check iron profile as above  -Monitor while admitted    CHF  -Patient reports recent episodes of orthopnea and PND.  No peripheral edema noted on exam and lungs slightly diminished in the bases otherwise unremarkable.  -proBNP: 5399.0  -Chest x-ray shows cardiomegaly without evidence of active disease.  -Patient noncompliant with outpatient diuretic therapy  -20 mg IV Lasix ordered x1 with close monitoring of kidney function and sodium planned  -Monitor I's and O's and daily weights  -2 g sodium diet  -Cardiology consulted as above    CAD  -Continue Plavix and beta-blocker (patient has been noncompliant with outpatient Plavix therapy but states he has been taking his Coreg)  -Continue cardiac monitoring    Hypertension  -Poorly controlled with a blood pressure on admission of 166/86 (pressure has normalized following reduction in anxiety as well as nitroglycerin administration in ED)  - Continue Coreg  -Hold lisinopril secondary to DENNIS and hyperkalemia  - Monitor while admitted    Depression/anxiety  -Continue Zoloft, trazodone and Seroquel    Drug abuse  -Patient reports methamphetamine use which she reports he smoked approximately 9 days prior.  He denies any history of IV drug use  -Encourage cessation especially  light of patient's comorbid conditions    Alcohol abuse  -Patient reports he generally drinks approximately 1 pint of liquor per day though he has had nothing to drink for the past 9 days  -Encourage cessation especially light of patient's comorbid conditions  -CIWA protocol ordered              VTE Prophylaxis -Lovenox  Mechanical Order History:     None      Pharmalogical Order History:     None          CODE STATUS: Full  There are no questions and answers to display.         I discussed the patient's findings and my recommendations with patient and nursing staff.      Signature:Electronically signed by Lenin Bethea PA-C, 11/14/20, 11:02 PM EST.    [facility]ist [facility]

## 2020-11-15 NOTE — PLAN OF CARE
Problem: Adult Inpatient Plan of Care  Goal: Plan of Care Review  Outcome: Ongoing, Progressing  Flowsheets (Taken 11/15/2020 1205)  Progress: no change  Plan of Care Reviewed With: patient  Outcome Summary: Patient receiving IV Lasix after cardiology evaluation. Patient comfortable with no complaints, will contiue to monitor.  Goal: Patient-Specific Goal (Individualized)  Outcome: Ongoing, Progressing  Goal: Absence of Hospital-Acquired Illness or Injury  Outcome: Ongoing, Progressing  Intervention: Identify and Manage Fall Risk  Recent Flowsheet Documentation  Taken 11/15/2020 1113 by Bala Roche RN  Safety Promotion/Fall Prevention: safety round/check completed  Taken 11/15/2020 0749 by Bala Roche RN  Safety Promotion/Fall Prevention: activity supervised  Intervention: Prevent Skin Injury  Recent Flowsheet Documentation  Taken 11/15/2020 1113 by Bala Roche RN  Body Position: side-lying, left  Taken 11/15/2020 0749 by Bala Roche RN  Body Position: supine  Goal: Optimal Comfort and Wellbeing  Outcome: Ongoing, Progressing  Goal: Readiness for Transition of Care  Outcome: Ongoing, Progressing     Problem: Chest Pain  Goal: Resolution of Chest Pain Symptoms  Outcome: Ongoing, Progressing     Problem: Behavior Regulation Impairment (Excessive Substance Use)  Goal: Improved Behavioral Control (Excessive Substance Use)  Outcome: Ongoing, Progressing     Problem: Physiologic Impairment (Excessive Substance Use)  Goal: Improved Physiologic Symptoms (Excessive Substance Use)  Outcome: Ongoing, Progressing   Goal Outcome Evaluation:  Plan of Care Reviewed With: patient  Progress: no change  Outcome Summary: Patient receiving IV Lasix after cardiology evaluation. Patient comfortable with no complaints, will contiue to monitor.

## 2020-11-15 NOTE — CONSULTS
Referring Provider: ZACK Shu    Attending: Alexus Soriano MD    Reason for Consultation:    Shortness of breath  Chest pain  Bilateral leg edema    Chief complaint:    Chest pain  Shortness of breath  Ischemic dilated cardiomyopathy  CAD  LAD stent       History of present illness:     Patient is 44-year-old white gentleman whose past medical history is significant for hypertension, alcohol dependence, hyperlipidemia, CAD, LAD stenting, ischemic cardiomyopathy, who is admitted with shortness of breath and chest pain.    Patient reports that from last few weeks he is getting progressively short of breath and also having chest pain.  Patient stopped taking his aspirin and Plavix because of noncompliance.  On the day of admission he had orthopnea and PND and could not breathe and he decided to come to the hospital.  His BNP is elevated and chest x-ray is consistent with with mild pulmonary congestion possible congestive heart failure.  Patient has bilateral leg edema.    In June 2019, patient was admitted with chest pain and shortness of breath.  Patient was found to be anemic and upper endoscopy was done patient had bleeding ulcer.  Patient underwent cautery.  Patient also underwent stress test which was abnormal and cardiac catheterization was deferred.  In March 2020, patient was admitted again with shortness of breath and chest pain.  Echocardiogram showed EF of 30 to 35%.  Patient underwent cardiac catheterization and it showed high-grade stenosis of LAD.  Patient underwent successful stenting.  Patient was started on aspirin and Plavix    Review of Systems  Review of Systems   Constitution: Negative for chills and fever.   HENT: Negative for ear discharge and nosebleeds.    Eyes: Negative for discharge and redness.   Cardiovascular: Positive for chest pain and leg swelling. Negative for orthopnea, palpitations, paroxysmal nocturnal dyspnea and syncope.   Respiratory: Positive for shortness of  breath. Negative for cough and wheezing.    Endocrine: Negative for heat intolerance.   Skin: Negative for rash.   Musculoskeletal: Negative for arthritis and myalgias.   Gastrointestinal: Negative for abdominal pain, melena, nausea and vomiting.   Genitourinary: Negative for dysuria and hematuria.   Neurological: Negative for dizziness, light-headedness, numbness and tremors.   Psychiatric/Behavioral: Negative for depression. The patient is not nervous/anxious.        Past Medical History  Past Medical History:   Diagnosis Date   • Abnormal nuclear stress test 6/28/2019   • Acute back pain 7/8/2019   • ADHD (attention deficit hyperactivity disorder)    • Alcohol dependence (CMS/[facility])    • Anxiety    • Arthritis    • Clotting disorder (CMS/[facility])    • Depression    • Depression with anxiety 7/14/2019   • Essential hypertension 6/28/2019   • Generalized abdominal pain 8/2/2019   • GERD (gastroesophageal reflux disease)    • H/O heart artery stent 03/24/2020   • Hepatitis C 06/2019   • Hypertensive urgency 7/8/2019   • Hyponatremia 8/4/2019   • IBS (irritable bowel syndrome)    • Intractable vomiting with nausea 7/13/2019    Added automatically from request for surgery [id number]   • Medically noncompliant 7/8/2019   • Melena 7/14/2019   • NICM (nonischemic cardiomyopathy) (CMS/[facility]) 7/14/2019   • Non-ST elevation myocardial infarction (NSTEMI) (CMS/[facility]) 3/24/2020   • Substance abuse (CMS/[facility])    • Tobacco dependency     and   Past Surgical History:   Procedure Laterality Date   • CARDIAC CATHETERIZATION     • CARDIAC CATHETERIZATION N/A 7/9/2019    Procedure: LEFT HEART CATH with possible PCI;  Surgeon: PAL Johnson MD;  Location: [facility] CATH INVASIVE LOCATION;  Service: Cardiovascular   • CARDIAC CATHETERIZATION N/A 3/24/2020    Procedure: LEFT HEART CATH with possible PCI;  Surgeon: Solomon Lowe MD;  Location: [facility] CATH INVASIVE LOCATION;  Service: Cardiovascular;  Laterality: N/A;  Local and IV sedation   •  CARDIAC CATHETERIZATION N/A 3/24/2020    Procedure: Stent ATUL coronary;  Surgeon: Solomon Lowe MD;  Location: [facility] CATH INVASIVE LOCATION;  Service: Cardiovascular;  Laterality: N/A;   • CARDIOVASCULAR STRESS TEST     • ECHO - CONVERTED     • ECHO - CONVERTED     • ENDOSCOPY N/A 2019    Procedure: ESOPHAGOGASTRODUODENOSCOPY WITH SCLEROTHERAPY, BICAP CAUTERY OF DUODENAL ULCER AND HEMOSTASIS SPRAY OF DUODENAL ULCER;  Surgeon: Wesly Myers MD;  Location: [facility] ENDOSCOPY;  Service: Gastroenterology   • EXPLORATORY LAPAROTOMY N/A 2019    Procedure: LAPAROTOMY EXPLORATORY;  Surgeon: Patience Peng MD;  Location: [facility] MAIN OR;  Service: General   • WRIST SURGERY N/A        Family History  Family History   Problem Relation Age of Onset   • Heart disease Mother    • Heart attack Mother    • Colon cancer Mother    • Heart disease Brother    • Heart attack Brother    • Heart disease Brother    • Heart attack Brother        Social History  Social History     Socioeconomic History   • Marital status:      Spouse name: Not on file   • Number of children: Not on file   • Years of education: Not on file   • Highest education level: Not on file   Tobacco Use   • Smoking status: Former Smoker     Packs/day: 1.00     Years: 20.00     Pack years: 20.00     Types: Cigarettes     Quit date: 2019     Years since quittin.3   • Smokeless tobacco: Former User   Substance and Sexual Activity   • Alcohol use: Not Currently     Frequency: Never     Comment: use to drink half gallon of liquor per day; down to 1 pint of liquor per day; quit 2019   • Drug use: Not Currently     Types: Cocaine(coke), Methamphetamines     Comment: last used meth about 2 months ago   • Sexual activity: Not Currently     Partners: Male   Social History Narrative    He reports extensive history of depression, anxiety, and substance abuse. He's been diagnosed with ADD and bipolar disorder, and has been treated at  "[facility] and [facility]. Medications in the past have made him feel \"slow\" and \"dumb\". He reports that his mind is always going \"a million miles a minute\", and he \"can't get shit done\" because he can't focus. He reports very low self esteem. He reports his son's mother has told him they'd be better off if he was dead and he wonders if that's true, but states tearfully that he doesn't want to die. He has quit drinking and doing drugs in the past, and says he went to Buddhism a lot. He's never had a sponsor or really participated in AA. He did cocaine on and off for around 10 years but quit when his son was born 9 yrs ago. He says that cocaine calmed him down. In the past couple years, he started using meth which calms him down if he snorts it, but amps him up if he smokes it. Alcohol calms him down. He reports feeling very scared right now and worried that he's going to die. He seems ready to address his substance abuse and try to live a healthier lifestyle. His son is his main motivation.        Objective     Physical Exam:    Vital Signs  Vitals:    11/15/20 0648 11/15/20 0800 11/15/20 0933 11/15/20 1100   BP: 111/72  111/72 (!) 145/101   BP Location: Right arm   Left arm   Patient Position:    Lying   Pulse: 94 92  96   Resp:    18   Temp: 97.5 °F (36.4 °C)   97.3 °F (36.3 °C)   TempSrc: Oral   Oral   SpO2: 95%   98%   Weight: 102 kg (224 lb 10.4 oz)  102 kg (224 lb)    Height:   185.4 cm (73\")        Weight  Flowsheet Rows      First Filed Value   Admission Height  185.4 cm (73\") Documented at 11/14/2020 1752   Admission Weight  86.2 kg (190 lb) Documented at 11/14/2020 1752           Constitutional:       Appearance: Well-developed.   Eyes:      General: No scleral icterus.        Right eye: No discharge.   HENT:      Head: Normocephalic and atraumatic.   Neck:      Thyroid: No thyromegaly.      Lymphadenopathy: No cervical adenopathy.   Pulmonary:      Effort: Pulmonary effort is normal. No respiratory " distress.      Breath sounds: No wheezing. Rales present.   Cardiovascular:      Normal rate. Regular rhythm.      No gallop.   Edema:     Peripheral edema present.  Abdominal:      Tenderness: There is no abdominal tenderness.   Skin:     Findings: No erythema or rash.   Neurological:      Mental Status: Alert and oriented to person, place, and time.         Results Review:  Lab Results (last 24 hours)     Procedure Component Value Units Date/Time    Creatinine, Urine, Random - Urine, Clean Catch [[id number]] Collected: 11/15/20 0002    Specimen: Urine, Clean Catch Updated: 11/15/20 1125     Creatinine, Urine 58.3 mg/dL     Narrative:      Reference intervals for random urine have not been established.  Clinical usage is dependent upon physician's interpretation in combination with other laboratory tests.       Sedimentation Rate [[id number]]  (Normal) Collected: 11/15/20 0129    Specimen: Blood Updated: 11/15/20 0249     Sed Rate 5 mm/hr     HIV-1 & HIV-2 Antibodies [[id number]]  (Normal) Collected: 11/15/20 0128    Specimen: Blood Updated: 11/15/20 0229    Narrative:      The following orders were created for panel order HIV-1 & HIV-2 Antibodies.  Procedure                               Abnormality         Status                     ---------                               -----------         ------                     HIV-1 / O / 2 Ag / Antib...[[id number]]  Normal              Final result                 Please view results for these tests on the individual orders.    HIV-1 / O / 2 Ag / Antibody 4th Generation [[id number]]  (Normal) Collected: 11/15/20 0128    Specimen: Blood Updated: 11/15/20 0229     HIV-1/ HIV-2 Non-Reactive     Comment: A non-reactive test result does not preclude the possibility of exposure to HIV or infection with HIV. An antibody response to recent exposure may take several months to reach detectable levels.       Narrative:      The HIV antibody/antigen combo assay is a qualitative assay for  HIV that includes the p24 antigen as well as antibodies to HIV types 1 and 2. This test is intended to be used as a screening assay in the diagnosis of HIV infection in patients over the age of 2.  Results may be falsely decreased if patient taking Biotin.      Comprehensive Metabolic Panel [[id number]]  (Abnormal) Collected: 11/15/20 0129    Specimen: Blood Updated: 11/15/20 0218     Glucose 190 mg/dL      BUN 39 mg/dL      Creatinine 1.26 mg/dL      Sodium 133 mmol/L      Potassium 4.7 mmol/L      Comment: Slight hemolysis detected by analyzer. Results may be affected.        Chloride 100 mmol/L      CO2 21.0 mmol/L      Calcium 8.2 mg/dL      Total Protein 5.8 g/dL      Albumin 3.00 g/dL      ALT (SGPT) 584 U/L      AST (SGOT) 404 U/L      Alkaline Phosphatase 172 U/L      Total Bilirubin 0.4 mg/dL      eGFR Non African Amer 62 mL/min/1.73      Globulin 2.8 gm/dL      A/G Ratio 1.1 g/dL      BUN/Creatinine Ratio 31.0     Anion Gap 12.0 mmol/L     Narrative:      GFR Normal >60  Chronic Kidney Disease <60  Kidney Failure <15      Iron Profile [[id number]]  (Abnormal) Collected: 11/15/20 0129    Specimen: Blood Updated: 11/15/20 0218     Iron 18 mcg/dL      Iron Saturation 3 %      Transferrin 354 mg/dL      TIBC 527 mcg/dL     Troponin [[id number]]  (Normal) Collected: 11/15/20 0129    Specimen: Blood Updated: 11/15/20 0218     Troponin T <0.010 ng/mL     Narrative:      Troponin T Reference Range:  <= 0.03 ng/mL-   Negative for AMI  >0.03 ng/mL-     Abnormal for myocardial necrosis.  Clinicians would have to utilize clinical acumen, EKG, Troponin and serial changes to determine if it is an Acute Myocardial Infarction or myocardial injury due to an underlying chronic condition.       Results may be falsely decreased if patient taking Biotin.      Magnesium [[id number]]  (Normal) Collected: 11/15/20 0129    Specimen: Blood Updated: 11/15/20 0218     Magnesium 1.8 mg/dL     BNP [[id number]]  (Abnormal) Collected:  11/15/20 0129    Specimen: Blood Updated: 11/15/20 0211     proBNP 4,378.0 pg/mL     Narrative:      Among patients with dyspnea, NT-proBNP is highly sensitive for the detection of acute congestive heart failure. In addition NT-proBNP of <300 pg/ml effectively rules out acute congestive heart failure with 99% negative predictive value.    Results may be falsely decreased if patient taking Biotin.      CBC & Differential [[id number]]  (Abnormal) Collected: 11/15/20 0129    Specimen: Blood Updated: 11/15/20 0143    Narrative:      The following orders were created for panel order CBC & Differential.  Procedure                               Abnormality         Status                     ---------                               -----------         ------                     CBC Auto Differential[[id number]]        Abnormal            Final result                 Please view results for these tests on the individual orders.    CBC Auto Differential [[id number]]  (Abnormal) Collected: 11/15/20 0129    Specimen: Blood Updated: 11/15/20 0143     WBC 12.20 10*3/mm3      RBC 4.54 10*6/mm3      Hemoglobin 10.9 g/dL      Hematocrit 35.2 %      MCV 77.6 fL      MCH 24.1 pg      MCHC 31.0 g/dL      RDW 17.0 %      RDW-SD 46.4 fl      MPV 7.9 fL      Platelets 273 10*3/mm3      Neutrophil % 68.4 %      Lymphocyte % 19.0 %      Monocyte % 11.5 %      Eosinophil % 0.2 %      Basophil % 0.9 %      Neutrophils, Absolute 8.30 10*3/mm3      Lymphocytes, Absolute 2.30 10*3/mm3      Monocytes, Absolute 1.40 10*3/mm3      Eosinophils, Absolute 0.00 10*3/mm3      Basophils, Absolute 0.10 10*3/mm3      nRBC 0.8 /100 WBC     Hepatitis Diagnostic Profile [[id number]] Collected: 11/15/20 0128    Specimen: Blood Updated: 11/15/20 0138    Blood Culture - Blood, Arm, Left [[id number]] Collected: 11/15/20 0128    Specimen: Blood from Arm, Left Updated: 11/15/20 0138    Blood Culture - Blood, Arm, Right [[id number]] Collected: 11/15/20 0127    Specimen:  "Blood from Arm, Right Updated: 11/15/20 0138    Osmolality, Urine - Urine, Clean Catch [[id number]]  (Normal) Collected: 11/15/20 0002    Specimen: Urine, Clean Catch Updated: 11/15/20 0027     Osmolality, Urine 481 mOsm/kg     Procalcitonin [[id number]]  (Normal) Collected: 11/14/20 2136    Specimen: Blood Updated: 11/14/20 2321     Procalcitonin 0.14 ng/mL     Narrative:      As a Marker for Sepsis (Non-Neonates):   1. <0.5 ng/mL represents a low risk of severe sepsis and/or septic shock.  1. >2 ng/mL represents a high risk of severe sepsis and/or septic shock.    As a Marker for Lower Respiratory Tract Infections that require antibiotic therapy:  PCT on Admission     Antibiotic Therapy             6-12 Hrs later  > 0.5                Strongly Recommended            >0.25 - <0.5         Recommended  0.1 - 0.25           Discouraged                   Remeasure/reassess PCT  <0.1                 Strongly Discouraged          Remeasure/reassess PCT      As 28 day mortality risk marker: \"Change in Procalcitonin Result\" (> 80 % or <=80 %) if Day 0 (or Day 1) and Day 4 values are available. Refer to http://www.[employer]-pct-calculator.com/   Change in PCT <=80 %   A decrease of PCT levels below or equal to 80 % defines a positive change in PCT test result representing a higher risk for 28-day all-cause mortality of patients diagnosed with severe sepsis or septic shock.  Change in PCT > 80 %   A decrease of PCT levels of more than 80 % defines a negative change in PCT result representing a lower risk for 28-day all-cause mortality of patients diagnosed with severe sepsis or septic shock.                Results may be falsely decreased if patient taking Biotin.     C-reactive Protein [[id number]]  (Abnormal) Collected: 11/14/20 2136    Specimen: Blood Updated: 11/14/20 2311     C-Reactive Protein 4.92 mg/dL     Potassium [[id number]]  (Normal) Collected: 11/14/20 2136    Specimen: Blood Updated: 11/14/20 2311     Potassium 5.1 " mmol/L     Osmolality, Serum [[id number]]  (Normal) Collected: 11/14/20 1919    Specimen: Blood Updated: 11/14/20 2301     Osmolality 298 mOsm/kg     Troponin [[id number]]  (Normal) Collected: 11/14/20 2136    Specimen: Blood Updated: 11/14/20 2209     Troponin T <0.010 ng/mL     Narrative:      Troponin T Reference Range:  <= 0.03 ng/mL-   Negative for AMI  >0.03 ng/mL-     Abnormal for myocardial necrosis.  Clinicians would have to utilize clinical acumen, EKG, Troponin and serial changes to determine if it is an Acute Myocardial Infarction or myocardial injury due to an underlying chronic condition.       Results may be falsely decreased if patient taking Biotin.      Urine Drug Screen - Urine, Clean Catch [[id number]]  (Abnormal) Collected: 11/14/20 1938    Specimen: Urine, Clean Catch Updated: 11/14/20 2050     Amphet/Methamphet, Screen Positive     Barbiturates Screen, Urine Negative     Benzodiazepine Screen, Urine Negative     Cocaine Screen, Urine Negative     Opiate Screen Negative     THC, Screen, Urine Positive     Methadone Screen, Urine Negative     Oxycodone Screen, Urine Negative    Narrative:      Negative Thresholds For Drugs Screened:     Amphetamines               500 ng/ml   Barbiturates               200 ng/ml   Benzodiazepines            100 ng/ml   Cocaine                    300 ng/ml   Methadone                  300 ng/ml   Opiates                    300 ng/ml   Oxycodone                  100 ng/ml   THC                        50 ng/ml    The Normal Value for all drugs tested is negative. This report includes final unconfirmed screening results to be used for medical treatment purposes only. Unconfirmed results must not be used for non-medical purposes such as employment or legal testing. Clinical consideration should be applied to any drug of abuse test, particulary when unconfirmed results are used.  All urine drugs of abuse requests without chain of custody are for medical screening purposes  only.  False positives are possible.      [facility] [[id number]] Collected: 11/14/20 1919    Specimen: Blood Updated: 11/14/20 2030    Narrative:      The following orders were created for panel order [facility].  Procedure                               Abnormality         Status                     ---------                               -----------         ------                     Light Blue Top[[id number]]                                   Final result               Green Top (Gel)[[id number]]                                  Final result               Lavender Top[[id number]]                                     Final result               Gold Top - SST[[id number]]                                   Final result                 Please view results for these tests on the individual orders.    Light Blue Top [[id number]] Collected: 11/14/20 1919    Specimen: Blood Updated: 11/14/20 2030     Extra Tube hold for add-on     Comment: Auto resulted       Lavender Top [[id number]] Collected: 11/14/20 1919    Specimen: Blood Updated: 11/14/20 2030     Extra Tube hold for add-on     Comment: Auto resulted       Gold Top - SST [[id number]] Collected: 11/14/20 1919    Specimen: Blood Updated: 11/14/20 2030     Extra Tube Hold for add-ons.     Comment: Auto resulted.       Urinalysis With Microscopic If Indicated (No Culture) - Urine, Clean Catch [[id number]]  (Abnormal) Collected: 11/14/20 1938    Specimen: Urine, Clean Catch Updated: 11/14/20 2011     Color, UA Dark Yellow     Comment: Result checked         Appearance, UA Clear     pH, UA 5.5     Specific Gravity, UA 1.028     Glucose, UA Negative     Ketones, UA Negative     Bilirubin, UA Small (1+)     Comment: Confirmation testing is unavailable.  A serum bilirubin is recommended for further assessment.        Blood, UA Negative     Protein, UA 30 mg/dL (1+)     Leuk Esterase, UA Negative     Nitrite, UA Negative     Urobilinogen, UA 1.0 E.U./dL    Urinalysis, Microscopic  Only - Urine, Clean Catch [[id number]]  (Abnormal) Collected: 11/14/20 1938    Specimen: Urine, Clean Catch Updated: 11/14/20 2011     RBC, UA 0-2 /HPF      WBC, UA 0-2 /HPF      Bacteria, UA None Seen /HPF      Squamous Epithelial Cells, UA 0-2 /HPF      Hyaline Casts, UA 7-12 /LPF      Methodology Manual Light Microscopy    Green Top (Gel) [[id number]] Collected: 11/14/20 1919    Specimen: Blood Updated: 11/14/20 2007    COVID-19, [facility] IN-HOUSE,NP Swab (NO TRANSPORT MEDIA) 2 HR TAT - Swab, Nasopharynx [[id number]]  (Normal) Collected: 11/14/20 1922    Specimen: Swab from Nasopharynx Updated: 11/14/20 2006     COVID19 Not Detected    Narrative:      Fact sheet for providers: https://www.fda.gov/media/702728/download     Fact sheet for patients: https://www.fda.gov/media/090025/download    Comprehensive Metabolic Panel [[id number]]  (Abnormal) Collected: 11/14/20 1919    Specimen: Blood Updated: 11/14/20 1955     Glucose 122 mg/dL      BUN 37 mg/dL      Creatinine 1.33 mg/dL      Sodium 128 mmol/L      Potassium 5.3 mmol/L      Comment: Slight hemolysis detected by analyzer. Results may be affected.        Chloride 97 mmol/L      CO2 18.0 mmol/L      Calcium 8.4 mg/dL      Total Protein 6.5 g/dL      Albumin 3.00 g/dL      ALT (SGPT) 642 U/L      AST (SGOT) 480 U/L      Comment: Slight hemolysis detected by analyzer. Results may be affected.        Alkaline Phosphatase 197 U/L      Total Bilirubin 0.6 mg/dL      eGFR Non African Amer 58 mL/min/1.73      Globulin 3.5 gm/dL      A/G Ratio 0.9 g/dL      BUN/Creatinine Ratio 27.8     Anion Gap 13.0 mmol/L     Narrative:      GFR Normal >60  Chronic Kidney Disease <60  Kidney Failure <15      Troponin [[id number]]  (Normal) Collected: 11/14/20 1919    Specimen: Blood Updated: 11/14/20 1954     Troponin T <0.010 ng/mL     Narrative:      Troponin T Reference Range:  <= 0.03 ng/mL-   Negative for AMI  >0.03 ng/mL-     Abnormal for myocardial necrosis.  Clinicians would have  to utilize clinical acumen, EKG, Troponin and serial changes to determine if it is an Acute Myocardial Infarction or myocardial injury due to an underlying chronic condition.       Results may be falsely decreased if patient taking Biotin.      BNP [[id number]]  (Abnormal) Collected: 11/14/20 1919    Specimen: Blood Updated: 11/14/20 1952     proBNP 5,399.0 pg/mL     Narrative:      Among patients with dyspnea, NT-proBNP is highly sensitive for the detection of acute congestive heart failure. In addition NT-proBNP of <300 pg/ml effectively rules out acute congestive heart failure with 99% negative predictive value.    Results may be falsely decreased if patient taking Biotin.      Protime-INR [[id number]]  (Abnormal) Collected: 11/14/20 1919    Specimen: Blood Updated: 11/14/20 1939     Protime 16.3 Seconds      INR 1.51    CBC & Differential [[id number]]  (Abnormal) Collected: 11/14/20 1919    Specimen: Blood Updated: 11/14/20 1928    Narrative:      The following orders were created for panel order CBC & Differential.  Procedure                               Abnormality         Status                     ---------                               -----------         ------                     CBC Auto Differential[[id number]]        Abnormal            Final result                 Please view results for these tests on the individual orders.    CBC Auto Differential [[id number]]  (Abnormal) Collected: 11/14/20 1919    Specimen: Blood Updated: 11/14/20 1928     WBC 14.60 10*3/mm3      RBC 5.04 10*6/mm3      Hemoglobin 12.2 g/dL      Hematocrit 39.6 %      MCV 78.6 fL      MCH 24.2 pg      MCHC 30.7 g/dL      RDW 17.6 %      RDW-SD 48.6 fl      MPV 7.9 fL      Platelets 375 10*3/mm3      Neutrophil % 64.9 %      Lymphocyte % 19.9 %      Monocyte % 14.0 %      Eosinophil % 0.4 %      Basophil % 0.8 %      Neutrophils, Absolute 9.40 10*3/mm3      Lymphocytes, Absolute 2.90 10*3/mm3      Monocytes, Absolute 2.00 10*3/mm3       Eosinophils, Absolute 0.10 10*3/mm3      Basophils, Absolute 0.10 10*3/mm3      nRBC 1.2 /100 WBC         Imaging Results (Last 72 Hours)     Procedure Component Value Units Date/Time    US Liver [[id number]] Collected: 11/15/20 0921     Updated: 11/15/20 0928    Narrative:      US LIVER-     Date of Exam: 11/15/2020 8:14 AM     Indication: New transaminitis; R07.9-Chest pain, unspecified.     Comparison: None available.     Technique: Transverse and sagittal ultrasound images of the right upper  quadrant were obtained. Doppler evaluation was also conducted.     FINDINGS:        The liver is homogeneous. There are no discrete liver masses. Portal  venous and hepatic venous flows are normal. The common duct is 5 mm.  There is an incidental right pleural effusion.       Impression:      Incidental right pleural effusion. Unremarkable images of the liver.     Electronically Signed By-Ky Yusuf MD On:11/15/2020 9:24 AM  This report was finalized on [id number] by  Ky Yusuf MD.    XR Chest 1 View [[id number]] Collected: 11/14/20 1939     Updated: 11/14/20 1958    Narrative:      Examination: XR CHEST 1 VW-     Date of Exam: 11/14/2020 5:20 PM     Indication: Chest pain protocol.     Comparison: 08/02/2019     Technique: 1 view of the chest      Findings:  The heart is enlarged. The pulmonary vascular markings are normal. There  are no focal infiltrates. The osseous structures are normal.          Impression:      Cardiomegaly without active disease     Electronically Signed By-Ky Yusuf MD On:11/14/2020 7:41 PM  This report was finalized on [id number] by  Ky Yusuf MD.        ECG 12 Lead   Preliminary Result   HEART RATE= 97  bpm   RR Interval= 616  ms   CT Interval= 186  ms   P Horizontal Axis=   deg   P Front Axis= 66  deg   QRSD Interval= 96  ms   QT Interval= 372  ms   QRS Axis= -14  deg   T Wave Axis= -88  deg   - ABNORMAL ECG -   Sinus rhythm   Probable left atrial enlargement   Left ventricular  hypertrophy   Anterior infarct, old   Nonspecific T abnormalities, lateral leads   When compared with ECG of 14-Nov-2020 18:34:37,   Significant repolarization change   Significant axis, voltage or hypertrophy change   Electronically Signed By:    Date and Time of Study: 2020-11-14 23:56:25      ECG 12 Lead   Preliminary Result   HEART RATE= 97  bpm   RR Interval= 616  ms   MA Interval= 186  ms   P Horizontal Axis=   deg   P Front Axis= 66  deg   QRSD Interval= 96  ms   QT Interval= 372  ms   QRS Axis= -14  deg   T Wave Axis= -88  deg   - ABNORMAL ECG -   Sinus rhythm   Probable left atrial enlargement   Left ventricular hypertrophy   Anterior infarct, old   Nonspecific T abnormalities, lateral leads   When compared with ECG of 14-Nov-2020 18:34:37,   Significant repolarization change   Significant axis, voltage or hypertrophy change   Electronically Signed By:    Date and Time of Study: 2020-11-14 23:56:25      ECG 12 Lead   Preliminary Result   HEART RATE= 95  bpm   RR Interval= 636  ms   MA Interval= 191  ms   P Horizontal Axis= -21  deg   P Front Axis= 43  deg   QRSD Interval= 92  ms   QT Interval= 421  ms   QRS Axis= 194  deg   T Wave Axis= -60  deg   - ABNORMAL ECG -   Sinus rhythm   LAE, consider biatrial enlargement   Probable RVH w/ secondary repol abnormality   Inferior infarct, age indeterminate   Borderline ST elevation, anterior leads   Prolonged QT interval   When compared with ECG of 24-Mar-2020 10:07:43,   New or worsened ischemia or infarction   Significant repolarization change   Significant axis, voltage or hypertrophy change   Electronically Signed By:    Date and Time of Study: 2020-11-14 18:34:37      ECG 12 Lead    (Results Pending)     CBC    Results from last 7 days   Lab Units 11/15/20  0129 11/14/20  1919   WBC 10*3/mm3 12.20* 14.60*   HEMOGLOBIN g/dL 10.9* 12.2*   PLATELETS 10*3/mm3 273 375     BMP   Results from last 7 days   Lab Units 11/15/20  0129 11/14/20  2136 11/14/20  1919      SODIUM mmol/L 133*  --  128*   POTASSIUM mmol/L 4.7 5.1 5.3*   CHLORIDE mmol/L 100  --  97*   CO2 mmol/L 21.0*  --  18.0*   BUN mg/dL 39*  --  37*   CREATININE mg/dL 1.26  --  1.33*   GLUCOSE mg/dL 190*  --  122*   MAGNESIUM mg/dL 1.8  --   --      CMP   Results from last 7 days   Lab Units 11/15/20  0129 11/14/20  2136 11/14/20  1919   SODIUM mmol/L 133*  --  128*   POTASSIUM mmol/L 4.7 5.1 5.3*   CHLORIDE mmol/L 100  --  97*   CO2 mmol/L 21.0*  --  18.0*   BUN mg/dL 39*  --  37*   CREATININE mg/dL 1.26  --  1.33*   GLUCOSE mg/dL 190*  --  122*   ALBUMIN g/dL 3.00*  --  3.00*   BILIRUBIN mg/dL 0.4  --  0.6   ALK PHOS U/L 172*  --  197*   AST (SGOT) U/L 404*  --  480*   ALT (SGPT) U/L 584*  --  642*     Medication Review  Scheduled Meds:aspirin, 81 mg, Oral, Daily  carvedilol, 12.5 mg, Oral, BID With Meals  clopidogrel, 75 mg, Oral, Daily  enoxaparin, 40 mg, Subcutaneous, Q24H  ferrous sulfate, 324 mg, Oral, Daily With Breakfast  furosemide, 40 mg, Intravenous, BID  lisinopril, 5 mg, Oral, Q24H  QUEtiapine, 50 mg, Oral, Nightly  sodium chloride, 10 mL, Intravenous, Q12H  traZODone, 100 mg, Oral, Nightly      Continuous Infusions:   PRN Meds:.Calcium Gluconate-NaCl **AND** calcium gluconate **AND** Calcium, Ionized  •  docusate sodium  •  hydrOXYzine  •  influenza vaccine  •  LORazepam **OR** LORazepam **OR** LORazepam **OR** LORazepam **OR** LORazepam **OR** LORazepam  •  magnesium sulfate **OR** magnesium sulfate **OR** magnesium sulfate  •  melatonin  •  nitroglycerin  •  ondansetron **OR** ondansetron  •  potassium & sodium phosphates **OR** potassium & sodium phosphates  •  potassium chloride  •  potassium chloride  •  sodium chloride  •  sodium chloride    Assessment:      Drug abuse (CMS/HCC)    Essential hypertension    Hyponatremia    CHF (congestive heart failure), NYHA class III, acute on chronic, systolic (CMS/HCC)    Chest pain    DENNIS (acute kidney injury) (CMS/HCC)    Hyperkalemia    Transaminitis     Anemia        Plan:    Patient is admitted with symptom of shortness of breath and chest pain.  He is clearly in congestive heart failure with volume overload.  I would continue diuresis.  Unfortunately patient stopped aspirin and Plavix and other medication.  At this stage, I would start aspirin and Plavix.  Once the patient achieved euvolemic status.  Patient may need cardiac catheterization.  I would increase carvedilol to 12.5 mg twice daily.  Imdur would be added.  I will see the patient in the morning.  Echocardiogram would be done    I discussed the patients findings and my recommendations with patient    Solomon Lowe MD  11/15/20  15:19 EST

## 2020-11-15 NOTE — ED NOTES
"Pt reports SOA and left sided chest pain that radiates all over body for 3 days that has gotten worse today. Pt states \"I feel like my chest is being squeezed and I feel like I am going to die.\"     Jacqueline Zlueta, RN  11/14/20 1918    "

## 2020-11-16 NOTE — PROGRESS NOTES
"      [facility] Medicine Services Daily Progress Note      Hospitalist Team  LOS 0 days      Patient Care Team:  Shelly Ribeiro APRN as PCP - General (Nurse Practitioner)    Patient Location: 242/1      Subjective   Subjective     Chief Complaint / Subjective  Chief Complaint   Patient presents with   • Chest Pain       Present on Admission:  • Chest pain  • Essential hypertension  • Drug abuse (CMS/[facility])  • CHF (congestive heart failure), NYHA class III, acute on chronic, systolic (CMS/[facility])  • DENNIS (acute kidney injury) (CMS/[facility])  • Hyponatremia  • Hyperkalemia  • Transaminitis  • Anemia      Brief Synopsis of Hospital Course/HPI  Mr. Bethea is a 44 y.o. male with past medical history of hypertension, substance-tobacco/alcohol abuse, heart failure with reduced ejection fraction and anemia who presents to [facility] complaining of chest pain.  Patient reports that he has a 3-day history of constant worsening left-sided chest pain which radiates to his left arm and back.  Pain is described as \"just really hurting\" rated 9/10 at its worst and 8/10 at the time of my exam.  Patient has a history of coronary artery disease underwent cardiac cath at this facility on 3/24/2020 with drug-eluting stent placed in the LAD.  Since that time patient has been minimally compliant with his scheduled medical therapy stating that he currently is taking his Coreg but has not had his Plavix or diuretics in some time.  Some associated orthopnea and PND is noted along with some nausea without vomiting, frequent episodes of tachycardia and palpitations, occasional peripheral edema and a recent subjective fever.  Patient reports that he has relapsed on his substance and alcohol abuse with his most recent use on 11/5/2020 when he states he smoked methamphetamine and drink alcohol.  He denies any history of IV drug use continues to smoke approximately 1 pack of cigarettes per day on average.  Urine output is " "noted as occurring somewhat less frequently and he does also report some chronic constipation.  Additionally patient notes a previous diagnosis of hepatitis C which he received over 1 year ago and has not been treated for.  He also reports having previous bowel resection.    Date::    11/15/20  Continues to report shortness of breath      ROS  Constitution: Negative for chills and decreased appetite.   HENT: Negative.    Eyes: Negative.    Cardiovascular: Positive for chest pain, dyspnea on exertion, leg swelling, orthopnea, palpitations and paroxysmal nocturnal dyspnea. Negative for irregular heartbeat and syncope.   Respiratory: Positive for cough and shortness of breath. Negative for sputum production.         Patient reports cough typically occurs if he attempts to lay flat.   Endocrine: Negative.    Skin: Negative.    Musculoskeletal: Negative.    Gastrointestinal: Positive for bloating and nausea. Negative for abdominal pain, hematemesis, hematochezia, melena and vomiting.   Genitourinary: Negative.    Neurological: Positive for light-headedness. Negative for focal weakness, headaches and weakness.        Lightheadedness on standing.   Psychiatric/Behavioral: Negative.         Objective   Objective      Vital Signs  Temp:  [97.3 °F (36.3 °C)-98.7 °F (37.1 °C)] 98.7 °F (37.1 °C)  Heart Rate:  [76-97] 85  Resp:  [16-20] 16  BP: (102-145)/() 109/72  Oxygen Therapy  SpO2: 96 %  Pulse Oximetry Type: Intermittent  Device (Oxygen Therapy): room air  Flowsheet Rows      First Filed Value   Admission Height  185.4 cm (73\") Documented at 11/14/2020 1752   Admission Weight  86.2 kg (190 lb) Documented at 11/14/2020 1752        Intake & Output (last 3 days)       11/13 0701 - 11/14 0700 11/14 0701 - 11/15 0700 11/15 0701 - 11/16 0700    P.O.   1220    Total Intake(mL/kg)   1220 (11.8)    Urine (mL/kg/hr)  750 2300 (1.6)    Total Output  750 2300    Net  -750 -1080               Lines, Drains & Airways    Active LDAs "     Name:   Placement date:   Placement time:   Site:   Days:    Peripheral IV 06/27/19 1800 Right Antecubital   06/27/19    1800    Antecubital   507    Peripheral IV 11/14/20 1913 Left Hand   11/14/20 1913    Hand   1                  Physical Exam:    Physical Exam  Physical Exam  Constitutional:       General: He is not in acute distress.     Appearance: Normal appearance. He is normal weight. He is not ill-appearing or toxic-appearing.   HENT:      Head: Normocephalic.      Right Ear: External ear normal.      Left Ear: External ear normal.      Nose: Nose normal.      Mouth/Throat:      Mouth: Mucous membranes are moist.   Eyes:      Extraocular Movements: Extraocular movements intact.      Conjunctiva/sclera: Conjunctivae normal.   Neck:      Musculoskeletal: Normal range of motion.   Cardiovascular:      Rate and Rhythm: Normal rate and regular rhythm.      Pulses: Normal pulses.      Heart sounds: Murmur present.      Comments: Murmur heard best at the fifth intercostal space midclavicular line  Pulmonary:      Effort: Pulmonary effort is normal.   Abdominal:      General: There is no distension.      Tenderness: There is no abdominal tenderness.      Comments: Hyperactive bowel sounds   Musculoskeletal: Normal range of motion.      Right lower leg: No edema.      Left lower leg: No edema.   Skin:     General: Skin is warm and dry.      Comments: No splinter hemorrhages, Osler nodes, Janeway lesions or unusual scarring noted on exam   Neurological:      General: No focal deficit present.      Mental Status: He is alert and oriented to person, place, and time.   Psychiatric:         Mood and Affect: Mood normal.         Behavior: Behavior normal.         Thought Content: Thought content normal.         Judgment: Judgment normal.     Procedures:              Results Review:     I reviewed the patient's new clinical results.      Lab Results (last 24 hours)     Procedure Component Value Units Date/Time     Creatinine, Urine, Random - Urine, Clean Catch [[id number]] Collected: 11/15/20 0002    Specimen: Urine, Clean Catch Updated: 11/15/20 1125     Creatinine, Urine 58.3 mg/dL     Narrative:      Reference intervals for random urine have not been established.  Clinical usage is dependent upon physician's interpretation in combination with other laboratory tests.       Sedimentation Rate [[id number]]  (Normal) Collected: 11/15/20 0129    Specimen: Blood Updated: 11/15/20 0249     Sed Rate 5 mm/hr     HIV-1 & HIV-2 Antibodies [[id number]]  (Normal) Collected: 11/15/20 0128    Specimen: Blood Updated: 11/15/20 0229    Narrative:      The following orders were created for panel order HIV-1 & HIV-2 Antibodies.  Procedure                               Abnormality         Status                     ---------                               -----------         ------                     HIV-1 / O / 2 Ag / Antib...[[id number]]  Normal              Final result                 Please view results for these tests on the individual orders.    HIV-1 / O / 2 Ag / Antibody 4th Generation [[id number]]  (Normal) Collected: 11/15/20 0128    Specimen: Blood Updated: 11/15/20 0229     HIV-1/ HIV-2 Non-Reactive     Comment: A non-reactive test result does not preclude the possibility of exposure to HIV or infection with HIV. An antibody response to recent exposure may take several months to reach detectable levels.       Narrative:      The HIV antibody/antigen combo assay is a qualitative assay for HIV that includes the p24 antigen as well as antibodies to HIV types 1 and 2. This test is intended to be used as a screening assay in the diagnosis of HIV infection in patients over the age of 2.  Results may be falsely decreased if patient taking Biotin.      Comprehensive Metabolic Panel [[id number]]  (Abnormal) Collected: 11/15/20 0129    Specimen: Blood Updated: 11/15/20 0218     Glucose 190 mg/dL      BUN 39 mg/dL      Creatinine 1.26 mg/dL       Sodium 133 mmol/L      Potassium 4.7 mmol/L      Comment: Slight hemolysis detected by analyzer. Results may be affected.        Chloride 100 mmol/L      CO2 21.0 mmol/L      Calcium 8.2 mg/dL      Total Protein 5.8 g/dL      Albumin 3.00 g/dL      ALT (SGPT) 584 U/L      AST (SGOT) 404 U/L      Alkaline Phosphatase 172 U/L      Total Bilirubin 0.4 mg/dL      eGFR Non African Amer 62 mL/min/1.73      Globulin 2.8 gm/dL      A/G Ratio 1.1 g/dL      BUN/Creatinine Ratio 31.0     Anion Gap 12.0 mmol/L     Narrative:      GFR Normal >60  Chronic Kidney Disease <60  Kidney Failure <15      Iron Profile [[id number]]  (Abnormal) Collected: 11/15/20 0129    Specimen: Blood Updated: 11/15/20 0218     Iron 18 mcg/dL      Iron Saturation 3 %      Transferrin 354 mg/dL      TIBC 527 mcg/dL     Troponin [[id number]]  (Normal) Collected: 11/15/20 0129    Specimen: Blood Updated: 11/15/20 0218     Troponin T <0.010 ng/mL     Narrative:      Troponin T Reference Range:  <= 0.03 ng/mL-   Negative for AMI  >0.03 ng/mL-     Abnormal for myocardial necrosis.  Clinicians would have to utilize clinical acumen, EKG, Troponin and serial changes to determine if it is an Acute Myocardial Infarction or myocardial injury due to an underlying chronic condition.       Results may be falsely decreased if patient taking Biotin.      Magnesium [[id number]]  (Normal) Collected: 11/15/20 0129    Specimen: Blood Updated: 11/15/20 0218     Magnesium 1.8 mg/dL     BNP [[id number]]  (Abnormal) Collected: 11/15/20 0129    Specimen: Blood Updated: 11/15/20 0211     proBNP 4,378.0 pg/mL     Narrative:      Among patients with dyspnea, NT-proBNP is highly sensitive for the detection of acute congestive heart failure. In addition NT-proBNP of <300 pg/ml effectively rules out acute congestive heart failure with 99% negative predictive value.    Results may be falsely decreased if patient taking Biotin.      CBC & Differential [[id number]]  (Abnormal) Collected:  11/15/20 0129    Specimen: Blood Updated: 11/15/20 0143    Narrative:      The following orders were created for panel order CBC & Differential.  Procedure                               Abnormality         Status                     ---------                               -----------         ------                     CBC Auto Differential[[id number]]        Abnormal            Final result                 Please view results for these tests on the individual orders.    CBC Auto Differential [[id number]]  (Abnormal) Collected: 11/15/20 0129    Specimen: Blood Updated: 11/15/20 0143     WBC 12.20 10*3/mm3      RBC 4.54 10*6/mm3      Hemoglobin 10.9 g/dL      Hematocrit 35.2 %      MCV 77.6 fL      MCH 24.1 pg      MCHC 31.0 g/dL      RDW 17.0 %      RDW-SD 46.4 fl      MPV 7.9 fL      Platelets 273 10*3/mm3      Neutrophil % 68.4 %      Lymphocyte % 19.0 %      Monocyte % 11.5 %      Eosinophil % 0.2 %      Basophil % 0.9 %      Neutrophils, Absolute 8.30 10*3/mm3      Lymphocytes, Absolute 2.30 10*3/mm3      Monocytes, Absolute 1.40 10*3/mm3      Eosinophils, Absolute 0.00 10*3/mm3      Basophils, Absolute 0.10 10*3/mm3      nRBC 0.8 /100 WBC     Hepatitis Diagnostic Profile [[id number]] Collected: 11/15/20 0128    Specimen: Blood Updated: 11/15/20 0138    Blood Culture - Blood, Arm, Left [[id number]] Collected: 11/15/20 0128    Specimen: Blood from Arm, Left Updated: 11/15/20 0138    Blood Culture - Blood, Arm, Right [[id number]] Collected: 11/15/20 0127    Specimen: Blood from Arm, Right Updated: 11/15/20 0138    Osmolality, Urine - Urine, Clean Catch [[id number]]  (Normal) Collected: 11/15/20 0002    Specimen: Urine, Clean Catch Updated: 11/15/20 0027     Osmolality, Urine 481 mOsm/kg     Procalcitonin [[id number]]  (Normal) Collected: 11/14/20 2136    Specimen: Blood Updated: 11/14/20 2321     Procalcitonin 0.14 ng/mL     Narrative:      As a Marker for Sepsis (Non-Neonates):   1. <0.5 ng/mL represents a low risk of  "severe sepsis and/or septic shock.  1. >2 ng/mL represents a high risk of severe sepsis and/or septic shock.    As a Marker for Lower Respiratory Tract Infections that require antibiotic therapy:  PCT on Admission     Antibiotic Therapy             6-12 Hrs later  > 0.5                Strongly Recommended            >0.25 - <0.5         Recommended  0.1 - 0.25           Discouraged                   Remeasure/reassess PCT  <0.1                 Strongly Discouraged          Remeasure/reassess PCT      As 28 day mortality risk marker: \"Change in Procalcitonin Result\" (> 80 % or <=80 %) if Day 0 (or Day 1) and Day 4 values are available. Refer to http://www.Soft Sciencepct-calculator.com/   Change in PCT <=80 %   A decrease of PCT levels below or equal to 80 % defines a positive change in PCT test result representing a higher risk for 28-day all-cause mortality of patients diagnosed with severe sepsis or septic shock.  Change in PCT > 80 %   A decrease of PCT levels of more than 80 % defines a negative change in PCT result representing a lower risk for 28-day all-cause mortality of patients diagnosed with severe sepsis or septic shock.                Results may be falsely decreased if patient taking Biotin.     C-reactive Protein [[id number]]  (Abnormal) Collected: 11/14/20 2136    Specimen: Blood Updated: 11/14/20 2311     C-Reactive Protein 4.92 mg/dL     Potassium [[id number]]  (Normal) Collected: 11/14/20 2136    Specimen: Blood Updated: 11/14/20 2311     Potassium 5.1 mmol/L     Osmolality, Serum [[id number]]  (Normal) Collected: 11/14/20 1919    Specimen: Blood Updated: 11/14/20 2301     Osmolality 298 mOsm/kg     Troponin [[id number]]  (Normal) Collected: 11/14/20 2136    Specimen: Blood Updated: 11/14/20 2209     Troponin T <0.010 ng/mL     Narrative:      Troponin T Reference Range:  <= 0.03 ng/mL-   Negative for AMI  >0.03 ng/mL-     Abnormal for myocardial necrosis.  Clinicians would have to utilize clinical acumen, " EKG, Troponin and serial changes to determine if it is an Acute Myocardial Infarction or myocardial injury due to an underlying chronic condition.       Results may be falsely decreased if patient taking Biotin.          No results found for: HGBA1C  Results from last 7 days   Lab Units 11/14/20  1919   INR  1.51*               Microbiology Results (last 10 days)     Procedure Component Value - Date/Time    COVID-19, [facility] IN-HOUSE,NP Swab (NO TRANSPORT MEDIA) 2 HR TAT - Swab, Nasopharynx [[id number]]  (Normal) Collected: 11/14/20 1922    Lab Status: Final result Specimen: Swab from Nasopharynx Updated: 11/14/20 2006     COVID19 Not Detected    Narrative:      Fact sheet for providers: https://www.fda.gov/media/443350/download     Fact sheet for patients: https://www.fda.gov/media/112285/download          ECG/EMG Results (most recent)     Procedure Component Value Units Date/Time    ECG 12 Lead [[id number]] Collected: 11/14/20 2356     Updated: 11/14/20 2357     QT Interval 372 ms     Narrative:      HEART RATE= 97  bpm  RR Interval= 616  ms  MD Interval= 186  ms  P Horizontal Axis=   deg  P Front Axis= 66  deg  QRSD Interval= 96  ms  QT Interval= 372  ms  QRS Axis= -14  deg  T Wave Axis= -88  deg  - ABNORMAL ECG -  Sinus rhythm  Probable left atrial enlargement  Left ventricular hypertrophy  Anterior infarct, old  Nonspecific T abnormalities, lateral leads  When compared with ECG of 14-Nov-2020 18:34:37,  Significant repolarization change  Significant axis, voltage or hypertrophy change  Electronically Signed By:   Date and Time of Study: 2020-11-14 23:56:25    ECG 12 Lead [[id number]] Collected: 11/14/20 2356     Updated: 11/15/20 0511     QT Interval 372 ms     Narrative:      HEART RATE= 97  bpm  RR Interval= 616  ms  MD Interval= 186  ms  P Horizontal Axis=   deg  P Front Axis= 66  deg  QRSD Interval= 96  ms  QT Interval= 372  ms  QRS Axis= -14  deg  T Wave Axis= -88  deg  - ABNORMAL ECG -  Sinus rhythm  Probable  left atrial enlargement  Left ventricular hypertrophy  Anterior infarct, old  Nonspecific T abnormalities, lateral leads  When compared with ECG of 14-Nov-2020 18:34:37,  Significant repolarization change  Significant axis, voltage or hypertrophy change  Electronically Signed By:   Date and Time of Study: 2020-11-14 23:56:25    ECG 12 Lead [[id number]] Collected: 11/14/20 1834     Updated: 11/15/20 0539     QT Interval 421 ms     Narrative:      HEART RATE= 95  bpm  RR Interval= 636  ms  IL Interval= 191  ms  P Horizontal Axis= -21  deg  P Front Axis= 43  deg  QRSD Interval= 92  ms  QT Interval= 421  ms  QRS Axis= 194  deg  T Wave Axis= -60  deg  - ABNORMAL ECG -  Sinus rhythm  LAE, consider biatrial enlargement  Probable RVH w/ secondary repol abnormality  Inferior infarct, age indeterminate  Borderline ST elevation, anterior leads  Prolonged QT interval  When compared with ECG of 24-Mar-2020 10:07:43,  New or worsened ischemia or infarction  Significant repolarization change  Significant axis, voltage or hypertrophy change  Electronically Signed By:   Date and Time of Study: 2020-11-14 18:34:37    Adult Transthoracic Echo Complete W/ Cont if Necessary Per Protocol [[id number]] Collected: 11/15/20 0936     Updated: 11/15/20 1043     BSA 2.3 m^2      RVIDd 3.5 cm      IVSd 1.3 cm      LVIDd 7.0 cm      LVIDs 6.6 cm      LVPWd 0.74 cm      IVS/LVPW 1.7     FS 4.9 %      EDV(Teich) 254.1 ml      ESV(Teich) 226.7 ml      EF(Teich) 10.8 %      EDV(cubed) 340.6 ml      ESV(cubed) 292.8 ml      EF(cubed) 14.0 %      LV mass(C)d 323.6 grams      LV mass(C)dI 143.3 grams/m^2      SV(Teich) 27.4 ml      SI(Teich) 12.1 ml/m^2      SV(cubed) 47.8 ml      SI(cubed) 21.2 ml/m^2      Ao root diam 3.8 cm      Ao root area 11.0 cm^2      ACS 2.5 cm      asc Aorta Diam 2.6 cm      LVOT diam 2.3 cm      LVOT area 4.3 cm^2      RVOT diam 2.7 cm      RVOT area 5.7 cm^2      EDV(MOD-sp4) 205.2 ml      ESV(MOD-sp4) 180.8 ml       EF(MOD-sp4) 11.9 %      SV(MOD-sp4) 24.5 ml      SI(MOD-sp4) 10.8 ml/m^2      Ao root area (BSA corrected) 1.7     LV Sims Vol (BSA corrected) 90.9 ml/m^2      LV Sys Vol (BSA corrected) 80.1 ml/m^2      Aortic R-R 0.65 sec      Aortic HR 92.7 BPM      MV E max osmar 84.0 cm/sec      MV A max osmar 64.1 cm/sec      MV E/A 1.3     MV V2 max 99.3 cm/sec      MV max PG 3.9 mmHg      MV V2 mean 55.1 cm/sec      MV mean PG 1.5 mmHg      MV V2 VTI 11.7 cm      MVA(VTI) 4.4 cm^2      MV dec slope 997.9 cm/sec^2      MV dec time 0.08 sec      Ao pk osmar 90.4 cm/sec      Ao max PG 3.3 mmHg      Ao max PG (full) 0.51 mmHg      Ao V2 mean 63.6 cm/sec      Ao mean PG 1.8 mmHg      Ao mean PG (full) 0.55 mmHg      Ao V2 VTI 14.8 cm      MAXIM(I,A) 3.5 cm^2      MAXIM(I,D) 3.5 cm^2      MAXIM(V,A) 3.9 cm^2      MAXIM(V,D) 3.9 cm^2      LV V1 max PG 2.8 mmHg      LV V1 mean PG 1.3 mmHg      LV V1 max 83.1 cm/sec      LV V1 mean 50.7 cm/sec      LV V1 VTI 12.2 cm      CO(Ao) 15.1 l/min      CI(Ao) 6.7 l/min/m^2      SV(Ao) 163.2 ml      SI(Ao) 72.3 ml/m^2      CO(LVOT) 4.8 l/min      CI(LVOT) 2.1 l/min/m^2      SV(LVOT) 51.9 ml      SV(RVOT) 55.4 ml      SI(LVOT) 23.0 ml/m^2      PA V2 max 66.6 cm/sec      PA max PG 1.8 mmHg      PA max PG (full) 0.02 mmHg      PA V2 mean 43.8 cm/sec      PA mean PG 0.92 mmHg      PA mean PG (full) 0.19 mmHg      PA V2 VTI 9.2 cm      PVA(I,A) 6.0 cm^2      BH CV ECHO PETER - PVA(I,D) 6.0 cm^2      BH CV ECHO PETER - PVA(V,A) 5.7 cm^2      BH CV ECHO PETER - PVA(V,D) 5.7 cm^2      PA acc time 0.09 sec      RV V1 max PG 1.8 mmHg      RV V1 mean PG 0.72 mmHg      RV V1 max 66.3 cm/sec      RV V1 mean 37.5 cm/sec      RV V1 VTI 9.7 cm      TR max osmar 249.8 cm/sec      RVSP(TR) 28.0 mmHg      RAP systole 3.0 mmHg      PA pr(Accel) 39.6 mmHg      Pulm Sys Osmar 35.4 cm/sec      Pulm Sims Osmar 65.2 cm/sec      Pulm S/D 0.54     Qp/Qs 1.1      CV ECHO PETER - BZI_BMI 29.6 kilograms/m^2       CV ECHO PETER - BSA([facility])  2.3 m^2       CV ECHO PETER - BZI_METRIC_WEIGHT 101.6 kg       CV ECHO PETER - BZI_METRIC_HEIGHT 185.4 cm      EF(MOD-bp) 12.0 %      LA dimension(2D) 5.3 cm                Results for orders placed during the hospital encounter of 03/22/20   Adult Transthoracic Echo Complete W/ Cont if Necessary Per Protocol    Narrative · Left ventricular systolic function is severely decreased.  · The following left ventricular wall segments are akinetic: basal   inferior, mid inferior, apical septal, basal inferoseptal and mid   inferoseptal.  · Left atrial cavity size is mildly dilated.  · Mild mitral valve regurgitation is present          Us Liver    Result Date: 11/15/2020  Incidental right pleural effusion. Unremarkable images of the liver.  Electronically Signed By-Ky Yusuf MD On:11/15/2020 9:24 AM This report was finalized on [id number] by  Ky Yusuf MD.    Xr Chest 1 View    Result Date: 11/14/2020  Cardiomegaly without active disease  Electronically Signed By-Ky Yusuf MD On:11/14/2020 7:41 PM This report was finalized on [id number] by  Ky Yusuf MD.      Xrays, labs reviewed personally by physician.    Medication Review:   I have reviewed the patient's current medication list      Scheduled Meds  aspirin, 81 mg, Oral, Daily  carvedilol, 12.5 mg, Oral, BID With Meals  clopidogrel, 75 mg, Oral, Daily  enoxaparin, 40 mg, Subcutaneous, Q24H  ferrous sulfate, 324 mg, Oral, Daily With Breakfast  furosemide, 40 mg, Intravenous, BID  isosorbide mononitrate, 30 mg, Oral, Q24H  lisinopril, 5 mg, Oral, Q24H  QUEtiapine, 50 mg, Oral, Nightly  sodium chloride, 10 mL, Intravenous, Q12H  traZODone, 100 mg, Oral, Nightly        Meds Infusions       Meds PRN  Calcium Gluconate-NaCl **AND** calcium gluconate **AND** Calcium, Ionized  •  docusate sodium  •  hydrOXYzine  •  influenza vaccine  •  LORazepam **OR** LORazepam **OR** LORazepam **OR** LORazepam **OR** LORazepam **OR** LORazepam  •  magnesium sulfate  **OR** magnesium sulfate **OR** magnesium sulfate  •  melatonin  •  nitroglycerin  •  ondansetron **OR** ondansetron  •  potassium & sodium phosphates **OR** potassium & sodium phosphates  •  potassium chloride  •  potassium chloride  •  sodium chloride  •  sodium chloride      Assessment/Plan   Assessment/Plan     Active Hospital Problems    Diagnosis  POA   • Chest pain [R07.9]  Yes   • DENNIS (acute kidney injury) (CMS/[facility]) [N17.9]  Yes   • Hyperkalemia [E87.5]  Yes   • Transaminitis [R74.01]  Yes   • Anemia [D64.9]  Yes   • Drug abuse (CMS/[facility]) [F19.10]  Yes   • CHF (congestive heart failure), NYHA class III, acute on chronic, systolic (CMS/[facility]) [I50.23]  Yes   • Hyponatremia [E87.1]  Yes   • Essential hypertension [I10]  Yes      Resolved Hospital Problems   No resolved problems to display.     Chest pain  -Troponin: Less than 0.010, trend  -Chest x-ray shows cardiomegaly without evidence of active disease.  -EKG reported by ED provider as showing no acute ST changes, attempting to locate original copy for review  -Patient awaiting cardiac cath on 3/24/2020 with placement of one drug-eluting stent in the LAD at that time.  -In the ED patient given aspirin nitroglycerin  -N.p.o. after midnight  -Cardiology consulted     DENNIS  - Creatine: 1.33, BUN: Thirty-seven, BUN/creatinine ratio: 27.8  - Hold lisinopril  -Avoid nephrotoxic medication and IV dye unless urgently needed  -Monitor BMP, I's and O's while admitted  -Patient may benefit from nephrology consult especially if kidney function continues to worsen if patient continues to require diuresis     Hyperkalemia  -Potassium: 5.3  -EKG pending review  -Kayexalate ordered  -Continue cardiac monitoring  -Monitor while admitted     Hyponatremia  -Sodium: 128  -Check urine and serum osmolality  -Hold Zoloft  -Monitor while admitted     Transaminitis  -ALT: S642 AST: 480 with an alk phos of of one ninety-seven and an albumin of 3.00 (levels were noted as normal on  3/22/2020)  -Check hepatitis panel, HIV, iron profile and ultrasound of right upper quadrant  -Monitor while admitted      Leukocytosis  -WBCs: 14.60 with an increased absolute neutrophil and monocyte count noted on differential  -Check blood cultures, lactic acid, procalcitonin, CRP and sed rate  -Monitor while admitted     Anemia  -Hemoglobin: 12.2 with a decreased MCV and [facility]  -Continue ferrous sulfate  -Check iron profile as above  -Monitor while admitted     CHF  -Patient reports recent episodes of orthopnea and PND.  No peripheral edema noted on exam and lungs slightly diminished in the bases otherwise unremarkable.  -proBNP: 5399.0  -Chest x-ray shows cardiomegaly without evidence of active disease.  -Patient noncompliant with outpatient diuretic therapy  -20 mg IV Lasix ordered x1 with close monitoring of kidney function and sodium planned  -Monitor I's and O's and daily weights  -2 g sodium diet  -Cardiology consulted as above     CAD  -Continue Plavix and beta-blocker (patient has been noncompliant with outpatient Plavix therapy but states he has been taking his Coreg)  -Continue cardiac monitoring     Hypertension  -Poorly controlled with a blood pressure on admission of 166/86 (pressure has normalized following reduction in anxiety as well as nitroglycerin administration in ED)  - Continue Coreg  -Hold lisinopril secondary to DENNIS and hyperkalemia  - Monitor while admitted     Depression/anxiety  -Continue Zoloft, trazodone and Seroquel     Drug abuse  -Patient reports methamphetamine use which she reports he smoked approximately 9 days prior.  He denies any history of IV drug use  -Encourage cessation especially light of patient's comorbid conditions     Alcohol abuse  -Patient reports he generally drinks approximately 1 pint of liquor per day though he has had nothing to drink for the past 9 days  -Encourage cessation especially light of patient's comorbid conditions  -CIWA protocol ordered    VTE  Prophylaxis - SCDs.      Code Status -   Code Status and Medical Interventions:   Ordered at: 11/14/20 2158     Code Status:    CPR     Medical Interventions (Level of Support Prior to Arrest):    Full       Discharge Planning  Pending clinical improvement, continued diuresis and further cardiac recs    Continued Care and Services - Admitted Since 11/14/2020    Coordination has not been started for this encounter.           Electronically signed by Alexus Soriano MD, 11/15/20, 21:14 EST.  [facility] Hospitalist Team

## 2020-11-16 NOTE — PROGRESS NOTES
"Heart Failure Program  Nurse Navigator  Discharge Planning    Patient Name:Asad Bethea  :1975  Cardiologist:Mynor  Current Admission Date: 2020   Previous Admission:    Admission frequency: 1 admissions in 6 months    Heart Failure history per record:    Symptoms on admission:c/o chest pain, swelling lower extremities past \"few weeks\".  Pt advises he does \"drugs and alcohol daily, I quit 11 days ago and went through DTs, I know I need to quit to be here for my son.\"  Pt advises he has not had any medications \"in quite some time\", does not have any to take. Per record, pt requesting refills for coreg from Dr Lowe, refills for trazadone and omerpazole from Dr Rosario office.  Pt with 4 no show appointments over past 6 moths for appointments.  Pt advises he is currently homeless and living in abandon houses, \"I do not have any family or place to live, no one cares about me.\"        Admission Weight:  Flowsheet Rows      First Filed Value   Admission Height  185.4 cm (73\") Documented at 2020 1752   Admission Weight  86.2 kg (190 lb) Documented at 2020 1752            Current Home Medications:  Prior to Admission medications    Not on File       Social history:   Pt homeless, unsure if patient has any of his mediations, advise he does not but per record has requested some refills per phone.  Pt advises of alcohol and drug abuse history, requesting to get sober, \"I want to live for my son\" pt tearful and agreeable to assistance with drugs and alcohol.      Smoking status:\"i only smoke 1-2 per week, don't have any now\"    Diagnostics Testing:  proBNP level: 5399>4378    Echocardiogram:  Results for orders placed during the hospital encounter of 20   Adult Transthoracic Echo Complete W/ Cont if Necessary Per Protocol    Narrative · Left ventricular systolic function is severely decreased.  · Left ventricular ejection fraction is 10 to 15%  · Akinetic septum, inferior wall and apex is " "noted  · The left ventricular cavity is mildly dilated.  · Left ventricular diastolic function was normal.  · Mildly reduced right ventricular systolic function noted.  · Mild mitral valve regurgitation is present  · Mild tricuspid valve regurgitation is present. Calculated right   ventricular systolic pressure from tricuspid regurgitation is 28.0 mmHg.            Patient Assessment:   Pt lying in bed, resp even and unlabored, no SOA with conversation. No edema at this time. Pt advises he does have SOA with walking in room.    Current O2: none  Home O2: none    Education provided to patient:  yes- Heart Failure disease education  yes -Symptom identification/management  pending -Daily Weights  pending- Diet education  n/a- Fluid restriction (if ordered)  pending- Activity education  yes- Medication education  yes- Smoking cessation  yes- Follow-up Appointments    Acceptance of learning: acceptable, pt tearful requesting help, cooperative    Heart Failure education interactive teaching session time: 30 minutes    Identified needs/barriers:   Drug abuse history and ETOH reqesting help, homelessness, medication adherence, no current meds to take, no follow-up appointments (hx of no show past 4 appointments), ?psych consult      Intervention:   discuss with CM and primary nurse    Patient goal: \" I want to live for my son, I have to take better care of myself\"      "

## 2020-11-16 NOTE — PLAN OF CARE
Goal Outcome Evaluation:  Plan of Care Reviewed With: patient  Progress: no change  Outcome Summary: Pt has c/o some back pain. pt has been in bed most of day sleeping on and off. pt CIWA score went from 2 this morning to 12 this afternoon- see MAR for interventions. pt has had no other complaints today. possible heart cath tomorrow morning or thursday- needing diuresed more. will continue to monitor.

## 2020-11-16 NOTE — PROGRESS NOTES
CARDIOLOGY FOLLOW-UP PROGRESS NOTE      Reason for follow-up:    Chest pain  Shortness of breath  Ischemic dilated cardiomyopathy  CAD  LAD stent     Attending: Zara Thibodeaux DO Subjective .     Denies pain at rest  C/o mild OLSON     Review of Systems   Constitution: Negative for decreased appetite and diaphoresis.   HENT: Negative for congestion, hearing loss and nosebleeds.    Cardiovascular: Positive for dyspnea on exertion. Negative for chest pain, claudication, irregular heartbeat, leg swelling, near-syncope, orthopnea, palpitations, paroxysmal nocturnal dyspnea and syncope.   Respiratory: Negative for cough, shortness of breath and sleep disturbances due to breathing.    Endocrine: Negative for polyuria.   Hematologic/Lymphatic: Does not bruise/bleed easily.   Skin: Negative for itching and rash.   Musculoskeletal: Negative for back pain, muscle weakness and myalgias.   Gastrointestinal: Negative for abdominal pain, change in bowel habit and nausea.   Genitourinary: Negative for dysuria, flank pain, frequency and hesitancy.   Neurological: Negative for dizziness, tremors and weakness.   Psychiatric/Behavioral: Negative for altered mental status. The patient does not have insomnia.        Allergies: Patient has no known allergies.    Scheduled Meds:aspirin, 81 mg, Oral, Daily  carvedilol, 12.5 mg, Oral, BID With Meals  clopidogrel, 75 mg, Oral, Daily  enoxaparin, 40 mg, Subcutaneous, Q24H  ferrous sulfate, 324 mg, Oral, Daily With Breakfast  furosemide, 40 mg, Intravenous, BID  isosorbide mononitrate, 30 mg, Oral, Q24H  lisinopril, 5 mg, Oral, Q24H  QUEtiapine, 50 mg, Oral, Nightly  sodium chloride, 10 mL, Intravenous, Q12H  traZODone, 100 mg, Oral, Nightly        Continuous Infusions:     PRN Meds:.Calcium Gluconate-NaCl **AND** calcium gluconate **AND** Calcium, Ionized  •  docusate sodium  •  hydrOXYzine  •  influenza vaccine  •  LORazepam **OR** LORazepam **OR** LORazepam **OR** LORazepam **OR**  "LORazepam **OR** LORazepam  •  magnesium sulfate **OR** magnesium sulfate **OR** magnesium sulfate  •  melatonin  •  nitroglycerin  •  ondansetron **OR** ondansetron  •  potassium & sodium phosphates **OR** potassium & sodium phosphates  •  potassium chloride  •  potassium chloride  •  sodium chloride  •  sodium chloride    Objective     VITAL SIGNS  Patient Vitals for the past 24 hrs:   BP Temp Temp src Pulse Resp SpO2 Weight   11/16/20 1917 107/75 97.9 °F (36.6 °C) Axillary 89 16 97 % --   11/16/20 1138 95/60 98.5 °F (36.9 °C) Oral 72 18 94 % --   11/16/20 0800 122/79 99 °F (37.2 °C) Oral 89 18 96 % --   11/16/20 0329 102/69 97.9 °F (36.6 °C) Oral 84 16 96 % 104 kg (228 lb 6.3 oz)   11/15/20 2059 109/72 98.7 °F (37.1 °C) Oral 85 16 96 % 103 kg (227 lb 4.7 oz)        Flowsheet Rows      First Filed Value   Admission Height  185.4 cm (73\") Documented at 11/14/2020 1752   Admission Weight  86.2 kg (190 lb) Documented at 11/14/2020 1752            Intake/Output Summary (Last 24 hours) at 11/16/2020 1921  Last data filed at 11/16/2020 1757  Gross per 24 hour   Intake 1800 ml   Output --   Net 1800 ml        TELEMETRY:     SR    Physical Exam:  Constitutional:       General: Not in acute distress.     Appearance: Normal appearance. Well-developed.   Eyes:      Pupils: Pupils are equal, round, and reactive to light.   HENT:      Head: Normocephalic and atraumatic.   Neck:      Musculoskeletal: Normal range of motion and neck supple.      Vascular: No JVD.   Pulmonary:      Effort: Pulmonary effort is normal.      Breath sounds: Normal breath sounds.   Cardiovascular:      Normal rate. Regular rhythm.   Pulses:     Intact distal pulses.   Edema:     Peripheral edema absent.   Abdominal:      General: There is no distension.      Palpations: Abdomen is soft.      Tenderness: There is no abdominal tenderness.   Musculoskeletal: Normal range of motion.   Skin:     General: Skin is warm and dry.   Neurological:      Mental " Status: Alert and oriented to person, place, and time.            Results Review:   I reviewed the patient's new clinical results.    CBC    Results from last 7 days   Lab Units 11/16/20  0320 11/15/20  0129 11/14/20  1919   WBC 10*3/mm3 10.20 12.20* 14.60*   HEMOGLOBIN g/dL 10.4* 10.9* 12.2*   PLATELETS 10*3/mm3 282 273 375     BMP   Results from last 7 days   Lab Units 11/16/20  0320 11/15/20  0129 11/14/20  2136 11/14/20  1919   SODIUM mmol/L 134* 133*  --  128*   POTASSIUM mmol/L 4.1 4.7 5.1 5.3*   CHLORIDE mmol/L 98 100  --  97*   CO2 mmol/L 27.0 21.0*  --  18.0*   BUN mg/dL 27* 39*  --  37*   CREATININE mg/dL 1.07 1.26  --  1.33*   GLUCOSE mg/dL 129* 190*  --  122*   MAGNESIUM mg/dL 2.1 1.8  --   --      Cr Clearance Estimated Creatinine Clearance: 110.4 mL/min (by C-G formula based on SCr of 1.07 mg/dL).  Coag   Results from last 7 days   Lab Units 11/14/20 1919   INR  1.51*     HbA1C   Lab Results   Component Value Date    HGBA1C 5.8 (H) 07/09/2019     Blood Glucose No results found for: POCGLU  Infection   Results from last 7 days   Lab Units 11/15/20  0128 11/15/20  0127 11/14/20  2136   BLOODCX  No growth at 24 hours No growth at 24 hours  --    PROCALCITONIN ng/mL  --   --  0.14     CMP   Results from last 7 days   Lab Units 11/16/20  0320 11/15/20  0129 11/14/20  2136 11/14/20  1919   SODIUM mmol/L 134* 133*  --  128*   POTASSIUM mmol/L 4.1 4.7 5.1 5.3*   CHLORIDE mmol/L 98 100  --  97*   CO2 mmol/L 27.0 21.0*  --  18.0*   BUN mg/dL 27* 39*  --  37*   CREATININE mg/dL 1.07 1.26  --  1.33*   GLUCOSE mg/dL 129* 190*  --  122*   ALBUMIN g/dL 2.90* 3.00*  --  3.00*   BILIRUBIN mg/dL 0.4 0.4  --  0.6   ALK PHOS U/L 145* 172*  --  197*   AST (SGOT) U/L 176* 404*  --  480*   ALT (SGPT) U/L 393* 584*  --  642*     ABG      UA    Results from last 7 days   Lab Units 11/14/20 1938   NITRITE UA  Negative   WBC UA /HPF 0-2*   BACTERIA UA /HPF None Seen   SQUAM EPITHEL UA /HPF 0-2     BONY  No results found for:  POCMETH, POCAMPHET, POCBARBITUR, POCBENZO, POCCOCAINE, POCOPIATES, POCOXYCODO, POCPHENCYC, POCPROPOXY, POCTHC, POCTRICYC  Lysis Labs   Results from last 7 days   Lab Units 11/16/20  0320 11/15/20  0129 11/14/20  1919   INR   --   --  1.51*   HEMOGLOBIN g/dL 10.4* 10.9* 12.2*   PLATELETS 10*3/mm3 282 273 375   CREATININE mg/dL 1.07 1.26 1.33*     Radiology(recent) Us Liver    Result Date: 11/15/2020  Incidental right pleural effusion. Unremarkable images of the liver.  Electronically Signed By-Ky Yusuf MD On:11/15/2020 9:24 AM This report was finalized on [id number] by  Ky Yusuf MD.      Imaging Results (Last 24 Hours)     ** No results found for the last 24 hours. **          Results from last 7 days   Lab Units 11/15/20  0129   TROPONIN T ng/mL <0.010       EKG                I personally viewed and interpreted the patient's EKG/Telemetry data:        ECHOCARDIOGRAM:     Results for orders placed during the hospital encounter of 11/14/20   Adult Transthoracic Echo Complete W/ Cont if Necessary Per Protocol    Narrative · Left ventricular systolic function is severely decreased.  · Left ventricular ejection fraction is 10 to 15%  · Akinetic septum, inferior wall and apex is noted  · The left ventricular cavity is mildly dilated.  · Left ventricular diastolic function was normal.  · Mildly reduced right ventricular systolic function noted.  · Mild mitral valve regurgitation is present  · Mild tricuspid valve regurgitation is present. Calculated right   ventricular systolic pressure from tricuspid regurgitation is 28.0 mmHg.            STRESS MYOVIEW:      CARDIAC CATHETERIZATION:  FINDINGS:     1. HEMODYNAMICS:       Aortic pressure: 89/52 mmHg     LVEDP: 18 mmHg     Gradient across aortic valve on pullback: No gradient across pullback        2. LEFT VENTRICULOGRAPHY: Not done        3. CORONARY ANGIOGRAPHY:            A: Left main coronary artery: Normal            B: Left anterior descending artery: 80%  stenosis with haziness consistent with thrombosis of proximal LAD with RUTH II-III flow.  RUTH-3 flow was noted after stenting of the segment            C: Left circumflex coronary artery: Free of significant disease            D: Right coronary artery: Large and dominant and had 25% plaque in the proximal and mid to distal segment.  RCA is dominant vessel        PROCEDURE COMMENTS:      After informed consent was obtained, the patient was prepped and draped in the usual sterile manner.  Mild to moderate sedation was administered.  Right femoral artery was accessed without difficulty and 6 Maltese arterial sheath was inserted.  Sheath was flushed with heparinized saline.     Using 6 Maltese Petty catheters, first left coronary artery and the right coronary was electively engaged and appropriate views were taken.  A 6 Maltese JR4 catheter was used to cross aortic valve and left heart catheterization was performed.  No left ventriculography was done.     After diagnostic catheterization, we proceeded to the intervention of the LAD coronary artery.     We advanced a 6 Maltese XB 3.5 LAD interventional guide and selective engagement of the left coronary artery was achieved.  We advanced interventional guidewire 0.014 BMW and crossed the lesion and placed in the distal part of the LAD coronary artery.     We advanced 3.5×15 Xience stent and deployed at nominal pressure.  Stenosis decreased from 80% to less than 10%.  RUTH II-III flow was present before the stenting.  RUTH-3 flow was present after the stenting of the LAD. No dissection, perforation or no reflow phenomena was noted.     Patient was given aspirin, Plavix and heparin during the procedure.  ACT at the end of procedure was 186 and 3000 units of unfractionated heparin was given more.     Patient tolerated the procedure well.        SUMMARY:      1.  Single-vessel coronary artery disease  2.  Successful stenting of LAD  3.  Severe left ventricular  dysfunction     RECOMMENDATIONS:      Dual antiplatelet therapy is recommended  I will proceed with transfusion of blood.       OTHER:         Assessment/Plan            Drug abuse (CMS/[facility])    Essential hypertension    Hyponatremia    CHF (congestive heart failure), NYHA class III, acute on chronic, systolic (CMS/[facility])    Chest pain    DENNIS (acute kidney injury) (CMS/[facility])    Hyperkalemia    Transaminitis    Anemia        ASSESSMENT:    Chest pain  Shortness of breath  Ischemic dilated cardiomyopathy  CAD  LAD stent    PLAN:    Severe LV dysfunction on cath  Continue DAPT  Diuresis          Additional recommendations per Dr. Lowe    I discussed the patients findings and my recommendations with patient and RN    Patient is seen and examined and findings are verified.  Patient is feeling slightly better.  Patient denies any chest pain.  Mild shortness of breath    Blood pressure is slightly low    JVD still elevated.  Normal S1 and S2.  Fine crackles at the bases.  No abdominal tenderness leg edema noted.    I will continue diuresis.  Echocardiogram showed EF is 10 to 15%.  Patient is told about this finding and advised him to be compliant with his therapy.  His social condition is very poor.  Patient does not have any place to live.  He is homeless.  Once the patient is stabilized I would consider cardiac catheterization.  I am very reluctant about his AICD implantation because of his noncompliance and lack of follow-up.                        Solomon Lowe MD  11/16/20  19:21 EST

## 2020-11-16 NOTE — PROGRESS NOTES
Discharge Planning Assessment   [facility]     Patient Name: Asad Bethea  MRN: [id number]  Today's Date: 11/16/2020    Admit Date: 11/14/2020    Discharge Needs Assessment     Row Name 11/16/20 1123       Living Environment    Lives With  alone    Current Living Arrangements  homeless    Primary Care Provided by  self    Provides Primary Care For  no one, unable/limited ability to care for self    Able to Return to Prior Arrangements  other (see comments) SW consulted, patient is homeless       Resource/Environmental Concerns    Resource/Environmental Concerns  reliable transportation;home accessibility    Home Accessibility Concerns  other (see comments) homeless    Transportation Concerns  other (see comments) SW to screen       Transition Planning    Patient/Family Anticipates Transition to  inpatient rehabilitation facility    Patient/Family Anticipated Services at Transition  mental health services;rehabilitation services;    Transportation Anticipated  family or friend will provide;other (see comments) may need transportation assistance       Discharge Needs Assessment    Readmission Within the Last 30 Days  no previous admission in last 30 days    Concerns to be Addressed  substance/tobacco abuse/use;homelessness;compliance issue    Equipment Needed After Discharge  none    Current Discharge Risk  substance use/abuse;homeless;lack of support system/caregiver        Discharge Plan     Row Name 11/16/20 1126       Plan    Plan  SW consulted for substance abuse and homeless.    Plan Comments  Received notice from bedside RN patient is homeless, notified [facility] SW. Also informed [facility] of patients substance abuse, ETOH and noncompliance. DC barriers: ECHO, IV Lasix          Demographic Summary     Row Name 11/16/20 1123       General Information    Admission Type  observation    Arrived From  emergency department    Referral Source  admission list    Reason for Consult  discharge planning    Preferred  Language  English        Functional Status     Row Name 11/16/20 1123       Functional Status    Usual Activity Tolerance  moderate    Current Activity Tolerance  moderate        No contact with patient.      Macrina Graf RN

## 2020-11-16 NOTE — PLAN OF CARE
Goal Outcome Evaluation:  Plan of Care Reviewed With: patient  Progress: no change  Outcome Summary: Pt slept well on and off through the night, no c/o pain or discomfort at this time. Cardio on board at this time. Will continue to monitor.

## 2020-11-16 NOTE — DISCHARGE PLACEMENT REQUEST
"Asad Sousa (44 y.o. Male)     Date of Birth Social Security Number Address Home Phone MRN    1975  [id number]  [place] [address] [phone] [id number]    Syn[employer] Marital Status          Catholic        Admission Date Admission Type Admitting Provider Attending Provider Department, Room/Bed    20 Emergency Zara Thibodeaux DO [place], [place], DO [facility] 2C MEDICAL INPATIENT, 242/1    Discharge Date Discharge Disposition Discharge Destination                       Attending Provider: Zara Thibodeaux DO    Allergies: No Known Allergies    Isolation: None   Infection: None   Code Status: CPR    Ht: 185.4 cm (73\")   Wt: 104 kg (228 lb 6.3 oz)    Admission Cmt: None   Principal Problem: None                Active Insurance as of 2020     Primary Coverage     Payor Plan Insurance Group Employer/Plan Group    [place] [facility] [place]      Payor Plan Address Payor Plan Phone Number Payor Plan Fax Number Effective Dates    [address] [phone]  2019 - None Entered    [place] [address]       Subscriber Name Subscriber Birth Date Member ID       ASAD SOUSA 1975 [id number]                 Emergency Contacts      (Rel.) Home Phone Work Phone Mobile Phone    ADRIÁN COLBY (Other) -- -- [phone]            Emergency Contact Information     Name Relation Home Work Mobile    ADRIÁN COLBY Other   [phone]          Insurance Information                [place] [employer] Phone: [phone]    Subscriber: Asad Sousa Subscriber#: [id number]    Group#:  Precert#:              History & Physical      Lenin Sousa PA-C at 20 2131                [facility] Medicine Services      Patient Name: Asad Sousa  : 1975  MRN: [id number]  Primary Care Physician: Shelly Ribeiro APRN  Date of admission: 2020    Patient " "Care Team:  Shelly Ribeiro APRN as PCP - General (Nurse Practitioner)          Subjective   History Present Illness     Chief Complaint:   Chief Complaint   Patient presents with   • Chest Pain         Mr. Bethea is a 44 y.o. male with past medical history of hypertension, substance-tobacco/alcohol abuse, heart failure with reduced ejection fraction and anemia who presents to [facility] complaining of chest pain.  Patient reports that he has a 3-day history of constant worsening left-sided chest pain which radiates to his left arm and back.  Pain is described as \"just really hurting\" rated 9/10 at its worst and 8/10 at the time of my exam.  Patient has a history of coronary artery disease underwent cardiac cath at this facility on 3/24/2020 with drug-eluting stent placed in the LAD.  Since that time patient has been minimally compliant with his scheduled medical therapy stating that he currently is taking his Coreg but has not had his Plavix or diuretics in some time.  Some associated orthopnea and PND is noted along with some nausea without vomiting, frequent episodes of tachycardia and palpitations, occasional peripheral edema and a recent subjective fever.  Patient reports that he has relapsed on his substance and alcohol abuse with his most recent use on 11/5/2020 when he states he smoked methamphetamine and drink alcohol.  He denies any history of IV drug use continues to smoke approximately 1 pack of cigarettes per day on average.  Urine output is noted as occurring somewhat less frequently and he does also report some chronic constipation.  Additionally patient notes a previous diagnosis of hepatitis C which he received over 1 year ago and has not been treated for.  He also reports having previous bowel resection.    In the ED patient did have lab significant for troponin of less than 0.010, proBNP: 5399.0, sodium: One twenty-eight, potassium: 5.3, CO2: 18.0, anion gap: 13.0, creatinine: 1.33, " BUN: Thirty-seven, alk phos: One ninety-seven, ALT: Six forty-two, AST: Forty-eight, albumin: 3.00.  WBCs: 14.60, hemoglobin: 12.2 with a decreased MCV and MCHC.  UA shows sterile pyuria with 0-2 WBCs, 0-2 RBCs, 1+ bilirubin, 1+ protein and a normal specific gravity of 1.028.  Urine tox positive for amphetamines and THC.  Chest x-ray shows cardiomegaly without evidence of active disease.  COVID-19 test was negative.  ED provider notes initial EKG had significant artifact and was difficult to interpret with subsequent EKG showing no acute ST changes.  This is not available for review at the time of my exam    History of Present Illness    Review of Systems   Constitution: Positive for fever. Negative for chills and decreased appetite.   HENT: Negative.    Eyes: Negative.    Cardiovascular: Positive for chest pain, dyspnea on exertion, leg swelling, orthopnea, palpitations and paroxysmal nocturnal dyspnea. Negative for irregular heartbeat and syncope.   Respiratory: Positive for cough and shortness of breath. Negative for sputum production.         Patient reports cough typically occurs if he attempts to lay flat.   Endocrine: Negative.    Skin: Negative.    Musculoskeletal: Negative.    Gastrointestinal: Positive for bloating and nausea. Negative for abdominal pain, hematemesis, hematochezia, melena and vomiting.   Genitourinary: Negative.    Neurological: Positive for light-headedness. Negative for focal weakness, headaches and weakness.        Lightheadedness on standing.   Psychiatric/Behavioral: Negative.            Personal History     Past Medical History:   Past Medical History:   Diagnosis Date   • Abnormal nuclear stress test 6/28/2019   • Acute back pain 7/8/2019   • ADHD (attention deficit hyperactivity disorder)    • Alcohol dependence (CMS/HCC)    • Anxiety    • Arthritis    • Clotting disorder (CMS/HCC)    • Depression    • Depression with anxiety 7/14/2019   • Essential hypertension 6/28/2019   •  Generalized abdominal pain 8/2/2019   • GERD (gastroesophageal reflux disease)    • H/O heart artery stent 03/24/2020   • Hepatitis C 06/2019   • Hypertensive urgency 7/8/2019   • Hyponatremia 8/4/2019   • IBS (irritable bowel syndrome)    • Intractable vomiting with nausea 7/13/2019    Added automatically from request for surgery [id number]   • Medically noncompliant 7/8/2019   • Melena 7/14/2019   • NICM (nonischemic cardiomyopathy) (CMS/HCC) 7/14/2019   • Non-ST elevation myocardial infarction (NSTEMI) (CMS/[facility]) 3/24/2020   • Substance abuse (CMS/HCC)    • Tobacco dependency        Surgical History:      Past Surgical History:   Procedure Laterality Date   • CARDIAC CATHETERIZATION     • CARDIAC CATHETERIZATION N/A 7/9/2019    Procedure: LEFT HEART CATH with possible PCI;  Surgeon: PAL Johnson MD;  Location: [facility] CATH INVASIVE LOCATION;  Service: Cardiovascular   • CARDIAC CATHETERIZATION N/A 3/24/2020    Procedure: LEFT HEART CATH with possible PCI;  Surgeon: Solomon Lowe MD;  Location: [facility] CATH INVASIVE LOCATION;  Service: Cardiovascular;  Laterality: N/A;  Local and IV sedation   • CARDIAC CATHETERIZATION N/A 3/24/2020    Procedure: Stent ATUL coronary;  Surgeon: Solomon Lowe MD;  Location: [facility] CATH INVASIVE LOCATION;  Service: Cardiovascular;  Laterality: N/A;   • CARDIOVASCULAR STRESS TEST  2019   • ECHO - CONVERTED  2019   • ECHO - CONVERTED  2020   • ENDOSCOPY N/A 7/14/2019    Procedure: ESOPHAGOGASTRODUODENOSCOPY WITH SCLEROTHERAPY, BICAP CAUTERY OF DUODENAL ULCER AND HEMOSTASIS SPRAY OF DUODENAL ULCER;  Surgeon: Wesly Myers MD;  Location: [facility] ENDOSCOPY;  Service: Gastroenterology   • EXPLORATORY LAPAROTOMY N/A 7/16/2019    Procedure: LAPAROTOMY EXPLORATORY;  Surgeon: Patience Peng MD;  Location: [facility] MAIN OR;  Service: General   • WRIST SURGERY N/A            Family History: family history includes Colon cancer in his mother; Heart attack in his brother, brother, and mother;  Heart disease in his brother, brother, and mother. Otherwise pertinent FHx was reviewed and unremarkable.     Social History:  reports that he quit smoking about 16 months ago. His smoking use included cigarettes. He has a 20.00 pack-year smoking history. He has quit using smokeless tobacco. He reports previous alcohol use. He reports previous drug use. Drugs: Cocaine(coke) and Methamphetamines.      Medications:  Prior to Admission medications    Medication Sig Start Date End Date Taking? Authorizing Provider   carvedilol (COREG) 3.125 MG tablet TAKE 1 TABLET BY MOUTH TWICE DAILY WITH MEALS 10/28/20   Solomon Lowe MD   clopidogrel (PLAVIX) 75 MG tablet Take 1 tablet by mouth Daily.  Patient taking differently: Take 75 mg by mouth Daily. Dr Lowe -  Pt to continue his Plavix  for (EGD/colonoscopy 5/19) 4/22/20   Solomon Lowe MD   ferrous sulfate 324 (65 Fe) MG tablet delayed-release EC tablet Take 1 tablet by mouth 2 (Two) Times a Day With Meals. 5/14/20   Shelly Ribeiro APRN   furosemide (Lasix) 20 MG tablet Take 1 tablet by mouth Daily. 4/22/20   Solomon Lowe MD   lisinopril (PRINIVIL,ZESTRIL) 2.5 MG tablet Take 1 tablet by mouth Daily. 4/22/20   Solomon Lowe MD   loperamide (IMODIUM) 2 MG capsule Take 1 capsule by mouth 3 (Three) Times a Day As Needed for Diarrhea. 3/25/20   Nga Malhotra MD   omeprazole (priLOSEC) 40 MG capsule TAKE 1 CAPSULE BY MOUTH EVERY DAY 6/22/20   Segundo Rosario Jr., MD   QUEtiapine (SEROquel) 100 MG tablet Take 1 tablet by mouth Every Night. 5/14/20   Shelly Ribeiro APRN   sertraline (ZOLOFT) 100 MG tablet Take 1 tablet by mouth Daily. 5/14/20   Shelly Ribeiro APRN   spironolactone (ALDACTONE) 25 MG tablet TAKE 1 TABLET BY MOUTH DAILY 7/21/20   Solomon Lowe MD   traZODone (DESYREL) 100 MG tablet TAKE 1 TABLET BY MOUTH EVERY EVENING 6/22/20   Segundo Rosario Jr., MD       Allergies:  No Known Allergies    Objective   Objective     Vital Signs  Temp:  [97.7 °F (36.5 °C)] 97.7  °F (36.5 °C)  Heart Rate:  [92-98] 94  Resp:  [20] 20  BP: (112-162)/(86-98) 112/98  SpO2:  [96 %-100 %] 96 %  on   ;   Device (Oxygen Therapy): room air  Body mass index is 25.07 kg/m².    Physical Exam  Constitutional:       General: He is not in acute distress.     Appearance: Normal appearance. He is normal weight. He is not ill-appearing or toxic-appearing.   HENT:      Head: Normocephalic.      Right Ear: External ear normal.      Left Ear: External ear normal.      Nose: Nose normal.      Mouth/Throat:      Mouth: Mucous membranes are moist.   Eyes:      Extraocular Movements: Extraocular movements intact.      Conjunctiva/sclera: Conjunctivae normal.   Neck:      Musculoskeletal: Normal range of motion.   Cardiovascular:      Rate and Rhythm: Normal rate and regular rhythm.      Pulses: Normal pulses.      Heart sounds: Murmur present.      Comments: Murmur heard best at the fifth intercostal space midclavicular line  Pulmonary:      Effort: Pulmonary effort is normal.   Abdominal:      General: There is no distension.      Tenderness: There is no abdominal tenderness.      Comments: Hyperactive bowel sounds   Musculoskeletal: Normal range of motion.      Right lower leg: No edema.      Left lower leg: No edema.   Skin:     General: Skin is warm and dry.      Comments: No splinter hemorrhages, Osler nodes, Janeway lesions or unusual scarring noted on exam   Neurological:      General: No focal deficit present.      Mental Status: He is alert and oriented to person, place, and time.   Psychiatric:         Mood and Affect: Mood normal.         Behavior: Behavior normal.         Thought Content: Thought content normal.         Judgment: Judgment normal.         Results Review:  I have personally reviewed most recent lab results and radiology images and interpretations and agree with findings, most notably: Troponin, proBNP, CBC, CMP, UA, urine tox, chest x-ray, EKG and COVID-19 test.    Results from last 7 days    Lab Units 11/14/20 1919   WBC 10*3/mm3 14.60*   HEMOGLOBIN g/dL 12.2*   HEMATOCRIT % 39.6   PLATELETS 10*3/mm3 375   INR  1.51*     Results from last 7 days   Lab Units 11/14/20 1919   SODIUM mmol/L 128*   POTASSIUM mmol/L 5.3*   CHLORIDE mmol/L 97*   CO2 mmol/L 18.0*   BUN mg/dL 37*   CREATININE mg/dL 1.33*   GLUCOSE mg/dL 122*   CALCIUM mg/dL 8.4*   ALT (SGPT) U/L 642*   AST (SGOT) U/L 480*   TROPONIN T ng/mL <0.010   PROBNP pg/mL 5,399.0*     Estimated Creatinine Clearance: 85.5 mL/min (A) (by C-G formula based on SCr of 1.33 mg/dL (H)).  Brief Urine Lab Results  (Last result in the past 365 days)      Color   Clarity   Blood   Leuk Est   Nitrite   Protein   CREAT   Urine HCG        11/14/20 1938 Dark Yellow  Comment:  Result checked  Clear Negative Negative Negative 30 mg/dL (1+)               Microbiology Results (last 10 days)     Procedure Component Value - Date/Time    COVID-19, ABBOTT IN-HOUSE,NP Swab (NO TRANSPORT MEDIA) 2 HR TAT - Swab, Nasopharynx [[id number]]  (Normal) Collected: 11/14/20 1922    Lab Status: Final result Specimen: Swab from Nasopharynx Updated: 11/14/20 2006     COVID19 Not Detected    Narrative:      Fact sheet for providers: https://www.fda.gov/media/741334/download     Fact sheet for patients: https://www.fda.gov/media/889906/download          ECG/EMG Results (most recent)     None               Results for orders placed during the hospital encounter of 03/22/20   Adult Transthoracic Echo Complete W/ Cont if Necessary Per Protocol    Narrative · Left ventricular systolic function is severely decreased.  · The following left ventricular wall segments are akinetic: basal   inferior, mid inferior, apical septal, basal inferoseptal and mid   inferoseptal.  · Left atrial cavity size is mildly dilated.  · Mild mitral valve regurgitation is present          Xr Chest 1 View    Result Date: 11/14/2020  Cardiomegaly without active disease  Electronically Signed By-Ky Yusuf MD  On:11/14/2020 7:41 PM This report was finalized on [id number] by  Ky Yusuf MD.        Estimated Creatinine Clearance: 85.5 mL/min (A) (by C-G formula based on SCr of 1.33 mg/dL (H)).    Assessment/Plan   Assessment/Plan       Active Hospital Problems    Diagnosis  POA   • Chest pain [R07.9]  Yes     Priority: High   • DENNIS (acute kidney injury) (CMS/[facility]) [N17.9]  Yes     Priority: High   • Hyperkalemia [E87.5]  Yes     Priority: High   • Transaminitis [R74.01]  Yes     Priority: High   • Hyponatremia [E87.1]  Yes     Priority: High   • Anemia [D64.9]  Yes     Priority: Medium   • CHF (congestive heart failure), NYHA class III, acute on chronic, systolic (CMS/[facility]) [I50.23]  Yes     Priority: Medium   • Essential hypertension [I10]  Yes     Priority: Medium   • Drug abuse (CMS/[facility]) [F19.10]  Yes     Priority: Low      Resolved Hospital Problems   No resolved problems to display.     Chest pain  -Troponin: Less than 0.010, trend  -Chest x-ray shows cardiomegaly without evidence of active disease.  -EKG reported by ED provider as showing no acute ST changes, attempting to locate original copy for review  -Patient awaiting cardiac cath on 3/24/2020 with placement of one drug-eluting stent in the LAD at that time.  -In the ED patient given aspirin nitroglycerin  -N.p.o. after midnight  -Cardiology consulted    DENNIS  - Creatine: 1.33, BUN: Thirty-seven, BUN/creatinine ratio: 27.8  - Hold lisinopril  -Avoid nephrotoxic medication and IV dye unless urgently needed  -Monitor BMP, I's and O's while admitted  -Patient may benefit from nephrology consult especially if kidney function continues to worsen if patient continues to require diuresis    Hyperkalemia  -Potassium: 5.3  -EKG pending review  -Kayexalate ordered  -Continue cardiac monitoring  -Monitor while admitted    Hyponatremia  -Sodium: 128  -Check urine and serum osmolality  -Hold Zoloft  -Monitor while admitted    Transaminitis  -ALT: S642 AST: 480 with an alk  phos of of one ninety-seven and an albumin of 3.00 (levels were noted as normal on 3/22/2020)  -Check hepatitis panel, HIV, iron profile and ultrasound of right upper quadrant  -Monitor while admitted     Leukocytosis  -WBCs: 14.60 with an increased absolute neutrophil and monocyte count noted on differential  -Check blood cultures, lactic acid, procalcitonin, CRP and sed rate  -Monitor while admitted    Anemia  -Hemoglobin: 12.2 with a decreased MCV and MCHC  -Continue ferrous sulfate  -Check iron profile as above  -Monitor while admitted    CHF  -Patient reports recent episodes of orthopnea and PND.  No peripheral edema noted on exam and lungs slightly diminished in the bases otherwise unremarkable.  -proBNP: 5399.0  -Chest x-ray shows cardiomegaly without evidence of active disease.  -Patient noncompliant with outpatient diuretic therapy  -20 mg IV Lasix ordered x1 with close monitoring of kidney function and sodium planned  -Monitor I's and O's and daily weights  -2 g sodium diet  -Cardiology consulted as above    CAD  -Continue Plavix and beta-blocker (patient has been noncompliant with outpatient Plavix therapy but states he has been taking his Coreg)  -Continue cardiac monitoring    Hypertension  -Poorly controlled with a blood pressure on admission of 166/86 (pressure has normalized following reduction in anxiety as well as nitroglycerin administration in ED)  - Continue Coreg  -Hold lisinopril secondary to DENNIS and hyperkalemia  - Monitor while admitted    Depression/anxiety  -Continue Zoloft, trazodone and Seroquel    Drug abuse  -Patient reports methamphetamine use which she reports he smoked approximately 9 days prior.  He denies any history of IV drug use  -Encourage cessation especially light of patient's comorbid conditions    Alcohol abuse  -Patient reports he generally drinks approximately 1 pint of liquor per day though he has had nothing to drink for the past 9 days  -Encourage cessation especially  light of patient's comorbid conditions  -CIWA protocol ordered              VTE Prophylaxis -Lovenox  Mechanical Order History:     None      Pharmalogical Order History:     None          CODE STATUS: Full  There are no questions and answers to display.         I discussed the patient's findings and my recommendations with patient and nursing staff.      Signature:Electronically signed by Lenin Bethea PA-C, 11/14/20, 11:02 PM EST.    [facility] Hospitalist Team          Electronically signed by Lenin Bethea PA-C at 11/14/20 5217       {Outbreak/Travel/Exposure Documentation......;  Question Available Choices Patient Response   Outbreak Screen: Do you currently have a new onset of the following symptoms?        Fever/Chils, Cough, Shortness of air, Loss of taste or smell, No, Unknown  (!) Shortness of Air (11/14/20 1751)   Outbreak Screen: In the last 14 days, have you had contact with anyone who is ill, has show any of the symptoms listed above and/or has been diagnosis with the 2019 Novel Coronavirus? This includes any immediate household members but excludes any patients with whom you have been in contact within your normal work duties wearing proper PPE, if you are a healthcare worker.  Yes, No, Unknown              No (11/14/20 1751)   Outbreak Screen: Who was notified?    Free text  (not recorded)   Travel Screen: Have you traveled in the last month? If so, to what country have you traveled? If [place] what state? Yes, No, Unknown  List of all countries  List of all States No (11/14/20 1751)  (not recorded)  (not recorded)   Infection Risk: Do you currently have the following symptoms?  (If cough is selected, the Tuberculosis Screen is performed.) Cough, Fever, Rash, No No (11/14/20 1751)   Tuberculosis Screen: Do you have any of the following Tuberculosis Risks?  · Have you lived or spent time with anyone who had or may have TB?  · Have you lived in or visited any of the following areas for more  than one month: [place], [place], [place], [place] or [place], the [place] or [place]?  · Do you have HIV/AIDS?  · Have you lived in or worked in a nursing home, homeless shelter, correctional facility, or substance abuse treatment facility?   · No    If Yes do you have any of the following symptoms? Yes responses display to the right    If Yes, symptoms listed are:  Cough greater than or equal to 3 weeks, Loss of appetite, Unexplained weight loss, Night sweats, Bloody sputum or hemoptysis, Hoarseness, Fever, Fatigue, Chest pain, No (not recorded)  (not recorded)   Exposure Screen: Have you been exposed to any of these contagious diseases in the last month? Measles, Chickenpox, Meningitis, Pertussis, Whooping Cough, No No (11/14/20 3331)       Current Facility-Administered Medications   Medication Dose Route Frequency Provider Last Rate Last Dose   • aspirin chewable tablet 81 mg  81 mg Oral Daily Solomon Lowe MD   81 mg at 11/16/20 0843   • calcium gluconate 1g/50ml 0.675% NaCl IV SOLN  1 g Intravenous PRN Lenin Bethea PA-C        And   • calcium gluconate 2-0.675 GM/100ML NACL IVPB  2 g Intravenous PRN Lenin Bethea PA-C       • carvedilol (COREG) tablet 12.5 mg  12.5 mg Oral BID With Meals Solomon Lowe MD   12.5 mg at 11/16/20 0843   • clopidogrel (PLAVIX) tablet 75 mg  75 mg Oral Daily Lenin Bethea PA-C   75 mg at 11/16/20 0843   • docusate sodium (COLACE) capsule 100 mg  100 mg Oral BID PRN Lenin Bethea PA-C       • enoxaparin (LOVENOX) syringe 40 mg  40 mg Subcutaneous Q24H Lenin Bethea PA-C   40 mg at 11/15/20 1604   • ferrous sulfate EC tablet 324 mg  324 mg Oral Daily With Breakfast Lenin Bethea PA-C   324 mg at 11/16/20 0843   • furosemide (LASIX) injection 40 mg  40 mg Intravenous BID Solomon Lowe MD   40 mg at 11/16/20 0843   • hydrOXYzine (ATARAX) tablet 50 mg  50 mg Oral TID PRN Lenin Bethea PA-C   50 mg at 11/15/20 0221   • influenza  vac split quad (FLUZONE,FLUARIX,AFLURIA,FLULAVAL) injection 0.5 mL  0.5 mL Intramuscular During Hospitalization Lenin Bethea PA-C       • isosorbide mononitrate (IMDUR) 24 hr tablet 30 mg  30 mg Oral Q24H Solomon Lowe MD   30 mg at 11/16/20 0843   • lisinopril (PRINIVIL,ZESTRIL) tablet 5 mg  5 mg Oral Q24H Solomon Lowe MD   5 mg at 11/16/20 0843   • LORazepam (ATIVAN) tablet 1 mg  1 mg Oral Q2H PRN Lenin Bethea PA-C        Or   • LORazepam (ATIVAN) injection 1 mg  1 mg Intravenous Q2H PRN Lenin Bethea PA-C        Or   • LORazepam (ATIVAN) tablet 2 mg  2 mg Oral Q1H PRN Lenin Bethea PA-C        Or   • LORazepam (ATIVAN) injection 2 mg  2 mg Intravenous Q1H PRN Lenin Bethea PA-C   2 mg at 11/16/20 1339    Or   • LORazepam (ATIVAN) injection 2 mg  2 mg Intravenous Q15 Min PRN Lenin Bethea PA-C        Or   • LORazepam (ATIVAN) injection 2 mg  2 mg Intramuscular Q15 Min PRN Lenin Bethea PA-C       • Magnesium Sulfate 2 gram Bolus, followed by 8 gram infusion (total Mg dose 10 grams)- Mg less than or equal to 1mg/dL  2 g Intravenous PRN Lenin Bethea PA-C        Or   • Magnesium Sulfate 2 gram / 50mL Infusion (GIVE X 3 BAGS TO EQUAL 6GM TOTAL DOSE) - Mg 1.1 - 1.5 mg/dl  2 g Intravenous PRN Lenin Bethea PA-C        Or   • Magnesium Sulfate 4 gram infusion- Mg 1.6-1.9 mg/dL  4 g Intravenous PRN Lenin Bethea PA-C   Stopped at 11/15/20 1007   • melatonin tablet 5 mg  5 mg Oral Nightly PRN Lenin Bethea PA-C   5 mg at 11/14/20 2303   • nitroglycerin (NITROSTAT) SL tablet 0.4 mg  0.4 mg Sublingual Q5 Min PRN Lenin Bethea PA-C       • ondansetron (ZOFRAN) tablet 4 mg  4 mg Oral Q6H PRN Lenin Bethea PA-C        Or   • ondansetron (ZOFRAN) injection 4 mg  4 mg Intravenous Q6H PRN Lenin Bethea PA-C       • potassium & sodium phosphates (PHOS-NAK) 280-160-250 MG packet - for Phosphorus less than 1.25 mg/dL  2 packet Oral  Q6H PRN Lenin Bethea PA-C        Or   • potassium & sodium phosphates (PHOS-NAK) 280-160-250 MG packet - for Phosphorus 1.25 - 2.5 mg/dL  2 packet Oral Q6H PRN Lenin Bethea PA-C       • potassium chloride (K-DUR,KLOR-CON) CR tablet 40 mEq  40 mEq Oral PRN Lenin Bethea PA-C       • potassium chloride (KLOR-CON) packet 40 mEq  40 mEq Oral PRN Lenin Bethea PA-C       • QUEtiapine (SEROquel) tablet 50 mg  50 mg Oral Nightly Lenin Bethea PA-C   50 mg at 11/15/20 2054   • sodium chloride 0.9 % flush 10 mL  10 mL Intravenous PRN Lenin Bethea PA-C       • sodium chloride 0.9 % flush 10 mL  10 mL Intravenous Q12H Lenin Bethea PA-C   10 mL at 11/16/20 0855   • sodium chloride 0.9 % flush 10 mL  10 mL Intravenous PRN Lnein Bethea PA-C       • traZODone (DESYREL) tablet 100 mg  100 mg Oral Nightly Lenin Bethea PA-C   100 mg at 11/15/20 2054        Physician Progress Notes (most recent note)      Kathryn Peter APRN at 11/16/20 1050          CARDIOLOGY FOLLOW-UP PROGRESS NOTE      Reason for follow-up:    Chest pain  Shortness of breath  Ischemic dilated cardiomyopathy  CAD  LAD stent     Attending: Zara Thibodeaux DO Subjective .     Denies pain at rest  C/o mild OLSON     Review of Systems   Constitution: Negative for decreased appetite and diaphoresis.   HENT: Negative for congestion, hearing loss and nosebleeds.    Cardiovascular: Positive for dyspnea on exertion. Negative for chest pain, claudication, irregular heartbeat, leg swelling, near-syncope, orthopnea, palpitations, paroxysmal nocturnal dyspnea and syncope.   Respiratory: Negative for cough, shortness of breath and sleep disturbances due to breathing.    Endocrine: Negative for polyuria.   Hematologic/Lymphatic: Does not bruise/bleed easily.   Skin: Negative for itching and rash.   Musculoskeletal: Negative for back pain, muscle weakness and myalgias.   Gastrointestinal: Negative for  "abdominal pain, change in bowel habit and nausea.   Genitourinary: Negative for dysuria, flank pain, frequency and hesitancy.   Neurological: Negative for dizziness, tremors and weakness.   Psychiatric/Behavioral: Negative for altered mental status. The patient does not have insomnia.        Allergies: Patient has no known allergies.    Scheduled Meds:aspirin, 81 mg, Oral, Daily  carvedilol, 12.5 mg, Oral, BID With Meals  clopidogrel, 75 mg, Oral, Daily  enoxaparin, 40 mg, Subcutaneous, Q24H  ferrous sulfate, 324 mg, Oral, Daily With Breakfast  furosemide, 40 mg, Intravenous, BID  isosorbide mononitrate, 30 mg, Oral, Q24H  lisinopril, 5 mg, Oral, Q24H  QUEtiapine, 50 mg, Oral, Nightly  sodium chloride, 10 mL, Intravenous, Q12H  traZODone, 100 mg, Oral, Nightly        Continuous Infusions:     PRN Meds:.Calcium Gluconate-NaCl **AND** calcium gluconate **AND** Calcium, Ionized  •  docusate sodium  •  hydrOXYzine  •  influenza vaccine  •  LORazepam **OR** LORazepam **OR** LORazepam **OR** LORazepam **OR** LORazepam **OR** LORazepam  •  magnesium sulfate **OR** magnesium sulfate **OR** magnesium sulfate  •  melatonin  •  nitroglycerin  •  ondansetron **OR** ondansetron  •  potassium & sodium phosphates **OR** potassium & sodium phosphates  •  potassium chloride  •  potassium chloride  •  sodium chloride  •  sodium chloride    Objective     VITAL SIGNS  Patient Vitals for the past 24 hrs:   BP Temp Temp src Pulse Resp SpO2 Weight   11/16/20 0800 122/79 99 °F (37.2 °C) Oral 89 18 96 % --   11/16/20 0329 102/69 97.9 °F (36.6 °C) Oral 84 16 96 % 104 kg (228 lb 6.3 oz)   11/15/20 2059 109/72 98.7 °F (37.1 °C) Oral 85 16 96 % 103 kg (227 lb 4.7 oz)   11/15/20 1900 102/68 98.6 °F (37 °C) Oral 76 16 95 % --   11/15/20 1500 110/74 97.8 °F (36.6 °C) Oral 83 16 96 % --   11/15/20 1100 (!) 145/101 97.3 °F (36.3 °C) Oral 96 18 98 % --        Flowsheet Rows      First Filed Value   Admission Height  185.4 cm (73\") Documented at " 11/14/2020 1752   Admission Weight  86.2 kg (190 lb) Documented at 11/14/2020 1752            Intake/Output Summary (Last 24 hours) at 11/16/2020 1050  Last data filed at 11/16/2020 0844  Gross per 24 hour   Intake 1820 ml   Output 2300 ml   Net -480 ml        TELEMETRY:     SR    Physical Exam:  Constitutional:       General: Not in acute distress.     Appearance: Normal appearance. Well-developed.   Eyes:      Pupils: Pupils are equal, round, and reactive to light.   HENT:      Head: Normocephalic and atraumatic.   Neck:      Musculoskeletal: Normal range of motion and neck supple.      Vascular: No JVD.   Pulmonary:      Effort: Pulmonary effort is normal.      Breath sounds: Normal breath sounds.   Cardiovascular:      Normal rate. Regular rhythm.   Pulses:     Intact distal pulses.   Edema:     Peripheral edema absent.   Abdominal:      General: There is no distension.      Palpations: Abdomen is soft.      Tenderness: There is no abdominal tenderness.   Musculoskeletal: Normal range of motion.   Skin:     General: Skin is warm and dry.   Neurological:      Mental Status: Alert and oriented to person, place, and time.            Results Review:   I reviewed the patient's new clinical results.    CBC    Results from last 7 days   Lab Units 11/16/20  0320 11/15/20  0129 11/14/20  1919   WBC 10*3/mm3 10.20 12.20* 14.60*   HEMOGLOBIN g/dL 10.4* 10.9* 12.2*   PLATELETS 10*3/mm3 282 273 375     BMP   Results from last 7 days   Lab Units 11/16/20  0320 11/15/20  0129 11/14/20  2136 11/14/20 1919   SODIUM mmol/L 134* 133*  --  128*   POTASSIUM mmol/L 4.1 4.7 5.1 5.3*   CHLORIDE mmol/L 98 100  --  97*   CO2 mmol/L 27.0 21.0*  --  18.0*   BUN mg/dL 27* 39*  --  37*   CREATININE mg/dL 1.07 1.26  --  1.33*   GLUCOSE mg/dL 129* 190*  --  122*   MAGNESIUM mg/dL 2.1 1.8  --   --      Cr Clearance Estimated Creatinine Clearance: 110.4 mL/min (by C-G formula based on SCr of 1.07 mg/dL).  Coag   Results from last 7 days   Lab  Units 11/14/20 1919   INR  1.51*     HbA1C   Lab Results   Component Value Date    HGBA1C 5.8 (H) 07/09/2019     Blood Glucose No results found for: POCGLU  Infection   Results from last 7 days   Lab Units 11/15/20  0128 11/15/20  0127 11/14/20  2136   BLOODCX  No growth at 24 hours No growth at 24 hours  --    PROCALCITONIN ng/mL  --   --  0.14     CMP   Results from last 7 days   Lab Units 11/16/20  0320 11/15/20  0129 11/14/20  2136 11/14/20  1919   SODIUM mmol/L 134* 133*  --  128*   POTASSIUM mmol/L 4.1 4.7 5.1 5.3*   CHLORIDE mmol/L 98 100  --  97*   CO2 mmol/L 27.0 21.0*  --  18.0*   BUN mg/dL 27* 39*  --  37*   CREATININE mg/dL 1.07 1.26  --  1.33*   GLUCOSE mg/dL 129* 190*  --  122*   ALBUMIN g/dL 2.90* 3.00*  --  3.00*   BILIRUBIN mg/dL 0.4 0.4  --  0.6   ALK PHOS U/L 145* 172*  --  197*   AST (SGOT) U/L 176* 404*  --  480*   ALT (SGPT) U/L 393* 584*  --  642*     ABG      UA    Results from last 7 days   Lab Units 11/14/20 1938   NITRITE UA  Negative   WBC UA /HPF 0-2*   BACTERIA UA /HPF None Seen   SQUAM EPITHEL UA /HPF 0-2     BONY  No results found for: POCMETH, POCAMPHET, POCBARBITUR, POCBENZO, POCCOCAINE, POCOPIATES, POCOXYCODO, POCPHENCYC, POCPROPOXY, POCTHC, POCTRICYC  Lysis Labs   Results from last 7 days   Lab Units 11/16/20  0320 11/15/20  0129 11/14/20  1919   INR   --   --  1.51*   HEMOGLOBIN g/dL 10.4* 10.9* 12.2*   PLATELETS 10*3/mm3 282 273 375   CREATININE mg/dL 1.07 1.26 1.33*     Radiology(recent) Us Liver    Result Date: 11/15/2020  Incidental right pleural effusion. Unremarkable images of the liver.  Electronically Signed By-Ky Yusuf MD On:11/15/2020 9:24 AM This report was finalized on [id number] by  yK Yusuf MD.    Xr Chest 1 View    Result Date: 11/14/2020  Cardiomegaly without active disease  Electronically Signed By-Ky Yusuf MD On:11/14/2020 7:41 PM This report was finalized on [id number] by  Ky Yusuf MD.      Imaging Results (Last 24 Hours)     ** No  results found for the last 24 hours. **          Results from last 7 days   Lab Units 11/15/20  0129   TROPONIN T ng/mL <0.010       EKG                I personally viewed and interpreted the patient's EKG/Telemetry data:        ECHOCARDIOGRAM:     Results for orders placed during the hospital encounter of 11/14/20   Adult Transthoracic Echo Complete W/ Cont if Necessary Per Protocol    Narrative · Left ventricular systolic function is severely decreased.  · Left ventricular ejection fraction is 10 to 15%  · Akinetic septum, inferior wall and apex is noted  · The left ventricular cavity is mildly dilated.  · Left ventricular diastolic function was normal.  · Mildly reduced right ventricular systolic function noted.  · Mild mitral valve regurgitation is present  · Mild tricuspid valve regurgitation is present. Calculated right   ventricular systolic pressure from tricuspid regurgitation is 28.0 mmHg.            STRESS MYOVIEW:      CARDIAC CATHETERIZATION:  FINDINGS:     1. HEMODYNAMICS:       Aortic pressure: 89/52 mmHg     LVEDP: 18 mmHg     Gradient across aortic valve on pullback: No gradient across pullback        2. LEFT VENTRICULOGRAPHY: Not done        3. CORONARY ANGIOGRAPHY:            A: Left main coronary artery: Normal            B: Left anterior descending artery: 80% stenosis with haziness consistent with thrombosis of proximal LAD with RUTH II-III flow.  RUTH-3 flow was noted after stenting of the segment            C: Left circumflex coronary artery: Free of significant disease            D: Right coronary artery: Large and dominant and had 25% plaque in the proximal and mid to distal segment.  RCA is dominant vessel        PROCEDURE COMMENTS:      After informed consent was obtained, the patient was prepped and draped in the usual sterile manner.  Mild to moderate sedation was administered.  Right femoral artery was accessed without difficulty and 6 [place] arterial sheath was inserted.  Sheath was  flushed with heparinized saline.     Using 6 [place] Petty catheters, first left coronary artery and the right coronary was electively engaged and appropriate views were taken.  A 6 [place] JR4 catheter was used to cross aortic valve and left heart catheterization was performed.  No left ventriculography was done.     After diagnostic catheterization, we proceeded to the intervention of the LAD coronary artery.     We advanced a 6 [place] XB 3.5 LAD interventional guide and selective engagement of the left coronary artery was achieved.  We advanced interventional guidewire 0.014 BMW and crossed the lesion and placed in the distal part of the LAD coronary artery.     We advanced 3.5×15 Xience stent and deployed at nominal pressure.  Stenosis decreased from 80% to less than 10%.  RUTH II-III flow was present before the stenting.  RUTH-3 flow was present after the stenting of the LAD. No dissection, perforation or no reflow phenomena was noted.     Patient was given aspirin, Plavix and heparin during the procedure.  ACT at the end of procedure was 186 and 3000 units of unfractionated heparin was given more.     Patient tolerated the procedure well.        SUMMARY:      1.  Single-vessel coronary artery disease  2.  Successful stenting of LAD  3.  Severe left ventricular dysfunction     RECOMMENDATIONS:      Dual antiplatelet therapy is recommended  I will proceed with transfusion of blood.       OTHER:         Assessment/Plan            Essential hypertension    Hyponatremia    CHF (congestive heart failure), NYHA class III, acute on chronic, systolic (CMS/HCC)    Drug abuse (CMS/HCC)    Chest pain    DENNIS (acute kidney injury) (CMS/HCC)    Hyperkalemia    Transaminitis    Anemia        ASSESSMENT:    Chest pain  Shortness of breath  Ischemic dilated cardiomyopathy  CAD  LAD stent    PLAN:    Severe LV dysfunction on cath  Continue DAPT  Diuresis          Additional recommendations per Dr. Lowe    I discussed the  patients findings and my recommendations with patient and RN                          ZACK Clemens  11/16/20  10:50 EST              Electronically signed by Kathryn Peter APRN at 11/16/20 1059       Respiratory Therapy Notes (most recent note)    No notes exist for this encounter.

## 2020-11-16 NOTE — PROGRESS NOTES
"Continued Stay Note   [facility]     Patient Name: Asad Bethea  MRN: [id number]  Today's Date: 11/16/2020    Admit Date: 11/14/2020    Discharge Plan     Row Name 11/16/20 1542       Plan    Plan Comments  SW met with pt wearing appropriate PPE (mask, goggles, standing 6ft away). 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 He stated that he has drank a pint to a 5th of alcohol daily \"all of my life\" pt stated that he is interested in an inpatient rehavb facility for treatment. Pt reported that he's been to [facility] over five years ago and stated that he only went because he was court ordered. SW informed pt of [facility] for inpatient and informed the pt of the services that would be provided. Pt reported being in agreement. Referral was made to [facility].        Discharge Codes    No documentation.       Expected Discharge Date and Time     Expected Discharge Date Expected Discharge Time    Nov 18, 2020         Nick DE LA GARZA   Cell: [phone]  Office: [phone]  Fax: [phone]           "

## 2020-11-17 NOTE — DISCHARGE PLACEMENT REQUEST
"Asad Sousa (44 y.o. Male)     Date of Birth Social Security Number Address Home Phone MRN    1975  [id number]  [place] [address] [phone] [id number]    Mu-ism Marital Status          Hindu        Admission Date Admission Type Admitting Provider Attending Provider Department, Room/Bed    20 Emergency Zara Thibodeaux DO [place], [place], DO [facility] 2C MEDICAL INPATIENT, 242/1    Discharge Date Discharge Disposition Discharge Destination                       Attending Provider: Zara Thibodeaux DO    Allergies: No Known Allergies    Isolation: None   Infection: None   Code Status: CPR    Ht: 185.4 cm (73\")   Wt: 106 kg (232 lb 12.9 oz)    Admission Cmt: None   Principal Problem: None                Active Insurance as of 2020     Primary Coverage     Payor Plan Insurance Group Employer/Plan Group    [place] [employer] [place]      Payor Plan Address Payor Plan Phone Number Payor Plan Fax Number Effective Dates    [address] [phone]  2019 - None Entered    [place] [address]       Subscriber Name Subscriber Birth Date Member ID       ASAD SOUSA 1975 [id number]                 Emergency Contacts      (Rel.) Home Phone Work Phone Mobile Phone    ADRIÁN COLBY (Other) -- -- [phone]            Emergency Contact Information     Name Relation Home Work Mobile    ADRIÁN COLBY Other   [phone]          Insurance Information                [place] [email or web address] [place] Phone: [phone]    Subscriber: Asad Sousa Subscriber#: [id number]    Group#:  Precert#:              History & Physical      Lenin Sousa PA-C at 20 2131                [facility] Medicine Services      Patient Name: Asad Sousa  : 1975  MRN: [id number]  Primary Care Physician: Shelly Ribeiro APRN  Date of admission: 2020    Patient " "Care Team:  Shelly Ribeiro APRN as PCP - General (Nurse Practitioner)          Subjective   History Present Illness     Chief Complaint:   Chief Complaint   Patient presents with   • Chest Pain         Mr. Bethea is a 44 y.o. male with past medical history of hypertension, substance-tobacco/alcohol abuse, heart failure with reduced ejection fraction and anemia who presents to [facility] complaining of chest pain.  Patient reports that he has a 3-day history of constant worsening left-sided chest pain which radiates to his left arm and back.  Pain is described as \"just really hurting\" rated 9/10 at its worst and 8/10 at the time of my exam.  Patient has a history of coronary artery disease underwent cardiac cath at this facility on 3/24/2020 with drug-eluting stent placed in the LAD.  Since that time patient has been minimally compliant with his scheduled medical therapy stating that he currently is taking his Coreg but has not had his Plavix or diuretics in some time.  Some associated orthopnea and PND is noted along with some nausea without vomiting, frequent episodes of tachycardia and palpitations, occasional peripheral edema and a recent subjective fever.  Patient reports that he has relapsed on his substance and alcohol abuse with his most recent use on 11/5/2020 when he states he smoked methamphetamine and drink alcohol.  He denies any history of IV drug use continues to smoke approximately 1 pack of cigarettes per day on average.  Urine output is noted as occurring somewhat less frequently and he does also report some chronic constipation.  Additionally patient notes a previous diagnosis of hepatitis C which he received over 1 year ago and has not been treated for.  He also reports having previous bowel resection.    In the ED patient did have lab significant for troponin of less than 0.010, proBNP: 5399.0, sodium: One twenty-eight, potassium: 5.3, CO2: 18.0, anion gap: 13.0, creatinine: 1.33, " BUN: Thirty-seven, alk phos: One ninety-seven, ALT: Six forty-two, AST: Forty-eight, albumin: 3.00.  WBCs: 14.60, hemoglobin: 12.2 with a decreased MCV and MCHC.  UA shows sterile pyuria with 0-2 WBCs, 0-2 RBCs, 1+ bilirubin, 1+ protein and a normal specific gravity of 1.028.  Urine tox positive for amphetamines and THC.  Chest x-ray shows cardiomegaly without evidence of active disease.  COVID-19 test was negative.  ED provider notes initial EKG had significant artifact and was difficult to interpret with subsequent EKG showing no acute ST changes.  This is not available for review at the time of my exam    History of Present Illness    Review of Systems   Constitution: Positive for fever. Negative for chills and decreased appetite.   HENT: Negative.    Eyes: Negative.    Cardiovascular: Positive for chest pain, dyspnea on exertion, leg swelling, orthopnea, palpitations and paroxysmal nocturnal dyspnea. Negative for irregular heartbeat and syncope.   Respiratory: Positive for cough and shortness of breath. Negative for sputum production.         Patient reports cough typically occurs if he attempts to lay flat.   Endocrine: Negative.    Skin: Negative.    Musculoskeletal: Negative.    Gastrointestinal: Positive for bloating and nausea. Negative for abdominal pain, hematemesis, hematochezia, melena and vomiting.   Genitourinary: Negative.    Neurological: Positive for light-headedness. Negative for focal weakness, headaches and weakness.        Lightheadedness on standing.   Psychiatric/Behavioral: Negative.            Personal History     Past Medical History:   Past Medical History:   Diagnosis Date   • Abnormal nuclear stress test 6/28/2019   • Acute back pain 7/8/2019   • ADHD (attention deficit hyperactivity disorder)    • Alcohol dependence (CMS/HCC)    • Anxiety    • Arthritis    • Clotting disorder (CMS/HCC)    • Depression    • Depression with anxiety 7/14/2019   • Essential hypertension 6/28/2019   •  Generalized abdominal pain 8/2/2019   • GERD (gastroesophageal reflux disease)    • H/O heart artery stent 03/24/2020   • Hepatitis C 06/2019   • Hypertensive urgency 7/8/2019   • Hyponatremia 8/4/2019   • IBS (irritable bowel syndrome)    • Intractable vomiting with nausea 7/13/2019    Added automatically from request for surgery [id number]   • Medically noncompliant 7/8/2019   • Melena 7/14/2019   • NICM (nonischemic cardiomyopathy) (CMS/HCC) 7/14/2019   • Non-ST elevation myocardial infarction (NSTEMI) (CMS/[facility]) 3/24/2020   • Substance abuse (CMS/HCC)    • Tobacco dependency        Surgical History:      Past Surgical History:   Procedure Laterality Date   • CARDIAC CATHETERIZATION     • CARDIAC CATHETERIZATION N/A 7/9/2019    Procedure: LEFT HEART CATH with possible PCI;  Surgeon: PAL Johnson MD;  Location: [facility] CATH INVASIVE LOCATION;  Service: Cardiovascular   • CARDIAC CATHETERIZATION N/A 3/24/2020    Procedure: LEFT HEART CATH with possible PCI;  Surgeon: Solomon Lowe MD;  Location: [facility] CATH INVASIVE LOCATION;  Service: Cardiovascular;  Laterality: N/A;  Local and IV sedation   • CARDIAC CATHETERIZATION N/A 3/24/2020    Procedure: Stent ATUL coronary;  Surgeon: Solomon Lowe MD;  Location: [facility] CATH INVASIVE LOCATION;  Service: Cardiovascular;  Laterality: N/A;   • CARDIOVASCULAR STRESS TEST  2019   • ECHO - CONVERTED  2019   • ECHO - CONVERTED  2020   • ENDOSCOPY N/A 7/14/2019    Procedure: ESOPHAGOGASTRODUODENOSCOPY WITH SCLEROTHERAPY, BICAP CAUTERY OF DUODENAL ULCER AND HEMOSTASIS SPRAY OF DUODENAL ULCER;  Surgeon: Wesly Myers MD;  Location: [facility] ENDOSCOPY;  Service: Gastroenterology   • EXPLORATORY LAPAROTOMY N/A 7/16/2019    Procedure: LAPAROTOMY EXPLORATORY;  Surgeon: Patience Peng MD;  Location: [facility] MAIN OR;  Service: General   • WRIST SURGERY N/A            Family History: family history includes Colon cancer in his mother; Heart attack in his brother, brother, and mother;  Heart disease in his brother, brother, and mother. Otherwise pertinent FHx was reviewed and unremarkable.     Social History:  reports that he quit smoking about 16 months ago. His smoking use included cigarettes. He has a 20.00 pack-year smoking history. He has quit using smokeless tobacco. He reports previous alcohol use. He reports previous drug use. Drugs: Cocaine(coke) and Methamphetamines.      Medications:  Prior to Admission medications    Medication Sig Start Date End Date Taking? Authorizing Provider   carvedilol (COREG) 3.125 MG tablet TAKE 1 TABLET BY MOUTH TWICE DAILY WITH MEALS 10/28/20   Solomon Lowe MD   clopidogrel (PLAVIX) 75 MG tablet Take 1 tablet by mouth Daily.  Patient taking differently: Take 75 mg by mouth Daily. Dr Lowe -  Pt to continue his Plavix  for (EGD/colonoscopy 5/19) 4/22/20   Solomon Lowe MD   ferrous sulfate 324 (65 Fe) MG tablet delayed-release EC tablet Take 1 tablet by mouth 2 (Two) Times a Day With Meals. 5/14/20   Shelly Ribeiro APRN   furosemide (Lasix) 20 MG tablet Take 1 tablet by mouth Daily. 4/22/20   Solomon Lowe MD   lisinopril (PRINIVIL,ZESTRIL) 2.5 MG tablet Take 1 tablet by mouth Daily. 4/22/20   Solomon Lowe MD   loperamide (IMODIUM) 2 MG capsule Take 1 capsule by mouth 3 (Three) Times a Day As Needed for Diarrhea. 3/25/20   Nga Malhotra MD   omeprazole (priLOSEC) 40 MG capsule TAKE 1 CAPSULE BY MOUTH EVERY DAY 6/22/20   Segundo Rosario Jr., MD   QUEtiapine (SEROquel) 100 MG tablet Take 1 tablet by mouth Every Night. 5/14/20   Shelly Ribeiro APRN   sertraline (ZOLOFT) 100 MG tablet Take 1 tablet by mouth Daily. 5/14/20   Shelly Ribeiro APRN   spironolactone (ALDACTONE) 25 MG tablet TAKE 1 TABLET BY MOUTH DAILY 7/21/20   Solomon Lowe MD   traZODone (DESYREL) 100 MG tablet TAKE 1 TABLET BY MOUTH EVERY EVENING 6/22/20   Segundo Rosario Jr., MD       Allergies:  No Known Allergies    Objective   Objective     Vital Signs  Temp:  [97.7 °F (36.5 °C)] 97.7  °F (36.5 °C)  Heart Rate:  [92-98] 94  Resp:  [20] 20  BP: (112-162)/(86-98) 112/98  SpO2:  [96 %-100 %] 96 %  on   ;   Device (Oxygen Therapy): room air  Body mass index is 25.07 kg/m².    Physical Exam  Constitutional:       General: He is not in acute distress.     Appearance: Normal appearance. He is normal weight. He is not ill-appearing or toxic-appearing.   HENT:      Head: Normocephalic.      Right Ear: External ear normal.      Left Ear: External ear normal.      Nose: Nose normal.      Mouth/Throat:      Mouth: Mucous membranes are moist.   Eyes:      Extraocular Movements: Extraocular movements intact.      Conjunctiva/sclera: Conjunctivae normal.   Neck:      Musculoskeletal: Normal range of motion.   Cardiovascular:      Rate and Rhythm: Normal rate and regular rhythm.      Pulses: Normal pulses.      Heart sounds: Murmur present.      Comments: Murmur heard best at the fifth intercostal space midclavicular line  Pulmonary:      Effort: Pulmonary effort is normal.   Abdominal:      General: There is no distension.      Tenderness: There is no abdominal tenderness.      Comments: Hyperactive bowel sounds   Musculoskeletal: Normal range of motion.      Right lower leg: No edema.      Left lower leg: No edema.   Skin:     General: Skin is warm and dry.      Comments: No splinter hemorrhages, Osler nodes, Janeway lesions or unusual scarring noted on exam   Neurological:      General: No focal deficit present.      Mental Status: He is alert and oriented to person, place, and time.   Psychiatric:         Mood and Affect: Mood normal.         Behavior: Behavior normal.         Thought Content: Thought content normal.         Judgment: Judgment normal.         Results Review:  I have personally reviewed most recent lab results and radiology images and interpretations and agree with findings, most notably: Troponin, proBNP, CBC, CMP, UA, urine tox, chest x-ray, EKG and COVID-19 test.    Results from last 7 days    Lab Units 11/14/20 1919   WBC 10*3/mm3 14.60*   HEMOGLOBIN g/dL 12.2*   HEMATOCRIT % 39.6   PLATELETS 10*3/mm3 375   INR  1.51*     Results from last 7 days   Lab Units 11/14/20 1919   SODIUM mmol/L 128*   POTASSIUM mmol/L 5.3*   CHLORIDE mmol/L 97*   CO2 mmol/L 18.0*   BUN mg/dL 37*   CREATININE mg/dL 1.33*   GLUCOSE mg/dL 122*   CALCIUM mg/dL 8.4*   ALT (SGPT) U/L 642*   AST (SGOT) U/L 480*   TROPONIN T ng/mL <0.010   PROBNP pg/mL 5,399.0*     Estimated Creatinine Clearance: 85.5 mL/min (A) (by C-G formula based on SCr of 1.33 mg/dL (H)).  Brief Urine Lab Results  (Last result in the past 365 days)      Color   Clarity   Blood   Leuk Est   Nitrite   Protein   CREAT   Urine HCG        11/14/20 1938 Dark Yellow  Comment:  Result checked  Clear Negative Negative Negative 30 mg/dL (1+)               Microbiology Results (last 10 days)     Procedure Component Value - Date/Time    COVID-19, ABBOTT IN-HOUSE,NP Swab (NO TRANSPORT MEDIA) 2 HR TAT - Swab, Nasopharynx [[id number]]  (Normal) Collected: 11/14/20 1922    Lab Status: Final result Specimen: Swab from Nasopharynx Updated: 11/14/20 2006     COVID19 Not Detected    Narrative:      Fact sheet for providers: https://www.fda.gov/media/902362/download     Fact sheet for patients: https://www.fda.gov/media/276967/download          ECG/EMG Results (most recent)     None               Results for orders placed during the hospital encounter of 03/22/20   Adult Transthoracic Echo Complete W/ Cont if Necessary Per Protocol    Narrative · Left ventricular systolic function is severely decreased.  · The following left ventricular wall segments are akinetic: basal   inferior, mid inferior, apical septal, basal inferoseptal and mid   inferoseptal.  · Left atrial cavity size is mildly dilated.  · Mild mitral valve regurgitation is present          Xr Chest 1 View    Result Date: 11/14/2020  Cardiomegaly without active disease  Electronically Signed By-Ky Yusuf MD  On:11/14/2020 7:41 PM This report was finalized on [id number] by  Ky Yusuf MD.        Estimated Creatinine Clearance: 85.5 mL/min (A) (by C-G formula based on SCr of 1.33 mg/dL (H)).    Assessment/Plan   Assessment/Plan       Active Hospital Problems    Diagnosis  POA   • Chest pain [R07.9]  Yes     Priority: High   • DENNIS (acute kidney injury) (CMS/[facility]) [N17.9]  Yes     Priority: High   • Hyperkalemia [E87.5]  Yes     Priority: High   • Transaminitis [R74.01]  Yes     Priority: High   • Hyponatremia [E87.1]  Yes     Priority: High   • Anemia [D64.9]  Yes     Priority: Medium   • CHF (congestive heart failure), NYHA class III, acute on chronic, systolic (CMS/[facility]) [I50.23]  Yes     Priority: Medium   • Essential hypertension [I10]  Yes     Priority: Medium   • Drug abuse (CMS/[facility]) [F19.10]  Yes     Priority: Low      Resolved Hospital Problems   No resolved problems to display.     Chest pain  -Troponin: Less than 0.010, trend  -Chest x-ray shows cardiomegaly without evidence of active disease.  -EKG reported by ED provider as showing no acute ST changes, attempting to locate original copy for review  -Patient awaiting cardiac cath on 3/24/2020 with placement of one drug-eluting stent in the LAD at that time.  -In the ED patient given aspirin nitroglycerin  -N.p.o. after midnight  -Cardiology consulted    DENNIS  - Creatine: 1.33, BUN: Thirty-seven, BUN/creatinine ratio: 27.8  - Hold lisinopril  -Avoid nephrotoxic medication and IV dye unless urgently needed  -Monitor BMP, I's and O's while admitted  -Patient may benefit from nephrology consult especially if kidney function continues to worsen if patient continues to require diuresis    Hyperkalemia  -Potassium: 5.3  -EKG pending review  -Kayexalate ordered  -Continue cardiac monitoring  -Monitor while admitted    Hyponatremia  -Sodium: 128  -Check urine and serum osmolality  -Hold Zoloft  -Monitor while admitted    Transaminitis  -ALT: S642 AST: 480 with an alk  phos of of one ninety-seven and an albumin of 3.00 (levels were noted as normal on 3/22/2020)  -Check hepatitis panel, HIV, iron profile and ultrasound of right upper quadrant  -Monitor while admitted     Leukocytosis  -WBCs: 14.60 with an increased absolute neutrophil and monocyte count noted on differential  -Check blood cultures, lactic acid, procalcitonin, CRP and sed rate  -Monitor while admitted    Anemia  -Hemoglobin: 12.2 with a decreased MCV and MCHC  -Continue ferrous sulfate  -Check iron profile as above  -Monitor while admitted    CHF  -Patient reports recent episodes of orthopnea and PND.  No peripheral edema noted on exam and lungs slightly diminished in the bases otherwise unremarkable.  -proBNP: 5399.0  -Chest x-ray shows cardiomegaly without evidence of active disease.  -Patient noncompliant with outpatient diuretic therapy  -20 mg IV Lasix ordered x1 with close monitoring of kidney function and sodium planned  -Monitor I's and O's and daily weights  -2 g sodium diet  -Cardiology consulted as above    CAD  -Continue Plavix and beta-blocker (patient has been noncompliant with outpatient Plavix therapy but states he has been taking his Coreg)  -Continue cardiac monitoring    Hypertension  -Poorly controlled with a blood pressure on admission of 166/86 (pressure has normalized following reduction in anxiety as well as nitroglycerin administration in ED)  - Continue Coreg  -Hold lisinopril secondary to DENNIS and hyperkalemia  - Monitor while admitted    Depression/anxiety  -Continue Zoloft, trazodone and Seroquel    Drug abuse  -Patient reports methamphetamine use which she reports he smoked approximately 9 days prior.  He denies any history of IV drug use  -Encourage cessation especially light of patient's comorbid conditions    Alcohol abuse  -Patient reports he generally drinks approximately 1 pint of liquor per day though he has had nothing to drink for the past 9 days  -Encourage cessation especially  light of patient's comorbid conditions  -CIWA protocol ordered              VTE Prophylaxis -Lovenox  Mechanical Order History:     None      Pharmalogical Order History:     None          CODE STATUS: Full  There are no questions and answers to display.         I discussed the patient's findings and my recommendations with patient and nursing staff.      Signature:Electronically signed by Lenin Bethea PA-C, 11/14/20, 11:02 PM EST.    [facility], [place], operated by [facility] Hospitalist Team          Electronically signed by Lenin Bethea PA-C at 11/14/20 3251       {Outbreak/Travel/Exposure Documentation......;  Question Available Choices Patient Response   Outbreak Screen: Do you currently have a new onset of the following symptoms?        Fever/Chils, Cough, Shortness of air, Loss of taste or smell, No, Unknown  (!) Shortness of Air (11/14/20 1751)   Outbreak Screen: In the last 14 days, have you had contact with anyone who is ill, has show any of the symptoms listed above and/or has been diagnosis with the 2019 Novel Coronavirus? This includes any immediate household members but excludes any patients with whom you have been in contact within your normal work duties wearing proper PPE, if you are a healthcare worker.  Yes, No, Unknown              No (11/14/20 1751)   Outbreak Screen: Who was notified?    Free text  (not recorded)   Travel Screen: Have you traveled in the last month? If so, to what country have you traveled? If [place] what state? Yes, No, Unknown  List of all countries  List of all States No (11/14/20 1751)  (not recorded)  (not recorded)   Infection Risk: Do you currently have the following symptoms?  (If cough is selected, the Tuberculosis Screen is performed.) Cough, Fever, Rash, No No (11/14/20 1751)   Tuberculosis Screen: Do you have any of the following Tuberculosis Risks?  · Have you lived or spent time with anyone who had or may have TB?  · Have you lived in or visited any of the following areas for more  than one month: [place], [place], [place], [place] or [place], the [place] or [place]?  · Do you have HIV/AIDS?  · Have you lived in or worked in a nursing home, homeless shelter, correctional facility, or substance abuse treatment facility?   · No    If Yes do you have any of the following symptoms? Yes responses display to the right    If Yes, symptoms listed are:  Cough greater than or equal to 3 weeks, Loss of appetite, Unexplained weight loss, Night sweats, Bloody sputum or hemoptysis, Hoarseness, Fever, Fatigue, Chest pain, No (not recorded)  (not recorded)   Exposure Screen: Have you been exposed to any of these contagious diseases in the last month? Measles, Chickenpox, Meningitis, Pertussis, Whooping Cough, No No (11/14/20 7951)       Current Facility-Administered Medications   Medication Dose Route Frequency Provider Last Rate Last Dose   • aspirin chewable tablet 81 mg  81 mg Oral Daily Solomon Lowe MD   81 mg at 11/17/20 0845   • calcium gluconate 1g/50ml 0.675% NaCl IV SOLN  1 g Intravenous PRN Lenin Bethea PA-C        And   • calcium gluconate 2-0.675 GM/100ML NACL IVPB  2 g Intravenous PRN Lenin Bethea PA-C       • carvedilol (COREG) tablet 12.5 mg  12.5 mg Oral BID With Meals Solomon Lowe MD   12.5 mg at 11/17/20 0846   • clopidogrel (PLAVIX) tablet 75 mg  75 mg Oral Daily Lenin Bethea PA-C   75 mg at 11/17/20 0845   • docusate sodium (COLACE) capsule 100 mg  100 mg Oral BID PRN Lenin Bethea PA-C       • enoxaparin (LOVENOX) syringe 40 mg  40 mg Subcutaneous Q24H Lenin Bethea PA-C   40 mg at 11/16/20 1718   • ferrous sulfate EC tablet 324 mg  324 mg Oral Daily With Breakfast Lenin Bethea PA-C   324 mg at 11/17/20 0845   • furosemide (LASIX) injection 40 mg  40 mg Intravenous BID Solomon Lowe MD   40 mg at 11/17/20 0845   • hydrOXYzine (ATARAX) tablet 50 mg  50 mg Oral TID PRN Lenin Bethea PA-C   50 mg at 11/15/20 0221   • influenza  vac split quad (FLUZONE,FLUARIX,AFLURIA,FLULAVAL) injection 0.5 mL  0.5 mL Intramuscular During Hospitalization Lenin Bethea PA-C       • isosorbide mononitrate (IMDUR) 24 hr tablet 30 mg  30 mg Oral Q24H Solomon Lowe MD   30 mg at 11/17/20 0846   • lisinopril (PRINIVIL,ZESTRIL) tablet 5 mg  5 mg Oral Q24H Solomon Lowe MD   5 mg at 11/17/20 0846   • LORazepam (ATIVAN) tablet 1 mg  1 mg Oral Q2H PRN Lenin Bethea PA-C        Or   • LORazepam (ATIVAN) injection 1 mg  1 mg Intravenous Q2H PRN Lenin Bethea PA-C        Or   • LORazepam (ATIVAN) tablet 2 mg  2 mg Oral Q1H PRN Lenin eBthea PA-C        Or   • LORazepam (ATIVAN) injection 2 mg  2 mg Intravenous Q1H PRN Lenin Bethea PA-C   2 mg at 11/17/20 0444    Or   • LORazepam (ATIVAN) injection 2 mg  2 mg Intravenous Q15 Min PRN Lenin Bethea PA-C   2 mg at 11/16/20 2055    Or   • LORazepam (ATIVAN) injection 2 mg  2 mg Intramuscular Q15 Min PRN Lenin Bethea PA-C       • Magnesium Sulfate 2 gram Bolus, followed by 8 gram infusion (total Mg dose 10 grams)- Mg less than or equal to 1mg/dL  2 g Intravenous PRN Lenin Bethea PA-C        Or   • Magnesium Sulfate 2 gram / 50mL Infusion (GIVE X 3 BAGS TO EQUAL 6GM TOTAL DOSE) - Mg 1.1 - 1.5 mg/dl  2 g Intravenous PRN Lenin Bethea PA-C        Or   • Magnesium Sulfate 4 gram infusion- Mg 1.6-1.9 mg/dL  4 g Intravenous PRN Lenin Bethea PA-C   Stopped at 11/15/20 1007   • melatonin tablet 5 mg  5 mg Oral Nightly PRN Lenin Bethea PA-C   5 mg at 11/14/20 2303   • nitroglycerin (NITROSTAT) SL tablet 0.4 mg  0.4 mg Sublingual Q5 Min PRN Lenin Bethea PA-C       • ondansetron (ZOFRAN) tablet 4 mg  4 mg Oral Q6H PRN Lenin Bethea PA-C        Or   • ondansetron (ZOFRAN) injection 4 mg  4 mg Intravenous Q6H PRN Lenin Bethea PA-C   4 mg at 11/16/20 2054   • potassium & sodium phosphates (PHOS-NAK) 280-160-250 MG packet - for  Phosphorus less than 1.25 mg/dL  2 packet Oral Q6H PRN Lenin Bethea PA-C        Or   • potassium & sodium phosphates (PHOS-NAK) 280-160-250 MG packet - for Phosphorus 1.25 - 2.5 mg/dL  2 packet Oral Q6H PRN Lenin Bethea PA-C       • potassium chloride (K-DUR,KLOR-CON) CR tablet 40 mEq  40 mEq Oral PRN Lenin Bethea PA-C       • potassium chloride (KLOR-CON) packet 40 mEq  40 mEq Oral PRN Lenin Bethea PA-C       • QUEtiapine (SEROquel) tablet 50 mg  50 mg Oral Nightly Lenin Bethea PA-C   50 mg at 11/16/20 2049   • sodium chloride 0.9 % flush 10 mL  10 mL Intravenous PRN Lenin Bethea PA-C   10 mL at 11/16/20 2055   • sodium chloride 0.9 % flush 10 mL  10 mL Intravenous Q12H Lenin Bethea PA-C   10 mL at 11/17/20 0846   • sodium chloride 0.9 % flush 10 mL  10 mL Intravenous PRN Lenin Bethea PA-C   10 mL at 11/17/20 0445   • traZODone (DESYREL) tablet 100 mg  100 mg Oral Nightly Lenin Bethea PA-C   100 mg at 11/16/20 2049        Physician Progress Notes (most recent note)      Solomon Lowe MD at 11/17/20 1500             LOS: 0 days   Admiting Physician- Zara Thibodeaux DO    Reason For Followup:    Congestive heart failure acute on chronic systolic  Dilated cardiomyopathy ischemic  CAD  S/p LAD stenting  Noncompliance  Chest pain    Subjective     Patient denies any chest pain today.  He is feeling slightly better.  Shortness of breath is improved    Objective     No obvious dyspnea    Review of Systems:   Review of Systems   Constitution: Negative for chills and fever.   HENT: Negative for ear discharge and nosebleeds.    Eyes: Negative for discharge and redness.   Cardiovascular: Negative for chest pain, orthopnea, palpitations, paroxysmal nocturnal dyspnea and syncope.   Respiratory: Positive for shortness of breath. Negative for cough and wheezing.    Endocrine: Negative for heat intolerance.   Skin: Negative for rash.   Musculoskeletal:  Negative for arthritis and myalgias.   Gastrointestinal: Negative for abdominal pain, melena, nausea and vomiting.   Genitourinary: Negative for dysuria and hematuria.   Neurological: Negative for dizziness, light-headedness, numbness and tremors.   Psychiatric/Behavioral: Negative for depression. The patient is not nervous/anxious.          Vital Signs  Vitals:    11/16/20 1917 11/17/20 0310 11/17/20 0845 11/17/20 1118   BP: 107/75 105/73 133/50 105/75   BP Location: Left arm Right arm  Right arm   Patient Position: Lying Sitting     Pulse: 89 90  89   Resp: 16 14  18   Temp: 97.9 °F (36.6 °C) 98.9 °F (37.2 °C)  98.9 °F (37.2 °C)   TempSrc: Axillary Oral  Oral   SpO2: 97% 98%  95%   Weight:  106 kg (232 lb 12.9 oz)     Height:         Wt Readings from Last 1 Encounters:   11/17/20 106 kg (232 lb 12.9 oz)       Intake/Output Summary (Last 24 hours) at 11/17/2020 1500  Last data filed at 11/17/2020 1454  Gross per 24 hour   Intake 1192 ml   Output --   Net 1192 ml     Physical Exam:  Constitutional:       Appearance: Well-developed.   Eyes:      General: No scleral icterus.        Right eye: No discharge.   HENT:      Head: Normocephalic and atraumatic.   Neck:      Thyroid: No thyromegaly.      Lymphadenopathy: No cervical adenopathy.   Pulmonary:      Effort: Pulmonary effort is normal. No respiratory distress.      Breath sounds: Normal breath sounds. No wheezing. No rales.   Cardiovascular:      Normal rate. Regular rhythm.      No gallop.   Edema:     Peripheral edema present.  Abdominal:      Tenderness: There is no abdominal tenderness.   Skin:     Findings: No erythema or rash.   Neurological:      Mental Status: Alert and oriented to person, place, and time.         Results Review:   Lab Results (last 24 hours)     Procedure Component Value Units Date/Time    Extra Tubes [[id number]] Collected: 11/17/20 0842    Specimen: Blood, Venous Line Updated: 11/17/20 0945    Narrative:      The following orders were  created for panel order Extra Tubes.  Procedure                               Abnormality         Status                     ---------                               -----------         ------                     Lavender Top[[id number]]                                     Final result                 Please view results for these tests on the individual orders.    Lavender Top [[id number]] Collected: 11/17/20 0842    Specimen: Blood Updated: 11/17/20 0945     Extra Tube hold for add-on     Comment: Auto resulted       Comprehensive Metabolic Panel [[id number]]  (Abnormal) Collected: 11/17/20 0842    Specimen: Blood Updated: 11/17/20 0944     Glucose 101 mg/dL      BUN 20 mg/dL      Creatinine 0.98 mg/dL      Sodium 134 mmol/L      Potassium 4.4 mmol/L      Chloride 100 mmol/L      CO2 27.0 mmol/L      Calcium 8.2 mg/dL      Total Protein 5.9 g/dL      Albumin 3.10 g/dL      ALT (SGPT) 279 U/L      AST (SGOT) 86 U/L      Alkaline Phosphatase 140 U/L      Total Bilirubin 0.4 mg/dL      eGFR Non African Amer 83 mL/min/1.73      Globulin 2.8 gm/dL      A/G Ratio 1.1 g/dL      BUN/Creatinine Ratio 20.4     Anion Gap 7.0 mmol/L     Narrative:      GFR Normal >60  Chronic Kidney Disease <60  Kidney Failure <15      Hepatitis Diagnostic Profile [[id number]] Collected: 11/15/20 0128    Specimen: Blood Updated: 11/17/20 0814     Hep A IgM Negative     Hepatitis B Surface Ag Negative     Hep B Core IgM Negative    Narrative:      Performed at:  [address] [place] [address]  [id number]  : Emory Mayfield PhD, Phone:  [phone]    Magnesium [[id number]]  (Normal) Collected: 11/17/20 0535    Specimen: Blood Updated: 11/17/20 0615     Magnesium 1.8 mg/dL     CBC & Differential [[id number]]  (Abnormal) Collected: 11/17/20 0535    Specimen: Blood Updated: 11/17/20 0552    Narrative:      The following orders were created for panel order CBC & Differential.  Procedure                                Abnormality         Status                     ---------                               -----------         ------                     CBC Auto Differential[[id number]]        Abnormal            Final result                 Please view results for these tests on the individual orders.    CBC Auto Differential [[id number]]  (Abnormal) Collected: 11/17/20 0535    Specimen: Blood Updated: 11/17/20 0552     WBC 9.70 10*3/mm3      RBC 4.52 10*6/mm3      Hemoglobin 10.8 g/dL      Hematocrit 34.3 %      MCV 75.9 fL      MCH 23.8 pg      MCHC 31.4 g/dL      RDW 17.2 %      RDW-SD 45.5 fl      MPV 7.8 fL      Platelets 242 10*3/mm3      Neutrophil % 60.6 %      Lymphocyte % 21.0 %      Monocyte % 16.4 %      Eosinophil % 0.9 %      Basophil % 1.1 %      Neutrophils, Absolute 5.80 10*3/mm3      Lymphocytes, Absolute 2.00 10*3/mm3      Monocytes, Absolute 1.60 10*3/mm3      Eosinophils, Absolute 0.10 10*3/mm3      Basophils, Absolute 0.10 10*3/mm3      nRBC 0.5 /100 WBC     Blood Culture - Blood, Arm, Left [[id number]] Collected: 11/15/20 0128    Specimen: Blood from Arm, Left Updated: 11/17/20 0145     Blood Culture No growth at 2 days    Blood Culture - Blood, Arm, Right [[id number]] Collected: 11/15/20 0127    Specimen: Blood from Arm, Right Updated: 11/17/20 0145     Blood Culture No growth at 2 days    Hepatitis Panel, Acute [[id number]]  (Abnormal) Collected: 11/16/20 2011    Specimen: Blood Updated: 11/16/20 2120     Hepatitis B Surface Ag Non-Reactive     Hep A IgM Non-Reactive     Hep B C IgM Non-Reactive     Hepatitis C Ab Reactive    Narrative:      Results may be falsely decreased if patient taking Biotin.         Imaging Results (Last 72 Hours)     Procedure Component Value Units Date/Time    US Liver [[id number]] Collected: 11/15/20 0921     Updated: 11/15/20 0928    Narrative:      US LIVER-     Date of Exam: 11/15/2020 8:14 AM     Indication: New transaminitis; R07.9-Chest pain, unspecified.     Comparison: None  available.     Technique: Transverse and sagittal ultrasound images of the right upper  quadrant were obtained. Doppler evaluation was also conducted.     FINDINGS:        The liver is homogeneous. There are no discrete liver masses. Portal  venous and hepatic venous flows are normal. The common duct is 5 mm.  There is an incidental right pleural effusion.       Impression:      Incidental right pleural effusion. Unremarkable images of the liver.     Electronically Signed By-Ky Yusuf MD On:11/15/2020 9:24 AM  This report was finalized on [id number] by  Ky Yusuf MD.    XR Chest 1 View [[id number]] Collected: 11/14/20 1939     Updated: 11/14/20 1958    Narrative:      Examination: XR CHEST 1 VW-     Date of Exam: 11/14/2020 5:20 PM     Indication: Chest pain protocol.     Comparison: 08/02/2019     Technique: 1 view of the chest      Findings:  The heart is enlarged. The pulmonary vascular markings are normal. There  are no focal infiltrates. The osseous structures are normal.          Impression:      Cardiomegaly without active disease     Electronically Signed By-Ky Yusuf MD On:11/14/2020 7:41 PM  This report was finalized on [id number] by  Ky Yusuf MD.        ECG/EMG Results (most recent)     Procedure Component Value Units Date/Time    ECG 12 Lead [[id number]] Collected: 11/14/20 2356     Updated: 11/14/20 2357     QT Interval 372 ms     Narrative:      HEART RATE= 97  bpm  RR Interval= 616  ms  TX Interval= 186  ms  P Horizontal Axis=   deg  P Front Axis= 66  deg  QRSD Interval= 96  ms  QT Interval= 372  ms  QRS Axis= -14  deg  T Wave Axis= -88  deg  - ABNORMAL ECG -  Sinus rhythm  Probable left atrial enlargement  Left ventricular hypertrophy  Anterior infarct, old  Nonspecific T abnormalities, lateral leads  When compared with ECG of 14-Nov-2020 18:34:37,  Significant repolarization change  Significant axis, voltage or hypertrophy change  Electronically Signed By:   Date and Time of  Study: 2020-11-14 23:56:25    ECG 12 Lead [[id number]] Collected: 11/14/20 2356     Updated: 11/15/20 0511     QT Interval 372 ms     Narrative:      HEART RATE= 97  bpm  RR Interval= 616  ms  WA Interval= 186  ms  P Horizontal Axis=   deg  P Front Axis= 66  deg  QRSD Interval= 96  ms  QT Interval= 372  ms  QRS Axis= -14  deg  T Wave Axis= -88  deg  - ABNORMAL ECG -  Sinus rhythm  Probable left atrial enlargement  Left ventricular hypertrophy  Anterior infarct, old  Nonspecific T abnormalities, lateral leads  When compared with ECG of 14-Nov-2020 18:34:37,  Significant repolarization change  Significant axis, voltage or hypertrophy change  Electronically Signed By:   Date and Time of Study: 2020-11-14 23:56:25    ECG 12 Lead [[id number]] Collected: 11/14/20 1834     Updated: 11/15/20 0539     QT Interval 421 ms     Narrative:      HEART RATE= 95  bpm  RR Interval= 636  ms  WA Interval= 191  ms  P Horizontal Axis= -21  deg  P Front Axis= 43  deg  QRSD Interval= 92  ms  QT Interval= 421  ms  QRS Axis= 194  deg  T Wave Axis= -60  deg  - ABNORMAL ECG -  Sinus rhythm  LAE, consider biatrial enlargement  Probable RVH w/ secondary repol abnormality  Inferior infarct, age indeterminate  Borderline ST elevation, anterior leads  Prolonged QT interval  When compared with ECG of 24-Mar-2020 10:07:43,  New or worsened ischemia or infarction  Significant repolarization change  Significant axis, voltage or hypertrophy change  Electronically Signed By:   Date and Time of Study: 2020-11-14 18:34:37    Adult Transthoracic Echo Complete W/ Cont if Necessary Per Protocol [[id number]] Collected: 11/15/20 0936     Updated: 11/16/20 0949     BSA 2.3 m^2      RVIDd 3.5 cm      IVSd 1.3 cm      LVIDd 7.0 cm      LVIDs 6.6 cm      LVPWd 0.74 cm      IVS/LVPW 1.7     FS 4.9 %      EDV(Teich) 254.1 ml      ESV(Teich) 226.7 ml      EF(Teich) 10.8 %      EDV(cubed) 340.6 ml      ESV(cubed) 292.8 ml      EF(cubed) 14.0 %      LV mass(C)d 323.6 grams       LV mass(C)dI 143.3 grams/m^2      SV(Teich) 27.4 ml      SI(Teich) 12.1 ml/m^2      SV(cubed) 47.8 ml      SI(cubed) 21.2 ml/m^2      Ao root diam 3.8 cm      Ao root area 11.0 cm^2      ACS 2.5 cm      asc Aorta Diam 2.6 cm      LVOT diam 2.3 cm      LVOT area 4.3 cm^2      RVOT diam 2.7 cm      RVOT area 5.7 cm^2      EDV(MOD-sp4) 205.2 ml      ESV(MOD-sp4) 180.8 ml      EF(MOD-sp4) 11.9 %      SV(MOD-sp4) 24.5 ml      SI(MOD-sp4) 10.8 ml/m^2      Ao root area (BSA corrected) 1.7     LV Sims Vol (BSA corrected) 90.9 ml/m^2      LV Sys Vol (BSA corrected) 80.1 ml/m^2      Aortic R-R 0.65 sec      Aortic HR 92.7 BPM      MV E max anthony 84.0 cm/sec      MV A max anthony 64.1 cm/sec      MV E/A 1.3     MV V2 max 99.3 cm/sec      MV max PG 3.9 mmHg      MV V2 mean 55.1 cm/sec      MV mean PG 1.5 mmHg      MV V2 VTI 11.7 cm      MVA(VTI) 4.4 cm^2      MV dec slope 997.9 cm/sec^2      MV dec time 0.08 sec      Ao pk anthony 90.4 cm/sec      Ao max PG 3.3 mmHg      Ao max PG (full) 0.51 mmHg      Ao V2 mean 63.6 cm/sec      Ao mean PG 1.8 mmHg      Ao mean PG (full) 0.55 mmHg      Ao V2 VTI 14.8 cm      MAXIM(I,A) 3.5 cm^2      MAXIM(I,D) 3.5 cm^2      MAXIM(V,A) 3.9 cm^2      MAXIM(V,D) 3.9 cm^2      LV V1 max PG 2.8 mmHg      LV V1 mean PG 1.3 mmHg      LV V1 max 83.1 cm/sec      LV V1 mean 50.7 cm/sec      LV V1 VTI 12.2 cm      CO(Ao) 15.1 l/min      CI(Ao) 6.7 l/min/m^2      SV(Ao) 163.2 ml      SI(Ao) 72.3 ml/m^2      CO(LVOT) 4.8 l/min      CI(LVOT) 2.1 l/min/m^2      SV(LVOT) 51.9 ml      SV(RVOT) 55.4 ml      SI(LVOT) 23.0 ml/m^2      PA V2 max 66.6 cm/sec      PA max PG 1.8 mmHg      PA max PG (full) 0.02 mmHg      PA V2 mean 43.8 cm/sec      PA mean PG 0.92 mmHg      PA mean PG (full) 0.19 mmHg      PA V2 VTI 9.2 cm      PVA(I,A) 6.0 cm^2      BH CV ECHO PETER - PVA(I,D) 6.0 cm^2      BH CV ECHO PETER - PVA(V,A) 5.7 cm^2      BH CV ECHO PETER - PVA(V,D) 5.7 cm^2      PA acc time 0.09 sec      RV V1 max PG 1.8 mmHg      RV V1  mean PG 0.72 mmHg      RV V1 max 66.3 cm/sec      RV V1 mean 37.5 cm/sec      RV V1 VTI 9.7 cm      TR max osmar 249.8 cm/sec      RVSP(TR) 28.0 mmHg      RAP systole 3.0 mmHg      PA pr(Accel) 39.6 mmHg      Pulm Sys Osmar 35.4 cm/sec      Pulm Sims Osmar 65.2 cm/sec      Pulm S/D 0.54     Qp/Qs 1.1      CV ECHO PETER - BZI_BMI 29.6 kilograms/m^2       CV ECHO PETER - BSA(HAYCOCK) 2.3 m^2       CV ECHO PETER - BZI_METRIC_WEIGHT 101.6 kg       CV ECHO PETER - BZI_METRIC_HEIGHT 185.4 cm      EF(MOD-bp) 12.0 %      LA dimension(2D) 5.3 cm     Narrative:      · Left ventricular systolic function is severely decreased.  · Left ventricular ejection fraction is 10 to 15%  · Akinetic septum, inferior wall and apex is noted  · The left ventricular cavity is mildly dilated.  · Left ventricular diastolic function was normal.  · Mildly reduced right ventricular systolic function noted.  · Mild mitral valve regurgitation is present  · Mild tricuspid valve regurgitation is present. Calculated right   ventricular systolic pressure from tricuspid regurgitation is 28.0 mmHg.           CBC    Results from last 7 days   Lab Units 11/17/20  0535 11/16/20  0320 11/15/20  0129 11/14/20  1919   WBC 10*3/mm3 9.70 10.20 12.20* 14.60*   HEMOGLOBIN g/dL 10.8* 10.4* 10.9* 12.2*   PLATELETS 10*3/mm3 242 282 273 375     BMP   Results from last 7 days   Lab Units 11/17/20  0842 11/17/20  0535 11/16/20  0320 11/15/20  0129 11/14/20  2136 11/14/20  1919   SODIUM mmol/L 134*  --  134* 133*  --  128*   POTASSIUM mmol/L 4.4  --  4.1 4.7 5.1 5.3*   CHLORIDE mmol/L 100  --  98 100  --  97*   CO2 mmol/L 27.0  --  27.0 21.0*  --  18.0*   BUN mg/dL 20  --  27* 39*  --  37*   CREATININE mg/dL 0.98  --  1.07 1.26  --  1.33*   GLUCOSE mg/dL 101*  --  129* 190*  --  122*   MAGNESIUM mg/dL  --  1.8 2.1 1.8  --   --      CMP   Results from last 7 days   Lab Units 11/17/20  0842 11/16/20  0320 11/15/20  0129 11/14/20  2136 11/14/20  1919   SODIUM mmol/L 134* 134*  133*  --  128*   POTASSIUM mmol/L 4.4 4.1 4.7 5.1 5.3*   CHLORIDE mmol/L 100 98 100  --  97*   CO2 mmol/L 27.0 27.0 21.0*  --  18.0*   BUN mg/dL 20 27* 39*  --  37*   CREATININE mg/dL 0.98 1.07 1.26  --  1.33*   GLUCOSE mg/dL 101* 129* 190*  --  122*   ALBUMIN g/dL 3.10* 2.90* 3.00*  --  3.00*   BILIRUBIN mg/dL 0.4 0.4 0.4  --  0.6   ALK PHOS U/L 140* 145* 172*  --  197*   AST (SGOT) U/L 86* 176* 404*  --  480*   ALT (SGPT) U/L 279* 393* 584*  --  642*     Cardiac Studies:  Echo-   Results for orders placed during the hospital encounter of 11/14/20   Adult Transthoracic Echo Complete W/ Cont if Necessary Per Protocol    Narrative · Left ventricular systolic function is severely decreased.  · Left ventricular ejection fraction is 10 to 15%  · Akinetic septum, inferior wall and apex is noted  · The left ventricular cavity is mildly dilated.  · Left ventricular diastolic function was normal.  · Mildly reduced right ventricular systolic function noted.  · Mild mitral valve regurgitation is present  · Mild tricuspid valve regurgitation is present. Calculated right   ventricular systolic pressure from tricuspid regurgitation is 28.0 mmHg.        Stress Myoview-  Cath-      Medication Review:   Scheduled Meds:aspirin, 81 mg, Oral, Daily  carvedilol, 12.5 mg, Oral, BID With Meals  clopidogrel, 75 mg, Oral, Daily  enoxaparin, 40 mg, Subcutaneous, Q24H  ferrous sulfate, 324 mg, Oral, Daily With Breakfast  furosemide, 40 mg, Intravenous, BID  isosorbide mononitrate, 30 mg, Oral, Q24H  lisinopril, 5 mg, Oral, Q24H  QUEtiapine, 50 mg, Oral, Nightly  sodium chloride, 10 mL, Intravenous, Q12H  traZODone, 100 mg, Oral, Nightly      Continuous Infusions:   PRN Meds:.Calcium Gluconate-NaCl **AND** calcium gluconate **AND** Calcium, Ionized  •  docusate sodium  •  hydrOXYzine  •  influenza vaccine  •  LORazepam **OR** LORazepam **OR** LORazepam **OR** LORazepam **OR** LORazepam **OR** LORazepam  •  magnesium sulfate **OR** magnesium  sulfate **OR** magnesium sulfate  •  melatonin  •  nitroglycerin  •  ondansetron **OR** ondansetron  •  potassium & sodium phosphates **OR** potassium & sodium phosphates  •  potassium chloride  •  potassium chloride  •  sodium chloride  •  sodium chloride      Assessment/Plan   Patient Active Problem List   Diagnosis   • Essential hypertension   • Syncope   • NICM (nonischemic cardiomyopathy) (CMS/[facility])   • Chest pain, atypical   • Dyspnea   • Hyponatremia   • CHF (congestive heart failure), NYHA class III, acute on chronic, systolic (CMS/[facility])   • Drug abuse (CMS/[facility])   • Non-ST elevation myocardial infarction (NSTEMI) (CMS/[facility])   • Precordial pain   • Chest pain   • DENNIS (acute kidney injury) (CMS/[facility])   • Hyperkalemia   • Transaminitis   • Anemia     Plan:    Patient is seen and examined and findings are verified.  He is slowly improving.  His intake and output is not well documented.  However patient believes that he is putting out urine.  Patient social condition is very poor.  I would not consider AICD implantation in him because of noncompliance.  I will proceed with cardiac catheterization on Thursday.  Continue diuresis and current treatment    Solomon Lowe MD  11/17/20  15:00 EST          Electronically signed by Solomon Lowe MD at 11/17/20 6408

## 2020-11-17 NOTE — PROGRESS NOTES
Continued Stay Note  [facility]     Patient Name: Asad Bethea  MRN: [id number]  Today's Date: 11/17/2020    Admit Date: 11/14/2020    Discharge Plan     Row Name 11/17/20 1540       Plan    Plan Comments  SW spoke with [facility] admissions department and was informed that there was no bed availability in [facility] however in [facility]. Pt was informed and reportewd that he would consider [facility]. SW made a referral to [facility] and was informed that pt would meet criteria for Intensive Outpatient Services but not inpatient. SW went to speak with pt to inform him and he reported that it is too far away and he would rather stay in the area so that he is able to see his son. SW contacted [facility] and faxed pt information. [facility] will make contact with pt to complete assessment.        Discharge Codes    No documentation.       Expected Discharge Date and Time     Expected Discharge Date Expected Discharge Time    Nov 19, 2020         Nick HENDRICKSON   Cell: [phone]  Office: [phone]  Fax: [phone]

## 2020-11-17 NOTE — PROGRESS NOTES
"      [facility] Medicine Services Daily Progress Note      Hospitalist Team  LOS 0 days      Patient Care Team:  Shelly Ribeiro APRN as PCP - General (Nurse Practitioner)    Patient Location: 242/1      Subjective   Subjective     Chief Complaint / Subjective  Chief Complaint   Patient presents with   • Chest Pain       Present on Admission:  • Chest pain  • Essential hypertension  • Drug abuse (CMS/[facility])  • CHF (congestive heart failure), NYHA class III, acute on chronic, systolic (CMS/[facility])  • DENNIS (acute kidney injury) (CMS/[facility])  • Hyponatremia  • Hyperkalemia  • Transaminitis  • Anemia      Brief Synopsis of Hospital Course/HPI  Mr. Bethea is a 44 y.o. male with past medical history of hypertension, substance-tobacco/alcohol abuse, heart failure with reduced ejection fraction and anemia who presents to [facility] complaining of chest pain.  Patient reports that he has a 3-day history of constant worsening left-sided chest pain which radiates to his left arm and back.  Pain is described as \"just really hurting\" rated 9/10 at its worst and 8/10 at the time of my exam.  Patient has a history of coronary artery disease underwent cardiac cath at this facility on 3/24/2020 with drug-eluting stent placed in the LAD.  Since that time patient has been minimally compliant with his scheduled medical therapy stating that he currently is taking his Coreg but has not had his Plavix or diuretics in some time.  Some associated orthopnea and PND is noted along with some nausea without vomiting, frequent episodes of tachycardia and palpitations, occasional peripheral edema and a recent subjective fever.  Patient reports that he has relapsed on his substance and alcohol abuse with his most recent use on 11/5/2020 when he states he smoked methamphetamine and drink alcohol.  He denies any history of IV drug use continues to smoke approximately 1 pack of cigarettes per day on average.  Urine output is " "noted as occurring somewhat less frequently and he does also report some chronic constipation.  Additionally patient notes a previous diagnosis of hepatitis C which he received over 1 year ago and has not been treated for.  He also reports having previous bowel resection.        11/15/20  Continues to report shortness of breath    11/16/20  Patient is complaining of some back pain. Denies any chest pain or sob. Reports his le swelling has improved.        Review of Systems   Cardiovascular: Negative for chest pain and palpitations.   Respiratory: Negative for cough and shortness of breath.    Musculoskeletal: Positive for back pain.         Objective   Objective      Vital Signs  Temp:  [97.9 °F (36.6 °C)-98.9 °F (37.2 °C)] 98.9 °F (37.2 °C)  Heart Rate:  [89-90] 89  Resp:  [14-18] 18  BP: (105-133)/(50-75) 105/75  Oxygen Therapy  SpO2: 95 %  Pulse Oximetry Type: Intermittent  Device (Oxygen Therapy): room air  Flowsheet Rows      First Filed Value   Admission Height  185.4 cm (73\") Documented at 11/14/2020 1752   Admission Weight  86.2 kg (190 lb) Documented at 11/14/2020 1752        Intake & Output (last 3 days)       11/14 0701 - 11/15 0700 11/15 0701 - 11/16 0700 11/16 0701 - 11/17 0700 11/17 0701 - 11/18 0700    P.O.  2060 960 712    Total Intake(mL/kg)  2060 (19.8) 960 (9.1) 712 (6.7)    Urine (mL/kg/hr) 750 2300 (0.9)      Total Output 750 2300      Net -750 -240 +960 +712            Urine Unmeasured Occurrence   3 x     Stool Unmeasured Occurrence   2 x 1 x        Lines, Drains & Airways    Active LDAs     Name:   Placement date:   Placement time:   Site:   Days:    Peripheral IV 06/27/19 1800 Right Antecubital   06/27/19    1800    Antecubital   507    Peripheral IV 11/14/20 1913 Left Hand   11/14/20 1913    Hand   1                  Physical Exam:    Physical Exam  Vitals signs reviewed.   Constitutional:       General: He is not in acute distress.     Appearance: He is obese.   HENT:      Head: " Normocephalic and atraumatic.      Mouth/Throat:      Mouth: Mucous membranes are moist.   Cardiovascular:      Rate and Rhythm: Normal rate and regular rhythm.   Pulmonary:      Effort: Pulmonary effort is normal. No respiratory distress.      Breath sounds: No wheezing or rales.   Abdominal:      General: Bowel sounds are normal. There is no distension.      Tenderness: There is no abdominal tenderness. There is no guarding.   Musculoskeletal:      Right lower leg: No edema.      Left lower leg: No edema.   Skin:     General: Skin is warm and dry.      Findings: No rash.   Neurological:      Mental Status: He is alert.           Procedures:              Results Review:     I reviewed the patient's new clinical results.      Lab Results (last 24 hours)     Procedure Component Value Units Date/Time    Extra Tubes [[id number]] Collected: 11/17/20 0842    Specimen: Blood, Venous Line Updated: 11/17/20 0945    Narrative:      The following orders were created for panel order Extra Tubes.  Procedure                               Abnormality         Status                     ---------                               -----------         ------                     Lavender Top[[id number]]                                     Final result                 Please view results for these tests on the individual orders.    Lavender Top [[id number]] Collected: 11/17/20 0842    Specimen: Blood Updated: 11/17/20 0945     Extra Tube hold for add-on     Comment: Auto resulted       Comprehensive Metabolic Panel [[id number]]  (Abnormal) Collected: 11/17/20 0842    Specimen: Blood Updated: 11/17/20 0944     Glucose 101 mg/dL      BUN 20 mg/dL      Creatinine 0.98 mg/dL      Sodium 134 mmol/L      Potassium 4.4 mmol/L      Chloride 100 mmol/L      CO2 27.0 mmol/L      Calcium 8.2 mg/dL      Total Protein 5.9 g/dL      Albumin 3.10 g/dL      ALT (SGPT) 279 U/L      AST (SGOT) 86 U/L      Alkaline Phosphatase 140 U/L      Total Bilirubin 0.4  mg/dL      eGFR Non African Amer 83 mL/min/1.73      Globulin 2.8 gm/dL      A/G Ratio 1.1 g/dL      BUN/Creatinine Ratio 20.4     Anion Gap 7.0 mmol/L     Narrative:      GFR Normal >60  Chronic Kidney Disease <60  Kidney Failure <15      Hepatitis Diagnostic Profile [[id number]] Collected: 11/15/20 0128    Specimen: Blood Updated: 11/17/20 0814     Hep A IgM Negative     Hepatitis B Surface Ag Negative     Hep B Core IgM Negative    Narrative:      Performed at:  [address] [place] [address]  [id number]  : Emory Mayfield PhD, Phone:  [phone]    Magnesium [[id number]]  (Normal) Collected: 11/17/20 0535    Specimen: Blood Updated: 11/17/20 0615     Magnesium 1.8 mg/dL     CBC & Differential [[id number]]  (Abnormal) Collected: 11/17/20 0535    Specimen: Blood Updated: 11/17/20 0552    Narrative:      The following orders were created for panel order CBC & Differential.  Procedure                               Abnormality         Status                     ---------                               -----------         ------                     CBC Auto Differential[[id number]]        Abnormal            Final result                 Please view results for these tests on the individual orders.    CBC Auto Differential [[id number]]  (Abnormal) Collected: 11/17/20 0535    Specimen: Blood Updated: 11/17/20 0552     WBC 9.70 10*3/mm3      RBC 4.52 10*6/mm3      Hemoglobin 10.8 g/dL      Hematocrit 34.3 %      MCV 75.9 fL      MCH 23.8 pg      MCHC 31.4 g/dL      RDW 17.2 %      RDW-SD 45.5 fl      MPV 7.8 fL      Platelets 242 10*3/mm3      Neutrophil % 60.6 %      Lymphocyte % 21.0 %      Monocyte % 16.4 %      Eosinophil % 0.9 %      Basophil % 1.1 %      Neutrophils, Absolute 5.80 10*3/mm3      Lymphocytes, Absolute 2.00 10*3/mm3      Monocytes, Absolute 1.60 10*3/mm3      Eosinophils, Absolute 0.10 10*3/mm3      Basophils, Absolute 0.10 10*3/mm3      nRBC 0.5 /100 WBC     Blood Culture -  Blood, Arm, Left [[id number]] Collected: 11/15/20 0128    Specimen: Blood from Arm, Left Updated: 11/17/20 0145     Blood Culture No growth at 2 days    Blood Culture - Blood, Arm, Right [[id number]] Collected: 11/15/20 0127    Specimen: Blood from Arm, Right Updated: 11/17/20 0145     Blood Culture No growth at 2 days    Hepatitis Panel, Acute [[id number]]  (Abnormal) Collected: 11/16/20 2011    Specimen: Blood Updated: 11/16/20 2120     Hepatitis B Surface Ag Non-Reactive     Hep A IgM Non-Reactive     Hep B C IgM Non-Reactive     Hepatitis C Ab Reactive    Narrative:      Results may be falsely decreased if patient taking Biotin.         No results found for: HGBA1C  Results from last 7 days   Lab Units 11/14/20  1919   INR  1.51*               Microbiology Results (last 10 days)     Procedure Component Value - Date/Time    Blood Culture - Blood, Arm, Left [[id number]] Collected: 11/15/20 0128    Lab Status: Preliminary result Specimen: Blood from Arm, Left Updated: 11/17/20 0145     Blood Culture No growth at 2 days    Blood Culture - Blood, Arm, Right [[id number]] Collected: 11/15/20 0127    Lab Status: Preliminary result Specimen: Blood from Arm, Right Updated: 11/17/20 0145     Blood Culture No growth at 2 days    COVID-19, [facility] IN-HOUSE,NP Swab (NO TRANSPORT MEDIA) 2 HR TAT - Swab, Nasopharynx [[id number]]  (Normal) Collected: 11/14/20 1922    Lab Status: Final result Specimen: Swab from Nasopharynx Updated: 11/14/20 2006     COVID19 Not Detected    Narrative:      Fact sheet for providers: https://www.fda.gov/media/708154/download     Fact sheet for patients: https://www.fda.gov/media/971247/download          ECG/EMG Results (most recent)     Procedure Component Value Units Date/Time    ECG 12 Lead [[id number]] Collected: 11/14/20 2356     Updated: 11/14/20 2357     QT Interval 372 ms     Narrative:      HEART RATE= 97  bpm  RR Interval= 616  ms  ID Interval= 186  ms  P Horizontal Axis=   deg  P Front Axis= 66   deg  QRSD Interval= 96  ms  QT Interval= 372  ms  QRS Axis= -14  deg  T Wave Axis= -88  deg  - ABNORMAL ECG -  Sinus rhythm  Probable left atrial enlargement  Left ventricular hypertrophy  Anterior infarct, old  Nonspecific T abnormalities, lateral leads  When compared with ECG of 14-Nov-2020 18:34:37,  Significant repolarization change  Significant axis, voltage or hypertrophy change  Electronically Signed By:   Date and Time of Study: 2020-11-14 23:56:25    ECG 12 Lead [[id number]] Collected: 11/14/20 2356     Updated: 11/15/20 0511     QT Interval 372 ms     Narrative:      HEART RATE= 97  bpm  RR Interval= 616  ms  TX Interval= 186  ms  P Horizontal Axis=   deg  P Front Axis= 66  deg  QRSD Interval= 96  ms  QT Interval= 372  ms  QRS Axis= -14  deg  T Wave Axis= -88  deg  - ABNORMAL ECG -  Sinus rhythm  Probable left atrial enlargement  Left ventricular hypertrophy  Anterior infarct, old  Nonspecific T abnormalities, lateral leads  When compared with ECG of 14-Nov-2020 18:34:37,  Significant repolarization change  Significant axis, voltage or hypertrophy change  Electronically Signed By:   Date and Time of Study: 2020-11-14 23:56:25    ECG 12 Lead [[id number]] Collected: 11/14/20 1834     Updated: 11/15/20 0539     QT Interval 421 ms     Narrative:      HEART RATE= 95  bpm  RR Interval= 636  ms  TX Interval= 191  ms  P Horizontal Axis= -21  deg  P Front Axis= 43  deg  QRSD Interval= 92  ms  QT Interval= 421  ms  QRS Axis= 194  deg  T Wave Axis= -60  deg  - ABNORMAL ECG -  Sinus rhythm  LAE, consider biatrial enlargement  Probable RVH w/ secondary repol abnormality  Inferior infarct, age indeterminate  Borderline ST elevation, anterior leads  Prolonged QT interval  When compared with ECG of 24-Mar-2020 10:07:43,  New or worsened ischemia or infarction  Significant repolarization change  Significant axis, voltage or hypertrophy change  Electronically Signed By:   Date and Time of Study: 2020-11-14 18:34:37    Adult  Transthoracic Echo Complete W/ Cont if Necessary Per Protocol [[id number]] Collected: 11/15/20 0936     Updated: 11/16/20 0949     BSA 2.3 m^2      RVIDd 3.5 cm      IVSd 1.3 cm      LVIDd 7.0 cm      LVIDs 6.6 cm      LVPWd 0.74 cm      IVS/LVPW 1.7     FS 4.9 %      EDV(Teich) 254.1 ml      ESV(Teich) 226.7 ml      EF(Teich) 10.8 %      EDV(cubed) 340.6 ml      ESV(cubed) 292.8 ml      EF(cubed) 14.0 %      LV mass(C)d 323.6 grams      LV mass(C)dI 143.3 grams/m^2      SV(Teich) 27.4 ml      SI(Teich) 12.1 ml/m^2      SV(cubed) 47.8 ml      SI(cubed) 21.2 ml/m^2      Ao root diam 3.8 cm      Ao root area 11.0 cm^2      ACS 2.5 cm      asc Aorta Diam 2.6 cm      LVOT diam 2.3 cm      LVOT area 4.3 cm^2      RVOT diam 2.7 cm      RVOT area 5.7 cm^2      EDV(MOD-sp4) 205.2 ml      ESV(MOD-sp4) 180.8 ml      EF(MOD-sp4) 11.9 %      SV(MOD-sp4) 24.5 ml      SI(MOD-sp4) 10.8 ml/m^2      Ao root area (BSA corrected) 1.7     LV Sims Vol (BSA corrected) 90.9 ml/m^2      LV Sys Vol (BSA corrected) 80.1 ml/m^2      Aortic R-R 0.65 sec      Aortic HR 92.7 BPM      MV E max anthony 84.0 cm/sec      MV A max anthony 64.1 cm/sec      MV E/A 1.3     MV V2 max 99.3 cm/sec      MV max PG 3.9 mmHg      MV V2 mean 55.1 cm/sec      MV mean PG 1.5 mmHg      MV V2 VTI 11.7 cm      MVA(VTI) 4.4 cm^2      MV dec slope 997.9 cm/sec^2      MV dec time 0.08 sec      Ao pk anthony 90.4 cm/sec      Ao max PG 3.3 mmHg      Ao max PG (full) 0.51 mmHg      Ao V2 mean 63.6 cm/sec      Ao mean PG 1.8 mmHg      Ao mean PG (full) 0.55 mmHg      Ao V2 VTI 14.8 cm      MAXIM(I,A) 3.5 cm^2      MAXIM(I,D) 3.5 cm^2      MAXIM(V,A) 3.9 cm^2      MAXIM(V,D) 3.9 cm^2      LV V1 max PG 2.8 mmHg      LV V1 mean PG 1.3 mmHg      LV V1 max 83.1 cm/sec      LV V1 mean 50.7 cm/sec      LV V1 VTI 12.2 cm      CO(Ao) 15.1 l/min      CI(Ao) 6.7 l/min/m^2      SV(Ao) 163.2 ml      SI(Ao) 72.3 ml/m^2      CO(LVOT) 4.8 l/min      CI(LVOT) 2.1 l/min/m^2      SV(LVOT) 51.9 ml      SV(RVOT)  55.4 ml      SI(LVOT) 23.0 ml/m^2      PA V2 max 66.6 cm/sec      PA max PG 1.8 mmHg      PA max PG (full) 0.02 mmHg      PA V2 mean 43.8 cm/sec      PA mean PG 0.92 mmHg      PA mean PG (full) 0.19 mmHg      PA V2 VTI 9.2 cm      PVA(I,A) 6.0 cm^2       CV ECHO PETER - PVA(I,D) 6.0 cm^2       CV ECHO PETER - PVA(V,A) 5.7 cm^2       CV ECHO PETER - PVA(V,D) 5.7 cm^2      PA acc time 0.09 sec      RV V1 max PG 1.8 mmHg      RV V1 mean PG 0.72 mmHg      RV V1 max 66.3 cm/sec      RV V1 mean 37.5 cm/sec      RV V1 VTI 9.7 cm      TR max osmar 249.8 cm/sec      RVSP(TR) 28.0 mmHg      RAP systole 3.0 mmHg      PA pr(Accel) 39.6 mmHg      Pulm Sys Osmar 35.4 cm/sec      Pulm Sims Osmar 65.2 cm/sec      Pulm S/D 0.54     Qp/Qs 1.1      CV ECHO PETER - BZI_BMI 29.6 kilograms/m^2       CV ECHO PETER - BSA(HAYCOCK) 2.3 m^2       CV ECHO PETER - BZI_METRIC_WEIGHT 101.6 kg       CV ECHO PETER - BZI_METRIC_HEIGHT 185.4 cm      EF(MOD-bp) 12.0 %      LA dimension(2D) 5.3 cm     Narrative:      · Left ventricular systolic function is severely decreased.  · Left ventricular ejection fraction is 10 to 15%  · Akinetic septum, inferior wall and apex is noted  · The left ventricular cavity is mildly dilated.  · Left ventricular diastolic function was normal.  · Mildly reduced right ventricular systolic function noted.  · Mild mitral valve regurgitation is present  · Mild tricuspid valve regurgitation is present. Calculated right   ventricular systolic pressure from tricuspid regurgitation is 28.0 mmHg.                  Results for orders placed during the hospital encounter of 11/14/20   Adult Transthoracic Echo Complete W/ Cont if Necessary Per Protocol    Narrative · Left ventricular systolic function is severely decreased.  · Left ventricular ejection fraction is 10 to 15%  · Akinetic septum, inferior wall and apex is noted  · The left ventricular cavity is mildly dilated.  · Left ventricular diastolic function was normal.  ·  Mildly reduced right ventricular systolic function noted.  · Mild mitral valve regurgitation is present  · Mild tricuspid valve regurgitation is present. Calculated right   ventricular systolic pressure from tricuspid regurgitation is 28.0 mmHg.          Us Liver    Result Date: 11/15/2020  Incidental right pleural effusion. Unremarkable images of the liver.  Electronically Signed By-Ky Yusuf MD On:11/15/2020 9:24 AM This report was finalized on [id number] by  Ky Yusuf MD.    Xr Chest 1 View    Result Date: 11/14/2020  Cardiomegaly without active disease  Electronically Signed By-Ky Yusuf MD On:11/14/2020 7:41 PM This report was finalized on [id number] by  Ky Yusuf MD.      Xrays, labs reviewed personally by physician.    Medication Review:   I have reviewed the patient's current medication list      Scheduled Meds  aspirin, 81 mg, Oral, Daily  carvedilol, 12.5 mg, Oral, BID With Meals  clopidogrel, 75 mg, Oral, Daily  enoxaparin, 40 mg, Subcutaneous, Q24H  ferrous sulfate, 324 mg, Oral, Daily With Breakfast  furosemide, 40 mg, Intravenous, BID  isosorbide mononitrate, 30 mg, Oral, Q24H  lisinopril, 5 mg, Oral, Q24H  QUEtiapine, 50 mg, Oral, Nightly  sodium chloride, 10 mL, Intravenous, Q12H  traZODone, 100 mg, Oral, Nightly        Meds Infusions       Meds PRN  Calcium Gluconate-NaCl **AND** calcium gluconate **AND** Calcium, Ionized  •  docusate sodium  •  hydrOXYzine  •  influenza vaccine  •  LORazepam **OR** LORazepam **OR** LORazepam **OR** LORazepam **OR** LORazepam **OR** LORazepam  •  magnesium sulfate **OR** magnesium sulfate **OR** magnesium sulfate  •  melatonin  •  nitroglycerin  •  ondansetron **OR** ondansetron  •  potassium & sodium phosphates **OR** potassium & sodium phosphates  •  potassium chloride  •  potassium chloride  •  sodium chloride  •  sodium chloride      Assessment/Plan   Assessment/Plan     Active Hospital Problems    Diagnosis  POA   • Chest pain [R07.9]  Yes      • DENNIS (acute kidney injury) (CMS/[facility]) [N17.9]  Yes   • Hyperkalemia [E87.5]  Yes   • Transaminitis [R74.01]  Yes   • Anemia [D64.9]  Yes   • Drug abuse (CMS/[facility]) [F19.10]  Yes   • CHF (congestive heart failure), NYHA class III, acute on chronic, systolic (CMS/[facility]) [I50.23]  Yes   • Hyponatremia [E87.1]  Yes   • Essential hypertension [I10]  Yes      Resolved Hospital Problems   No resolved problems to display.         Chest pain  - serial troponin negative   - CXR- cardiomegaly without evidence of active disease.  - cardiac cath 3/24/2020 -one drug-eluting stent in the LAD at that time.  - continue aspirin, plavix, statin, bbl, acei, imdur  - cardiology following  - plan on cardiac cath on 11/19/2020     Ischemic dilated cardiomyopathy, Actute HFrEF  - echo -  LVEF 10-15%, Akinetic septum, inferior wall and apex, mild MR, mild TR, RVSP 28mmHg  - Dr. Lowe planning cardiac catheterization on Thursday   - continue diuresis with IV lasix  - strict I&Os  - daily weights      Acute Kidney Injury  - baseline Cr 0.8   - improving  - resume lisinopril   - UA reviewed      Hyperkalemia  -Potassium: 5.3  -EKG pending review  -Kayexalate ordered  -Continue cardiac monitoring  -Monitor while admitted     Hyponatremia  - improving   - Check urine and serum osmolality  - Holding Zoloft  - Monitor while admitted     Transaminitis, Hepatitis C  - possibly component of hepatic congestion   - HIV Negative  - liver us unremarkable   - pt denies known history of hepatitis C, will need outpatient follow up with gastroenterology     Leukocytosis  -WBCs: 14.60 with an increased absolute neutrophil and monocyte count noted on differential  -Check blood cultures, lactic acid, procalcitonin, CRP and sed rate  -Monitor while admitted     Iron deficiency Anemia  - Hgb stable   - iron profile reviewed   - no signs of bleeding noted     CAD  - continue aspirin, plavix, bbl, acei, imdur  - patient has been noncompliant with outpatient Plavix  therapy   - telemetry      Hypertension  - Poorly controlled with a blood pressure on admission of 166/86 (pressure has normalized following reduction in anxiety as well as nitroglycerin administration in ED)  - Continue Coreg, lisinopril, lasix  - lisinopril initially held due to DENNIS and hyperkalemia  - Monitor with routine vs and make adjustments as needed      Depression/anxiety  - Continue trazodone and Seroquel     Drug abuse  - Patient reports methamphetamine use which she reports he smoked approximately 9 days prior.    - He denies any history of IV drug use  - Encourage cessation especially light of patient's comorbid conditions     Alcohol abuse  - Patient reports he generally drinks approximately 1 pint of liquor per day though he has had nothing to drink for the past 9 days  - Encourage cessation especially light of patient's comorbid conditions  - [facility] protocol ordered    DVT Prophylaxis  - enoxaparin     Patient is high risk requiring IV ativan         Code Status -   Code Status and Medical Interventions:   Ordered at: 11/14/20 2158     Code Status:    CPR     Medical Interventions (Level of Support Prior to Arrest):    Full       Discharge Planning  Defer, patient is homeless, discussed with case management, pt is interested in drug rehab           Electronically signed by Zara Thibodeaux DO, 11/17/20, 16:31 EST.  [facility] Hospitalist Team

## 2020-11-17 NOTE — PLAN OF CARE
Goal Outcome Evaluation:  Plan of Care Reviewed With: patient  Progress: no change  Outcome Summary: Patient has not complained of any chest pain this shift. Gave meds for CIWA. Patient has slept some this shift. Been eating ice cream and sherbert this shift. Will continue to monitor.

## 2020-11-17 NOTE — PROGRESS NOTES
"      [facility] Medicine Services Daily Progress Note      Hospitalist Team  LOS 0 days      Patient Care Team:  Shelly Ribeiro APRN as PCP - General (Nurse Practitioner)    Patient Location: 242/1      Subjective   Subjective     Chief Complaint / Subjective  Chief Complaint   Patient presents with   • Chest Pain       Present on Admission:  • Chest pain  • Essential hypertension  • Drug abuse (CMS/[facility])  • CHF (congestive heart failure), NYHA class III, acute on chronic, systolic (CMS/[facility])  • DENNIS (acute kidney injury) (CMS/[facility])  • Hyponatremia  • Hyperkalemia  • Transaminitis  • Anemia      Brief Synopsis of Hospital Course/HPI  Mr. Bethea is a 44 y.o. male with past medical history of hypertension, substance-tobacco/alcohol abuse, heart failure with reduced ejection fraction and anemia who presents to [facility] complaining of chest pain.  Patient reports that he has a 3-day history of constant worsening left-sided chest pain which radiates to his left arm and back.  Pain is described as \"just really hurting\" rated 9/10 at its worst and 8/10 at the time of my exam.  Patient has a history of coronary artery disease underwent cardiac cath at this facility on 3/24/2020 with drug-eluting stent placed in the LAD.  Since that time patient has been minimally compliant with his scheduled medical therapy stating that he currently is taking his Coreg but has not had his Plavix or diuretics in some time.  Some associated orthopnea and PND is noted along with some nausea without vomiting, frequent episodes of tachycardia and palpitations, occasional peripheral edema and a recent subjective fever.  Patient reports that he has relapsed on his substance and alcohol abuse with his most recent use on 11/5/2020 when he states he smoked methamphetamine and drink alcohol.  He denies any history of IV drug use continues to smoke approximately 1 pack of cigarettes per day on average.  Urine output is " "noted as occurring somewhat less frequently and he does also report some chronic constipation.  Additionally patient notes a previous diagnosis of hepatitis C which he received over 1 year ago and has not been treated for.  He also reports having previous bowel resection.        11/15/20  Continues to report shortness of breath    11/16/20  Patient is complaining of some back pain. Denies any chest pain or sob. Reports his le swelling has improved.        Review of Systems   Cardiovascular: Negative for chest pain and palpitations.   Respiratory: Negative for cough and shortness of breath.    Musculoskeletal: Positive for back pain.         Objective   Objective      Vital Signs  Temp:  [97.9 °F (36.6 °C)-99 °F (37.2 °C)] 97.9 °F (36.6 °C)  Heart Rate:  [72-89] 89  Resp:  [16-18] 16  BP: ()/(60-79) 107/75  Oxygen Therapy  SpO2: 97 %  Pulse Oximetry Type: Intermittent  Device (Oxygen Therapy): room air  Flowsheet Rows      First Filed Value   Admission Height  185.4 cm (73\") Documented at 11/14/2020 1752   Admission Weight  86.2 kg (190 lb) Documented at 11/14/2020 1752        Intake & Output (last 3 days)       11/14 0701 - 11/15 0700 11/15 0701 - 11/16 0700 11/16 0701 - 11/17 0700    P.O.  2060 960    Total Intake(mL/kg)  2060 (19.8) 960 (9.2)    Urine (mL/kg/hr) 750 2300 (0.9)     Total Output 750 2300     Net -750 -240 +960           Urine Unmeasured Occurrence   3 x    Stool Unmeasured Occurrence   2 x        Lines, Drains & Airways    Active LDAs     Name:   Placement date:   Placement time:   Site:   Days:    Peripheral IV 06/27/19 1800 Right Antecubital   06/27/19    1800    Antecubital   507    Peripheral IV 11/14/20 1913 Left Hand   11/14/20 1913    Hand   1                  Physical Exam:    Physical Exam  Vitals signs reviewed.   Constitutional:       General: He is not in acute distress.     Appearance: He is obese.   HENT:      Head: Normocephalic and atraumatic.      Mouth/Throat:      Mouth: " Mucous membranes are moist.   Cardiovascular:      Rate and Rhythm: Normal rate and regular rhythm.   Pulmonary:      Effort: Pulmonary effort is normal. No respiratory distress.      Breath sounds: No wheezing or rales.   Abdominal:      General: Bowel sounds are normal. There is no distension.      Tenderness: There is no abdominal tenderness. There is no guarding.   Musculoskeletal:      Right lower leg: No edema.      Left lower leg: No edema.   Skin:     General: Skin is warm and dry.      Findings: No rash.   Neurological:      Mental Status: He is alert.           Procedures:              Results Review:     I reviewed the patient's new clinical results.      Lab Results (last 24 hours)     Procedure Component Value Units Date/Time    Comprehensive Metabolic Panel [[id number]]  (Abnormal) Collected: 11/16/20 0320    Specimen: Blood Updated: 11/16/20 0509     Glucose 129 mg/dL      BUN 27 mg/dL      Creatinine 1.07 mg/dL      Sodium 134 mmol/L      Potassium 4.1 mmol/L      Chloride 98 mmol/L      CO2 27.0 mmol/L      Calcium 7.7 mg/dL      Total Protein 5.5 g/dL      Albumin 2.90 g/dL      ALT (SGPT) 393 U/L      AST (SGOT) 176 U/L      Alkaline Phosphatase 145 U/L      Total Bilirubin 0.4 mg/dL      eGFR Non African Amer 75 mL/min/1.73      Globulin 2.6 gm/dL      A/G Ratio 1.1 g/dL      BUN/Creatinine Ratio 25.2     Anion Gap 9.0 mmol/L     Narrative:      GFR Normal >60  Chronic Kidney Disease <60  Kidney Failure <15      Magnesium [[id number]]  (Normal) Collected: 11/16/20 0320    Specimen: Blood Updated: 11/16/20 0509     Magnesium 2.1 mg/dL     CBC & Differential [[id number]]  (Abnormal) Collected: 11/16/20 0320    Specimen: Blood Updated: 11/16/20 0445    Narrative:      The following orders were created for panel order CBC & Differential.  Procedure                               Abnormality         Status                     ---------                               -----------         ------                      CBC Auto Differential[[id number]]        Abnormal            Final result                 Please view results for these tests on the individual orders.    CBC Auto Differential [[id number]]  (Abnormal) Collected: 11/16/20 0320    Specimen: Blood Updated: 11/16/20 0445     WBC 10.20 10*3/mm3      RBC 4.29 10*6/mm3      Hemoglobin 10.4 g/dL      Hematocrit 33.0 %      MCV 76.9 fL      MCH 24.3 pg      MCHC 31.5 g/dL      RDW 17.2 %      RDW-SD 46.4 fl      MPV 7.8 fL      Platelets 282 10*3/mm3      Neutrophil % 60.4 %      Lymphocyte % 22.3 %      Monocyte % 15.6 %      Eosinophil % 1.2 %      Basophil % 0.5 %      Neutrophils, Absolute 6.10 10*3/mm3      Lymphocytes, Absolute 2.30 10*3/mm3      Monocytes, Absolute 1.60 10*3/mm3      Eosinophils, Absolute 0.10 10*3/mm3      Basophils, Absolute 0.10 10*3/mm3      nRBC 1.0 /100 WBC     Blood Culture - Blood, Arm, Right [[id number]] Collected: 11/15/20 0127    Specimen: Blood from Arm, Right Updated: 11/16/20 0145     Blood Culture No growth at 24 hours    Blood Culture - Blood, Arm, Left [[id number]] Collected: 11/15/20 0128    Specimen: Blood from Arm, Left Updated: 11/16/20 0145     Blood Culture No growth at 24 hours        No results found for: HGBA1C  Results from last 7 days   Lab Units 11/14/20  1919   INR  1.51*               Microbiology Results (last 10 days)     Procedure Component Value - Date/Time    Blood Culture - Blood, Arm, Left [[id number]] Collected: 11/15/20 0128    Lab Status: Preliminary result Specimen: Blood from Arm, Left Updated: 11/16/20 0145     Blood Culture No growth at 24 hours    Blood Culture - Blood, Arm, Right [[id number]] Collected: 11/15/20 0127    Lab Status: Preliminary result Specimen: Blood from Arm, Right Updated: 11/16/20 0145     Blood Culture No growth at 24 hours    COVID-19, [facility] IN-HOUSE,NP Swab (NO TRANSPORT MEDIA) 2 HR TAT - Swab, Nasopharynx [[id number]]  (Normal) Collected: 11/14/20 1922    Lab Status: Final result  Specimen: Swab from Nasopharynx Updated: 11/14/20 2006     COVID19 Not Detected    Narrative:      Fact sheet for providers: https://www.fda.gov/media/359973/download     Fact sheet for patients: https://www.fda.gov/media/427554/download          ECG/EMG Results (most recent)     Procedure Component Value Units Date/Time    ECG 12 Lead [[id number]] Collected: 11/14/20 2356     Updated: 11/14/20 2357     QT Interval 372 ms     Narrative:      HEART RATE= 97  bpm  RR Interval= 616  ms  MI Interval= 186  ms  P Horizontal Axis=   deg  P Front Axis= 66  deg  QRSD Interval= 96  ms  QT Interval= 372  ms  QRS Axis= -14  deg  T Wave Axis= -88  deg  - ABNORMAL ECG -  Sinus rhythm  Probable left atrial enlargement  Left ventricular hypertrophy  Anterior infarct, old  Nonspecific T abnormalities, lateral leads  When compared with ECG of 14-Nov-2020 18:34:37,  Significant repolarization change  Significant axis, voltage or hypertrophy change  Electronically Signed By:   Date and Time of Study: 2020-11-14 23:56:25    ECG 12 Lead [[id number]] Collected: 11/14/20 2356     Updated: 11/15/20 0511     QT Interval 372 ms     Narrative:      HEART RATE= 97  bpm  RR Interval= 616  ms  MI Interval= 186  ms  P Horizontal Axis=   deg  P Front Axis= 66  deg  QRSD Interval= 96  ms  QT Interval= 372  ms  QRS Axis= -14  deg  T Wave Axis= -88  deg  - ABNORMAL ECG -  Sinus rhythm  Probable left atrial enlargement  Left ventricular hypertrophy  Anterior infarct, old  Nonspecific T abnormalities, lateral leads  When compared with ECG of 14-Nov-2020 18:34:37,  Significant repolarization change  Significant axis, voltage or hypertrophy change  Electronically Signed By:   Date and Time of Study: 2020-11-14 23:56:25    ECG 12 Lead [[id number]] Collected: 11/14/20 1834     Updated: 11/15/20 0539     QT Interval 421 ms     Narrative:      HEART RATE= 95  bpm  RR Interval= 636  ms  MI Interval= 191  ms  P Horizontal Axis= -21  deg  P Front Axis= 43   deg  QRSD Interval= 92  ms  QT Interval= 421  ms  QRS Axis= 194  deg  T Wave Axis= -60  deg  - ABNORMAL ECG -  Sinus rhythm  LAE, consider biatrial enlargement  Probable RVH w/ secondary repol abnormality  Inferior infarct, age indeterminate  Borderline ST elevation, anterior leads  Prolonged QT interval  When compared with ECG of 24-Mar-2020 10:07:43,  New or worsened ischemia or infarction  Significant repolarization change  Significant axis, voltage or hypertrophy change  Electronically Signed By:   Date and Time of Study: 2020-11-14 18:34:37    Adult Transthoracic Echo Complete W/ Cont if Necessary Per Protocol [[id number]] Collected: 11/15/20 0936     Updated: 11/16/20 0949     BSA 2.3 m^2      RVIDd 3.5 cm      IVSd 1.3 cm      LVIDd 7.0 cm      LVIDs 6.6 cm      LVPWd 0.74 cm      IVS/LVPW 1.7     FS 4.9 %      EDV(Teich) 254.1 ml      ESV(Teich) 226.7 ml      EF(Teich) 10.8 %      EDV(cubed) 340.6 ml      ESV(cubed) 292.8 ml      EF(cubed) 14.0 %      LV mass(C)d 323.6 grams      LV mass(C)dI 143.3 grams/m^2      SV(Teich) 27.4 ml      SI(Teich) 12.1 ml/m^2      SV(cubed) 47.8 ml      SI(cubed) 21.2 ml/m^2      Ao root diam 3.8 cm      Ao root area 11.0 cm^2      ACS 2.5 cm      asc Aorta Diam 2.6 cm      LVOT diam 2.3 cm      LVOT area 4.3 cm^2      RVOT diam 2.7 cm      RVOT area 5.7 cm^2      EDV(MOD-sp4) 205.2 ml      ESV(MOD-sp4) 180.8 ml      EF(MOD-sp4) 11.9 %      SV(MOD-sp4) 24.5 ml      SI(MOD-sp4) 10.8 ml/m^2      Ao root area (BSA corrected) 1.7     LV Sims Vol (BSA corrected) 90.9 ml/m^2      LV Sys Vol (BSA corrected) 80.1 ml/m^2      Aortic R-R 0.65 sec      Aortic HR 92.7 BPM      MV E max anthony 84.0 cm/sec      MV A max anthony 64.1 cm/sec      MV E/A 1.3     MV V2 max 99.3 cm/sec      MV max PG 3.9 mmHg      MV V2 mean 55.1 cm/sec      MV mean PG 1.5 mmHg      MV V2 VTI 11.7 cm      MVA(VTI) 4.4 cm^2      MV dec slope 997.9 cm/sec^2      MV dec time 0.08 sec      Ao pk anthony 90.4 cm/sec      Ao max  PG 3.3 mmHg      Ao max PG (full) 0.51 mmHg      Ao V2 mean 63.6 cm/sec      Ao mean PG 1.8 mmHg      Ao mean PG (full) 0.55 mmHg      Ao V2 VTI 14.8 cm      MAXIM(I,A) 3.5 cm^2      MAXIM(I,D) 3.5 cm^2      MAXIM(V,A) 3.9 cm^2      MAXIM(V,D) 3.9 cm^2      LV V1 max PG 2.8 mmHg      LV V1 mean PG 1.3 mmHg      LV V1 max 83.1 cm/sec      LV V1 mean 50.7 cm/sec      LV V1 VTI 12.2 cm      CO(Ao) 15.1 l/min      CI(Ao) 6.7 l/min/m^2      SV(Ao) 163.2 ml      SI(Ao) 72.3 ml/m^2      CO(LVOT) 4.8 l/min      CI(LVOT) 2.1 l/min/m^2      SV(LVOT) 51.9 ml      SV(RVOT) 55.4 ml      SI(LVOT) 23.0 ml/m^2      PA V2 max 66.6 cm/sec      PA max PG 1.8 mmHg      PA max PG (full) 0.02 mmHg      PA V2 mean 43.8 cm/sec      PA mean PG 0.92 mmHg      PA mean PG (full) 0.19 mmHg      PA V2 VTI 9.2 cm      PVA(I,A) 6.0 cm^2       CV ECHO PETER - PVA(I,D) 6.0 cm^2       CV ECHO PETER - PVA(V,A) 5.7 cm^2       CV ECHO PETER - PVA(V,D) 5.7 cm^2      PA acc time 0.09 sec      RV V1 max PG 1.8 mmHg      RV V1 mean PG 0.72 mmHg      RV V1 max 66.3 cm/sec      RV V1 mean 37.5 cm/sec      RV V1 VTI 9.7 cm      TR max osmar 249.8 cm/sec      RVSP(TR) 28.0 mmHg      RAP systole 3.0 mmHg      PA pr(Accel) 39.6 mmHg      Pulm Sys Osmar 35.4 cm/sec      Pulm Sims Osmar 65.2 cm/sec      Pulm S/D 0.54     Qp/Qs 1.1      CV ECHO PETER - BZI_BMI 29.6 kilograms/m^2       CV ECHO PETER - BSA([facility]) 2.3 m^2      BH CV ECHO PETER - BZI_METRIC_WEIGHT 101.6 kg      BH CV ECHO PETER - BZI_METRIC_HEIGHT 185.4 cm      EF(MOD-bp) 12.0 %      LA dimension(2D) 5.3 cm     Narrative:      · Left ventricular systolic function is severely decreased.  · Left ventricular ejection fraction is 10 to 15%  · Akinetic septum, inferior wall and apex is noted  · The left ventricular cavity is mildly dilated.  · Left ventricular diastolic function was normal.  · Mildly reduced right ventricular systolic function noted.  · Mild mitral valve regurgitation is present  · Mild tricuspid  valve regurgitation is present. Calculated right   ventricular systolic pressure from tricuspid regurgitation is 28.0 mmHg.                  Results for orders placed during the hospital encounter of 11/14/20   Adult Transthoracic Echo Complete W/ Cont if Necessary Per Protocol    Narrative · Left ventricular systolic function is severely decreased.  · Left ventricular ejection fraction is 10 to 15%  · Akinetic septum, inferior wall and apex is noted  · The left ventricular cavity is mildly dilated.  · Left ventricular diastolic function was normal.  · Mildly reduced right ventricular systolic function noted.  · Mild mitral valve regurgitation is present  · Mild tricuspid valve regurgitation is present. Calculated right   ventricular systolic pressure from tricuspid regurgitation is 28.0 mmHg.          Us Liver    Result Date: 11/15/2020  Incidental right pleural effusion. Unremarkable images of the liver.  Electronically Signed By-Ky Yusuf MD On:11/15/2020 9:24 AM This report was finalized on [id number] by  Ky Yusuf MD.    Xr Chest 1 View    Result Date: 11/14/2020  Cardiomegaly without active disease  Electronically Signed By-Ky Yusuf MD On:11/14/2020 7:41 PM This report was finalized on [id number] by  Ky Yusuf MD.      Xrays, labs reviewed personally by physician.    Medication Review:   I have reviewed the patient's current medication list      Scheduled Meds  aspirin, 81 mg, Oral, Daily  carvedilol, 12.5 mg, Oral, BID With Meals  clopidogrel, 75 mg, Oral, Daily  enoxaparin, 40 mg, Subcutaneous, Q24H  ferrous sulfate, 324 mg, Oral, Daily With Breakfast  furosemide, 40 mg, Intravenous, BID  isosorbide mononitrate, 30 mg, Oral, Q24H  lisinopril, 5 mg, Oral, Q24H  QUEtiapine, 50 mg, Oral, Nightly  sodium chloride, 10 mL, Intravenous, Q12H  traZODone, 100 mg, Oral, Nightly        Meds Infusions       Meds PRN  Calcium Gluconate-NaCl **AND** calcium gluconate **AND** Calcium, Ionized  •   docusate sodium  •  hydrOXYzine  •  influenza vaccine  •  LORazepam **OR** LORazepam **OR** LORazepam **OR** LORazepam **OR** LORazepam **OR** LORazepam  •  magnesium sulfate **OR** magnesium sulfate **OR** magnesium sulfate  •  melatonin  •  nitroglycerin  •  ondansetron **OR** ondansetron  •  potassium & sodium phosphates **OR** potassium & sodium phosphates  •  potassium chloride  •  potassium chloride  •  sodium chloride  •  sodium chloride      Assessment/Plan   Assessment/Plan     Active Hospital Problems    Diagnosis  POA   • Chest pain [R07.9]  Yes   • DENNIS (acute kidney injury) (CMS/HCC) [N17.9]  Yes   • Hyperkalemia [E87.5]  Yes   • Transaminitis [R74.01]  Yes   • Anemia [D64.9]  Yes   • Drug abuse (CMS/[facility]) [F19.10]  Yes   • CHF (congestive heart failure), NYHA class III, acute on chronic, systolic (CMS/[facility]) [I50.23]  Yes   • Hyponatremia [E87.1]  Yes   • Essential hypertension [I10]  Yes      Resolved Hospital Problems   No resolved problems to display.         Chest pain  -Troponin: Less than 0.010, trend  -Chest x-ray shows cardiomegaly without evidence of active disease.  -EKG reported by ED provider as showing no acute ST changes, attempting to locate original copy for review  -Patient awaiting cardiac cath on 3/24/2020 with placement of one drug-eluting stent in the LAD at that time.  -In the ED patient given aspirin nitroglycerin  -N.p.o. after midnight  -Cardiology consulted    Ischemic dilated cardiomyopathy, ACUTE HFrEF  - echo reviewed   - needs cardi      DENNIS  - Creatine: 1.33, BUN: Thirty-seven, BUN/creatinine ratio: 27.8  - Hold lisinopril  -Avoid nephrotoxic medication and IV dye unless urgently needed  -Monitor BMP, I's and O's while admitted  -Patient may benefit from nephrology consult especially if kidney function continues to worsen if patient continues to require diuresis     Hyperkalemia  -Potassium: 5.3  -EKG pending review  -Kayexalate ordered  -Continue cardiac monitoring  -Monitor  while admitted     Hyponatremia  -Sodium: 128  -Check urine and serum osmolality  -Hold Zoloft  -Monitor while admitted     Transaminitis  -ALT: S642 AST: 480 with an alk phos of of one ninety-seven and an albumin of 3.00 (levels were noted as normal on 3/22/2020)  - HIV Negative  - Hepatitis panel negative  - liver us unremarkable     Leukocytosis  -WBCs: 14.60 with an increased absolute neutrophil and monocyte count noted on differential  -Check blood cultures, lactic acid, procalcitonin, CRP and sed rate  -Monitor while admitted     Anemia  -Hemoglobin: 12.2 with a decreased MCV and MCHC  -Continue ferrous sulfate  -Check iron profile as above  -Monitor while admitted    CAD  -Continue Plavix and beta-blocker (patient has been noncompliant with outpatient Plavix therapy but states he has been taking his Coreg)  -Continue cardiac monitoring     Hypertension  -Poorly controlled with a blood pressure on admission of 166/86 (pressure has normalized following reduction in anxiety as well as nitroglycerin administration in ED)  - Continue Coreg  -Hold lisinopril secondary to DENNIS and hyperkalemia  - Monitor while admitted     Depression/anxiety  -Continue Zoloft, trazodone and Seroquel     Drug abuse  -Patient reports methamphetamine use which she reports he smoked approximately 9 days prior.  He denies any history of IV drug use  -Encourage cessation especially light of patient's comorbid conditions     Alcohol abuse  -Patient reports he generally drinks approximately 1 pint of liquor per day though he has had nothing to drink for the past 9 days  -Encourage cessation especially light of patient's comorbid conditions  -CIWA protocol ordered    DVT Prophylaxis  - SCDs    Patient is high risk         Code Status -   Code Status and Medical Interventions:   Ordered at: 11/14/20 2158     Code Status:    CPR     Medical Interventions (Level of Support Prior to Arrest):    Full       Discharge Planning  Defer, patient is  homeless, discussed with case management, pt is interested in rehab           Electronically signed by Zara Thibodeaux DO, 11/16/20, 19:57 EST.  [facility] Hospitalist Team

## 2020-11-17 NOTE — DISCHARGE PLACEMENT REQUEST
"Asad Sousa (44 y.o. Male)     Date of Birth Social Security Number Address Home Phone MRN    1975  [id number]  [place] [address] [phone] [id number]    Alevism Marital Status          Yazidism        Admission Date Admission Type Admitting Provider Attending Provider Department, Room/Bed    20 Emergency Zara Thibodeaux DO [place], [place], DO [facility] 2C MEDICAL INPATIENT, 242/1    Discharge Date Discharge Disposition Discharge Destination                       Attending Provider: Zara Thibodeaux DO    Allergies: No Known Allergies    Isolation: None   Infection: None   Code Status: CPR    Ht: 185.4 cm (73\")   Wt: 106 kg (232 lb 12.9 oz)    Admission Cmt: None   Principal Problem: None                Active Insurance as of 2020     Primary Coverage     Payor Plan Insurance Group Employer/Plan Group    [place] [employer] [place]      Payor Plan Address Payor Plan Phone Number Payor Plan Fax Number Effective Dates    [address] [phone]  2019 - None Entered    [place] [address]       Subscriber Name Subscriber Birth Date Member ID       ASAD SOUSA 1975 [id number]                 Emergency Contacts      (Rel.) Home Phone Work Phone Mobile Phone    ADRIÁN COLBY (Other) -- -- [phone]            Emergency Contact Information     Name Relation Home Work Mobile    ADRIÁN COLBY Other   [phone]          Insurance Information                [place] [employer] [place] Phone: [phone]    Subscriber: Asad Sousa Subscriber#: [id number]    Group#:  Precert#:              History & Physical      Lenin Sousa PA-C at 20 2131                [facility]      Patient Name: Asad Sousa  : 1975  MRN: [id number]  Primary Care Physician: Shelly Ribeiro APRN  Date of admission: 2020    Patient " "Care Team:  Shelly Ribeiro APRN as PCP - General (Nurse Practitioner)          Subjective   History Present Illness     Chief Complaint:   Chief Complaint   Patient presents with   • Chest Pain         Mr. Bethea is a 44 y.o. male with past medical history of hypertension, substance-tobacco/alcohol abuse, heart failure with reduced ejection fraction and anemia who presents to [facility] complaining of chest pain.  Patient reports that he has a 3-day history of constant worsening left-sided chest pain which radiates to his left arm and back.  Pain is described as \"just really hurting\" rated 9/10 at its worst and 8/10 at the time of my exam.  Patient has a history of coronary artery disease underwent cardiac cath at this facility on 3/24/2020 with drug-eluting stent placed in the LAD.  Since that time patient has been minimally compliant with his scheduled medical therapy stating that he currently is taking his Coreg but has not had his Plavix or diuretics in some time.  Some associated orthopnea and PND is noted along with some nausea without vomiting, frequent episodes of tachycardia and palpitations, occasional peripheral edema and a recent subjective fever.  Patient reports that he has relapsed on his substance and alcohol abuse with his most recent use on 11/5/2020 when he states he smoked methamphetamine and drink alcohol.  He denies any history of IV drug use continues to smoke approximately 1 pack of cigarettes per day on average.  Urine output is noted as occurring somewhat less frequently and he does also report some chronic constipation.  Additionally patient notes a previous diagnosis of hepatitis C which he received over 1 year ago and has not been treated for.  He also reports having previous bowel resection.    In the ED patient did have lab significant for troponin of less than 0.010, proBNP: 5399.0, sodium: One twenty-eight, potassium: 5.3, CO2: 18.0, anion gap: 13.0, creatinine: 1.33, " BUN: Thirty-seven, alk phos: One ninety-seven, ALT: Six forty-two, AST: Forty-eight, albumin: 3.00.  WBCs: 14.60, hemoglobin: 12.2 with a decreased MCV and MCHC.  UA shows sterile pyuria with 0-2 WBCs, 0-2 RBCs, 1+ bilirubin, 1+ protein and a normal specific gravity of 1.028.  Urine tox positive for amphetamines and THC.  Chest x-ray shows cardiomegaly without evidence of active disease.  COVID-19 test was negative.  ED provider notes initial EKG had significant artifact and was difficult to interpret with subsequent EKG showing no acute ST changes.  This is not available for review at the time of my exam    History of Present Illness    Review of Systems   Constitution: Positive for fever. Negative for chills and decreased appetite.   HENT: Negative.    Eyes: Negative.    Cardiovascular: Positive for chest pain, dyspnea on exertion, leg swelling, orthopnea, palpitations and paroxysmal nocturnal dyspnea. Negative for irregular heartbeat and syncope.   Respiratory: Positive for cough and shortness of breath. Negative for sputum production.         Patient reports cough typically occurs if he attempts to lay flat.   Endocrine: Negative.    Skin: Negative.    Musculoskeletal: Negative.    Gastrointestinal: Positive for bloating and nausea. Negative for abdominal pain, hematemesis, hematochezia, melena and vomiting.   Genitourinary: Negative.    Neurological: Positive for light-headedness. Negative for focal weakness, headaches and weakness.        Lightheadedness on standing.   Psychiatric/Behavioral: Negative.            Personal History     Past Medical History:   Past Medical History:   Diagnosis Date   • Abnormal nuclear stress test 6/28/2019   • Acute back pain 7/8/2019   • ADHD (attention deficit hyperactivity disorder)    • Alcohol dependence (CMS/HCC)    • Anxiety    • Arthritis    • Clotting disorder (CMS/HCC)    • Depression    • Depression with anxiety 7/14/2019   • Essential hypertension 6/28/2019   •  Generalized abdominal pain 8/2/2019   • GERD (gastroesophageal reflux disease)    • H/O heart artery stent 03/24/2020   • Hepatitis C 06/2019   • Hypertensive urgency 7/8/2019   • Hyponatremia 8/4/2019   • IBS (irritable bowel syndrome)    • Intractable vomiting with nausea 7/13/2019    Added automatically from request for surgery [id number]   • Medically noncompliant 7/8/2019   • Melena 7/14/2019   • NICM (nonischemic cardiomyopathy) (CMS/HCC) 7/14/2019   • Non-ST elevation myocardial infarction (NSTEMI) (CMS/[facility]) 3/24/2020   • Substance abuse (CMS/HCC)    • Tobacco dependency        Surgical History:      Past Surgical History:   Procedure Laterality Date   • CARDIAC CATHETERIZATION     • CARDIAC CATHETERIZATION N/A 7/9/2019    Procedure: LEFT HEART CATH with possible PCI;  Surgeon: PAL Johnson MD;  Location: [facility] CATH INVASIVE LOCATION;  Service: Cardiovascular   • CARDIAC CATHETERIZATION N/A 3/24/2020    Procedure: LEFT HEART CATH with possible PCI;  Surgeon: Solomon Lowe MD;  Location: [facility] CATH INVASIVE LOCATION;  Service: Cardiovascular;  Laterality: N/A;  Local and IV sedation   • CARDIAC CATHETERIZATION N/A 3/24/2020    Procedure: Stent ATUL coronary;  Surgeon: Solomon Lowe MD;  Location: [facility] CATH INVASIVE LOCATION;  Service: Cardiovascular;  Laterality: N/A;   • CARDIOVASCULAR STRESS TEST  2019   • ECHO - CONVERTED  2019   • ECHO - CONVERTED  2020   • ENDOSCOPY N/A 7/14/2019    Procedure: ESOPHAGOGASTRODUODENOSCOPY WITH SCLEROTHERAPY, BICAP CAUTERY OF DUODENAL ULCER AND HEMOSTASIS SPRAY OF DUODENAL ULCER;  Surgeon: Wesly Myers MD;  Location: [facility] ENDOSCOPY;  Service: Gastroenterology   • EXPLORATORY LAPAROTOMY N/A 7/16/2019    Procedure: LAPAROTOMY EXPLORATORY;  Surgeon: Patience Peng MD;  Location: [facility] MAIN OR;  Service: General   • WRIST SURGERY N/A            Family History: family history includes Colon cancer in his mother; Heart attack in his brother, brother, and mother;  Heart disease in his brother, brother, and mother. Otherwise pertinent FHx was reviewed and unremarkable.     Social History:  reports that he quit smoking about 16 months ago. His smoking use included cigarettes. He has a 20.00 pack-year smoking history. He has quit using smokeless tobacco. He reports previous alcohol use. He reports previous drug use. Drugs: Cocaine(coke) and Methamphetamines.      Medications:  Prior to Admission medications    Medication Sig Start Date End Date Taking? Authorizing Provider   carvedilol (COREG) 3.125 MG tablet TAKE 1 TABLET BY MOUTH TWICE DAILY WITH MEALS 10/28/20   Solomon Lowe MD   clopidogrel (PLAVIX) 75 MG tablet Take 1 tablet by mouth Daily.  Patient taking differently: Take 75 mg by mouth Daily. Dr Lowe -  Pt to continue his Plavix  for (EGD/colonoscopy 5/19) 4/22/20   Solomon Lowe MD   ferrous sulfate 324 (65 Fe) MG tablet delayed-release EC tablet Take 1 tablet by mouth 2 (Two) Times a Day With Meals. 5/14/20   Shelly Ribeiro APRN   furosemide (Lasix) 20 MG tablet Take 1 tablet by mouth Daily. 4/22/20   Solomon Lowe MD   lisinopril (PRINIVIL,ZESTRIL) 2.5 MG tablet Take 1 tablet by mouth Daily. 4/22/20   Solomon Lowe MD   loperamide (IMODIUM) 2 MG capsule Take 1 capsule by mouth 3 (Three) Times a Day As Needed for Diarrhea. 3/25/20   Nga Malhotra MD   omeprazole (priLOSEC) 40 MG capsule TAKE 1 CAPSULE BY MOUTH EVERY DAY 6/22/20   Segundo Rosario Jr., MD   QUEtiapine (SEROquel) 100 MG tablet Take 1 tablet by mouth Every Night. 5/14/20   Shelly Ribeiro APRN   sertraline (ZOLOFT) 100 MG tablet Take 1 tablet by mouth Daily. 5/14/20   Shelly Ribeiro APRN   spironolactone (ALDACTONE) 25 MG tablet TAKE 1 TABLET BY MOUTH DAILY 7/21/20   Solomon Lowe MD   traZODone (DESYREL) 100 MG tablet TAKE 1 TABLET BY MOUTH EVERY EVENING 6/22/20   Segundo Rosario Jr., MD       Allergies:  No Known Allergies    Objective   Objective     Vital Signs  Temp:  [97.7 °F (36.5 °C)] 97.7  °F (36.5 °C)  Heart Rate:  [92-98] 94  Resp:  [20] 20  BP: (112-162)/(86-98) 112/98  SpO2:  [96 %-100 %] 96 %  on   ;   Device (Oxygen Therapy): room air  Body mass index is 25.07 kg/m².    Physical Exam  Constitutional:       General: He is not in acute distress.     Appearance: Normal appearance. He is normal weight. He is not ill-appearing or toxic-appearing.   HENT:      Head: Normocephalic.      Right Ear: External ear normal.      Left Ear: External ear normal.      Nose: Nose normal.      Mouth/Throat:      Mouth: Mucous membranes are moist.   Eyes:      Extraocular Movements: Extraocular movements intact.      Conjunctiva/sclera: Conjunctivae normal.   Neck:      Musculoskeletal: Normal range of motion.   Cardiovascular:      Rate and Rhythm: Normal rate and regular rhythm.      Pulses: Normal pulses.      Heart sounds: Murmur present.      Comments: Murmur heard best at the fifth intercostal space midclavicular line  Pulmonary:      Effort: Pulmonary effort is normal.   Abdominal:      General: There is no distension.      Tenderness: There is no abdominal tenderness.      Comments: Hyperactive bowel sounds   Musculoskeletal: Normal range of motion.      Right lower leg: No edema.      Left lower leg: No edema.   Skin:     General: Skin is warm and dry.      Comments: No splinter hemorrhages, Osler nodes, Janeway lesions or unusual scarring noted on exam   Neurological:      General: No focal deficit present.      Mental Status: He is alert and oriented to person, place, and time.   Psychiatric:         Mood and Affect: Mood normal.         Behavior: Behavior normal.         Thought Content: Thought content normal.         Judgment: Judgment normal.         Results Review:  I have personally reviewed most recent lab results and radiology images and interpretations and agree with findings, most notably: Troponin, proBNP, CBC, CMP, UA, urine tox, chest x-ray, EKG and COVID-19 test.    Results from last 7 days    Lab Units 11/14/20 1919   WBC 10*3/mm3 14.60*   HEMOGLOBIN g/dL 12.2*   HEMATOCRIT % 39.6   PLATELETS 10*3/mm3 375   INR  1.51*     Results from last 7 days   Lab Units 11/14/20 1919   SODIUM mmol/L 128*   POTASSIUM mmol/L 5.3*   CHLORIDE mmol/L 97*   CO2 mmol/L 18.0*   BUN mg/dL 37*   CREATININE mg/dL 1.33*   GLUCOSE mg/dL 122*   CALCIUM mg/dL 8.4*   ALT (SGPT) U/L 642*   AST (SGOT) U/L 480*   TROPONIN T ng/mL <0.010   PROBNP pg/mL 5,399.0*     Estimated Creatinine Clearance: 85.5 mL/min (A) (by C-G formula based on SCr of 1.33 mg/dL (H)).  Brief Urine Lab Results  (Last result in the past 365 days)      Color   Clarity   Blood   Leuk Est   Nitrite   Protein   CREAT   Urine HCG        11/14/20 1938 Dark Yellow  Comment:  Result checked  Clear Negative Negative Negative 30 mg/dL (1+)               Microbiology Results (last 10 days)     Procedure Component Value - Date/Time    COVID-19, ABBOTT IN-HOUSE,NP Swab (NO TRANSPORT MEDIA) 2 HR TAT - Swab, Nasopharynx [[id number]]  (Normal) Collected: 11/14/20 1922    Lab Status: Final result Specimen: Swab from Nasopharynx Updated: 11/14/20 2006     COVID19 Not Detected    Narrative:      Fact sheet for providers: https://www.fda.gov/media/967671/download     Fact sheet for patients: https://www.fda.gov/media/976067/download          ECG/EMG Results (most recent)     None               Results for orders placed during the hospital encounter of 03/22/20   Adult Transthoracic Echo Complete W/ Cont if Necessary Per Protocol    Narrative · Left ventricular systolic function is severely decreased.  · The following left ventricular wall segments are akinetic: basal   inferior, mid inferior, apical septal, basal inferoseptal and mid   inferoseptal.  · Left atrial cavity size is mildly dilated.  · Mild mitral valve regurgitation is present          Xr Chest 1 View    Result Date: 11/14/2020  Cardiomegaly without active disease  Electronically Signed By-Ky Yusuf MD  On:11/14/2020 7:41 PM This report was finalized on [id number] by  Ky Yusuf MD.        Estimated Creatinine Clearance: 85.5 mL/min (A) (by C-G formula based on SCr of 1.33 mg/dL (H)).    Assessment/Plan   Assessment/Plan       Active Hospital Problems    Diagnosis  POA   • Chest pain [R07.9]  Yes     Priority: High   • DENNIS (acute kidney injury) (CMS/formerly [facility]) [N17.9]  Yes     Priority: High   • Hyperkalemia [E87.5]  Yes     Priority: High   • Transaminitis [R74.01]  Yes     Priority: High   • Hyponatremia [E87.1]  Yes     Priority: High   • Anemia [D64.9]  Yes     Priority: Medium   • CHF (congestive heart failure), NYHA class III, acute on chronic, systolic (CMS/formerly [facility]) [I50.23]  Yes     Priority: Medium   • Essential hypertension [I10]  Yes     Priority: Medium   • Drug abuse (CMS/formerly [facility]) [F19.10]  Yes     Priority: Low      Resolved Hospital Problems   No resolved problems to display.     Chest pain  -Troponin: Less than 0.010, trend  -Chest x-ray shows cardiomegaly without evidence of active disease.  -EKG reported by ED provider as showing no acute ST changes, attempting to locate original copy for review  -Patient awaiting cardiac cath on 3/24/2020 with placement of one drug-eluting stent in the LAD at that time.  -In the ED patient given aspirin nitroglycerin  -N.p.o. after midnight  -Cardiology consulted    DENNIS  - Creatine: 1.33, BUN: Thirty-seven, BUN/creatinine ratio: 27.8  - Hold lisinopril  -Avoid nephrotoxic medication and IV dye unless urgently needed  -Monitor BMP, I's and O's while admitted  -Patient may benefit from nephrology consult especially if kidney function continues to worsen if patient continues to require diuresis    Hyperkalemia  -Potassium: 5.3  -EKG pending review  -Kayexalate ordered  -Continue cardiac monitoring  -Monitor while admitted    Hyponatremia  -Sodium: 128  -Check urine and serum osmolality  -Hold Zoloft  -Monitor while admitted    Transaminitis  -ALT: S642 AST: 480 with an alk  phos of of one ninety-seven and an albumin of 3.00 (levels were noted as normal on 3/22/2020)  -Check hepatitis panel, HIV, iron profile and ultrasound of right upper quadrant  -Monitor while admitted     Leukocytosis  -WBCs: 14.60 with an increased absolute neutrophil and monocyte count noted on differential  -Check blood cultures, lactic acid, procalcitonin, CRP and sed rate  -Monitor while admitted    Anemia  -Hemoglobin: 12.2 with a decreased MCV and MCHC  -Continue ferrous sulfate  -Check iron profile as above  -Monitor while admitted    CHF  -Patient reports recent episodes of orthopnea and PND.  No peripheral edema noted on exam and lungs slightly diminished in the bases otherwise unremarkable.  -proBNP: 5399.0  -Chest x-ray shows cardiomegaly without evidence of active disease.  -Patient noncompliant with outpatient diuretic therapy  -20 mg IV Lasix ordered x1 with close monitoring of kidney function and sodium planned  -Monitor I's and O's and daily weights  -2 g sodium diet  -Cardiology consulted as above    CAD  -Continue Plavix and beta-blocker (patient has been noncompliant with outpatient Plavix therapy but states he has been taking his Coreg)  -Continue cardiac monitoring    Hypertension  -Poorly controlled with a blood pressure on admission of 166/86 (pressure has normalized following reduction in anxiety as well as nitroglycerin administration in ED)  - Continue Coreg  -Hold lisinopril secondary to DENNIS and hyperkalemia  - Monitor while admitted    Depression/anxiety  -Continue Zoloft, trazodone and Seroquel    Drug abuse  -Patient reports methamphetamine use which she reports he smoked approximately 9 days prior.  He denies any history of IV drug use  -Encourage cessation especially light of patient's comorbid conditions    Alcohol abuse  -Patient reports he generally drinks approximately 1 pint of liquor per day though he has had nothing to drink for the past 9 days  -Encourage cessation especially  light of patient's comorbid conditions  -CIWA protocol ordered              VTE Prophylaxis -Lovenox  Mechanical Order History:     None      Pharmalogical Order History:     None          CODE STATUS: Full  There are no questions and answers to display.         I discussed the patient's findings and my recommendations with patient and nursing staff.      Signature:Electronically signed by Lenin Bethea PA-C, 11/14/20, 11:02 PM EST.    [facility] Hospitalist Team          Electronically signed by Lenin Bethea PA-C at 11/14/20 2388       {Outbreak/Travel/Exposure Documentation......;  Question Available Choices Patient Response   Outbreak Screen: Do you currently have a new onset of the following symptoms?        Fever/Chils, Cough, Shortness of air, Loss of taste or smell, No, Unknown  (!) Shortness of Air (11/14/20 1751)   Outbreak Screen: In the last 14 days, have you had contact with anyone who is ill, has show any of the symptoms listed above and/or has been diagnosis with the 2019 Novel Coronavirus? This includes any immediate household members but excludes any patients with whom you have been in contact within your normal work duties wearing proper PPE, if you are a healthcare worker.  Yes, No, Unknown              No (11/14/20 1751)   Outbreak Screen: Who was notified?    Free text  (not recorded)   Travel Screen: Have you traveled in the last month? If so, to what country have you traveled? If [place] what state? Yes, No, Unknown  List of all countries  List of all States No (11/14/20 1751)  (not recorded)  (not recorded)   Infection Risk: Do you currently have the following symptoms?  (If cough is selected, the Tuberculosis Screen is performed.) Cough, Fever, Rash, No No (11/14/20 1751)   Tuberculosis Screen: Do you have any of the following Tuberculosis Risks?  · Have you lived or spent time with anyone who had or may have TB?  · Have you lived in or visited any of the following areas for more  than one month: [place], [place], [place], [place] or [place], the [place] or [place]?  · Do you have HIV/AIDS?  · Have you lived in or worked in a nursing home, homeless shelter, correctional facility, or substance abuse treatment facility?   · No    If Yes do you have any of the following symptoms? Yes responses display to the right    If Yes, symptoms listed are:  Cough greater than or equal to 3 weeks, Loss of appetite, Unexplained weight loss, Night sweats, Bloody sputum or hemoptysis, Hoarseness, Fever, Fatigue, Chest pain, No (not recorded)  (not recorded)   Exposure Screen: Have you been exposed to any of these contagious diseases in the last month? Measles, Chickenpox, Meningitis, Pertussis, Whooping Cough, No No (11/14/20 0101)       Current Facility-Administered Medications   Medication Dose Route Frequency Provider Last Rate Last Dose   • aspirin chewable tablet 81 mg  81 mg Oral Daily Solomon Lowe MD   81 mg at 11/17/20 0845   • calcium gluconate 1g/50ml 0.675% NaCl IV SOLN  1 g Intravenous PRN Lenin Bethea PA-C        And   • calcium gluconate 2-0.675 GM/100ML NACL IVPB  2 g Intravenous PRN Lenin Bethea PA-C       • carvedilol (COREG) tablet 12.5 mg  12.5 mg Oral BID With Meals Solomon Lowe MD   12.5 mg at 11/17/20 0846   • clopidogrel (PLAVIX) tablet 75 mg  75 mg Oral Daily Lenin Bethea PA-C   75 mg at 11/17/20 0845   • docusate sodium (COLACE) capsule 100 mg  100 mg Oral BID PRN Lenin Bethea PA-C       • enoxaparin (LOVENOX) syringe 40 mg  40 mg Subcutaneous Q24H Lenin Bethea PA-C   40 mg at 11/16/20 1718   • ferrous sulfate EC tablet 324 mg  324 mg Oral Daily With Breakfast Lenin Bethea PA-C   324 mg at 11/17/20 0845   • furosemide (LASIX) injection 40 mg  40 mg Intravenous BID Solomon Lowe MD   40 mg at 11/17/20 0845   • hydrOXYzine (ATARAX) tablet 50 mg  50 mg Oral TID PRN Lenin Bethea PA-C   50 mg at 11/15/20 0221   • influenza  vac split quad (FLUZONE,FLUARIX,AFLURIA,FLULAVAL) injection 0.5 mL  0.5 mL Intramuscular During Hospitalization Lenin Bethea PA-C       • isosorbide mononitrate (IMDUR) 24 hr tablet 30 mg  30 mg Oral Q24H Solomon Lowe MD   30 mg at 11/17/20 0846   • lisinopril (PRINIVIL,ZESTRIL) tablet 5 mg  5 mg Oral Q24H Solomon Lowe MD   5 mg at 11/17/20 0846   • LORazepam (ATIVAN) tablet 1 mg  1 mg Oral Q2H PRN Lenin Bethea PA-C        Or   • LORazepam (ATIVAN) injection 1 mg  1 mg Intravenous Q2H PRN Lenin Bethea PA-C        Or   • LORazepam (ATIVAN) tablet 2 mg  2 mg Oral Q1H PRN Lenin Bethea PA-C        Or   • LORazepam (ATIVAN) injection 2 mg  2 mg Intravenous Q1H PRN Lenin Bethea PA-C   2 mg at 11/17/20 0444    Or   • LORazepam (ATIVAN) injection 2 mg  2 mg Intravenous Q15 Min PRN Lenin Bethea PA-C   2 mg at 11/16/20 2055    Or   • LORazepam (ATIVAN) injection 2 mg  2 mg Intramuscular Q15 Min PRN Lenin Bethea PA-C       • Magnesium Sulfate 2 gram Bolus, followed by 8 gram infusion (total Mg dose 10 grams)- Mg less than or equal to 1mg/dL  2 g Intravenous PRN Lenin Bethea PA-C        Or   • Magnesium Sulfate 2 gram / 50mL Infusion (GIVE X 3 BAGS TO EQUAL 6GM TOTAL DOSE) - Mg 1.1 - 1.5 mg/dl  2 g Intravenous PRN Lenin Bethea PA-C        Or   • Magnesium Sulfate 4 gram infusion- Mg 1.6-1.9 mg/dL  4 g Intravenous PRN Lenin Bethea PA-C   Stopped at 11/15/20 1007   • melatonin tablet 5 mg  5 mg Oral Nightly PRN Lenin Bethea PA-C   5 mg at 11/14/20 2303   • nitroglycerin (NITROSTAT) SL tablet 0.4 mg  0.4 mg Sublingual Q5 Min PRN Lenin Bethea PA-C       • ondansetron (ZOFRAN) tablet 4 mg  4 mg Oral Q6H PRN Lenin Bethea PA-C        Or   • ondansetron (ZOFRAN) injection 4 mg  4 mg Intravenous Q6H PRN Lenin Bethea PA-C   4 mg at 11/16/20 2054   • potassium & sodium phosphates (PHOS-NAK) 280-160-250 MG packet - for  Phosphorus less than 1.25 mg/dL  2 packet Oral Q6H PRN Lenin Bethea PA-C        Or   • potassium & sodium phosphates (PHOS-NAK) 280-160-250 MG packet - for Phosphorus 1.25 - 2.5 mg/dL  2 packet Oral Q6H PRN Lenin Bethea PA-C       • potassium chloride (K-DUR,KLOR-CON) CR tablet 40 mEq  40 mEq Oral PRN Lenin Bethea PA-C       • potassium chloride (KLOR-CON) packet 40 mEq  40 mEq Oral PRN Lenin Bethea PA-C       • QUEtiapine (SEROquel) tablet 50 mg  50 mg Oral Nightly Lenin Bethea PA-C   50 mg at 11/16/20 2049   • sodium chloride 0.9 % flush 10 mL  10 mL Intravenous PRN Lenin Bethea PA-C   10 mL at 11/16/20 2055   • sodium chloride 0.9 % flush 10 mL  10 mL Intravenous Q12H Lenin Bethea PA-C   10 mL at 11/17/20 0846   • sodium chloride 0.9 % flush 10 mL  10 mL Intravenous PRN Lenin Bethea PA-C   10 mL at 11/17/20 0445   • traZODone (DESYREL) tablet 100 mg  100 mg Oral Nightly Lenin Bethea PA-C   100 mg at 11/16/20 2049     Lab Results (most recent)     Procedure Component Value Units Date/Time    Extra Tubes [[id number]] Collected: 11/17/20 0842    Specimen: Blood, Venous Line Updated: 11/17/20 0945    Narrative:      The following orders were created for panel order Extra Tubes.  Procedure                               Abnormality         Status                     ---------                               -----------         ------                     Lavender Top[[id number]]                                     Final result                 Please view results for these tests on the individual orders.    Lavender Top [[id number]] Collected: 11/17/20 0842    Specimen: Blood Updated: 11/17/20 0945     Extra Tube hold for add-on     Comment: Auto resulted       Comprehensive Metabolic Panel [[id number]]  (Abnormal) Collected: 11/17/20 0842    Specimen: Blood Updated: 11/17/20 0944     Glucose 101 mg/dL      BUN 20 mg/dL      Creatinine 0.98 mg/dL       Sodium 134 mmol/L      Potassium 4.4 mmol/L      Chloride 100 mmol/L      CO2 27.0 mmol/L      Calcium 8.2 mg/dL      Total Protein 5.9 g/dL      Albumin 3.10 g/dL      ALT (SGPT) 279 U/L      AST (SGOT) 86 U/L      Alkaline Phosphatase 140 U/L      Total Bilirubin 0.4 mg/dL      eGFR Non African Amer 83 mL/min/1.73      Globulin 2.8 gm/dL      A/G Ratio 1.1 g/dL      BUN/Creatinine Ratio 20.4     Anion Gap 7.0 mmol/L     Narrative:      GFR Normal >60  Chronic Kidney Disease <60  Kidney Failure <15      Hepatitis Diagnostic Profile [[id number]] Collected: 11/15/20 0128    Specimen: Blood Updated: 11/17/20 0814     Hep A IgM Negative     Hepatitis B Surface Ag Negative     Hep B Core IgM Negative    Narrative:      Performed at:  [address] [place] [address]  [id number]  : Emory Mayfield PhD, Phone:  [phone]    Magnesium [[id number]]  (Normal) Collected: 11/17/20 0535    Specimen: Blood Updated: 11/17/20 0615     Magnesium 1.8 mg/dL     CBC & Differential [[id number]]  (Abnormal) Collected: 11/17/20 0535    Specimen: Blood Updated: 11/17/20 0552    Narrative:      The following orders were created for panel order CBC & Differential.  Procedure                               Abnormality         Status                     ---------                               -----------         ------                     CBC Auto Differential[[id number]]        Abnormal            Final result                 Please view results for these tests on the individual orders.    CBC Auto Differential [[id number]]  (Abnormal) Collected: 11/17/20 0535    Specimen: Blood Updated: 11/17/20 0552     WBC 9.70 10*3/mm3      RBC 4.52 10*6/mm3      Hemoglobin 10.8 g/dL      Hematocrit 34.3 %      MCV 75.9 fL      MCH 23.8 pg      MCHC 31.4 g/dL      RDW 17.2 %      RDW-SD 45.5 fl      MPV 7.8 fL      Platelets 242 10*3/mm3      Neutrophil % 60.6 %      Lymphocyte % 21.0 %      Monocyte % 16.4 %      Eosinophil %  0.9 %      Basophil % 1.1 %      Neutrophils, Absolute 5.80 10*3/mm3      Lymphocytes, Absolute 2.00 10*3/mm3      Monocytes, Absolute 1.60 10*3/mm3      Eosinophils, Absolute 0.10 10*3/mm3      Basophils, Absolute 0.10 10*3/mm3      nRBC 0.5 /100 WBC     Blood Culture - Blood, Arm, Left [[id number]] Collected: 11/15/20 0128    Specimen: Blood from Arm, Left Updated: 11/17/20 0145     Blood Culture No growth at 2 days    Blood Culture - Blood, Arm, Right [[id number]] Collected: 11/15/20 0127    Specimen: Blood from Arm, Right Updated: 11/17/20 0145     Blood Culture No growth at 2 days    Hepatitis Panel, Acute [[id number]]  (Abnormal) Collected: 11/16/20 2011    Specimen: Blood Updated: 11/16/20 2120     Hepatitis B Surface Ag Non-Reactive     Hep A IgM Non-Reactive     Hep B C IgM Non-Reactive     Hepatitis C Ab Reactive    Narrative:      Results may be falsely decreased if patient taking Biotin.     Comprehensive Metabolic Panel [[id number]]  (Abnormal) Collected: 11/16/20 0320    Specimen: Blood Updated: 11/16/20 0509     Glucose 129 mg/dL      BUN 27 mg/dL      Creatinine 1.07 mg/dL      Sodium 134 mmol/L      Potassium 4.1 mmol/L      Chloride 98 mmol/L      CO2 27.0 mmol/L      Calcium 7.7 mg/dL      Total Protein 5.5 g/dL      Albumin 2.90 g/dL      ALT (SGPT) 393 U/L      AST (SGOT) 176 U/L      Alkaline Phosphatase 145 U/L      Total Bilirubin 0.4 mg/dL      eGFR Non African Amer 75 mL/min/1.73      Globulin 2.6 gm/dL      A/G Ratio 1.1 g/dL      BUN/Creatinine Ratio 25.2     Anion Gap 9.0 mmol/L     Narrative:      GFR Normal >60  Chronic Kidney Disease <60  Kidney Failure <15      Magnesium [[id number]]  (Normal) Collected: 11/16/20 0320    Specimen: Blood Updated: 11/16/20 0509     Magnesium 2.1 mg/dL     CBC & Differential [[id number]]  (Abnormal) Collected: 11/16/20 0320    Specimen: Blood Updated: 11/16/20 0445    Narrative:      The following orders were created for panel order CBC &  Differential.  Procedure                               Abnormality         Status                     ---------                               -----------         ------                     CBC Auto Differential[[id number]]        Abnormal            Final result                 Please view results for these tests on the individual orders.    CBC Auto Differential [[id number]]  (Abnormal) Collected: 11/16/20 0320    Specimen: Blood Updated: 11/16/20 0445     WBC 10.20 10*3/mm3      RBC 4.29 10*6/mm3      Hemoglobin 10.4 g/dL      Hematocrit 33.0 %      MCV 76.9 fL      MCH 24.3 pg      MCHC 31.5 g/dL      RDW 17.2 %      RDW-SD 46.4 fl      MPV 7.8 fL      Platelets 282 10*3/mm3      Neutrophil % 60.4 %      Lymphocyte % 22.3 %      Monocyte % 15.6 %      Eosinophil % 1.2 %      Basophil % 0.5 %      Neutrophils, Absolute 6.10 10*3/mm3      Lymphocytes, Absolute 2.30 10*3/mm3      Monocytes, Absolute 1.60 10*3/mm3      Eosinophils, Absolute 0.10 10*3/mm3      Basophils, Absolute 0.10 10*3/mm3      nRBC 1.0 /100 WBC     Creatinine, Urine, Random - Urine, Clean Catch [[id number]] Collected: 11/15/20 0002    Specimen: Urine, Clean Catch Updated: 11/15/20 1125     Creatinine, Urine 58.3 mg/dL     Narrative:      Reference intervals for random urine have not been established.  Clinical usage is dependent upon physician's interpretation in combination with other laboratory tests.       Sedimentation Rate [[id number]]  (Normal) Collected: 11/15/20 0129    Specimen: Blood Updated: 11/15/20 0249     Sed Rate 5 mm/hr     HIV-1 & HIV-2 Antibodies [[id number]]  (Normal) Collected: 11/15/20 0128    Specimen: Blood Updated: 11/15/20 0229    Narrative:      The following orders were created for panel order HIV-1 & HIV-2 Antibodies.  Procedure                               Abnormality         Status                     ---------                               -----------         ------                     HIV-1 / O / 2 Ag /  Antib...[[id number]]  Normal              Final result                 Please view results for these tests on the individual orders.    HIV-1 / O / 2 Ag / Antibody 4th Generation [[id number]]  (Normal) Collected: 11/15/20 0128    Specimen: Blood Updated: 11/15/20 0229     HIV-1/ HIV-2 Non-Reactive     Comment: A non-reactive test result does not preclude the possibility of exposure to HIV or infection with HIV. An antibody response to recent exposure may take several months to reach detectable levels.       Narrative:      The HIV antibody/antigen combo assay is a qualitative assay for HIV that includes the p24 antigen as well as antibodies to HIV types 1 and 2. This test is intended to be used as a screening assay in the diagnosis of HIV infection in patients over the age of 2.  Results may be falsely decreased if patient taking Biotin.      Iron Profile [[id number]]  (Abnormal) Collected: 11/15/20 0129    Specimen: Blood Updated: 11/15/20 0218     Iron 18 mcg/dL      Iron Saturation 3 %      Transferrin 354 mg/dL      TIBC 527 mcg/dL     Troponin [[id number]]  (Normal) Collected: 11/15/20 0129    Specimen: Blood Updated: 11/15/20 0218     Troponin T <0.010 ng/mL     Narrative:      Troponin T Reference Range:  <= 0.03 ng/mL-   Negative for AMI  >0.03 ng/mL-     Abnormal for myocardial necrosis.  Clinicians would have to utilize clinical acumen, EKG, Troponin and serial changes to determine if it is an Acute Myocardial Infarction or myocardial injury due to an underlying chronic condition.       Results may be falsely decreased if patient taking Biotin.      BNP [[id number]]  (Abnormal) Collected: 11/15/20 0129    Specimen: Blood Updated: 11/15/20 0211     proBNP 4,378.0 pg/mL     Narrative:      Among patients with dyspnea, NT-proBNP is highly sensitive for the detection of acute congestive heart failure. In addition NT-proBNP of <300 pg/ml effectively rules out acute congestive heart failure with 99% negative  "predictive value.    Results may be falsely decreased if patient taking Biotin.      Osmolality, Urine - Urine, Clean Catch [[id number]]  (Normal) Collected: 11/15/20 0002    Specimen: Urine, Clean Catch Updated: 11/15/20 0027     Osmolality, Urine 481 mOsm/kg     Procalcitonin [[id number]]  (Normal) Collected: 11/14/20 2136    Specimen: Blood Updated: 11/14/20 2321     Procalcitonin 0.14 ng/mL     Narrative:      As a Marker for Sepsis (Non-Neonates):   1. <0.5 ng/mL represents a low risk of severe sepsis and/or septic shock.  1. >2 ng/mL represents a high risk of severe sepsis and/or septic shock.    As a Marker for Lower Respiratory Tract Infections that require antibiotic therapy:  PCT on Admission     Antibiotic Therapy             6-12 Hrs later  > 0.5                Strongly Recommended            >0.25 - <0.5         Recommended  0.1 - 0.25           Discouraged                   Remeasure/reassess PCT  <0.1                 Strongly Discouraged          Remeasure/reassess PCT      As 28 day mortality risk marker: \"Change in Procalcitonin Result\" (> 80 % or <=80 %) if Day 0 (or Day 1) and Day 4 values are available. Refer to http://www.[employer]-pct-calculator.com/   Change in PCT <=80 %   A decrease of PCT levels below or equal to 80 % defines a positive change in PCT test result representing a higher risk for 28-day all-cause mortality of patients diagnosed with severe sepsis or septic shock.  Change in PCT > 80 %   A decrease of PCT levels of more than 80 % defines a negative change in PCT result representing a lower risk for 28-day all-cause mortality of patients diagnosed with severe sepsis or septic shock.                Results may be falsely decreased if patient taking Biotin.     C-reactive Protein [[id number]]  (Abnormal) Collected: 11/14/20 2136    Specimen: Blood Updated: 11/14/20 2311     C-Reactive Protein 4.92 mg/dL     Potassium [[id number]]  (Normal) Collected: 11/14/20 2136    Specimen: Blood " Updated: 11/14/20 2311     Potassium 5.1 mmol/L     Osmolality, Serum [[id number]]  (Normal) Collected: 11/14/20 1919    Specimen: Blood Updated: 11/14/20 2301     Osmolality 298 mOsm/kg     Troponin [[id number]]  (Normal) Collected: 11/14/20 2136    Specimen: Blood Updated: 11/14/20 2209     Troponin T <0.010 ng/mL     Narrative:      Troponin T Reference Range:  <= 0.03 ng/mL-   Negative for AMI  >0.03 ng/mL-     Abnormal for myocardial necrosis.  Clinicians would have to utilize clinical acumen, EKG, Troponin and serial changes to determine if it is an Acute Myocardial Infarction or myocardial injury due to an underlying chronic condition.       Results may be falsely decreased if patient taking Biotin.      Urine Drug Screen - Urine, Clean Catch [[id number]]  (Abnormal) Collected: 11/14/20 1938    Specimen: Urine, Clean Catch Updated: 11/14/20 2050     Amphet/Methamphet, Screen Positive     Barbiturates Screen, Urine Negative     Benzodiazepine Screen, Urine Negative     Cocaine Screen, Urine Negative     Opiate Screen Negative     THC, Screen, Urine Positive     Methadone Screen, Urine Negative     Oxycodone Screen, Urine Negative    Narrative:      Negative Thresholds For Drugs Screened:     Amphetamines               500 ng/ml   Barbiturates               200 ng/ml   Benzodiazepines            100 ng/ml   Cocaine                    300 ng/ml   Methadone                  300 ng/ml   Opiates                    300 ng/ml   Oxycodone                  100 ng/ml   THC                        50 ng/ml    The Normal Value for all drugs tested is negative. This report includes final unconfirmed screening results to be used for medical treatment purposes only. Unconfirmed results must not be used for non-medical purposes such as employment or legal testing. Clinical consideration should be applied to any drug of abuse test, particulary when unconfirmed results are used.  All urine drugs of abuse requests without chain of  custody are for medical screening purposes only.  False positives are possible.      [place] Draw [[id number]] Collected: 11/14/20 1919    Specimen: Blood Updated: 11/14/20 2030    Narrative:      The following orders were created for panel order [place] Draw.  Procedure                               Abnormality         Status                     ---------                               -----------         ------                     Light Blue Top[[id number]]                                   Final result               Green Top (Gel)[[id number]]                                  Final result               Lavender Top[[id number]]                                     Final result               Gold Top - SST[[id number]]                                   Final result                 Please view results for these tests on the individual orders.    Light Blue Top [[id number]] Collected: 11/14/20 1919    Specimen: Blood Updated: 11/14/20 2030     Extra Tube hold for add-on     Comment: Auto resulted       Lavender Top [[id number]] Collected: 11/14/20 1919    Specimen: Blood Updated: 11/14/20 2030     Extra Tube hold for add-on     Comment: Auto resulted       Gold Top - SST [[id number]] Collected: 11/14/20 1919    Specimen: Blood Updated: 11/14/20 2030     Extra Tube Hold for add-ons.     Comment: Auto resulted.       Urinalysis With Microscopic If Indicated (No Culture) - Urine, Clean Catch [[id number]]  (Abnormal) Collected: 11/14/20 1938    Specimen: Urine, Clean Catch Updated: 11/14/20 2011     Color, UA Dark Yellow     Comment: Result checked         Appearance, UA Clear     pH, UA 5.5     Specific Gravity, UA 1.028     Glucose, UA Negative     Ketones, UA Negative     Bilirubin, UA Small (1+)     Comment: Confirmation testing is unavailable.  A serum bilirubin is recommended for further assessment.        Blood, UA Negative     Protein, UA 30 mg/dL (1+)     Leuk Esterase, UA Negative     Nitrite, UA Negative     Urobilinogen,  UA 1.0 E.U./dL    Urinalysis, Microscopic Only - Urine, Clean Catch [[id number]]  (Abnormal) Collected: 11/14/20 1938    Specimen: Urine, Clean Catch Updated: 11/14/20 2011     RBC, UA 0-2 /HPF      WBC, UA 0-2 /HPF      Bacteria, UA None Seen /HPF      Squamous Epithelial Cells, UA 0-2 /HPF      Hyaline Casts, UA 7-12 /LPF      Methodology Manual Light Microscopy    Green Top (Gel) [[id number]] Collected: 11/14/20 1919    Specimen: Blood Updated: 11/14/20 2007    COVID-19, [facility] IN-HOUSE,NP Swab (NO TRANSPORT MEDIA) 2 HR TAT - Swab, Nasopharynx [[id number]]  (Normal) Collected: 11/14/20 1922    Specimen: Swab from Nasopharynx Updated: 11/14/20 2006     COVID19 Not Detected    Narrative:      Fact sheet for providers: https://www.fda.gov/media/019458/download     Fact sheet for patients: https://www.fda.gov/media/932590/download    BNP [[id number]]  (Abnormal) Collected: 11/14/20 1919    Specimen: Blood Updated: 11/14/20 1952     proBNP 5,399.0 pg/mL     Narrative:      Among patients with dyspnea, NT-proBNP is highly sensitive for the detection of acute congestive heart failure. In addition NT-proBNP of <300 pg/ml effectively rules out acute congestive heart failure with 99% negative predictive value.    Results may be falsely decreased if patient taking Biotin.      Protime-INR [[id number]]  (Abnormal) Collected: 11/14/20 1919    Specimen: Blood Updated: 11/14/20 1939     Protime 16.3 Seconds      INR 1.51             Physician Progress Notes (most recent note)      Zara Thibodeaux DO at 11/16/20 1956                [facility] Medicine Services Daily Progress Note      Hospitalist Team  LOS 0 days      Patient Care Team:  Shelly Ribeiro APRN as PCP - General (Nurse Practitioner)    Patient Location: 242/1      Subjective   Subjective     Chief Complaint / Subjective  Chief Complaint   Patient presents with   • Chest Pain       Present on Admission:  • Chest pain  • Essential hypertension  • Drug  "abuse (CMS/[facility])  • CHF (congestive heart failure), NYHA class III, acute on chronic, systolic (CMS/[facility])  • DENNIS (acute kidney injury) (CMS/[facility])  • Hyponatremia  • Hyperkalemia  • Transaminitis  • Anemia      Brief Synopsis of Hospital Course/HPI  Mr. Bethea is a 44 y.o. male with past medical history of hypertension, substance-tobacco/alcohol abuse, heart failure with reduced ejection fraction and anemia who presents to [facility] complaining of chest pain.  Patient reports that he has a 3-day history of constant worsening left-sided chest pain which radiates to his left arm and back.  Pain is described as \"just really hurting\" rated 9/10 at its worst and 8/10 at the time of my exam.  Patient has a history of coronary artery disease underwent cardiac cath at this facility on 3/24/2020 with drug-eluting stent placed in the LAD.  Since that time patient has been minimally compliant with his scheduled medical therapy stating that he currently is taking his Coreg but has not had his Plavix or diuretics in some time.  Some associated orthopnea and PND is noted along with some nausea without vomiting, frequent episodes of tachycardia and palpitations, occasional peripheral edema and a recent subjective fever.  Patient reports that he has relapsed on his substance and alcohol abuse with his most recent use on 11/5/2020 when he states he smoked methamphetamine and drink alcohol.  He denies any history of IV drug use continues to smoke approximately 1 pack of cigarettes per day on average.  Urine output is noted as occurring somewhat less frequently and he does also report some chronic constipation.  Additionally patient notes a previous diagnosis of hepatitis C which he received over 1 year ago and has not been treated for.  He also reports having previous bowel resection.        11/15/20  Continues to report shortness of breath    11/16/20  Patient is complaining of some back pain. Denies any chest pain or sob. " "Reports his le swelling has improved.        Review of Systems   Cardiovascular: Negative for chest pain and palpitations.   Respiratory: Negative for cough and shortness of breath.    Musculoskeletal: Positive for back pain.         Objective   Objective      Vital Signs  Temp:  [97.9 °F (36.6 °C)-99 °F (37.2 °C)] 97.9 °F (36.6 °C)  Heart Rate:  [72-89] 89  Resp:  [16-18] 16  BP: ()/(60-79) 107/75  Oxygen Therapy  SpO2: 97 %  Pulse Oximetry Type: Intermittent  Device (Oxygen Therapy): room air  Flowsheet Rows      First Filed Value   Admission Height  185.4 cm (73\") Documented at 11/14/2020 1752   Admission Weight  86.2 kg (190 lb) Documented at 11/14/2020 1752        Intake & Output (last 3 days)       11/14 0701 - 11/15 0700 11/15 0701 - 11/16 0700 11/16 0701 - 11/17 0700    P.O.  2060 960    Total Intake(mL/kg)  2060 (19.8) 960 (9.2)    Urine (mL/kg/hr) 750 2300 (0.9)     Total Output 750 2300     Net -750 -240 +960           Urine Unmeasured Occurrence   3 x    Stool Unmeasured Occurrence   2 x        Lines, Drains & Airways    Active LDAs     Name:   Placement date:   Placement time:   Site:   Days:    Peripheral IV 06/27/19 1800 Right Antecubital   06/27/19    1800    Antecubital   507    Peripheral IV 11/14/20 1913 Left Hand   11/14/20 1913    Hand   1                  Physical Exam:    Physical Exam  Vitals signs reviewed.   Constitutional:       General: He is not in acute distress.     Appearance: He is obese.   HENT:      Head: Normocephalic and atraumatic.      Mouth/Throat:      Mouth: Mucous membranes are moist.   Cardiovascular:      Rate and Rhythm: Normal rate and regular rhythm.   Pulmonary:      Effort: Pulmonary effort is normal. No respiratory distress.      Breath sounds: No wheezing or rales.   Abdominal:      General: Bowel sounds are normal. There is no distension.      Tenderness: There is no abdominal tenderness. There is no guarding.   Musculoskeletal:      Right lower leg: No " edema.      Left lower leg: No edema.   Skin:     General: Skin is warm and dry.      Findings: No rash.   Neurological:      Mental Status: He is alert.           Procedures:              Results Review:     I reviewed the patient's new clinical results.      Lab Results (last 24 hours)     Procedure Component Value Units Date/Time    Comprehensive Metabolic Panel [[id number]]  (Abnormal) Collected: 11/16/20 0320    Specimen: Blood Updated: 11/16/20 0509     Glucose 129 mg/dL      BUN 27 mg/dL      Creatinine 1.07 mg/dL      Sodium 134 mmol/L      Potassium 4.1 mmol/L      Chloride 98 mmol/L      CO2 27.0 mmol/L      Calcium 7.7 mg/dL      Total Protein 5.5 g/dL      Albumin 2.90 g/dL      ALT (SGPT) 393 U/L      AST (SGOT) 176 U/L      Alkaline Phosphatase 145 U/L      Total Bilirubin 0.4 mg/dL      eGFR Non African Amer 75 mL/min/1.73      Globulin 2.6 gm/dL      A/G Ratio 1.1 g/dL      BUN/Creatinine Ratio 25.2     Anion Gap 9.0 mmol/L     Narrative:      GFR Normal >60  Chronic Kidney Disease <60  Kidney Failure <15      Magnesium [[id number]]  (Normal) Collected: 11/16/20 0320    Specimen: Blood Updated: 11/16/20 0509     Magnesium 2.1 mg/dL     CBC & Differential [[id number]]  (Abnormal) Collected: 11/16/20 0320    Specimen: Blood Updated: 11/16/20 0445    Narrative:      The following orders were created for panel order CBC & Differential.  Procedure                               Abnormality         Status                     ---------                               -----------         ------                     CBC Auto Differential[[id number]]        Abnormal            Final result                 Please view results for these tests on the individual orders.    CBC Auto Differential [[id number]]  (Abnormal) Collected: 11/16/20 0320    Specimen: Blood Updated: 11/16/20 0445     WBC 10.20 10*3/mm3      RBC 4.29 10*6/mm3      Hemoglobin 10.4 g/dL      Hematocrit 33.0 %      MCV 76.9 fL      MCH 24.3 pg      MCHC  31.5 g/dL      RDW 17.2 %      RDW-SD 46.4 fl      MPV 7.8 fL      Platelets 282 10*3/mm3      Neutrophil % 60.4 %      Lymphocyte % 22.3 %      Monocyte % 15.6 %      Eosinophil % 1.2 %      Basophil % 0.5 %      Neutrophils, Absolute 6.10 10*3/mm3      Lymphocytes, Absolute 2.30 10*3/mm3      Monocytes, Absolute 1.60 10*3/mm3      Eosinophils, Absolute 0.10 10*3/mm3      Basophils, Absolute 0.10 10*3/mm3      nRBC 1.0 /100 WBC     Blood Culture - Blood, Arm, Right [[id number]] Collected: 11/15/20 0127    Specimen: Blood from Arm, Right Updated: 11/16/20 0145     Blood Culture No growth at 24 hours    Blood Culture - Blood, Arm, Left [[id number]] Collected: 11/15/20 0128    Specimen: Blood from Arm, Left Updated: 11/16/20 0145     Blood Culture No growth at 24 hours        No results found for: HGBA1C  Results from last 7 days   Lab Units 11/14/20 1919   INR  1.51*               Microbiology Results (last 10 days)     Procedure Component Value - Date/Time    Blood Culture - Blood, Arm, Left [[id number]] Collected: 11/15/20 0128    Lab Status: Preliminary result Specimen: Blood from Arm, Left Updated: 11/16/20 0145     Blood Culture No growth at 24 hours    Blood Culture - Blood, Arm, Right [[id number]] Collected: 11/15/20 0127    Lab Status: Preliminary result Specimen: Blood from Arm, Right Updated: 11/16/20 0145     Blood Culture No growth at 24 hours    COVID-19, [facility] IN-HOUSE,NP Swab (NO TRANSPORT MEDIA) 2 HR TAT - Swab, Nasopharynx [[id number]]  (Normal) Collected: 11/14/20 1922    Lab Status: Final result Specimen: Swab from Nasopharynx Updated: 11/14/20 2006     COVID19 Not Detected    Narrative:      Fact sheet for providers: https://www.fda.gov/media/992950/download     Fact sheet for patients: https://www.fda.gov/media/564397/download          ECG/EMG Results (most recent)     Procedure Component Value Units Date/Time    ECG 12 Lead [[id number]] Collected: 11/14/20 2356     Updated: 11/14/20 4954     QT  Interval 372 ms     Narrative:      HEART RATE= 97  bpm  RR Interval= 616  ms  VT Interval= 186  ms  P Horizontal Axis=   deg  P Front Axis= 66  deg  QRSD Interval= 96  ms  QT Interval= 372  ms  QRS Axis= -14  deg  T Wave Axis= -88  deg  - ABNORMAL ECG -  Sinus rhythm  Probable left atrial enlargement  Left ventricular hypertrophy  Anterior infarct, old  Nonspecific T abnormalities, lateral leads  When compared with ECG of 14-Nov-2020 18:34:37,  Significant repolarization change  Significant axis, voltage or hypertrophy change  Electronically Signed By:   Date and Time of Study: 2020-11-14 23:56:25    ECG 12 Lead [[id number]] Collected: 11/14/20 2356     Updated: 11/15/20 0511     QT Interval 372 ms     Narrative:      HEART RATE= 97  bpm  RR Interval= 616  ms  VT Interval= 186  ms  P Horizontal Axis=   deg  P Front Axis= 66  deg  QRSD Interval= 96  ms  QT Interval= 372  ms  QRS Axis= -14  deg  T Wave Axis= -88  deg  - ABNORMAL ECG -  Sinus rhythm  Probable left atrial enlargement  Left ventricular hypertrophy  Anterior infarct, old  Nonspecific T abnormalities, lateral leads  When compared with ECG of 14-Nov-2020 18:34:37,  Significant repolarization change  Significant axis, voltage or hypertrophy change  Electronically Signed By:   Date and Time of Study: 2020-11-14 23:56:25    ECG 12 Lead [[id number]] Collected: 11/14/20 1834     Updated: 11/15/20 0539     QT Interval 421 ms     Narrative:      HEART RATE= 95  bpm  RR Interval= 636  ms  VT Interval= 191  ms  P Horizontal Axis= -21  deg  P Front Axis= 43  deg  QRSD Interval= 92  ms  QT Interval= 421  ms  QRS Axis= 194  deg  T Wave Axis= -60  deg  - ABNORMAL ECG -  Sinus rhythm  LAE, consider biatrial enlargement  Probable RVH w/ secondary repol abnormality  Inferior infarct, age indeterminate  Borderline ST elevation, anterior leads  Prolonged QT interval  When compared with ECG of 24-Mar-2020 10:07:43,  New or worsened ischemia or infarction  Significant  repolarization change  Significant axis, voltage or hypertrophy change  Electronically Signed By:   Date and Time of Study: 2020-11-14 18:34:37    Adult Transthoracic Echo Complete W/ Cont if Necessary Per Protocol [[id number]] Collected: 11/15/20 0936     Updated: 11/16/20 0949     BSA 2.3 m^2      RVIDd 3.5 cm      IVSd 1.3 cm      LVIDd 7.0 cm      LVIDs 6.6 cm      LVPWd 0.74 cm      IVS/LVPW 1.7     FS 4.9 %      EDV(Teich) 254.1 ml      ESV(Teich) 226.7 ml      EF(Teich) 10.8 %      EDV(cubed) 340.6 ml      ESV(cubed) 292.8 ml      EF(cubed) 14.0 %      LV mass(C)d 323.6 grams      LV mass(C)dI 143.3 grams/m^2      SV(Teich) 27.4 ml      SI(Teich) 12.1 ml/m^2      SV(cubed) 47.8 ml      SI(cubed) 21.2 ml/m^2      Ao root diam 3.8 cm      Ao root area 11.0 cm^2      ACS 2.5 cm      asc Aorta Diam 2.6 cm      LVOT diam 2.3 cm      LVOT area 4.3 cm^2      RVOT diam 2.7 cm      RVOT area 5.7 cm^2      EDV(MOD-sp4) 205.2 ml      ESV(MOD-sp4) 180.8 ml      EF(MOD-sp4) 11.9 %      SV(MOD-sp4) 24.5 ml      SI(MOD-sp4) 10.8 ml/m^2      Ao root area (BSA corrected) 1.7     LV Sims Vol (BSA corrected) 90.9 ml/m^2      LV Sys Vol (BSA corrected) 80.1 ml/m^2      Aortic R-R 0.65 sec      Aortic HR 92.7 BPM      MV E max anthony 84.0 cm/sec      MV A max anthony 64.1 cm/sec      MV E/A 1.3     MV V2 max 99.3 cm/sec      MV max PG 3.9 mmHg      MV V2 mean 55.1 cm/sec      MV mean PG 1.5 mmHg      MV V2 VTI 11.7 cm      MVA(VTI) 4.4 cm^2      MV dec slope 997.9 cm/sec^2      MV dec time 0.08 sec      Ao pk anthony 90.4 cm/sec      Ao max PG 3.3 mmHg      Ao max PG (full) 0.51 mmHg      Ao V2 mean 63.6 cm/sec      Ao mean PG 1.8 mmHg      Ao mean PG (full) 0.55 mmHg      Ao V2 VTI 14.8 cm      MAXIM(I,A) 3.5 cm^2      MAXIM(I,D) 3.5 cm^2      MAXIM(V,A) 3.9 cm^2      MAXIM(V,D) 3.9 cm^2      LV V1 max PG 2.8 mmHg      LV V1 mean PG 1.3 mmHg      LV V1 max 83.1 cm/sec      LV V1 mean 50.7 cm/sec      LV V1 VTI 12.2 cm      CO(Ao) 15.1 l/min       CI(Ao) 6.7 l/min/m^2      SV(Ao) 163.2 ml      SI(Ao) 72.3 ml/m^2      CO(LVOT) 4.8 l/min      CI(LVOT) 2.1 l/min/m^2      SV(LVOT) 51.9 ml      SV(RVOT) 55.4 ml      SI(LVOT) 23.0 ml/m^2      PA V2 max 66.6 cm/sec      PA max PG 1.8 mmHg      PA max PG (full) 0.02 mmHg      PA V2 mean 43.8 cm/sec      PA mean PG 0.92 mmHg      PA mean PG (full) 0.19 mmHg      PA V2 VTI 9.2 cm      PVA(I,A) 6.0 cm^2       CV ECHO PETER - PVA(I,D) 6.0 cm^2       CV ECHO PETER - PVA(V,A) 5.7 cm^2       CV ECHO PETER - PVA(V,D) 5.7 cm^2      PA acc time 0.09 sec      RV V1 max PG 1.8 mmHg      RV V1 mean PG 0.72 mmHg      RV V1 max 66.3 cm/sec      RV V1 mean 37.5 cm/sec      RV V1 VTI 9.7 cm      TR max osmar 249.8 cm/sec      RVSP(TR) 28.0 mmHg      RAP systole 3.0 mmHg      PA pr(Accel) 39.6 mmHg      Pulm Sys Osmar 35.4 cm/sec      Pulm Sims Osmar 65.2 cm/sec      Pulm S/D 0.54     Qp/Qs 1.1      CV ECHO PETER - BZI_BMI 29.6 kilograms/m^2       CV ECHO PETER - BSA(HAYCOCK) 2.3 m^2       CV ECHO PETER - BZI_METRIC_WEIGHT 101.6 kg       CV ECHO PETER - BZI_METRIC_HEIGHT 185.4 cm      EF(MOD-bp) 12.0 %      LA dimension(2D) 5.3 cm     Narrative:      · Left ventricular systolic function is severely decreased.  · Left ventricular ejection fraction is 10 to 15%  · Akinetic septum, inferior wall and apex is noted  · The left ventricular cavity is mildly dilated.  · Left ventricular diastolic function was normal.  · Mildly reduced right ventricular systolic function noted.  · Mild mitral valve regurgitation is present  · Mild tricuspid valve regurgitation is present. Calculated right   ventricular systolic pressure from tricuspid regurgitation is 28.0 mmHg.                  Results for orders placed during the hospital encounter of 11/14/20   Adult Transthoracic Echo Complete W/ Cont if Necessary Per Protocol    Narrative · Left ventricular systolic function is severely decreased.  · Left ventricular ejection fraction is 10 to 15%  ·  Akinetic septum, inferior wall and apex is noted  · The left ventricular cavity is mildly dilated.  · Left ventricular diastolic function was normal.  · Mildly reduced right ventricular systolic function noted.  · Mild mitral valve regurgitation is present  · Mild tricuspid valve regurgitation is present. Calculated right   ventricular systolic pressure from tricuspid regurgitation is 28.0 mmHg.          Us Liver    Result Date: 11/15/2020  Incidental right pleural effusion. Unremarkable images of the liver.  Electronically Signed By-Ky Yusuf MD On:11/15/2020 9:24 AM This report was finalized on [id number] by  Ky Yusuf MD.    Xr Chest 1 View    Result Date: 11/14/2020  Cardiomegaly without active disease  Electronically Signed By-Ky Yusuf MD On:11/14/2020 7:41 PM This report was finalized on [id number] by  Ky Yusuf MD.      Xrays, labs reviewed personally by physician.    Medication Review:   I have reviewed the patient's current medication list      Scheduled Meds  aspirin, 81 mg, Oral, Daily  carvedilol, 12.5 mg, Oral, BID With Meals  clopidogrel, 75 mg, Oral, Daily  enoxaparin, 40 mg, Subcutaneous, Q24H  ferrous sulfate, 324 mg, Oral, Daily With Breakfast  furosemide, 40 mg, Intravenous, BID  isosorbide mononitrate, 30 mg, Oral, Q24H  lisinopril, 5 mg, Oral, Q24H  QUEtiapine, 50 mg, Oral, Nightly  sodium chloride, 10 mL, Intravenous, Q12H  traZODone, 100 mg, Oral, Nightly        Meds Infusions       Meds PRN  Calcium Gluconate-NaCl **AND** calcium gluconate **AND** Calcium, Ionized  •  docusate sodium  •  hydrOXYzine  •  influenza vaccine  •  LORazepam **OR** LORazepam **OR** LORazepam **OR** LORazepam **OR** LORazepam **OR** LORazepam  •  magnesium sulfate **OR** magnesium sulfate **OR** magnesium sulfate  •  melatonin  •  nitroglycerin  •  ondansetron **OR** ondansetron  •  potassium & sodium phosphates **OR** potassium & sodium phosphates  •  potassium chloride  •  potassium chloride  •   sodium chloride  •  sodium chloride      Assessment/Plan   Assessment/Plan     Active Hospital Problems    Diagnosis  POA   • Chest pain [R07.9]  Yes   • DENNIS (acute kidney injury) (CMS/[facility]) [N17.9]  Yes   • Hyperkalemia [E87.5]  Yes   • Transaminitis [R74.01]  Yes   • Anemia [D64.9]  Yes   • Drug abuse (CMS/[facility]) [F19.10]  Yes   • CHF (congestive heart failure), NYHA class III, acute on chronic, systolic (CMS/[facility]) [I50.23]  Yes   • Hyponatremia [E87.1]  Yes   • Essential hypertension [I10]  Yes      Resolved Hospital Problems   No resolved problems to display.         Chest pain  -Troponin: Less than 0.010, trend  -Chest x-ray shows cardiomegaly without evidence of active disease.  -EKG reported by ED provider as showing no acute ST changes, attempting to locate original copy for review  -Patient awaiting cardiac cath on 3/24/2020 with placement of one drug-eluting stent in the LAD at that time.  -In the ED patient given aspirin nitroglycerin  -N.p.o. after midnight  -Cardiology consulted    Ischemic dilated cardiomyopathy, ACUTE HFrEF  - echo reviewed   - needs cardi      DENNIS  - Creatine: 1.33, BUN: Thirty-seven, BUN/creatinine ratio: 27.8  - Hold lisinopril  -Avoid nephrotoxic medication and IV dye unless urgently needed  -Monitor BMP, I's and O's while admitted  -Patient may benefit from nephrology consult especially if kidney function continues to worsen if patient continues to require diuresis     Hyperkalemia  -Potassium: 5.3  -EKG pending review  -Kayexalate ordered  -Continue cardiac monitoring  -Monitor while admitted     Hyponatremia  -Sodium: 128  -Check urine and serum osmolality  -Hold Zoloft  -Monitor while admitted     Transaminitis  -ALT: S642 AST: 480 with an alk phos of of one ninety-seven and an albumin of 3.00 (levels were noted as normal on 3/22/2020)  - HIV Negative  - Hepatitis panel negative  - liver us unremarkable     Leukocytosis  -WBCs: 14.60 with an increased absolute neutrophil and  monocyte count noted on differential  -Check blood cultures, lactic acid, procalcitonin, CRP and sed rate  -Monitor while admitted     Anemia  -Hemoglobin: 12.2 with a decreased MCV and MCHC  -Continue ferrous sulfate  -Check iron profile as above  -Monitor while admitted    CAD  -Continue Plavix and beta-blocker (patient has been noncompliant with outpatient Plavix therapy but states he has been taking his Coreg)  -Continue cardiac monitoring     Hypertension  -Poorly controlled with a blood pressure on admission of 166/86 (pressure has normalized following reduction in anxiety as well as nitroglycerin administration in ED)  - Continue Coreg  -Hold lisinopril secondary to DENNIS and hyperkalemia  - Monitor while admitted     Depression/anxiety  -Continue Zoloft, trazodone and Seroquel     Drug abuse  -Patient reports methamphetamine use which she reports he smoked  Verona Magaña, RN   Registered Nurse      Plan of Care   Signed   Date of Service:  11/17/20 0324   Creation Time:  11/17/20 0324            Signed             Show:Clear all  []Manual[x]Template[]Copied    Added by:  [x]Verona Magaña, RN    []Ever for details  Goal Outcome Evaluation:  Plan of Care Reviewed With: patient  Progress: no change  Outcome Summary: Patient has not complained of any chest pain this shift. Gave meds for CIWA. Patient has slept some this shift. Been eating ice cream and sherbert this shift. Will continue to monitor.               approximately 9 days prior.  He denies any history of IV drug use  -Encourage cessation especially light of patient's comorbid conditions     Alcohol abuse  -Patient reports he generally drinks approximately 1 pint of liquor per day though he has had nothing to drink for the past 9 days  -Encourage cessation especially light of patient's comorbid conditions  -CIWA protocol ordered    DVT Prophylaxis  - SCDs    Patient is high risk         Code Status -   Code Status and Medical Interventions:   Ordered  at: 11/14/20 2158     Code Status:    CPR     Medical Interventions (Level of Support Prior to Arrest):    Full       Discharge Planning  Defer, patient is homeless, discussed with case management, pt is interested in rehab           Electronically signed by Zara Thibodeaux DO, 11/16/20, 19:57 EST.  [facility], [place], [facility]          Electronically signed by Zara Thibodeaux DO at 11/16/20 2003       Jacqueline Chase LPN   Licensed Nurse      Plan of Care   Signed   Date of Service:  11/16/20 0334   Creation Time:  11/16/20 0334            Signed             Show:Clear all  []Manual[x]Template[]Copied    Added by:  [x]Jacqueline Chase LPN    []Ever for details  Goal Outcome Evaluation:  Plan of Care Reviewed With: patient  Progress: no change  Outcome Summary: Pt slept well on and off through the night, no c/o pain or discomfort at this time. Cardio on board at this time. Will continue to monitor.

## 2020-11-17 NOTE — PROGRESS NOTES
Continued Stay Note   [facility]     Patient Name: Asad Bethea  MRN: [id number]  Today's Date: 11/17/2020    Admit Date: 11/14/2020    Discharge Plan     Row Name 11/17/20 1214       Plan    Plan  Referral to [facility] for inpt drug rehab- pending acceptance. See  note for details.    Plan Comments  Wagner MEYER working on inpt rehab placement at [facility] for drug rehab, see  note for details. DC barriers: IV Lasix, cardiology following              Macrina Graf RN

## 2020-11-17 NOTE — PROGRESS NOTES
LOS: 0 days   Admiting Physician- Zara Thibodeaux DO    Reason For Followup:    Congestive heart failure acute on chronic systolic  Dilated cardiomyopathy ischemic  CAD  S/p LAD stenting  Noncompliance  Chest pain    Subjective     Patient denies any chest pain today.  He is feeling slightly better.  Shortness of breath is improved    Objective     No obvious dyspnea    Review of Systems:   Review of Systems   Constitution: Negative for chills and fever.   HENT: Negative for ear discharge and nosebleeds.    Eyes: Negative for discharge and redness.   Cardiovascular: Negative for chest pain, orthopnea, palpitations, paroxysmal nocturnal dyspnea and syncope.   Respiratory: Positive for shortness of breath. Negative for cough and wheezing.    Endocrine: Negative for heat intolerance.   Skin: Negative for rash.   Musculoskeletal: Negative for arthritis and myalgias.   Gastrointestinal: Negative for abdominal pain, melena, nausea and vomiting.   Genitourinary: Negative for dysuria and hematuria.   Neurological: Negative for dizziness, light-headedness, numbness and tremors.   Psychiatric/Behavioral: Negative for depression. The patient is not nervous/anxious.          Vital Signs  Vitals:    11/16/20 1917 11/17/20 0310 11/17/20 0845 11/17/20 1118   BP: 107/75 105/73 133/50 105/75   BP Location: Left arm Right arm  Right arm   Patient Position: Lying Sitting     Pulse: 89 90  89   Resp: 16 14  18   Temp: 97.9 °F (36.6 °C) 98.9 °F (37.2 °C)  98.9 °F (37.2 °C)   TempSrc: Axillary Oral  Oral   SpO2: 97% 98%  95%   Weight:  106 kg (232 lb 12.9 oz)     Height:         Wt Readings from Last 1 Encounters:   11/17/20 106 kg (232 lb 12.9 oz)       Intake/Output Summary (Last 24 hours) at 11/17/2020 1500  Last data filed at 11/17/2020 1454  Gross per 24 hour   Intake 1192 ml   Output --   Net 1192 ml     Physical Exam:  Constitutional:       Appearance: Well-developed.   Eyes:      General: No scleral icterus.        Right  eye: No discharge.   HENT:      Head: Normocephalic and atraumatic.   Neck:      Thyroid: No thyromegaly.      Lymphadenopathy: No cervical adenopathy.   Pulmonary:      Effort: Pulmonary effort is normal. No respiratory distress.      Breath sounds: Normal breath sounds. No wheezing. No rales.   Cardiovascular:      Normal rate. Regular rhythm.      No gallop.   Edema:     Peripheral edema present.  Abdominal:      Tenderness: There is no abdominal tenderness.   Skin:     Findings: No erythema or rash.   Neurological:      Mental Status: Alert and oriented to person, place, and time.         Results Review:   Lab Results (last 24 hours)     Procedure Component Value Units Date/Time    Extra Tubes [[id number]] Collected: 11/17/20 0842    Specimen: Blood, Venous Line Updated: 11/17/20 0945    Narrative:      The following orders were created for panel order Extra Tubes.  Procedure                               Abnormality         Status                     ---------                               -----------         ------                     Lavender Top[[id number]]                                     Final result                 Please view results for these tests on the individual orders.    Lavender Top [[id number]] Collected: 11/17/20 0842    Specimen: Blood Updated: 11/17/20 0945     Extra Tube hold for add-on     Comment: Auto resulted       Comprehensive Metabolic Panel [[id number]]  (Abnormal) Collected: 11/17/20 0842    Specimen: Blood Updated: 11/17/20 0944     Glucose 101 mg/dL      BUN 20 mg/dL      Creatinine 0.98 mg/dL      Sodium 134 mmol/L      Potassium 4.4 mmol/L      Chloride 100 mmol/L      CO2 27.0 mmol/L      Calcium 8.2 mg/dL      Total Protein 5.9 g/dL      Albumin 3.10 g/dL      ALT (SGPT) 279 U/L      AST (SGOT) 86 U/L      Alkaline Phosphatase 140 U/L      Total Bilirubin 0.4 mg/dL      eGFR Non African Amer 83 mL/min/1.73      Globulin 2.8 gm/dL      A/G Ratio 1.1 g/dL      BUN/Creatinine  Ratio 20.4     Anion Gap 7.0 mmol/L     Narrative:      GFR Normal >60  Chronic Kidney Disease <60  Kidney Failure <15      Hepatitis Diagnostic Profile [[id number]] Collected: 11/15/20 0128    Specimen: Blood Updated: 11/17/20 0814     Hep A IgM Negative     Hepatitis B Surface Ag Negative     Hep B Core IgM Negative    Narrative:      Performed at:  [facility]  [id number]  : Emory Mayfield PhD, Phone:  [phone]    Magnesium [[id number]]  (Normal) Collected: 11/17/20 0535    Specimen: Blood Updated: 11/17/20 0615     Magnesium 1.8 mg/dL     CBC & Differential [[id number]]  (Abnormal) Collected: 11/17/20 0535    Specimen: Blood Updated: 11/17/20 0552    Narrative:      The following orders were created for panel order CBC & Differential.  Procedure                               Abnormality         Status                     ---------                               -----------         ------                     CBC Auto Differential[[id number]]        Abnormal            Final result                 Please view results for these tests on the individual orders.    CBC Auto Differential [[id number]]  (Abnormal) Collected: 11/17/20 0535    Specimen: Blood Updated: 11/17/20 0552     WBC 9.70 10*3/mm3      RBC 4.52 10*6/mm3      Hemoglobin 10.8 g/dL      Hematocrit 34.3 %      MCV 75.9 fL      MCH 23.8 pg      MCHC 31.4 g/dL      RDW 17.2 %      RDW-SD 45.5 fl      MPV 7.8 fL      Platelets 242 10*3/mm3      Neutrophil % 60.6 %      Lymphocyte % 21.0 %      Monocyte % 16.4 %      Eosinophil % 0.9 %      Basophil % 1.1 %      Neutrophils, Absolute 5.80 10*3/mm3      Lymphocytes, Absolute 2.00 10*3/mm3      Monocytes, Absolute 1.60 10*3/mm3      Eosinophils, Absolute 0.10 10*3/mm3      Basophils, Absolute 0.10 10*3/mm3      nRBC 0.5 /100 WBC     Blood Culture - Blood, Arm, Left [[id number]] Collected: 11/15/20 0128    Specimen: Blood from Arm, Left Updated: 11/17/20 0149      Blood Culture No growth at 2 days    Blood Culture - Blood, Arm, Right [[id number]] Collected: 11/15/20 0127    Specimen: Blood from Arm, Right Updated: 11/17/20 0145     Blood Culture No growth at 2 days    Hepatitis Panel, Acute [[id number]]  (Abnormal) Collected: 11/16/20 2011    Specimen: Blood Updated: 11/16/20 2120     Hepatitis B Surface Ag Non-Reactive     Hep A IgM Non-Reactive     Hep B C IgM Non-Reactive     Hepatitis C Ab Reactive    Narrative:      Results may be falsely decreased if patient taking Biotin.         Imaging Results (Last 72 Hours)     Procedure Component Value Units Date/Time    US Liver [[id number]] Collected: 11/15/20 0921     Updated: 11/15/20 0928    Narrative:      US LIVER-     Date of Exam: 11/15/2020 8:14 AM     Indication: New transaminitis; R07.9-Chest pain, unspecified.     Comparison: None available.     Technique: Transverse and sagittal ultrasound images of the right upper  quadrant were obtained. Doppler evaluation was also conducted.     FINDINGS:        The liver is homogeneous. There are no discrete liver masses. Portal  venous and hepatic venous flows are normal. The common duct is 5 mm.  There is an incidental right pleural effusion.       Impression:      Incidental right pleural effusion. Unremarkable images of the liver.     Electronically Signed By-Ky Yusuf MD On:11/15/2020 9:24 AM  This report was finalized on [id number] by  Ky Yusuf MD.    XR Chest 1 View [[id number]] Collected: 11/14/20 1939     Updated: 11/14/20 1958    Narrative:      Examination: XR CHEST 1 VW-     Date of Exam: 11/14/2020 5:20 PM     Indication: Chest pain protocol.     Comparison: 08/02/2019     Technique: 1 view of the chest      Findings:  The heart is enlarged. The pulmonary vascular markings are normal. There  are no focal infiltrates. The osseous structures are normal.          Impression:      Cardiomegaly without active disease     Electronically Signed By-Ky Yusuf MD  On:11/14/2020 7:41 PM  This report was finalized on [id number] by  Ky Yusuf MD.        ECG/EMG Results (most recent)     Procedure Component Value Units Date/Time    ECG 12 Lead [[id number]] Collected: 11/14/20 2356     Updated: 11/14/20 2357     QT Interval 372 ms     Narrative:      HEART RATE= 97  bpm  RR Interval= 616  ms  W[place] Interval= 186  ms  P Horizontal Axis=   deg  P Front Axis= 66  deg  QRSD Interval= 96  ms  QT Interval= 372  ms  QRS Axis= -14  deg  T Wave Axis= -88  deg  - ABNORMAL ECG -  Sinus rhythm  Probable left atrial enlargement  Left ventricular hypertrophy  Anterior infarct, old  Nonspecific T abnormalities, lateral leads  When compared with ECG of 14-Nov-2020 18:34:37,  Significant repolarization change  Significant axis, voltage or hypertrophy change  Electronically Signed By:   Date and Time of Study: 2020-11-14 23:56:25    ECG 12 Lead [[id number]] Collected: 11/14/20 2356     Updated: 11/15/20 0511     QT Interval 372 ms     Narrative:      HEART RATE= 97  bpm  RR Interval= 616  ms  W[place] Interval= 186  ms  P Horizontal Axis=   deg  P Front Axis= 66  deg  QRSD Interval= 96  ms  QT Interval= 372  ms  QRS Axis= -14  deg  T Wave Axis= -88  deg  - ABNORMAL ECG -  Sinus rhythm  Probable left atrial enlargement  Left ventricular hypertrophy  Anterior infarct, old  Nonspecific T abnormalities, lateral leads  When compared with ECG of 14-Nov-2020 18:34:37,  Significant repolarization change  Significant axis, voltage or hypertrophy change  Electronically Signed By:   Date and Time of Study: 2020-11-14 23:56:25    ECG 12 Lead [[id number]] Collected: 11/14/20 1834     Updated: 11/15/20 0539     QT Interval 421 ms     Narrative:      HEART RATE= 95  bpm  RR Interval= 636  ms  WY Interval= 191  ms  P Horizontal Axis= -21  deg  P Front Axis= 43  deg  QRSD Interval= 92  ms  QT Interval= 421  ms  QRS Axis= 194  deg  T Wave Axis= -60  deg  - ABNORMAL ECG -  Sinus rhythm  LAE, consider biatrial  enlargement  Probable RVH w/ secondary repol abnormality  Inferior infarct, age indeterminate  Borderline ST elevation, anterior leads  Prolonged QT interval  When compared with ECG of 24-Mar-2020 10:07:43,  New or worsened ischemia or infarction  Significant repolarization change  Significant axis, voltage or hypertrophy change  Electronically Signed By:   Date and Time of Study: 2020-11-14 18:34:37    Adult Transthoracic Echo Complete W/ Cont if Necessary Per Protocol [[id number]] Collected: 11/15/20 0936     Updated: 11/16/20 0949     BSA 2.3 m^2      RVIDd 3.5 cm      IVSd 1.3 cm      LVIDd 7.0 cm      LVIDs 6.6 cm      LVPWd 0.74 cm      IVS/LVPW 1.7     FS 4.9 %      EDV(Teich) 254.1 ml      ESV(Teich) 226.7 ml      EF(Teich) 10.8 %      EDV(cubed) 340.6 ml      ESV(cubed) 292.8 ml      EF(cubed) 14.0 %      LV mass(C)d 323.6 grams      LV mass(C)dI 143.3 grams/m^2      SV(Teich) 27.4 ml      SI(Teich) 12.1 ml/m^2      SV(cubed) 47.8 ml      SI(cubed) 21.2 ml/m^2      Ao root diam 3.8 cm      Ao root area 11.0 cm^2      ACS 2.5 cm      asc Aorta Diam 2.6 cm      LVOT diam 2.3 cm      LVOT area 4.3 cm^2      RVOT diam 2.7 cm      RVOT area 5.7 cm^2      EDV(MOD-sp4) 205.2 ml      ESV(MOD-sp4) 180.8 ml      EF(MOD-sp4) 11.9 %      SV(MOD-sp4) 24.5 ml      SI(MOD-sp4) 10.8 ml/m^2      Ao root area (BSA corrected) 1.7     LV Sims Vol (BSA corrected) 90.9 ml/m^2      LV Sys Vol (BSA corrected) 80.1 ml/m^2      Aortic R-R 0.65 sec      Aortic HR 92.7 BPM      MV E max anthony 84.0 cm/sec      MV A max anthony 64.1 cm/sec      MV E/A 1.3     MV V2 max 99.3 cm/sec      MV max PG 3.9 mmHg      MV V2 mean 55.1 cm/sec      MV mean PG 1.5 mmHg      MV V2 VTI 11.7 cm      MVA(VTI) 4.4 cm^2      MV dec slope 997.9 cm/sec^2      MV dec time 0.08 sec      Ao pk anthony 90.4 cm/sec      Ao max PG 3.3 mmHg      Ao max PG (full) 0.51 mmHg      Ao V2 mean 63.6 cm/sec      Ao mean PG 1.8 mmHg      Ao mean PG (full) 0.55 mmHg      Ao V2 VTI  14.8 cm      MAXIM(I,A) 3.5 cm^2      MAXIM(I,D) 3.5 cm^2      MAXIM(V,A) 3.9 cm^2      MAXIM(V,D) 3.9 cm^2      LV V1 max PG 2.8 mmHg      LV V1 mean PG 1.3 mmHg      LV V1 max 83.1 cm/sec      LV V1 mean 50.7 cm/sec      LV V1 VTI 12.2 cm      CO(Ao) 15.1 l/min      CI(Ao) 6.7 l/min/m^2      SV(Ao) 163.2 ml      SI(Ao) 72.3 ml/m^2      CO(LVOT) 4.8 l/min      CI(LVOT) 2.1 l/min/m^2      SV(LVOT) 51.9 ml      SV(RVOT) 55.4 ml      SI(LVOT) 23.0 ml/m^2      PA V2 max 66.6 cm/sec      PA max PG 1.8 mmHg      PA max PG (full) 0.02 mmHg      PA V2 mean 43.8 cm/sec      PA mean PG 0.92 mmHg      PA mean PG (full) 0.19 mmHg      PA V2 VTI 9.2 cm      PVA(I,A) 6.0 cm^2       CV ECHO PETER - PVA(I,D) 6.0 cm^2       CV ECHO PETER - PVA(V,A) 5.7 cm^2       CV ECHO PETER - PVA(V,D) 5.7 cm^2      PA acc time 0.09 sec      RV V1 max PG 1.8 mmHg      RV V1 mean PG 0.72 mmHg      RV V1 max 66.3 cm/sec      RV V1 mean 37.5 cm/sec      RV V1 VTI 9.7 cm      TR max osmar 249.8 cm/sec      RVSP(TR) 28.0 mmHg      RAP systole 3.0 mmHg      PA pr(Accel) 39.6 mmHg      Pulm Sys Osmar 35.4 cm/sec      Pulm Sims Osmar 65.2 cm/sec      Pulm S/D 0.54     Qp/Qs 1.1      CV ECHO PETER - BZI_BMI 29.6 kilograms/m^2       CV ECHO PETER - BSA([facility]) 2.3 m^2       CV ECHO PETER - BZI_METRIC_WEIGHT 101.6 kg       CV ECHO PETER - BZI_METRIC_HEIGHT 185.4 cm      EF(MOD-bp) 12.0 %      LA dimension(2D) 5.3 cm     Narrative:      · Left ventricular systolic function is severely decreased.  · Left ventricular ejection fraction is 10 to 15%  · Akinetic septum, inferior wall and apex is noted  · The left ventricular cavity is mildly dilated.  · Left ventricular diastolic function was normal.  · Mildly reduced right ventricular systolic function noted.  · Mild mitral valve regurgitation is present  · Mild tricuspid valve regurgitation is present. Calculated right   ventricular systolic pressure from tricuspid regurgitation is 28.0 mmHg.           CBC    Results  from last 7 days   Lab Units 11/17/20  0535 11/16/20  0320 11/15/20  0129 11/14/20  1919   WBC 10*3/mm3 9.70 10.20 12.20* 14.60*   HEMOGLOBIN g/dL 10.8* 10.4* 10.9* 12.2*   PLATELETS 10*3/mm3 242 282 273 375     BMP   Results from last 7 days   Lab Units 11/17/20  0842 11/17/20  0535 11/16/20  0320 11/15/20  0129 11/14/20  2136 11/14/20  1919   SODIUM mmol/L 134*  --  134* 133*  --  128*   POTASSIUM mmol/L 4.4  --  4.1 4.7 5.1 5.3*   CHLORIDE mmol/L 100  --  98 100  --  97*   CO2 mmol/L 27.0  --  27.0 21.0*  --  18.0*   BUN mg/dL 20  --  27* 39*  --  37*   CREATININE mg/dL 0.98  --  1.07 1.26  --  1.33*   GLUCOSE mg/dL 101*  --  129* 190*  --  122*   MAGNESIUM mg/dL  --  1.8 2.1 1.8  --   --      CMP   Results from last 7 days   Lab Units 11/17/20  0842 11/16/20  0320 11/15/20  0129 11/14/20  2136 11/14/20  1919   SODIUM mmol/L 134* 134* 133*  --  128*   POTASSIUM mmol/L 4.4 4.1 4.7 5.1 5.3*   CHLORIDE mmol/L 100 98 100  --  97*   CO2 mmol/L 27.0 27.0 21.0*  --  18.0*   BUN mg/dL 20 27* 39*  --  37*   CREATININE mg/dL 0.98 1.07 1.26  --  1.33*   GLUCOSE mg/dL 101* 129* 190*  --  122*   ALBUMIN g/dL 3.10* 2.90* 3.00*  --  3.00*   BILIRUBIN mg/dL 0.4 0.4 0.4  --  0.6   ALK PHOS U/L 140* 145* 172*  --  197*   AST (SGOT) U/L 86* 176* 404*  --  480*   ALT (SGPT) U/L 279* 393* 584*  --  642*     Cardiac Studies:  Echo-   Results for orders placed during the hospital encounter of 11/14/20   Adult Transthoracic Echo Complete W/ Cont if Necessary Per Protocol    Narrative · Left ventricular systolic function is severely decreased.  · Left ventricular ejection fraction is 10 to 15%  · Akinetic septum, inferior wall and apex is noted  · The left ventricular cavity is mildly dilated.  · Left ventricular diastolic function was normal.  · Mildly reduced right ventricular systolic function noted.  · Mild mitral valve regurgitation is present  · Mild tricuspid valve regurgitation is present. Calculated right   ventricular systolic  pressure from tricuspid regurgitation is 28.0 mmHg.        Stress Myoview-  Cath-      Medication Review:   Scheduled Meds:aspirin, 81 mg, Oral, Daily  carvedilol, 12.5 mg, Oral, BID With Meals  clopidogrel, 75 mg, Oral, Daily  enoxaparin, 40 mg, Subcutaneous, Q24H  ferrous sulfate, 324 mg, Oral, Daily With Breakfast  furosemide, 40 mg, Intravenous, BID  isosorbide mononitrate, 30 mg, Oral, Q24H  lisinopril, 5 mg, Oral, Q24H  QUEtiapine, 50 mg, Oral, Nightly  sodium chloride, 10 mL, Intravenous, Q12H  traZODone, 100 mg, Oral, Nightly      Continuous Infusions:   PRN Meds:.Calcium Gluconate-NaCl **AND** calcium gluconate **AND** Calcium, Ionized  •  docusate sodium  •  hydrOXYzine  •  influenza vaccine  •  LORazepam **OR** LORazepam **OR** LORazepam **OR** LORazepam **OR** LORazepam **OR** LORazepam  •  magnesium sulfate **OR** magnesium sulfate **OR** magnesium sulfate  •  melatonin  •  nitroglycerin  •  ondansetron **OR** ondansetron  •  potassium & sodium phosphates **OR** potassium & sodium phosphates  •  potassium chloride  •  potassium chloride  •  sodium chloride  •  sodium chloride      Assessment/Plan   Patient Active Problem List   Diagnosis   • Essential hypertension   • Syncope   • NICM (nonischemic cardiomyopathy) (CMS/[facility])   • Chest pain, atypical   • Dyspnea   • Hyponatremia   • CHF (congestive heart failure), NYHA class III, acute on chronic, systolic (CMS/[facility])   • Drug abuse (CMS/[facility])   • Non-ST elevation myocardial infarction (NSTEMI) (CMS/[facility])   • Precordial pain   • Chest pain   • DENNIS (acute kidney injury) (CMS/[facility])   • Hyperkalemia   • Transaminitis   • Anemia     Plan:    Patient is seen and examined and findings are verified.  He is slowly improving.  His intake and output is not well documented.  However patient believes that he is putting out urine.  Patient social condition is very poor.  I would not consider AICD implantation in him because of noncompliance.  I will proceed with cardiac  catheterization on Thursday.  Continue diuresis and current treatment    Solomon Lowe MD  11/17/20  15:00 EST

## 2020-11-18 NOTE — PROGRESS NOTES
Continued Stay Note  IMSTY Sousa     Patient Name: Asad Bethea  MRN: [id number]  Today's Date: 11/18/2020    Admit Date: 11/14/2020    Discharge Plan     Row Name 11/18/20 1357       Plan    Plan Comments  SW met with pt wearing appropriate PPE (mask, goggles, standing 6ft away). SW informed pt that [facility] informed SW that there is a possiblilty that he would not be accepted due to concerns of pt medical needs. SW informed pt that [facility] has beds available and pt would need to complete an assessment. Pt was in agreement and completed the assessment with [facility] admissions. Pt was informed by [facility] admissions to follow up on 11/18/2020.  SW will follow up with pt on 11/19/2020.        Discharge Codes    No documentation.       Expected Discharge Date and Time     Expected Discharge Date Expected Discharge Time    Nov 20, 2020         Nick HENDRICKSON   Cell: [phone]  Office: [phone]  Fax: [phone]

## 2020-11-18 NOTE — DISCHARGE PLACEMENT REQUEST
"Asad Sousa (44 y.o. Male)     Date of Birth Social Security Number Address Home Phone MRN    1975  [id number]  [place] [address] [phone] [id number]    Caodaism Marital Status          Hoahaoism        Admission Date Admission Type Admitting Provider Attending Provider Department, Room/Bed    20 Emergency Zara Thibodeaux DO [place], [place], DO [facility] 2C MEDICAL INPATIENT, 242/1    Discharge Date Discharge Disposition Discharge Destination                       Attending Provider: Zara Thibodeaux DO    Allergies: No Known Allergies    Isolation: None   Infection: None   Code Status: CPR    Ht: 185.4 cm (73\")   Wt: 102 kg (225 lb 5 oz)    Admission Cmt: None   Principal Problem: None                Active Insurance as of 2020     Primary Coverage     Payor Plan Insurance Group Employer/Plan Group    [place]      Payor Plan Address Payor Plan Phone Number Payor Plan Fax Number Effective Dates    [address] [phone]  2019 - None Entered    [facility] [address]       Subscriber Name Subscriber Birth Date Member ID       ASAD SOUSA 1975 [id number]                 Emergency Contacts      (Rel.) Home Phone Work Phone Mobile Phone    ADRIÁN COLBY (Other) -- -- [phone]            Emergency Contact Information     Name Relation Home Work ADRIÁN Goodman Other   [phone]          Insurance Information                [place] Phone: [phone]    Subscriber: Asad Sousa Subscriber#: [id number]    Group#:  Precert#:              History & Physical      Lenin Sousa PA-C at 20 2131                [facility]      Patient Name: Asad Sousa  : 1975  MRN: [id number]  Primary Care Physician: Shelly Ribeiro APRN  Date of admission: 2020    Patient Care " "Team:  Shelly Ribeiro APRN as PCP - General (Nurse Practitioner)          Subjective   History Present Illness     Chief Complaint:   Chief Complaint   Patient presents with   • Chest Pain         Mr. Bethea is a 44 y.o. male with past medical history of hypertension, substance-tobacco/alcohol abuse, heart failure with reduced ejection fraction and anemia who presents to [facility] complaining of chest pain.  Patient reports that he has a 3-day history of constant worsening left-sided chest pain which radiates to his left arm and back.  Pain is described as \"just really hurting\" rated 9/10 at its worst and 8/10 at the time of my exam.  Patient has a history of coronary artery disease underwent cardiac cath at this facility on 3/24/2020 with drug-eluting stent placed in the LAD.  Since that time patient has been minimally compliant with his scheduled medical therapy stating that he currently is taking his Coreg but has not had his Plavix or diuretics in some time.  Some associated orthopnea and PND is noted along with some nausea without vomiting, frequent episodes of tachycardia and palpitations, occasional peripheral edema and a recent subjective fever.  Patient reports that he has relapsed on his substance and alcohol abuse with his most recent use on 11/5/2020 when he states he smoked methamphetamine and drink alcohol.  He denies any history of IV drug use continues to smoke approximately 1 pack of cigarettes per day on average.  Urine output is noted as occurring somewhat less frequently and he does also report some chronic constipation.  Additionally patient notes a previous diagnosis of hepatitis C which he received over 1 year ago and has not been treated for.  He also reports having previous bowel resection.    In the ED patient did have lab significant for troponin of less than 0.010, proBNP: 5399.0, sodium: One twenty-eight, potassium: 5.3, CO2: 18.0, anion gap: 13.0, creatinine: 1.33, BUN: " Thirty-seven, alk phos: One ninety-seven, ALT: Six forty-two, AST: Forty-eight, albumin: 3.00.  WBCs: 14.60, hemoglobin: 12.2 with a decreased MCV and MCHC.  UA shows sterile pyuria with 0-2 WBCs, 0-2 RBCs, 1+ bilirubin, 1+ protein and a normal specific gravity of 1.028.  Urine tox positive for amphetamines and THC.  Chest x-ray shows cardiomegaly without evidence of active disease.  COVID-19 test was negative.  ED provider notes initial EKG had significant artifact and was difficult to interpret with subsequent EKG showing no acute ST changes.  This is not available for review at the time of my exam    History of Present Illness    Review of Systems   Constitution: Positive for fever. Negative for chills and decreased appetite.   HENT: Negative.    Eyes: Negative.    Cardiovascular: Positive for chest pain, dyspnea on exertion, leg swelling, orthopnea, palpitations and paroxysmal nocturnal dyspnea. Negative for irregular heartbeat and syncope.   Respiratory: Positive for cough and shortness of breath. Negative for sputum production.         Patient reports cough typically occurs if he attempts to lay flat.   Endocrine: Negative.    Skin: Negative.    Musculoskeletal: Negative.    Gastrointestinal: Positive for bloating and nausea. Negative for abdominal pain, hematemesis, hematochezia, melena and vomiting.   Genitourinary: Negative.    Neurological: Positive for light-headedness. Negative for focal weakness, headaches and weakness.        Lightheadedness on standing.   Psychiatric/Behavioral: Negative.            Personal History     Past Medical History:   Past Medical History:   Diagnosis Date   • Abnormal nuclear stress test 6/28/2019   • Acute back pain 7/8/2019   • ADHD (attention deficit hyperactivity disorder)    • Alcohol dependence (CMS/HCC)    • Anxiety    • Arthritis    • Clotting disorder (CMS/HCC)    • Depression    • Depression with anxiety 7/14/2019   • Essential hypertension 6/28/2019   •  Generalized abdominal pain 8/2/2019   • GERD (gastroesophageal reflux disease)    • H/O heart artery stent 03/24/2020   • Hepatitis C 06/2019   • Hypertensive urgency 7/8/2019   • Hyponatremia 8/4/2019   • IBS (irritable bowel syndrome)    • Intractable vomiting with nausea 7/13/2019    Added automatically from request for surgery [id number]   • Medically noncompliant 7/8/2019   • Melena 7/14/2019   • NICM (nonischemic cardiomyopathy) (CMS/HCC) 7/14/2019   • Non-ST elevation myocardial infarction (NSTEMI) (CMS/[facility]) 3/24/2020   • Substance abuse (CMS/HCC)    • Tobacco dependency        Surgical History:      Past Surgical History:   Procedure Laterality Date   • CARDIAC CATHETERIZATION     • CARDIAC CATHETERIZATION N/A 7/9/2019    Procedure: LEFT HEART CATH with possible PCI;  Surgeon: PAL Johnson MD;  Location: [facility] CATH INVASIVE LOCATION;  Service: Cardiovascular   • CARDIAC CATHETERIZATION N/A 3/24/2020    Procedure: LEFT HEART CATH with possible PCI;  Surgeon: Solomon Lowe MD;  Location: [facility] CATH INVASIVE LOCATION;  Service: Cardiovascular;  Laterality: N/A;  Local and IV sedation   • CARDIAC CATHETERIZATION N/A 3/24/2020    Procedure: Stent ATUL coronary;  Surgeon: Solomon Lowe MD;  Location: [facility] CATH INVASIVE LOCATION;  Service: Cardiovascular;  Laterality: N/A;   • CARDIOVASCULAR STRESS TEST  2019   • ECHO - CONVERTED  2019   • ECHO - CONVERTED  2020   • ENDOSCOPY N/A 7/14/2019    Procedure: ESOPHAGOGASTRODUODENOSCOPY WITH SCLEROTHERAPY, BICAP CAUTERY OF DUODENAL ULCER AND HEMOSTASIS SPRAY OF DUODENAL ULCER;  Surgeon: Wesly Myers MD;  Location: [facility] ENDOSCOPY;  Service: Gastroenterology   • EXPLORATORY LAPAROTOMY N/A 7/16/2019    Procedure: LAPAROTOMY EXPLORATORY;  Surgeon: Patience Peng MD;  Location: [facility] MAIN OR;  Service: General   • WRIST SURGERY N/A            Family History: family history includes Colon cancer in his mother; Heart attack in his brother, brother, and mother;  Heart disease in his brother, brother, and mother. Otherwise pertinent FHx was reviewed and unremarkable.     Social History:  reports that he quit smoking about 16 months ago. His smoking use included cigarettes. He has a 20.00 pack-year smoking history. He has quit using smokeless tobacco. He reports previous alcohol use. He reports previous drug use. Drugs: Cocaine(coke) and Methamphetamines.      Medications:  Prior to Admission medications    Medication Sig Start Date End Date Taking? Authorizing Provider   carvedilol (COREG) 3.125 MG tablet TAKE 1 TABLET BY MOUTH TWICE DAILY WITH MEALS 10/28/20   Solomon Lowe MD   clopidogrel (PLAVIX) 75 MG tablet Take 1 tablet by mouth Daily.  Patient taking differently: Take 75 mg by mouth Daily. Dr Lowe -  Pt to continue his Plavix  for (EGD/colonoscopy 5/19) 4/22/20   Solomon Lowe MD   ferrous sulfate 324 (65 Fe) MG tablet delayed-release EC tablet Take 1 tablet by mouth 2 (Two) Times a Day With Meals. 5/14/20   Shelly Ribeiro APRN   furosemide (Lasix) 20 MG tablet Take 1 tablet by mouth Daily. 4/22/20   Solomon Lowe MD   lisinopril (PRINIVIL,ZESTRIL) 2.5 MG tablet Take 1 tablet by mouth Daily. 4/22/20   Solomon Lowe MD   loperamide (IMODIUM) 2 MG capsule Take 1 capsule by mouth 3 (Three) Times a Day As Needed for Diarrhea. 3/25/20   Nga Malhotra MD   omeprazole (priLOSEC) 40 MG capsule TAKE 1 CAPSULE BY MOUTH EVERY DAY 6/22/20   Segundo Rosario Jr., MD   QUEtiapine (SEROquel) 100 MG tablet Take 1 tablet by mouth Every Night. 5/14/20   Shelly Ribeiro APRN   sertraline (ZOLOFT) 100 MG tablet Take 1 tablet by mouth Daily. 5/14/20   Shelly Ribeiro APRN   spironolactone (ALDACTONE) 25 MG tablet TAKE 1 TABLET BY MOUTH DAILY 7/21/20   Solomon Lowe MD   traZODone (DESYREL) 100 MG tablet TAKE 1 TABLET BY MOUTH EVERY EVENING 6/22/20   Segundo Rosario Jr., MD       Allergies:  No Known Allergies    Objective   Objective     Vital Signs  Temp:  [97.7 °F (36.5 °C)] 97.7  °F (36.5 °C)  Heart Rate:  [92-98] 94  Resp:  [20] 20  BP: (112-162)/(86-98) 112/98  SpO2:  [96 %-100 %] 96 %  on   ;   Device (Oxygen Therapy): room air  Body mass index is 25.07 kg/m².    Physical Exam  Constitutional:       General: He is not in acute distress.     Appearance: Normal appearance. He is normal weight. He is not ill-appearing or toxic-appearing.   HENT:      Head: Normocephalic.      Right Ear: External ear normal.      Left Ear: External ear normal.      Nose: Nose normal.      Mouth/Throat:      Mouth: Mucous membranes are moist.   Eyes:      Extraocular Movements: Extraocular movements intact.      Conjunctiva/sclera: Conjunctivae normal.   Neck:      Musculoskeletal: Normal range of motion.   Cardiovascular:      Rate and Rhythm: Normal rate and regular rhythm.      Pulses: Normal pulses.      Heart sounds: Murmur present.      Comments: Murmur heard best at the fifth intercostal space midclavicular line  Pulmonary:      Effort: Pulmonary effort is normal.   Abdominal:      General: There is no distension.      Tenderness: There is no abdominal tenderness.      Comments: Hyperactive bowel sounds   Musculoskeletal: Normal range of motion.      Right lower leg: No edema.      Left lower leg: No edema.   Skin:     General: Skin is warm and dry.      Comments: No splinter hemorrhages, Osler nodes, Janeway lesions or unusual scarring noted on exam   Neurological:      General: No focal deficit present.      Mental Status: He is alert and oriented to person, place, and time.   Psychiatric:         Mood and Affect: Mood normal.         Behavior: Behavior normal.         Thought Content: Thought content normal.         Judgment: Judgment normal.         Results Review:  I have personally reviewed most recent lab results and radiology images and interpretations and agree with findings, most notably: Troponin, proBNP, CBC, CMP, UA, urine tox, chest x-ray, EKG and COVID-19 test.    Results from last 7 days    Lab Units 11/14/20 1919   WBC 10*3/mm3 14.60*   HEMOGLOBIN g/dL 12.2*   HEMATOCRIT % 39.6   PLATELETS 10*3/mm3 375   INR  1.51*     Results from last 7 days   Lab Units 11/14/20 1919   SODIUM mmol/L 128*   POTASSIUM mmol/L 5.3*   CHLORIDE mmol/L 97*   CO2 mmol/L 18.0*   BUN mg/dL 37*   CREATININE mg/dL 1.33*   GLUCOSE mg/dL 122*   CALCIUM mg/dL 8.4*   ALT (SGPT) U/L 642*   AST (SGOT) U/L 480*   TROPONIN T ng/mL <0.010   PROBNP pg/mL 5,399.0*     Estimated Creatinine Clearance: 85.5 mL/min (A) (by C-G formula based on SCr of 1.33 mg/dL (H)).  Brief Urine Lab Results  (Last result in the past 365 days)      Color   Clarity   Blood   Leuk Est   Nitrite   Protein   CREAT   Urine HCG        11/14/20 1938 Dark Yellow  Comment:  Result checked  Clear Negative Negative Negative 30 mg/dL (1+)               Microbiology Results (last 10 days)     Procedure Component Value - Date/Time    COVID-19, ABBOTT IN-HOUSE,NP Swab (NO TRANSPORT MEDIA) 2 HR TAT - Swab, Nasopharynx [[id number]]  (Normal) Collected: 11/14/20 1922    Lab Status: Final result Specimen: Swab from Nasopharynx Updated: 11/14/20 2006     COVID19 Not Detected    Narrative:      Fact sheet for providers: https://www.fda.gov/media/808510/download     Fact sheet for patients: https://www.fda.gov/media/506129/download          ECG/EMG Results (most recent)     None               Results for orders placed during the hospital encounter of 03/22/20   Adult Transthoracic Echo Complete W/ Cont if Necessary Per Protocol    Narrative · Left ventricular systolic function is severely decreased.  · The following left ventricular wall segments are akinetic: basal   inferior, mid inferior, apical septal, basal inferoseptal and mid   inferoseptal.  · Left atrial cavity size is mildly dilated.  · Mild mitral valve regurgitation is present          Xr Chest 1 View    Result Date: 11/14/2020  Cardiomegaly without active disease  Electronically Signed By-Ky Yusuf MD  On:11/14/2020 7:41 PM This report was finalized on [id number] by  Ky Yusuf MD.        Estimated Creatinine Clearance: 85.5 mL/min (A) (by C-G formula based on SCr of 1.33 mg/dL (H)).    Assessment/Plan   Assessment/Plan       Active Hospital Problems    Diagnosis  POA   • Chest pain [R07.9]  Yes     Priority: High   • DENNIS (acute kidney injury) (CMS/[facility]) [N17.9]  Yes     Priority: High   • Hyperkalemia [E87.5]  Yes     Priority: High   • Transaminitis [R74.01]  Yes     Priority: High   • Hyponatremia [E87.1]  Yes     Priority: High   • Anemia [D64.9]  Yes     Priority: Medium   • CHF (congestive heart failure), NYHA class III, acute on chronic, systolic (CMS/[facility]) [I50.23]  Yes     Priority: Medium   • Essential hypertension [I10]  Yes     Priority: Medium   • Drug abuse (CMS/[facility]) [F19.10]  Yes     Priority: Low      Resolved Hospital Problems   No resolved problems to display.     Chest pain  -Troponin: Less than 0.010, trend  -Chest x-ray shows cardiomegaly without evidence of active disease.  -EKG reported by ED provider as showing no acute ST changes, attempting to locate original copy for review  -Patient awaiting cardiac cath on 3/24/2020 with placement of one drug-eluting stent in the LAD at that time.  -In the ED patient given aspirin nitroglycerin  -N.p.o. after midnight  -Cardiology consulted    DENNIS  - Creatine: 1.33, BUN: Thirty-seven, BUN/creatinine ratio: 27.8  - Hold lisinopril  -Avoid nephrotoxic medication and IV dye unless urgently needed  -Monitor BMP, I's and O's while admitted  -Patient may benefit from nephrology consult especially if kidney function continues to worsen if patient continues to require diuresis    Hyperkalemia  -Potassium: 5.3  -EKG pending review  -Kayexalate ordered  -Continue cardiac monitoring  -Monitor while admitted    Hyponatremia  -Sodium: 128  -Check urine and serum osmolality  -Hold Zoloft  -Monitor while admitted    Transaminitis  -ALT: S642 AST: 480 with an alk  phos of of one ninety-seven and an albumin of 3.00 (levels were noted as normal on 3/22/2020)  -Check hepatitis panel, HIV, iron profile and ultrasound of right upper quadrant  -Monitor while admitted     Leukocytosis  -WBCs: 14.60 with an increased absolute neutrophil and monocyte count noted on differential  -Check blood cultures, lactic acid, procalcitonin, CRP and sed rate  -Monitor while admitted    Anemia  -Hemoglobin: 12.2 with a decreased MCV and MCHC  -Continue ferrous sulfate  -Check iron profile as above  -Monitor while admitted    CHF  -Patient reports recent episodes of orthopnea and PND.  No peripheral edema noted on exam and lungs slightly diminished in the bases otherwise unremarkable.  -proBNP: 5399.0  -Chest x-ray shows cardiomegaly without evidence of active disease.  -Patient noncompliant with outpatient diuretic therapy  -20 mg IV Lasix ordered x1 with close monitoring of kidney function and sodium planned  -Monitor I's and O's and daily weights  -2 g sodium diet  -Cardiology consulted as above    CAD  -Continue Plavix and beta-blocker (patient has been noncompliant with outpatient Plavix therapy but states he has been taking his Coreg)  -Continue cardiac monitoring    Hypertension  -Poorly controlled with a blood pressure on admission of 166/86 (pressure has normalized following reduction in anxiety as well as nitroglycerin administration in ED)  - Continue Coreg  -Hold lisinopril secondary to DENNIS and hyperkalemia  - Monitor while admitted    Depression/anxiety  -Continue Zoloft, trazodone and Seroquel    Drug abuse  -Patient reports methamphetamine use which she reports he smoked approximately 9 days prior.  He denies any history of IV drug use  -Encourage cessation especially light of patient's comorbid conditions    Alcohol abuse  -Patient reports he generally drinks approximately 1 pint of liquor per day though he has had nothing to drink for the past 9 days  -Encourage cessation especially  light of patient's comorbid conditions  -CIWA protocol ordered              VTE Prophylaxis -Lovenox  Mechanical Order History:     None      Pharmalogical Order History:     None          CODE STATUS: Full  There are no questions and answers to display.         I discussed the patient's findings and my recommendations with patient and nursing staff.      Signature:Electronically signed by Lenin Bethea PA-C, 11/14/20, 11:02 PM EST.    [facility] Hospitalist Team          Electronically signed by Lenin Bethea PA-C at 11/14/20 5926       {Outbreak/Travel/Exposure Documentation......;  Question Available Choices Patient Response   Outbreak Screen: Do you currently have a new onset of the following symptoms?        Fever/Chils, Cough, Shortness of air, Loss of taste or smell, No, Unknown  (!) Shortness of Air (11/14/20 1751)   Outbreak Screen: In the last 14 days, have you had contact with anyone who is ill, has show any of the symptoms listed above and/or has been diagnosis with the 2019 Novel Coronavirus? This includes any immediate household members but excludes any patients with whom you have been in contact within your normal work duties wearing proper PPE, if you are a healthcare worker.  Yes, No, Unknown              No (11/14/20 1751)   Outbreak Screen: Who was notified?    Free text  (not recorded)   Travel Screen: Have you traveled in the last month? If so, to what country have you traveled? If [place] what state? Yes, No, Unknown  List of all countries  List of all States No (11/14/20 1751)  (not recorded)  (not recorded)   Infection Risk: Do you currently have the following symptoms?  (If cough is selected, the Tuberculosis Screen is performed.) Cough, Fever, Rash, No No (11/14/20 1751)   Tuberculosis Screen: Do you have any of the following Tuberculosis Risks?  · Have you lived or spent time with anyone who had or may have TB?  · Have you lived in or visited any of the following areas for more  than one month: [place], [place], [place], [place] or [place], the [place] or [place]?  · Do you have HIV/AIDS?  · Have you lived in or worked in a nursing home, homeless shelter, correctional facility, or substance abuse treatment facility?   · No    If Yes do you have any of the following symptoms? Yes responses display to the right    If Yes, symptoms listed are:  Cough greater than or equal to 3 weeks, Loss of appetite, Unexplained weight loss, Night sweats, Bloody sputum or hemoptysis, Hoarseness, Fever, Fatigue, Chest pain, No (not recorded)  (not recorded)   Exposure Screen: Have you been exposed to any of these contagious diseases in the last month? Measles, Chickenpox, Meningitis, Pertussis, Whooping Cough, No No (11/14/20 7851)       Current Facility-Administered Medications   Medication Dose Route Frequency Provider Last Rate Last Admin   • aspirin chewable tablet 81 mg  81 mg Oral Daily Solomon Lowe MD   81 mg at 11/18/20 0820   • calcium gluconate 1g/50ml 0.675% NaCl IV SOLN  1 g Intravenous PRN Lenin Bethea PA-C        And   • calcium gluconate 2-0.675 GM/100ML NACL IVPB  2 g Intravenous PRN Lenin Bethea PA-C       • carvedilol (COREG) tablet 12.5 mg  12.5 mg Oral BID With Meals Solomon Lowe MD   12.5 mg at 11/18/20 0821   • clopidogrel (PLAVIX) tablet 75 mg  75 mg Oral Daily Lenin Bethea PA-C   75 mg at 11/18/20 0820   • docusate sodium (COLACE) capsule 100 mg  100 mg Oral BID PRN Lenin Bethea PA-C       • enoxaparin (LOVENOX) syringe 40 mg  40 mg Subcutaneous Q24H Lenin Bethea PA-C   40 mg at 11/17/20 1554   • ferrous sulfate EC tablet 324 mg  324 mg Oral Daily With Breakfast Lenin Bethea PA-C   324 mg at 11/18/20 0821   • furosemide (LASIX) injection 40 mg  40 mg Intravenous BID Solomon Lowe MD   40 mg at 11/18/20 0821   • hydrOXYzine (ATARAX) tablet 50 mg  50 mg Oral TID PRN Lenin Bethea PA-C   50 mg at 11/15/20 0221   • influenza  vac split quad (FLUZONE,FLUARIX,AFLURIA,FLULAVAL) injection 0.5 mL  0.5 mL Intramuscular During Hospitalization Lenin Bethea PA-C       • isosorbide mononitrate (IMDUR) 24 hr tablet 30 mg  30 mg Oral Q24H Solomon Lowe MD   30 mg at 11/18/20 0820   • lisinopril (PRINIVIL,ZESTRIL) tablet 5 mg  5 mg Oral Q24H Solomon Lowe MD   5 mg at 11/18/20 0821   • LORazepam (ATIVAN) tablet 1 mg  1 mg Oral Q2H PRN Lenin Bethea PA-C        Or   • LORazepam (ATIVAN) injection 1 mg  1 mg Intravenous Q2H PRN Lenin Bethea PA-C        Or   • LORazepam (ATIVAN) tablet 2 mg  2 mg Oral Q1H PRN Lenin Bethea PA-C        Or   • LORazepam (ATIVAN) injection 2 mg  2 mg Intravenous Q1H PRN Lenin Bethea PA-C   2 mg at 11/17/20 0444    Or   • LORazepam (ATIVAN) injection 2 mg  2 mg Intravenous Q15 Min PRN Lenin Bethea PA-C   2 mg at 11/16/20 2055    Or   • LORazepam (ATIVAN) injection 2 mg  2 mg Intramuscular Q15 Min PRN Lenin Bethea PA-C       • Magnesium Sulfate 2 gram Bolus, followed by 8 gram infusion (total Mg dose 10 grams)- Mg less than or equal to 1mg/dL  2 g Intravenous PRN Lenin Bethea PA-C        Or   • Magnesium Sulfate 2 gram / 50mL Infusion (GIVE X 3 BAGS TO EQUAL 6GM TOTAL DOSE) - Mg 1.1 - 1.5 mg/dl  2 g Intravenous PRN Lenin Bethea PA-C        Or   • Magnesium Sulfate 4 gram infusion- Mg 1.6-1.9 mg/dL  4 g Intravenous PRN Lenin Bethea PA-C   Stopped at 11/15/20 1007   • melatonin tablet 5 mg  5 mg Oral Nightly PRN Lenin Bethea PA-C   5 mg at 11/14/20 2303   • nitroglycerin (NITROSTAT) SL tablet 0.4 mg  0.4 mg Sublingual Q5 Min PRN Lenin Bethea PA-C       • ondansetron (ZOFRAN) tablet 4 mg  4 mg Oral Q6H PRN Lenin Bethea PA-C        Or   • ondansetron (ZOFRAN) injection 4 mg  4 mg Intravenous Q6H PRN Lenin Bethea PA-C   4 mg at 11/18/20 1255   • potassium & sodium phosphates (PHOS-NAK) 280-160-250 MG packet - for  Phosphorus less than 1.25 mg/dL  2 packet Oral Q6H PRN Lenin Bethea PA-C        Or   • potassium & sodium phosphates (PHOS-NAK) 280-160-250 MG packet - for Phosphorus 1.25 - 2.5 mg/dL  2 packet Oral Q6H PRN Lenin Bethea PA-C       • potassium chloride (K-DUR,KLOR-CON) CR tablet 40 mEq  40 mEq Oral PRN Lenin Bethea PA-C       • potassium chloride (KLOR-CON) packet 40 mEq  40 mEq Oral PRN Lenin Bethea PA-C       • QUEtiapine (SEROquel) tablet 50 mg  50 mg Oral Nightly Lenin Bethea PA-C   50 mg at 11/17/20 2006   • sodium chloride 0.9 % flush 10 mL  10 mL Intravenous PRN Lenin Bethea PA-C   10 mL at 11/16/20 2055   • sodium chloride 0.9 % flush 10 mL  10 mL Intravenous Q12H Lenin Bethea PA-C   10 mL at 11/18/20 0821   • sodium chloride 0.9 % flush 10 mL  10 mL Intravenous PRN Lenin Bethea PA-C   10 mL at 11/17/20 0445   • traZODone (DESYREL) tablet 100 mg  100 mg Oral Nightly Lenin Bethea PA-C   100 mg at 11/17/20 2006     Lab Results (all)     Procedure Component Value Units Date/Time    Comprehensive Metabolic Panel [[id number]]  (Abnormal) Collected: 11/18/20 0427    Specimen: Blood Updated: 11/18/20 0614     Glucose 117 mg/dL      BUN 23 mg/dL      Creatinine 1.16 mg/dL      Sodium 135 mmol/L      Potassium 4.5 mmol/L      Chloride 99 mmol/L      CO2 26.0 mmol/L      Calcium 8.3 mg/dL      Total Protein 6.1 g/dL      Albumin 3.20 g/dL      ALT (SGPT) 223 U/L      AST (SGOT) 57 U/L      Alkaline Phosphatase 135 U/L      Total Bilirubin 0.4 mg/dL      eGFR Non African Amer 68 mL/min/1.73      Globulin 2.9 gm/dL      A/G Ratio 1.1 g/dL      BUN/Creatinine Ratio 19.8     Anion Gap 10.0 mmol/L     Narrative:      GFR Normal >60  Chronic Kidney Disease <60  Kidney Failure <15      Magnesium [[id number]]  (Normal) Collected: 11/18/20 0427    Specimen: Blood Updated: 11/18/20 0614     Magnesium 1.9 mg/dL     CBC & Differential [[id number]]  (Abnormal)  Collected: 11/18/20 0427    Specimen: Blood Updated: 11/18/20 0552    Narrative:      The following orders were created for panel order CBC & Differential.  Procedure                               Abnormality         Status                     ---------                               -----------         ------                     CBC Auto Differential[[id number]]        Abnormal            Final result                 Please view results for these tests on the individual orders.    CBC Auto Differential [[id number]]  (Abnormal) Collected: 11/18/20 0427    Specimen: Blood Updated: 11/18/20 0552     WBC 8.60 10*3/mm3      RBC 4.54 10*6/mm3      Hemoglobin 10.9 g/dL      Hematocrit 35.1 %      MCV 77.2 fL      MCH 24.0 pg      MCHC 31.1 g/dL      RDW 17.5 %      RDW-SD 47.3 fl      MPV 8.1 fL      Platelets 302 10*3/mm3      Neutrophil % 54.0 %      Lymphocyte % 26.4 %      Monocyte % 16.8 %      Eosinophil % 2.0 %      Basophil % 0.8 %      Neutrophils, Absolute 4.60 10*3/mm3      Lymphocytes, Absolute 2.30 10*3/mm3      Monocytes, Absolute 1.50 10*3/mm3      Eosinophils, Absolute 0.20 10*3/mm3      Basophils, Absolute 0.10 10*3/mm3      nRBC 0.5 /100 WBC     Blood Culture - Blood, Arm, Right [[id number]] Collected: 11/15/20 0127    Specimen: Blood from Arm, Right Updated: 11/18/20 0145     Blood Culture No growth at 3 days    Blood Culture - Blood, Arm, Left [[id number]] Collected: 11/15/20 0128    Specimen: Blood from Arm, Left Updated: 11/18/20 0145     Blood Culture No growth at 3 days    BNP [[id number]]  (Abnormal) Collected: 11/17/20 0842    Specimen: Blood Updated: 11/17/20 1902     proBNP 2,548.0 pg/mL     Narrative:      Among patients with dyspnea, NT-proBNP is highly sensitive for the detection of acute congestive heart failure. In addition NT-proBNP of <300 pg/ml effectively rules out acute congestive heart failure with 99% negative predictive value.    Results may be falsely decreased if patient taking  Biotin.      Extra Tubes [[id number]] Collected: 11/17/20 0842    Specimen: Blood, Venous Line Updated: 11/17/20 0945    Narrative:      The following orders were created for panel order Extra Tubes.  Procedure                               Abnormality         Status                     ---------                               -----------         ------                     Lavender Top[[id number]]                                     Final result                 Please view results for these tests on the individual orders.    Lavender Top [[id number]] Collected: 11/17/20 0842    Specimen: Blood Updated: 11/17/20 0945     Extra Tube hold for add-on     Comment: Auto resulted       Comprehensive Metabolic Panel [[id number]]  (Abnormal) Collected: 11/17/20 0842    Specimen: Blood Updated: 11/17/20 0944     Glucose 101 mg/dL      BUN 20 mg/dL      Creatinine 0.98 mg/dL      Sodium 134 mmol/L      Potassium 4.4 mmol/L      Chloride 100 mmol/L      CO2 27.0 mmol/L      Calcium 8.2 mg/dL      Total Protein 5.9 g/dL      Albumin 3.10 g/dL      ALT (SGPT) 279 U/L      AST (SGOT) 86 U/L      Alkaline Phosphatase 140 U/L      Total Bilirubin 0.4 mg/dL      eGFR Non African Amer 83 mL/min/1.73      Globulin 2.8 gm/dL      A/G Ratio 1.1 g/dL      BUN/Creatinine Ratio 20.4     Anion Gap 7.0 mmol/L     Narrative:      GFR Normal >60  Chronic Kidney Disease <60  Kidney Failure <15      Hepatitis Diagnostic Profile [[id number]] Collected: 11/15/20 0128    Specimen: Blood Updated: 11/17/20 0814     Hep A IgM Negative     Hepatitis B Surface Ag Negative     Hep B Core IgM Negative    Narrative:      Performed at:  [address] [place] [address]  [id number]  : Emory Mayfield PhD, Phone:  [phone]    Magnesium [[id number]]  (Normal) Collected: 11/17/20 0535    Specimen: Blood Updated: 11/17/20 0615     Magnesium 1.8 mg/dL     CBC & Differential [[id number]]  (Abnormal) Collected: 11/17/20 0535    Specimen:  Blood Updated: 11/17/20 0552    Narrative:      The following orders were created for panel order CBC & Differential.  Procedure                               Abnormality         Status                     ---------                               -----------         ------                     CBC Auto Differential[[id number]]        Abnormal            Final result                 Please view results for these tests on the individual orders.    CBC Auto Differential [[id number]]  (Abnormal) Collected: 11/17/20 0535    Specimen: Blood Updated: 11/17/20 0552     WBC 9.70 10*3/mm3      RBC 4.52 10*6/mm3      Hemoglobin 10.8 g/dL      Hematocrit 34.3 %      MCV 75.9 fL      MCH 23.8 pg      MCHC 31.4 g/dL      RDW 17.2 %      RDW-SD 45.5 fl      MPV 7.8 fL      Platelets 242 10*3/mm3      Neutrophil % 60.6 %      Lymphocyte % 21.0 %      Monocyte % 16.4 %      Eosinophil % 0.9 %      Basophil % 1.1 %      Neutrophils, Absolute 5.80 10*3/mm3      Lymphocytes, Absolute 2.00 10*3/mm3      Monocytes, Absolute 1.60 10*3/mm3      Eosinophils, Absolute 0.10 10*3/mm3      Basophils, Absolute 0.10 10*3/mm3      nRBC 0.5 /100 WBC     Hepatitis Panel, Acute [[id number]]  (Abnormal) Collected: 11/16/20 2011    Specimen: Blood Updated: 11/16/20 2120     Hepatitis B Surface Ag Non-Reactive     Hep A IgM Non-Reactive     Hep B C IgM Non-Reactive     Hepatitis C Ab Reactive    Narrative:      Results may be falsely decreased if patient taking Biotin.     Comprehensive Metabolic Panel [[id number]]  (Abnormal) Collected: 11/16/20 0320    Specimen: Blood Updated: 11/16/20 0509     Glucose 129 mg/dL      BUN 27 mg/dL      Creatinine 1.07 mg/dL      Sodium 134 mmol/L      Potassium 4.1 mmol/L      Chloride 98 mmol/L      CO2 27.0 mmol/L      Calcium 7.7 mg/dL      Total Protein 5.5 g/dL      Albumin 2.90 g/dL      ALT (SGPT) 393 U/L      AST (SGOT) 176 U/L      Alkaline Phosphatase 145 U/L      Total Bilirubin 0.4 mg/dL      eGFR Non   Amer 75 mL/min/1.73      Globulin 2.6 gm/dL      A/G Ratio 1.1 g/dL      BUN/Creatinine Ratio 25.2     Anion Gap 9.0 mmol/L     Narrative:      GFR Normal >60  Chronic Kidney Disease <60  Kidney Failure <15      Magnesium [[id number]]  (Normal) Collected: 11/16/20 0320    Specimen: Blood Updated: 11/16/20 0509     Magnesium 2.1 mg/dL     CBC & Differential [[id number]]  (Abnormal) Collected: 11/16/20 0320    Specimen: Blood Updated: 11/16/20 0445    Narrative:      The following orders were created for panel order CBC & Differential.  Procedure                               Abnormality         Status                     ---------                               -----------         ------                     CBC Auto Differential[[id number]]        Abnormal            Final result                 Please view results for these tests on the individual orders.    CBC Auto Differential [[id number]]  (Abnormal) Collected: 11/16/20 0320    Specimen: Blood Updated: 11/16/20 0445     WBC 10.20 10*3/mm3      RBC 4.29 10*6/mm3      Hemoglobin 10.4 g/dL      Hematocrit 33.0 %      MCV 76.9 fL      MCH 24.3 pg      MCHC 31.5 g/dL      RDW 17.2 %      RDW-SD 46.4 fl      MPV 7.8 fL      Platelets 282 10*3/mm3      Neutrophil % 60.4 %      Lymphocyte % 22.3 %      Monocyte % 15.6 %      Eosinophil % 1.2 %      Basophil % 0.5 %      Neutrophils, Absolute 6.10 10*3/mm3      Lymphocytes, Absolute 2.30 10*3/mm3      Monocytes, Absolute 1.60 10*3/mm3      Eosinophils, Absolute 0.10 10*3/mm3      Basophils, Absolute 0.10 10*3/mm3      nRBC 1.0 /100 WBC     Creatinine, Urine, Random - Urine, Clean Catch [[id number]] Collected: 11/15/20 0002    Specimen: Urine, Clean Catch Updated: 11/15/20 1125     Creatinine, Urine 58.3 mg/dL     Narrative:      Reference intervals for random urine have not been established.  Clinical usage is dependent upon physician's interpretation in combination with other laboratory tests.       Sedimentation Rate  [[id number]]  (Normal) Collected: 11/15/20 0129    Specimen: Blood Updated: 11/15/20 0249     Sed Rate 5 mm/hr     HIV-1 & HIV-2 Antibodies [[id number]]  (Normal) Collected: 11/15/20 0128    Specimen: Blood Updated: 11/15/20 0229    Narrative:      The following orders were created for panel order HIV-1 & HIV-2 Antibodies.  Procedure                               Abnormality         Status                     ---------                               -----------         ------                     HIV-1 / O / 2 Ag / Antib...[[id number]]  Normal              Final result                 Please view results for these tests on the individual orders.    HIV-1 / O / 2 Ag / Antibody 4th Generation [[id number]]  (Normal) Collected: 11/15/20 0128    Specimen: Blood Updated: 11/15/20 0229     HIV-1/ HIV-2 Non-Reactive     Comment: A non-reactive test result does not preclude the possibility of exposure to HIV or infection with HIV. An antibody response to recent exposure may take several months to reach detectable levels.       Narrative:      The HIV antibody/antigen combo assay is a qualitative assay for HIV that includes the p24 antigen as well as antibodies to HIV types 1 and 2. This test is intended to be used as a screening assay in the diagnosis of HIV infection in patients over the age of 2.  Results may be falsely decreased if patient taking Biotin.      Comprehensive Metabolic Panel [[id number]]  (Abnormal) Collected: 11/15/20 0129    Specimen: Blood Updated: 11/15/20 0218     Glucose 190 mg/dL      BUN 39 mg/dL      Creatinine 1.26 mg/dL      Sodium 133 mmol/L      Potassium 4.7 mmol/L      Comment: Slight hemolysis detected by analyzer. Results may be affected.        Chloride 100 mmol/L      CO2 21.0 mmol/L      Calcium 8.2 mg/dL      Total Protein 5.8 g/dL      Albumin 3.00 g/dL      ALT (SGPT) 584 U/L      AST (SGOT) 404 U/L      Alkaline Phosphatase 172 U/L      Total Bilirubin 0.4 mg/dL      eGFR Non  Amer 62  mL/min/1.73      Globulin 2.8 gm/dL      A/G Ratio 1.1 g/dL      BUN/Creatinine Ratio 31.0     Anion Gap 12.0 mmol/L     Narrative:      GFR Normal >60  Chronic Kidney Disease <60  Kidney Failure <15      Iron Profile [[id number]]  (Abnormal) Collected: 11/15/20 0129    Specimen: Blood Updated: 11/15/20 0218     Iron 18 mcg/dL      Iron Saturation 3 %      Transferrin 354 mg/dL      TIBC 527 mcg/dL     Troponin [[id number]]  (Normal) Collected: 11/15/20 0129    Specimen: Blood Updated: 11/15/20 0218     Troponin T <0.010 ng/mL     Narrative:      Troponin T Reference Range:  <= 0.03 ng/mL-   Negative for AMI  >0.03 ng/mL-     Abnormal for myocardial necrosis.  Clinicians would have to utilize clinical acumen, EKG, Troponin and serial changes to determine if it is an Acute Myocardial Infarction or myocardial injury due to an underlying chronic condition.       Results may be falsely decreased if patient taking Biotin.      Magnesium [[id number]]  (Normal) Collected: 11/15/20 0129    Specimen: Blood Updated: 11/15/20 0218     Magnesium 1.8 mg/dL     BNP [[id number]]  (Abnormal) Collected: 11/15/20 0129    Specimen: Blood Updated: 11/15/20 0211     proBNP 4,378.0 pg/mL     Narrative:      Among patients with dyspnea, NT-proBNP is highly sensitive for the detection of acute congestive heart failure. In addition NT-proBNP of <300 pg/ml effectively rules out acute congestive heart failure with 99% negative predictive value.    Results may be falsely decreased if patient taking Biotin.      CBC & Differential [[id number]]  (Abnormal) Collected: 11/15/20 0129    Specimen: Blood Updated: 11/15/20 0143    Narrative:      The following orders were created for panel order CBC & Differential.  Procedure                               Abnormality         Status                     ---------                               -----------         ------                     CBC Auto Differential[[id number]]        Abnormal            Final  "result                 Please view results for these tests on the individual orders.    CBC Auto Differential [[id number]]  (Abnormal) Collected: 11/15/20 0129    Specimen: Blood Updated: 11/15/20 0143     WBC 12.20 10*3/mm3      RBC 4.54 10*6/mm3      Hemoglobin 10.9 g/dL      Hematocrit 35.2 %      MCV 77.6 fL      MCH 24.1 pg      MCHC 31.0 g/dL      RDW 17.0 %      RDW-SD 46.4 fl      MPV 7.9 fL      Platelets 273 10*3/mm3      Neutrophil % 68.4 %      Lymphocyte % 19.0 %      Monocyte % 11.5 %      Eosinophil % 0.2 %      Basophil % 0.9 %      Neutrophils, Absolute 8.30 10*3/mm3      Lymphocytes, Absolute 2.30 10*3/mm3      Monocytes, Absolute 1.40 10*3/mm3      Eosinophils, Absolute 0.00 10*3/mm3      Basophils, Absolute 0.10 10*3/mm3      nRBC 0.8 /100 WBC     Osmolality, Urine - Urine, Clean Catch [[id number]]  (Normal) Collected: 11/15/20 0002    Specimen: Urine, Clean Catch Updated: 11/15/20 0027     Osmolality, Urine 481 mOsm/kg     Procalcitonin [[id number]]  (Normal) Collected: 11/14/20 2136    Specimen: Blood Updated: 11/14/20 2321     Procalcitonin 0.14 ng/mL     Narrative:      As a Marker for Sepsis (Non-Neonates):   1. <0.5 ng/mL represents a low risk of severe sepsis and/or septic shock.  1. >2 ng/mL represents a high risk of severe sepsis and/or septic shock.    As a Marker for Lower Respiratory Tract Infections that require antibiotic therapy:  PCT on Admission     Antibiotic Therapy             6-12 Hrs later  > 0.5                Strongly Recommended            >0.25 - <0.5         Recommended  0.1 - 0.25           Discouraged                   Remeasure/reassess PCT  <0.1                 Strongly Discouraged          Remeasure/reassess PCT      As 28 day mortality risk marker: \"Change in Procalcitonin Result\" (> 80 % or <=80 %) if Day 0 (or Day 1) and Day 4 values are available. Refer to http://www.[facility]-pct-calculator.com/   Change in PCT <=80 %   A decrease of PCT levels below or equal to 80 " % defines a positive change in PCT test result representing a higher risk for 28-day all-cause mortality of patients diagnosed with severe sepsis or septic shock.  Change in PCT > 80 %   A decrease of PCT levels of more than 80 % defines a negative change in PCT result representing a lower risk for 28-day all-cause mortality of patients diagnosed with severe sepsis or septic shock.                Results may be falsely decreased if patient taking Biotin.     C-reactive Protein [[id number]]  (Abnormal) Collected: 11/14/20 2136    Specimen: Blood Updated: 11/14/20 2311     C-Reactive Protein 4.92 mg/dL     Potassium [[id number]]  (Normal) Collected: 11/14/20 2136    Specimen: Blood Updated: 11/14/20 2311     Potassium 5.1 mmol/L     Osmolality, Serum [[id number]]  (Normal) Collected: 11/14/20 1919    Specimen: Blood Updated: 11/14/20 2301     Osmolality 298 mOsm/kg     Troponin [[id number]]  (Normal) Collected: 11/14/20 2136    Specimen: Blood Updated: 11/14/20 2209     Troponin T <0.010 ng/mL     Narrative:      Troponin T Reference Range:  <= 0.03 ng/mL-   Negative for AMI  >0.03 ng/mL-     Abnormal for myocardial necrosis.  Clinicians would have to utilize clinical acumen, EKG, Troponin and serial changes to determine if it is an Acute Myocardial Infarction or myocardial injury due to an underlying chronic condition.       Results may be falsely decreased if patient taking Biotin.      Urine Drug Screen - Urine, Clean Catch [[id number]]  (Abnormal) Collected: 11/14/20 1938    Specimen: Urine, Clean Catch Updated: 11/14/20 2050     Amphet/Methamphet, Screen Positive     Barbiturates Screen, Urine Negative     Benzodiazepine Screen, Urine Negative     Cocaine Screen, Urine Negative     Opiate Screen Negative     THC, Screen, Urine Positive     Methadone Screen, Urine Negative     Oxycodone Screen, Urine Negative    Narrative:      Negative Thresholds For Drugs Screened:     Amphetamines               500 ng/ml    Barbiturates               200 ng/ml   Benzodiazepines            100 ng/ml   Cocaine                    300 ng/ml   Methadone                  300 ng/ml   Opiates                    300 ng/ml   Oxycodone                  100 ng/ml   THC                        50 ng/ml    The Normal Value for all drugs tested is negative. This report includes final unconfirmed screening results to be used for medical treatment purposes only. Unconfirmed results must not be used for non-medical purposes such as employment or legal testing. Clinical consideration should be applied to any drug of abuse test, particulary when unconfirmed results are used.  All urine drugs of abuse requests without chain of custody are for medical screening purposes only.  False positives are possible.      [place] Draw [[id number]] Collected: 11/14/20 1919    Specimen: Blood Updated: 11/14/20 2030    Narrative:      The following orders were created for panel order [place] Draw.  Procedure                               Abnormality         Status                     ---------                               -----------         ------                     Light Blue Top[[id number]]                                   Final result               Green Top (Gel)[[id number]]                                  Final result               Lavender Top[[id number]]                                     Final result               Gold Top - SST[[id number]]                                   Final result                 Please view results for these tests on the individual orders.    Light Blue Top [[id number]] Collected: 11/14/20 1919    Specimen: Blood Updated: 11/14/20 2030     Extra Tube hold for add-on     Comment: Auto resulted       Lavender Top [[id number]] Collected: 11/14/20 1919    Specimen: Blood Updated: 11/14/20 2030     Extra Tube hold for add-on     Comment: Auto resulted       Gold Top - SST [[id number]] Collected: 11/14/20 1919    Specimen: Blood Updated: 11/14/20  2030     Extra Tube Hold for add-ons.     Comment: Auto resulted.       Urinalysis With Microscopic If Indicated (No Culture) - Urine, Clean Catch [[id number]]  (Abnormal) Collected: 11/14/20 1938    Specimen: Urine, Clean Catch Updated: 11/14/20 2011     Color, UA Dark Yellow     Comment: Result checked         Appearance, UA Clear     pH, UA 5.5     Specific Gravity, UA 1.028     Glucose, UA Negative     Ketones, UA Negative     Bilirubin, UA Small (1+)     Comment: Confirmation testing is unavailable.  A serum bilirubin is recommended for further assessment.        Blood, UA Negative     Protein, UA 30 mg/dL (1+)     Leuk Esterase, UA Negative     Nitrite, UA Negative     Urobilinogen, UA 1.0 E.U./dL    Urinalysis, Microscopic Only - Urine, Clean Catch [[id number]]  (Abnormal) Collected: 11/14/20 1938    Specimen: Urine, Clean Catch Updated: 11/14/20 2011     RBC, UA 0-2 /HPF      WBC, UA 0-2 /HPF      Bacteria, UA None Seen /HPF      Squamous Epithelial Cells, UA 0-2 /HPF      Hyaline Casts, UA 7-12 /LPF      Methodology Manual Light Microscopy    Green Top (Gel) [[id number]] Collected: 11/14/20 1919    Specimen: Blood Updated: 11/14/20 2007    COVID-19, ABBOTT IN-HOUSE,NP Swab (NO TRANSPORT MEDIA) 2 HR TAT - Swab, Nasopharynx [[id number]]  (Normal) Collected: 11/14/20 1922    Specimen: Swab from Nasopharynx Updated: 11/14/20 2006     COVID19 Not Detected    Narrative:      Fact sheet for providers: https://www.fda.gov/media/934884/download     Fact sheet for patients: https://www.fda.gov/media/026687/download    Comprehensive Metabolic Panel [[id number]]  (Abnormal) Collected: 11/14/20 1919    Specimen: Blood Updated: 11/14/20 1955     Glucose 122 mg/dL      BUN 37 mg/dL      Creatinine 1.33 mg/dL      Sodium 128 mmol/L      Potassium 5.3 mmol/L      Comment: Slight hemolysis detected by analyzer. Results may be affected.        Chloride 97 mmol/L      CO2 18.0 mmol/L      Calcium 8.4 mg/dL      Total Protein 6.5  g/dL      Albumin 3.00 g/dL      ALT (SGPT) 642 U/L      AST (SGOT) 480 U/L      Comment: Slight hemolysis detected by analyzer. Results may be affected.        Alkaline Phosphatase 197 U/L      Total Bilirubin 0.6 mg/dL      eGFR Non African Amer 58 mL/min/1.73      Globulin 3.5 gm/dL      A/G Ratio 0.9 g/dL      BUN/Creatinine Ratio 27.8     Anion Gap 13.0 mmol/L     Narrative:      GFR Normal >60  Chronic Kidney Disease <60  Kidney Failure <15      Troponin [[id number]]  (Normal) Collected: 11/14/20 1919    Specimen: Blood Updated: 11/14/20 1954     Troponin T <0.010 ng/mL     Narrative:      Troponin T Reference Range:  <= 0.03 ng/mL-   Negative for AMI  >0.03 ng/mL-     Abnormal for myocardial necrosis.  Clinicians would have to utilize clinical acumen, EKG, Troponin and serial changes to determine if it is an Acute Myocardial Infarction or myocardial injury due to an underlying chronic condition.       Results may be falsely decreased if patient taking Biotin.      BNP [[id number]]  (Abnormal) Collected: 11/14/20 1919    Specimen: Blood Updated: 11/14/20 1952     proBNP 5,399.0 pg/mL     Narrative:      Among patients with dyspnea, NT-proBNP is highly sensitive for the detection of acute congestive heart failure. In addition NT-proBNP of <300 pg/ml effectively rules out acute congestive heart failure with 99% negative predictive value.    Results may be falsely decreased if patient taking Biotin.      Protime-INR [[id number]]  (Abnormal) Collected: 11/14/20 1919    Specimen: Blood Updated: 11/14/20 1939     Protime 16.3 Seconds      INR 1.51    CBC & Differential [[id number]]  (Abnormal) Collected: 11/14/20 1919    Specimen: Blood Updated: 11/14/20 1928    Narrative:      The following orders were created for panel order CBC & Differential.  Procedure                               Abnormality         Status                     ---------                               -----------         ------                      CBC Auto Differential[[id number]]        Abnormal            Final result                 Please view results for these tests on the individual orders.    CBC Auto Differential [[id number]]  (Abnormal) Collected: 11/14/20 1919    Specimen: Blood Updated: 11/14/20 1928     WBC 14.60 10*3/mm3      RBC 5.04 10*6/mm3      Hemoglobin 12.2 g/dL      Hematocrit 39.6 %      MCV 78.6 fL      MCH 24.2 pg      MCHC 30.7 g/dL      RDW 17.6 %      RDW-SD 48.6 fl      MPV 7.9 fL      Platelets 375 10*3/mm3      Neutrophil % 64.9 %      Lymphocyte % 19.9 %      Monocyte % 14.0 %      Eosinophil % 0.4 %      Basophil % 0.8 %      Neutrophils, Absolute 9.40 10*3/mm3      Lymphocytes, Absolute 2.90 10*3/mm3      Monocytes, Absolute 2.00 10*3/mm3      Eosinophils, Absolute 0.10 10*3/mm3      Basophils, Absolute 0.10 10*3/mm3      nRBC 1.2 /100 WBC              Physician Progress Notes (most recent note)      Zara Thibodeaux DO at 11/17/20 1631                [facility] Medicine Services Daily Progress Note      Hospitalist Team  LOS 0 days      Patient Care Team:  Shelly Ribeiro APRN as PCP - General (Nurse Practitioner)    [facility]      Subjective   Subjective     Chief Complaint / Subjective  Chief Complaint   Patient presents with   • Chest Pain       Present on Admission:  • Chest pain  • Essential hypertension  • Drug abuse (CMS/[facility])  • CHF (congestive heart failure), NYHA class III, acute on chronic, systolic (CMS/[facility])  • DENNIS (acute kidney injury) (CMS/[facility])  • Hyponatremia  • Hyperkalemia  • Transaminitis  • Anemia      Brief Synopsis of Hospital Course/HPI  Mr. Bethea is a 44 y.o. male with past medical history of hypertension, substance-tobacco/alcohol abuse, heart failure with reduced ejection fraction and anemia who presents to [facility] complaining of chest pain.  Patient reports that he has a 3-day history of constant worsening left-sided chest pain which radiates to his  "left arm and back.  Pain is described as \"just really hurting\" rated 9/10 at its worst and 8/10 at the time of my exam.  Patient has a history of coronary artery disease underwent cardiac cath at this facility on 3/24/2020 with drug-eluting stent placed in the LAD.  Since that time patient has been minimally compliant with his scheduled medical therapy stating that he currently is taking his Coreg but has not had his Plavix or diuretics in some time.  Some associated orthopnea and PND is noted along with some nausea without vomiting, frequent episodes of tachycardia and palpitations, occasional peripheral edema and a recent subjective fever.  Patient reports that he has relapsed on his substance and alcohol abuse with his most recent use on 11/5/2020 when he states he smoked methamphetamine and drink alcohol.  He denies any history of IV drug use continues to smoke approximately 1 pack of cigarettes per day on average.  Urine output is noted as occurring somewhat less frequently and he does also report some chronic constipation.  Additionally patient notes a previous diagnosis of hepatitis C which he received over 1 year ago and has not been treated for.  He also reports having previous bowel resection.        11/15/20  Continues to report shortness of breath    11/16/20  Patient is complaining of some back pain. Denies any chest pain or sob. Reports his le swelling has improved.        Review of Systems   Cardiovascular: Negative for chest pain and palpitations.   Respiratory: Negative for cough and shortness of breath.    Musculoskeletal: Positive for back pain.         Objective   Objective      Vital Signs  Temp:  [97.9 °F (36.6 °C)-98.9 °F (37.2 °C)] 98.9 °F (37.2 °C)  Heart Rate:  [89-90] 89  Resp:  [14-18] 18  BP: (105-133)/(50-75) 105/75  Oxygen Therapy  SpO2: 95 %  Pulse Oximetry Type: Intermittent  Device (Oxygen Therapy): room air  Flowsheet Rows      First Filed Value   Admission Height  185.4 cm (73\") " Documented at 11/14/2020 1752   Admission Weight  86.2 kg (190 lb) Documented at 11/14/2020 1752        Intake & Output (last 3 days)       11/14 0701 - 11/15 0700 11/15 0701 - 11/16 0700 11/16 0701 - 11/17 0700 11/17 0701 - 11/18 0700    P.O.  2060 960 712    Total Intake(mL/kg)  2060 (19.8) 960 (9.1) 712 (6.7)    Urine (mL/kg/hr) 750 2300 (0.9)      Total Output 750 2300      Net -750 -240 +960 +712            Urine Unmeasured Occurrence   3 x     Stool Unmeasured Occurrence   2 x 1 x        Lines, Drains & Airways    Active LDAs     Name:   Placement date:   Placement time:   Site:   Days:    Peripheral IV 06/27/19 1800 Right Antecubital   06/27/19    1800    Antecubital   507    Peripheral IV 11/14/20 1913 Left Hand   11/14/20 1913    Hand   1                  Physical Exam:    Physical Exam  Vitals signs reviewed.   Constitutional:       General: He is not in acute distress.     Appearance: He is obese.   HENT:      Head: Normocephalic and atraumatic.      Mouth/Throat:      Mouth: Mucous membranes are moist.   Cardiovascular:      Rate and Rhythm: Normal rate and regular rhythm.   Pulmonary:      Effort: Pulmonary effort is normal. No respiratory distress.      Breath sounds: No wheezing or rales.   Abdominal:      General: Bowel sounds are normal. There is no distension.      Tenderness: There is no abdominal tenderness. There is no guarding.   Musculoskeletal:      Right lower leg: No edema.      Left lower leg: No edema.   Skin:     General: Skin is warm and dry.      Findings: No rash.   Neurological:      Mental Status: He is alert.           Procedures:              Results Review:     I reviewed the patient's new clinical results.      Lab Results (last 24 hours)     Procedure Component Value Units Date/Time    Extra Tubes [[id number]] Collected: 11/17/20 0842    Specimen: Blood, Venous Line Updated: 11/17/20 0945    Narrative:      The following orders were created for panel order Extra  Tubes.  Procedure                               Abnormality         Status                     ---------                               -----------         ------                     Lavender Top[[id number]]                                     Final result                 Please view results for these tests on the individual orders.    Lavender Top [[id number]] Collected: 11/17/20 0842    Specimen: Blood Updated: 11/17/20 0945     Extra Tube hold for add-on     Comment: Auto resulted       Comprehensive Metabolic Panel [[id number]]  (Abnormal) Collected: 11/17/20 0842    Specimen: Blood Updated: 11/17/20 0944     Glucose 101 mg/dL      BUN 20 mg/dL      Creatinine 0.98 mg/dL      Sodium 134 mmol/L      Potassium 4.4 mmol/L      Chloride 100 mmol/L      CO2 27.0 mmol/L      Calcium 8.2 mg/dL      Total Protein 5.9 g/dL      Albumin 3.10 g/dL      ALT (SGPT) 279 U/L      AST (SGOT) 86 U/L      Alkaline Phosphatase 140 U/L      Total Bilirubin 0.4 mg/dL      eGFR Non African Amer 83 mL/min/1.73      Globulin 2.8 gm/dL      A/G Ratio 1.1 g/dL      BUN/Creatinine Ratio 20.4     Anion Gap 7.0 mmol/L     Narrative:      GFR Normal >60  Chronic Kidney Disease <60  Kidney Failure <15      Hepatitis Diagnostic Profile [[id number]] Collected: 11/15/20 0128    Specimen: Blood Updated: 11/17/20 0814     Hep A IgM Negative     Hepatitis B Surface Ag Negative     Hep B Core IgM Negative    Narrative:      Performed at:  [address] [place] [address]  [id number]  : Emory Mayfield PhD, Phone:  [phone]    Magnesium [[id number]]  (Normal) Collected: 11/17/20 0535    Specimen: Blood Updated: 11/17/20 0615     Magnesium 1.8 mg/dL     CBC & Differential [[id number]]  (Abnormal) Collected: 11/17/20 0535    Specimen: Blood Updated: 11/17/20 0552    Narrative:      The following orders were created for panel order CBC & Differential.  Procedure                               Abnormality         Status                      ---------                               -----------         ------                     CBC Auto Differential[[id number]]        Abnormal            Final result                 Please view results for these tests on the individual orders.    CBC Auto Differential [[id number]]  (Abnormal) Collected: 11/17/20 0535    Specimen: Blood Updated: 11/17/20 0552     WBC 9.70 10*3/mm3      RBC 4.52 10*6/mm3      Hemoglobin 10.8 g/dL      Hematocrit 34.3 %      MCV 75.9 fL      MCH 23.8 pg      MCHC 31.4 g/dL      RDW 17.2 %      RDW-SD 45.5 fl      MPV 7.8 fL      Platelets 242 10*3/mm3      Neutrophil % 60.6 %      Lymphocyte % 21.0 %      Monocyte % 16.4 %      Eosinophil % 0.9 %      Basophil % 1.1 %      Neutrophils, Absolute 5.80 10*3/mm3      Lymphocytes, Absolute 2.00 10*3/mm3      Monocytes, Absolute 1.60 10*3/mm3      Eosinophils, Absolute 0.10 10*3/mm3      Basophils, Absolute 0.10 10*3/mm3      nRBC 0.5 /100 WBC     Blood Culture - Blood, Arm, Left [[id number]] Collected: 11/15/20 0128    Specimen: Blood from Arm, Left Updated: 11/17/20 0145     Blood Culture No growth at 2 days    Blood Culture - Blood, Arm, Right [[id number]] Collected: 11/15/20 0127    Specimen: Blood from Arm, Right Updated: 11/17/20 0145     Blood Culture No growth at 2 days    Hepatitis Panel, Acute [[id number]]  (Abnormal) Collected: 11/16/20 2011    Specimen: Blood Updated: 11/16/20 2120     Hepatitis B Surface Ag Non-Reactive     Hep A IgM Non-Reactive     Hep B C IgM Non-Reactive     Hepatitis C Ab Reactive    Narrative:      Results may be falsely decreased if patient taking Biotin.         No results found for: HGBA1C  Results from last 7 days   Lab Units 11/14/20  1919   INR  1.51*               Microbiology Results (last 10 days)     Procedure Component Value - Date/Time    Blood Culture - Blood, Arm, Left [[id number]] Collected: 11/15/20 0128    Lab Status: Preliminary result Specimen: Blood from Arm, Left Updated: 11/17/20 0145      Blood Culture No growth at 2 days    Blood Culture - Blood, Arm, Right [[id number]] Collected: 11/15/20 0127    Lab Status: Preliminary result Specimen: Blood from Arm, Right Updated: 11/17/20 0145     Blood Culture No growth at 2 days    COVID-19, [facility] IN-HOUSE,NP Swab (NO TRANSPORT MEDIA) 2 HR TAT - Swab, Nasopharynx [[id number]]  (Normal) Collected: 11/14/20 1922    Lab Status: Final result Specimen: Swab from Nasopharynx Updated: 11/14/20 2006     COVID19 Not Detected    Narrative:      Fact sheet for providers: https://www.fda.gov/media/448759/download     Fact sheet for patients: https://www.fda.gov/media/724876/download          ECG/EMG Results (most recent)     Procedure Component Value Units Date/Time    ECG 12 Lead [[id number]] Collected: 11/14/20 2356     Updated: 11/14/20 2357     QT Interval 372 ms     Narrative:      HEART RATE= 97  bpm  RR Interval= 616  ms  SD Interval= 186  ms  P Horizontal Axis=   deg  P Front Axis= 66  deg  QRSD Interval= 96  ms  QT Interval= 372  ms  QRS Axis= -14  deg  T Wave Axis= -88  deg  - ABNORMAL ECG -  Sinus rhythm  Probable left atrial enlargement  Left ventricular hypertrophy  Anterior infarct, old  Nonspecific T abnormalities, lateral leads  When compared with ECG of 14-Nov-2020 18:34:37,  Significant repolarization change  Significant axis, voltage or hypertrophy change  Electronically Signed By:   Date and Time of Study: 2020-11-14 23:56:25    ECG 12 Lead [[id number]] Collected: 11/14/20 2356     Updated: 11/15/20 0511     QT Interval 372 ms     Narrative:      HEART RATE= 97  bpm  RR Interval= 616  ms  SD Interval= 186  ms  P Horizontal Axis=   deg  P Front Axis= 66  deg  QRSD Interval= 96  ms  QT Interval= 372  ms  QRS Axis= -14  deg  T Wave Axis= -88  deg  - ABNORMAL ECG -  Sinus rhythm  Probable left atrial enlargement  Left ventricular hypertrophy  Anterior infarct, old  Nonspecific T abnormalities, lateral leads  When compared with ECG of 14-Nov-2020  18:34:37,  Significant repolarization change  Significant axis, voltage or hypertrophy change  Electronically Signed By:   Date and Time of Study: 2020-11-14 23:56:25    ECG 12 Lead [[id number]] Collected: 11/14/20 1834     Updated: 11/15/20 0539     QT Interval 421 ms     Narrative:      HEART RATE= 95  bpm  RR Interval= 636  ms  ME Interval= 191  ms  P Horizontal Axis= -21  deg  P Front Axis= 43  deg  QRSD Interval= 92  ms  QT Interval= 421  ms  QRS Axis= 194  deg  T Wave Axis= -60  deg  - ABNORMAL ECG -  Sinus rhythm  LAE, consider biatrial enlargement  Probable RVH w/ secondary repol abnormality  Inferior infarct, age indeterminate  Borderline ST elevation, anterior leads  Prolonged QT interval  When compared with ECG of 24-Mar-2020 10:07:43,  New or worsened ischemia or infarction  Significant repolarization change  Significant axis, voltage or hypertrophy change  Electronically Signed By:   Date and Time of Study: 2020-11-14 18:34:37    Adult Transthoracic Echo Complete W/ Cont if Necessary Per Protocol [[id number]] Collected: 11/15/20 0936     Updated: 11/16/20 0949     BSA 2.3 m^2      RVIDd 3.5 cm      IVSd 1.3 cm      LVIDd 7.0 cm      LVIDs 6.6 cm      LVPWd 0.74 cm      IVS/LVPW 1.7     FS 4.9 %      EDV(Teich) 254.1 ml      ESV(Teich) 226.7 ml      EF(Teich) 10.8 %      EDV(cubed) 340.6 ml      ESV(cubed) 292.8 ml      EF(cubed) 14.0 %      LV mass(C)d 323.6 grams      LV mass(C)dI 143.3 grams/m^2      SV(Teich) 27.4 ml      SI(Teich) 12.1 ml/m^2      SV(cubed) 47.8 ml      SI(cubed) 21.2 ml/m^2      Ao root diam 3.8 cm      Ao root area 11.0 cm^2      ACS 2.5 cm      asc Aorta Diam 2.6 cm      LVOT diam 2.3 cm      LVOT area 4.3 cm^2      RVOT diam 2.7 cm      RVOT area 5.7 cm^2      EDV(MOD-sp4) 205.2 ml      ESV(MOD-sp4) 180.8 ml      EF(MOD-sp4) 11.9 %      SV(MOD-sp4) 24.5 ml      SI(MOD-sp4) 10.8 ml/m^2      Ao root area (BSA corrected) 1.7     LV Sims Vol (BSA corrected) 90.9 ml/m^2      LV Sys Vol  (BSA corrected) 80.1 ml/m^2      Aortic R-R 0.65 sec      Aortic HR 92.7 BPM      MV E max osmar 84.0 cm/sec      MV A max osmar 64.1 cm/sec      MV E/A 1.3     MV V2 max 99.3 cm/sec      MV max PG 3.9 mmHg      MV V2 mean 55.1 cm/sec      MV mean PG 1.5 mmHg      MV V2 VTI 11.7 cm      MVA(VTI) 4.4 cm^2      MV dec slope 997.9 cm/sec^2      MV dec time 0.08 sec      Ao pk osmar 90.4 cm/sec      Ao max PG 3.3 mmHg      Ao max PG (full) 0.51 mmHg      Ao V2 mean 63.6 cm/sec      Ao mean PG 1.8 mmHg      Ao mean PG (full) 0.55 mmHg      Ao V2 VTI 14.8 cm      MAXIM(I,A) 3.5 cm^2      MAXIM(I,D) 3.5 cm^2      MAXIM(V,A) 3.9 cm^2      MAXIM(V,D) 3.9 cm^2      LV V1 max PG 2.8 mmHg      LV V1 mean PG 1.3 mmHg      LV V1 max 83.1 cm/sec      LV V1 mean 50.7 cm/sec      LV V1 VTI 12.2 cm      CO(Ao) 15.1 l/min      CI(Ao) 6.7 l/min/m^2      SV(Ao) 163.2 ml      SI(Ao) 72.3 ml/m^2      CO(LVOT) 4.8 l/min      CI(LVOT) 2.1 l/min/m^2      SV(LVOT) 51.9 ml      SV(RVOT) 55.4 ml      SI(LVOT) 23.0 ml/m^2      PA V2 max 66.6 cm/sec      PA max PG 1.8 mmHg      PA max PG (full) 0.02 mmHg      PA V2 mean 43.8 cm/sec      PA mean PG 0.92 mmHg      PA mean PG (full) 0.19 mmHg      PA V2 VTI 9.2 cm      PVA(I,A) 6.0 cm^2      BH CV ECHO PETER - PVA(I,D) 6.0 cm^2      BH CV ECHO PETER - PVA(V,A) 5.7 cm^2      BH CV ECHO PETER - PVA(V,D) 5.7 cm^2      PA acc time 0.09 sec      RV V1 max PG 1.8 mmHg      RV V1 mean PG 0.72 mmHg      RV V1 max 66.3 cm/sec      RV V1 mean 37.5 cm/sec      RV V1 VTI 9.7 cm      TR max osmar 249.8 cm/sec      RVSP(TR) 28.0 mmHg      RAP systole 3.0 mmHg      PA pr(Accel) 39.6 mmHg      Pulm Sys Osmar 35.4 cm/sec      Pulm Sims Osmar 65.2 cm/sec      Pulm S/D 0.54     Qp/Qs 1.1      CV ECHO PETER - BZI_BMI 29.6 kilograms/m^2       [facility] - [facility]) 2.3 m^2       CV ECHO PETER - BZI_METRIC_WEIGHT 101.6 kg       CV ECHO PETER - BZI_METRIC_HEIGHT 185.4 cm      EF(MOD-bp) 12.0 %      LA dimension(2D) 5.3 cm      Narrative:      · Left ventricular systolic function is severely decreased.  · Left ventricular ejection fraction is 10 to 15%  · Akinetic septum, inferior wall and apex is noted  · The left ventricular cavity is mildly dilated.  · Left ventricular diastolic function was normal.  · Mildly reduced right ventricular systolic function noted.  · Mild mitral valve regurgitation is present  · Mild tricuspid valve regurgitation is present. Calculated right   ventricular systolic pressure from tricuspid regurgitation is 28.0 mmHg.                  Results for orders placed during the hospital encounter of 11/14/20   Adult Transthoracic Echo Complete W/ Cont if Necessary Per Protocol    Narrative · Left ventricular systolic function is severely decreased.  · Left ventricular ejection fraction is 10 to 15%  · Akinetic septum, inferior wall and apex is noted  · The left ventricular cavity is mildly dilated.  · Left ventricular diastolic function was normal.  · Mildly reduced right ventricular systolic function noted.  · Mild mitral valve regurgitation is present  · Mild tricuspid valve regurgitation is present. Calculated right   ventricular systolic pressure from tricuspid regurgitation is 28.0 mmHg.          Us Liver    Result Date: 11/15/2020  Incidental right pleural effusion. Unremarkable images of the liver.  Electronically Signed By-Ky Yusuf MD On:11/15/2020 9:24 AM This report was finalized on [id number] by  Ky Yusuf MD.    Xr Chest 1 View    Result Date: 11/14/2020  Cardiomegaly without active disease  Electronically Signed By-Ky Yusuf MD On:11/14/2020 7:41 PM This report was finalized on [id number] by  Ky Yusuf MD.      Xrays, labs reviewed personally by physician.    Medication Review:   I have reviewed the patient's current medication list      Scheduled Meds  aspirin, 81 mg, Oral, Daily  carvedilol, 12.5 mg, Oral, BID With Meals  clopidogrel, 75 mg, Oral, Daily  enoxaparin, 40 mg,  Subcutaneous, Q24H  ferrous sulfate, 324 mg, Oral, Daily With Breakfast  furosemide, 40 mg, Intravenous, BID  isosorbide mononitrate, 30 mg, Oral, Q24H  lisinopril, 5 mg, Oral, Q24H  QUEtiapine, 50 mg, Oral, Nightly  sodium chloride, 10 mL, Intravenous, Q12H  traZODone, 100 mg, Oral, Nightly        Meds Infusions       Meds PRN  Calcium Gluconate-NaCl **AND** calcium gluconate **AND** Calcium, Ionized  •  docusate sodium  •  hydrOXYzine  •  influenza vaccine  •  LORazepam **OR** LORazepam **OR** LORazepam **OR** LORazepam **OR** LORazepam **OR** LORazepam  •  magnesium sulfate **OR** magnesium sulfate **OR** magnesium sulfate  •  melatonin  •  nitroglycerin  •  ondansetron **OR** ondansetron  •  potassium & sodium phosphates **OR** potassium & sodium phosphates  •  potassium chloride  •  potassium chloride  •  sodium chloride  •  sodium chloride      Assessment/Plan   Assessment/Plan     Active Hospital Problems    Diagnosis  POA   • Chest pain [R07.9]  Yes   • DENNIS (acute kidney injury) (CMS/[facility]) [N17.9]  Yes   • Hyperkalemia [E87.5]  Yes   • Transaminitis [R74.01]  Yes   • Anemia [D64.9]  Yes   • Drug abuse (CMS/[facility]) [F19.10]  Yes   • CHF (congestive heart failure), NYHA class III, acute on chronic, systolic (CMS/[facility]) [I50.23]  Yes   • Hyponatremia [E87.1]  Yes   • Essential hypertension [I10]  Yes      Resolved Hospital Problems   No resolved problems to display.         Chest pain  - serial troponin negative   - CXR- cardiomegaly without evidence of active disease.  - cardiac cath 3/24/2020 -one drug-eluting stent in the LAD at that time.  - continue aspirin, plavix, statin, bbl, acei, imdur  - cardiology following  - plan on cardiac cath on 11/19/2020     Ischemic dilated cardiomyopathy, Actute HFrEF  - echo -  LVEF 10-15%, Akinetic septum, inferior wall and apex, mild MR, mild TR, RVSP 28mmHg  - Dr. Lowe planning cardiac catheterization on Thursday   - continue diuresis with IV lasix  - strict I&Os  - daily  weights      Acute Kidney Injury  - baseline Cr 0.8   - improving  - resume lisinopril   - UA reviewed      Hyperkalemia  -Potassium: 5.3  -EKG pending review  -Kayexalate ordered  -Continue cardiac monitoring  -Monitor while admitted     Hyponatremia  - improving   - Check urine and serum osmolality  - Holding Zoloft  - Monitor while admitted     Transaminitis, Hepatitis C  - possibly component of hepatic congestion   - HIV Negative  - liver us unremarkable   - pt denies known history of hepatitis C, will need outpatient follow up with gastroenterology     Leukocytosis  -WBCs: 14.60 with an increased absolute neutrophil and monocyte count noted on differential  -Check blood cultures, lactic acid, procalcitonin, CRP and sed rate  -Monitor while admitted     Iron deficiency Anemia  - Hgb stable   - iron profile reviewed   - no signs of bleeding noted     CAD  - continue aspirin, plavix, bbl, acei, imdur  - patient has been noncompliant with outpatient Plavix therapy   - telemetry      Hypertension  - Poorly controlled with a blood pressure on admission of 166/86 (pressure has normalized following reduction in anxiety as well as nitroglycerin administration in ED)  - Continue Coreg, lisinopril, lasix  - lisinopril initially held due to DENNIS and hyperkalemia  - Monitor with routine vs and make adjustments as needed      Depression/anxiety  - Continue trazodone and Seroquel     Drug abuse  - Patient reports methamphetamine use which she reports he smoked approximately 9 days prior.    - He denies any history of IV drug use  - Encourage cessation especially light of patient's comorbid conditions     Alcohol abuse  - Patient reports he generally drinks approximately 1 pint of liquor per day though he has had nothing to drink for the past 9 days  - Encourage cessation especially light of patient's comorbid conditions  - [place] protocol ordered    DVT Prophylaxis  - enoxaparin     Patient is high risk requiring IV ativan          Code Status -   Code Status and Medical Interventions:   Ordered at: 11/14/20 8831     Code Status:    CPR     Medical Interventions (Level of Support Prior to Arrest):    Full       Discharge Planning  Defer, patient is homeless, discussed with case management, pt is interested in drug rehab           Electronically signed by Zara Thibodeaux DO, 11/17/20, 16:31 EST.  [facility], [place], operated by [facility] Hospitalist Team          Electronically signed by Zara Thibodeaux DO at 11/17/20 5929

## 2020-11-18 NOTE — PROGRESS NOTES
Continued Stay Note  MISTY Sousa     Patient Name: Asad Bethea  MRN: [id number]  Today's Date: 11/18/2020    Admit Date: 11/14/2020    Discharge Plan     Row Name 11/18/20 1024       Plan    Plan   working on inpt drug rehab placement    Plan Comments  DC barriers: IV lasix, heart cath planned for today            Macrina Graf RN

## 2020-11-18 NOTE — PROGRESS NOTES
"      [facility] Medicine Services Daily Progress Note      Hospitalist Team  LOS 0 days      Patient Care Team:  Shelly Ribeiro APRN as PCP - General (Nurse Practitioner)    Patient Location: 242/1      Subjective   Subjective     Chief Complaint / Subjective  Chief Complaint   Patient presents with   • Chest Pain       Present on Admission:  • Chest pain  • Essential hypertension  • Drug abuse (CMS/Formerly [facility])  • CHF (congestive heart failure), NYHA class III, acute on chronic, systolic (CMS/Formerly [facility])  • DENNIS (acute kidney injury) (CMS/Formerly [facility])  • Hyponatremia  • Hyperkalemia  • Transaminitis  • Anemia      Brief Synopsis of Hospital Course/HPI  Mr. Bethea is a 44 y.o. male with past medical history of hypertension, substance-tobacco/alcohol abuse, heart failure with reduced ejection fraction and anemia who presents to [facility] complaining of chest pain.  Patient reports that he has a 3-day history of constant worsening left-sided chest pain which radiates to his left arm and back.  Pain is described as \"just really hurting\" rated 9/10 at its worst and 8/10 at the time of my exam.  Patient has a history of coronary artery disease underwent cardiac cath at this facility on 3/24/2020 with drug-eluting stent placed in the LAD.  Since that time patient has been minimally compliant with his scheduled medical therapy stating that he currently is taking his Coreg but has not had his Plavix or diuretics in some time.  Some associated orthopnea and PND is noted along with some nausea without vomiting, frequent episodes of tachycardia and palpitations, occasional peripheral edema and a recent subjective fever.  Patient reports that he has relapsed on his substance and alcohol abuse with his most recent use on 11/5/2020 when he states he smoked methamphetamine and drink alcohol.  He denies any history of IV drug use continues to smoke approximately 1 pack of cigarettes per day on average.  Urine output is " "noted as occurring somewhat less frequently and he does also report some chronic constipation.  Additionally patient notes a previous diagnosis of hepatitis C which he received over 1 year ago and has not been treated for.  He also reports having previous bowel resection.        11/15/20  Continues to report shortness of breath    11/16/20  Patient is complaining of some back pain. Denies any chest pain or sob. Reports his le swelling has improved.        Review of Systems   Cardiovascular: Negative for chest pain and palpitations.   Respiratory: Negative for cough and shortness of breath.    Musculoskeletal: Positive for back pain.         Objective   Objective      Vital Signs  Temp:  [98.2 °F (36.8 °C)-99 °F (37.2 °C)] 98.2 °F (36.8 °C)  Heart Rate:  [91-96] 92  Resp:  [22-24] 24  BP: (116-118)/(72-85) 117/85  Oxygen Therapy  SpO2: 94 %  Pulse Oximetry Type: Intermittent  Device (Oxygen Therapy): room air  Flowsheet Rows      First Filed Value   Admission Height  185.4 cm (73\") Documented at 11/14/2020 1752   Admission Weight  86.2 kg (190 lb) Documented at 11/14/2020 1752        Intake & Output (last 3 days)       11/15 0701 - 11/16 0700 11/16 0701 - 11/17 0700 11/17 0701 - 11/18 0700 11/18 0701 - 11/19 0700    P.O. 2060 960 1672 480    Total Intake(mL/kg) 2060 (19.8) 960 (9.1) 1672 (16.4) 480 (4.7)    Urine (mL/kg/hr) 2300 (0.9)   600 (0.7)    Total Output 2300   600    Net -240 +960 +1672 -120            Urine Unmeasured Occurrence  3 x 3 x     Stool Unmeasured Occurrence  2 x 3 x         Lines, Drains & Airways    Active LDAs     Name:   Placement date:   Placement time:   Site:   Days:    Peripheral IV 06/27/19 1800 Right Antecubital   06/27/19    1800    Antecubital   507    Peripheral IV 11/14/20 1913 Left Hand   11/14/20 1913    Hand   1                  Physical Exam:    Physical Exam  Vitals signs reviewed.   Constitutional:       General: He is not in acute distress.     Appearance: He is obese.   "   HENT:      Head: Normocephalic and atraumatic.      Mouth/Throat:      Mouth: Mucous membranes are moist.   Cardiovascular:      Rate and Rhythm: Normal rate and regular rhythm.   Pulmonary:      Effort: Pulmonary effort is normal. No respiratory distress.      Breath sounds: No wheezing or rales.   Abdominal:      General: Bowel sounds are normal. There is no distension.      Tenderness: There is no abdominal tenderness. There is no guarding.   Musculoskeletal:      Right lower leg: No edema.      Left lower leg: No edema.   Skin:     General: Skin is warm and dry.      Findings: No rash.   Neurological:      Mental Status: He is alert.           Procedures:              Results Review:     I reviewed the patient's new clinical results.      Lab Results (last 24 hours)     Procedure Component Value Units Date/Time    Comprehensive Metabolic Panel [[id number]]  (Abnormal) Collected: 11/18/20 0427    Specimen: Blood Updated: 11/18/20 0614     Glucose 117 mg/dL      BUN 23 mg/dL      Creatinine 1.16 mg/dL      Sodium 135 mmol/L      Potassium 4.5 mmol/L      Chloride 99 mmol/L      CO2 26.0 mmol/L      Calcium 8.3 mg/dL      Total Protein 6.1 g/dL      Albumin 3.20 g/dL      ALT (SGPT) 223 U/L      AST (SGOT) 57 U/L      Alkaline Phosphatase 135 U/L      Total Bilirubin 0.4 mg/dL      eGFR Non African Amer 68 mL/min/1.73      Globulin 2.9 gm/dL      A/G Ratio 1.1 g/dL      BUN/Creatinine Ratio 19.8     Anion Gap 10.0 mmol/L     Narrative:      GFR Normal >60  Chronic Kidney Disease <60  Kidney Failure <15      Magnesium [[id number]]  (Normal) Collected: 11/18/20 0427    Specimen: Blood Updated: 11/18/20 0614     Magnesium 1.9 mg/dL     CBC & Differential [[id number]]  (Abnormal) Collected: 11/18/20 0427    Specimen: Blood Updated: 11/18/20 0552    Narrative:      The following orders were created for panel order CBC & Differential.  Procedure                               Abnormality         Status                      ---------                               -----------         ------                     CBC Auto Differential[[id number]]        Abnormal            Final result                 Please view results for these tests on the individual orders.    CBC Auto Differential [[id number]]  (Abnormal) Collected: 11/18/20 0427    Specimen: Blood Updated: 11/18/20 0552     WBC 8.60 10*3/mm3      RBC 4.54 10*6/mm3      Hemoglobin 10.9 g/dL      Hematocrit 35.1 %      MCV 77.2 fL      MCH 24.0 pg      MCHC 31.1 g/dL      RDW 17.5 %      RDW-SD 47.3 fl      MPV 8.1 fL      Platelets 302 10*3/mm3      Neutrophil % 54.0 %      Lymphocyte % 26.4 %      Monocyte % 16.8 %      Eosinophil % 2.0 %      Basophil % 0.8 %      Neutrophils, Absolute 4.60 10*3/mm3      Lymphocytes, Absolute 2.30 10*3/mm3      Monocytes, Absolute 1.50 10*3/mm3      Eosinophils, Absolute 0.20 10*3/mm3      Basophils, Absolute 0.10 10*3/mm3      nRBC 0.5 /100 WBC     Blood Culture - Blood, Arm, Right [[id number]] Collected: 11/15/20 0127    Specimen: Blood from Arm, Right Updated: 11/18/20 0145     Blood Culture No growth at 3 days    Blood Culture - Blood, Arm, Left [[id number]] Collected: 11/15/20 0128    Specimen: Blood from Arm, Left Updated: 11/18/20 0145     Blood Culture No growth at 3 days    BNP [[id number]]  (Abnormal) Collected: 11/17/20 0842    Specimen: Blood Updated: 11/17/20 1902     proBNP 2,548.0 pg/mL     Narrative:      Among patients with dyspnea, NT-proBNP is highly sensitive for the detection of acute congestive heart failure. In addition NT-proBNP of <300 pg/ml effectively rules out acute congestive heart failure with 99% negative predictive value.    Results may be falsely decreased if patient taking Biotin.          No results found for: HGBA1C  Results from last 7 days   Lab Units 11/14/20  1919   INR  1.51*               Microbiology Results (last 10 days)     Procedure Component Value - Date/Time    Blood Culture - Blood, Arm, Left  [[id number]] Collected: 11/15/20 0128    Lab Status: Preliminary result Specimen: Blood from Arm, Left Updated: 11/18/20 0145     Blood Culture No growth at 3 days    Blood Culture - Blood, Arm, Right [[id number]] Collected: 11/15/20 0127    Lab Status: Preliminary result Specimen: Blood from Arm, Right Updated: 11/18/20 0145     Blood Culture No growth at 3 days    COVID-19, [facility]OTT IN-HOUSE,NP Swab (NO TRANSPORT MEDIA) 2 HR TAT - Swab, Nasopharynx [[id number]]  (Normal) Collected: 11/14/20 1922    Lab Status: Final result Specimen: Swab from Nasopharynx Updated: 11/14/20 2006     COVID19 Not Detected    Narrative:      Fact sheet for providers: https://www.fda.gov/media/554116/download     Fact sheet for patients: https://www.fda.gov/media/029092/download          ECG/EMG Results (most recent)     Procedure Component Value Units Date/Time    ECG 12 Lead [[id number]] Collected: 11/14/20 2356     Updated: 11/14/20 2357     QT Interval 372 ms     Narrative:      HEART RATE= 97  bpm  RR Interval= 616  ms  DC Interval= 186  ms  P Horizontal Axis=   deg  P Front Axis= 66  deg  QRSD Interval= 96  ms  QT Interval= 372  ms  QRS Axis= -14  deg  T Wave Axis= -88  deg  - ABNORMAL ECG -  Sinus rhythm  Probable left atrial enlargement  Left ventricular hypertrophy  Anterior infarct, old  Nonspecific T abnormalities, lateral leads  When compared with ECG of 14-Nov-2020 18:34:37,  Significant repolarization change  Significant axis, voltage or hypertrophy change  Electronically Signed By:   Date and Time of Study: 2020-11-14 23:56:25    ECG 12 Lead [[id number]] Collected: 11/14/20 2356     Updated: 11/15/20 0511     QT Interval 372 ms     Narrative:      HEART RATE= 97  bpm  RR Interval= 616  ms  DC Interval= 186  ms  P Horizontal Axis=   deg  P Front Axis= 66  deg  QRSD Interval= 96  ms  QT Interval= 372  ms  QRS Axis= -14  deg  T Wave Axis= -88  deg  - ABNORMAL ECG -  Sinus rhythm  Probable left atrial enlargement  Left ventricular  hypertrophy  Anterior infarct, old  Nonspecific T abnormalities, lateral leads  When compared with ECG of 14-Nov-2020 18:34:37,  Significant repolarization change  Significant axis, voltage or hypertrophy change  Electronically Signed By:   Date and Time of Study: 2020-11-14 23:56:25    Adult Transthoracic Echo Complete W/ Cont if Necessary Per Protocol [[id number]] Collected: 11/15/20 0936     Updated: 11/16/20 0949     BSA 2.3 m^2      RVIDd 3.5 cm      IVSd 1.3 cm      LVIDd 7.0 cm      LVIDs 6.6 cm      LVPWd 0.74 cm      IVS/LVPW 1.7     FS 4.9 %      EDV(Teich) 254.1 ml      ESV(Teich) 226.7 ml      EF(Teich) 10.8 %      EDV(cubed) 340.6 ml      ESV(cubed) 292.8 ml      EF(cubed) 14.0 %      LV mass(C)d 323.6 grams      LV mass(C)dI 143.3 grams/m^2      SV(Teich) 27.4 ml      SI(Teich) 12.1 ml/m^2      SV(cubed) 47.8 ml      SI(cubed) 21.2 ml/m^2      Ao root diam 3.8 cm      Ao root area 11.0 cm^2      ACS 2.5 cm      asc Aorta Diam 2.6 cm      LVOT diam 2.3 cm      LVOT area 4.3 cm^2      RVOT diam 2.7 cm      RVOT area 5.7 cm^2      EDV(MOD-sp4) 205.2 ml      ESV(MOD-sp4) 180.8 ml      EF(MOD-sp4) 11.9 %      SV(MOD-sp4) 24.5 ml      SI(MOD-sp4) 10.8 ml/m^2      Ao root area (BSA corrected) 1.7     LV Sims Vol (BSA corrected) 90.9 ml/m^2      LV Sys Vol (BSA corrected) 80.1 ml/m^2      Aortic R-R 0.65 sec      Aortic HR 92.7 BPM      MV E max anthony 84.0 cm/sec      MV A max anthony 64.1 cm/sec      MV E/A 1.3     MV V2 max 99.3 cm/sec      MV max PG 3.9 mmHg      MV V2 mean 55.1 cm/sec      MV mean PG 1.5 mmHg      MV V2 VTI 11.7 cm      MVA(VTI) 4.4 cm^2      MV dec slope 997.9 cm/sec^2      MV dec time 0.08 sec      Ao pk anthony 90.4 cm/sec      Ao max PG 3.3 mmHg      Ao max PG (full) 0.51 mmHg      Ao V2 mean 63.6 cm/sec      Ao mean PG 1.8 mmHg      Ao mean PG (full) 0.55 mmHg      Ao V2 VTI 14.8 cm      MAXIM(I,A) 3.5 cm^2      MAXIM(I,D) 3.5 cm^2      MAXIM(V,A) 3.9 cm^2      MAXIM(V,D) 3.9 cm^2      LV V1 max PG 2.8  mmHg      LV V1 mean PG 1.3 mmHg      LV V1 max 83.1 cm/sec      LV V1 mean 50.7 cm/sec      LV V1 VTI 12.2 cm      CO(Ao) 15.1 l/min      CI(Ao) 6.7 l/min/m^2      SV(Ao) 163.2 ml      SI(Ao) 72.3 ml/m^2      CO(LVOT) 4.8 l/min      CI(LVOT) 2.1 l/min/m^2      SV(LVOT) 51.9 ml      SV(RVOT) 55.4 ml      SI(LVOT) 23.0 ml/m^2      PA V2 max 66.6 cm/sec      PA max PG 1.8 mmHg      PA max PG (full) 0.02 mmHg      PA V2 mean 43.8 cm/sec      PA mean PG 0.92 mmHg      PA mean PG (full) 0.19 mmHg      PA V2 VTI 9.2 cm      PVA(I,A) 6.0 cm^2       CV ECHO PETER - PVA(I,D) 6.0 cm^2       CV ECHO PEETR - PVA(V,A) 5.7 cm^2       CV ECHO PETER - PVA(V,D) 5.7 cm^2      PA acc time 0.09 sec      RV V1 max PG 1.8 mmHg      RV V1 mean PG 0.72 mmHg      RV V1 max 66.3 cm/sec      RV V1 mean 37.5 cm/sec      RV V1 VTI 9.7 cm      TR max osmar 249.8 cm/sec      RVSP(TR) 28.0 mmHg      RAP systole 3.0 mmHg      PA pr(Accel) 39.6 mmHg      Pulm Sys Osmar 35.4 cm/sec      Pulm Sims Somar 65.2 cm/sec      Pulm S/D 0.54     Qp/Qs 1.1      CV ECHO PETER - BZI_BMI 29.6 kilograms/m^2       CV ECHO PETER - BSA(HAYCOCK) 2.3 m^2       CV ECHO PETER - BZI_METRIC_WEIGHT 101.6 kg       CV ECHO PETER - BZI_METRIC_HEIGHT 185.4 cm      EF(MOD-bp) 12.0 %      LA dimension(2D) 5.3 cm     Narrative:      · Left ventricular systolic function is severely decreased.  · Left ventricular ejection fraction is 10 to 15%  · Akinetic septum, inferior wall and apex is noted  · The left ventricular cavity is mildly dilated.  · Left ventricular diastolic function was normal.  · Mildly reduced right ventricular systolic function noted.  · Mild mitral valve regurgitation is present  · Mild tricuspid valve regurgitation is present. Calculated right   ventricular systolic pressure from tricuspid regurgitation is 28.0 mmHg.       ECG 12 Lead [[id number]] Collected: 11/14/20 1834     Updated: 11/17/20 1835     QT Interval 421 ms     Narrative:      HEART RATE= 95   bpm  RR Interval= 636  ms  GA Interval= 191  ms  P Horizontal Axis= -21  deg  P Front Axis= 43  deg  QRSD Interval= 92  ms  QT Interval= 421  ms  QRS Axis= 194  deg  T Wave Axis= -60  deg  - ABNORMAL ECG -  Sinus rhythm  LAE, consider biatrial enlargement  Probable RVH w/ secondary repol abnormality  Inferior infarct, age indeterminate  Borderline ST elevation, anterior leads  Prolonged QT interval  When compared with ECG of 24-Mar-2020 10:07:43,  New or worsened ischemia or infarction  Significant repolarization change  Significant axis, voltage or hypertrophy change  Electronically Signed By: Richard Diaz (SHRUTHI) 17-Nov-2020 18:24:18  Date and Time of Study: 2020-11-14 18:34:37               Results for orders placed during the hospital encounter of 11/14/20   Adult Transthoracic Echo Complete W/ Cont if Necessary Per Protocol    Narrative · Left ventricular systolic function is severely decreased.  · Left ventricular ejection fraction is 10 to 15%  · Akinetic septum, inferior wall and apex is noted  · The left ventricular cavity is mildly dilated.  · Left ventricular diastolic function was normal.  · Mildly reduced right ventricular systolic function noted.  · Mild mitral valve regurgitation is present  · Mild tricuspid valve regurgitation is present. Calculated right   ventricular systolic pressure from tricuspid regurgitation is 28.0 mmHg.          Us Liver    Result Date: 11/15/2020  Incidental right pleural effusion. Unremarkable images of the liver.  Electronically Signed By-Ky Yusuf MD On:11/15/2020 9:24 AM This report was finalized on [id number] by  Ky Yusuf MD.    Xr Chest 1 View    Result Date: 11/14/2020  Cardiomegaly without active disease  Electronically Signed By-Ky Yusuf MD On:11/14/2020 7:41 PM This report was finalized on [id number] by  Ky Yusuf MD.      Xrays, labs reviewed personally by physician.    Medication Review:   I have reviewed the patient's current medication  list      Scheduled Meds  aspirin, 81 mg, Oral, Daily  carvedilol, 12.5 mg, Oral, BID With Meals  clopidogrel, 75 mg, Oral, Daily  enoxaparin, 40 mg, Subcutaneous, Q24H  ferrous sulfate, 324 mg, Oral, Daily With Breakfast  furosemide, 40 mg, Intravenous, BID  isosorbide mononitrate, 30 mg, Oral, Q24H  lisinopril, 5 mg, Oral, Q24H  QUEtiapine, 50 mg, Oral, Nightly  sodium chloride, 10 mL, Intravenous, Q12H  traZODone, 100 mg, Oral, Nightly        Meds Infusions       Meds PRN  Calcium Gluconate-NaCl **AND** calcium gluconate **AND** Calcium, Ionized  •  docusate sodium  •  hydrOXYzine  •  influenza vaccine  •  LORazepam **OR** LORazepam **OR** LORazepam **OR** LORazepam **OR** LORazepam **OR** LORazepam  •  magnesium sulfate **OR** magnesium sulfate **OR** magnesium sulfate  •  melatonin  •  nitroglycerin  •  ondansetron **OR** ondansetron  •  potassium & sodium phosphates **OR** potassium & sodium phosphates  •  potassium chloride  •  potassium chloride  •  sodium chloride  •  sodium chloride      Assessment/Plan   Assessment/Plan     Active Hospital Problems    Diagnosis  POA   • Chest pain [R07.9]  Yes   • DENNIS (acute kidney injury) (CMS/formerly [facility]) [N17.9]  Yes   • Hyperkalemia [E87.5]  Yes   • Transaminitis [R74.01]  Yes   • Anemia [D64.9]  Yes   • Drug abuse (CMS/formerly [facility]) [F19.10]  Yes   • CHF (congestive heart failure), NYHA class III, acute on chronic, systolic (CMS/formerly [facility]) [I50.23]  Yes   • Hyponatremia [E87.1]  Yes   • Essential hypertension [I10]  Yes      Resolved Hospital Problems   No resolved problems to display.         Chest pain  - serial troponin negative   - CXR- cardiomegaly without evidence of active disease.  - cardiac cath 3/24/2020 -one drug-eluting stent in the LAD at that time.  - continue aspirin, plavix, statin, bbl, acei, imdur  - cardiology following  - plan on cardiac cath on 11/19/2020     Ischemic dilated cardiomyopathy, Actute HFrEF  - echo -  LVEF 10-15%, Akinetic septum, inferior wall and apex,  mild MR, mild TR, RVSP 28mmHg  - Dr. Lowe planning cardiac catheterization on Thursday   - continue diuresis with IV lasix  - strict I&Os  - daily weights      Acute Kidney Injury  - baseline Cr 0.8   - improving  - resume lisinopril   - UA reviewed      Hyperkalemia- resolved   -Potassium: 5.3  -EKG pending review  -Kayexalate ordered  -Continue cardiac monitoring  -Monitor while admitted     Hyponatremia  - improving   - Check urine and serum osmolality  - Holding Zoloft  - Monitor while admitted     Transaminitis, Hepatitis C  - possibly component of hepatic congestion   - HIV Negative  - liver us unremarkable   - pt denies known history of hepatitis C, will need outpatient follow up with gastroenterology     Chronic diarrhea s/p oversewing of a bleeding duodenal ulcer  - previously had G tube and J tube   - follows with GI  - will add imodium prn     Leukocytosis  -WBCs: 14.60 with an increased absolute neutrophil and monocyte count noted on differential  -Check blood cultures, lactic acid, procalcitonin, CRP and sed rate  -Monitor while admitted     Iron deficiency Anemia  - Hgb stable   - iron profile reviewed   - no signs of bleeding noted   - continue ferrous sulfate     CAD  - continue aspirin, plavix, bbl, acei, imdur  - patient has been noncompliant with outpatient Plavix therapy   - telemetry      Hypertension  - Poorly controlled with a blood pressure on admission of 166/86 (pressure has normalized following reduction in anxiety as well as nitroglycerin administration in ED)  - Continue Coreg, lisinopril, lasix  - lisinopril initially held due to DENNIS and hyperkalemia  - Monitor with routine vs and make adjustments as needed      Depression/anxiety  - Continue trazodone and Seroquel     Drug abuse  - Patient reports methamphetamine use which she reports he smoked approximately 9 days prior.    - He denies any history of IV drug use  - Encourage cessation especially light of patient's comorbid  conditions     Alcohol abuse  - Patient reports he generally drinks approximately 1 pint of liquor per day though he has had nothing to drink for the past 9 days  - Encourage cessation especially light of patient's comorbid conditions  - [facility] protocol ordered    DVT Prophylaxis  - enoxaparin     Patient is high risk requiring IV ativan         Code Status -   Code Status and Medical Interventions:   Ordered at: 11/14/20 2158     Code Status:    CPR     Medical Interventions (Level of Support Prior to Arrest):    Full       Discharge Planning  Defer, patient is homeless, discussed with case management, pt is interested in drug rehab           Electronically signed by Zara Thibodeaux DO, 11/18/20, 15:14 EST.  [facility] Hospitalist Team

## 2020-11-18 NOTE — PROGRESS NOTES
LOS: 0 days   Admiting Physician- Zara Thibodeaux DO    Reason For Followup:    Chest pain  Congestive heart failure  Acute on chronic systolic heart failure  Ischemic cardiomyopathy  Noncompliance    Subjective     Patient is feeling much better.  No chest pain    Objective     Hemodynamics are stable blood pressure is slightly on the lower side    Review of Systems:   Review of Systems   Constitution: Negative for chills and fever.   HENT: Negative for ear discharge and nosebleeds.    Eyes: Negative for discharge and redness.   Cardiovascular: Negative for chest pain, orthopnea, palpitations, paroxysmal nocturnal dyspnea and syncope.   Respiratory: Positive for shortness of breath. Negative for cough and wheezing.    Endocrine: Negative for heat intolerance.   Skin: Negative for rash.   Musculoskeletal: Negative for arthritis and myalgias.   Gastrointestinal: Negative for abdominal pain, melena, nausea and vomiting.   Genitourinary: Negative for dysuria and hematuria.   Neurological: Negative for dizziness, light-headedness, numbness and tremors.   Psychiatric/Behavioral: Negative for depression. The patient is not nervous/anxious.          Vital Signs  Vitals:    11/17/20 2014 11/18/20 0335 11/18/20 0820 11/18/20 1117   BP: 116/76 118/72 118/85 117/85   BP Location:       Patient Position:       Pulse: 96 91 91 92   Resp: 22 24     Temp: 98.7 °F (37.1 °C) 99 °F (37.2 °C)  98.2 °F (36.8 °C)   TempSrc:  Oral  Oral   SpO2: 100% 94%     Weight:  102 kg (225 lb 5 oz)     Height:         Wt Readings from Last 1 Encounters:   11/18/20 102 kg (225 lb 5 oz)       Intake/Output Summary (Last 24 hours) at 11/18/2020 1739  Last data filed at 11/18/2020 1251  Gross per 24 hour   Intake 1200 ml   Output 600 ml   Net 600 ml     Physical Exam:  Constitutional:       Appearance: Well-developed.   Eyes:      General: No scleral icterus.        Right eye: No discharge.   HENT:      Head: Normocephalic and atraumatic.   Neck:         Thyroid: No thyromegaly.      Lymphadenopathy: No cervical adenopathy.   Pulmonary:      Effort: Pulmonary effort is normal. No respiratory distress.      Breath sounds: Normal breath sounds. No wheezing. No rales.   Cardiovascular:      Normal rate. Regular rhythm.      No gallop.   Edema:     Peripheral edema absent.   Abdominal:      Tenderness: There is no abdominal tenderness.   Skin:     Findings: No erythema or rash.   Neurological:      Mental Status: Alert and oriented to person, place, and time.         Results Review:   Lab Results (last 24 hours)     Procedure Component Value Units Date/Time    Comprehensive Metabolic Panel [[id number]]  (Abnormal) Collected: 11/18/20 0427    Specimen: Blood Updated: 11/18/20 0614     Glucose 117 mg/dL      BUN 23 mg/dL      Creatinine 1.16 mg/dL      Sodium 135 mmol/L      Potassium 4.5 mmol/L      Chloride 99 mmol/L      CO2 26.0 mmol/L      Calcium 8.3 mg/dL      Total Protein 6.1 g/dL      Albumin 3.20 g/dL      ALT (SGPT) 223 U/L      AST (SGOT) 57 U/L      Alkaline Phosphatase 135 U/L      Total Bilirubin 0.4 mg/dL      eGFR Non African Amer 68 mL/min/1.73      Globulin 2.9 gm/dL      A/G Ratio 1.1 g/dL      BUN/Creatinine Ratio 19.8     Anion Gap 10.0 mmol/L     Narrative:      GFR Normal >60  Chronic Kidney Disease <60  Kidney Failure <15      Magnesium [[id number]]  (Normal) Collected: 11/18/20 0427    Specimen: Blood Updated: 11/18/20 0614     Magnesium 1.9 mg/dL     CBC & Differential [[id number]]  (Abnormal) Collected: 11/18/20 0427    Specimen: Blood Updated: 11/18/20 0552    Narrative:      The following orders were created for panel order CBC & Differential.  Procedure                               Abnormality         Status                     ---------                               -----------         ------                     CBC Auto Differential[[id number]]        Abnormal            Final result                 Please view results for these tests  on the individual orders.    CBC Auto Differential [[id number]]  (Abnormal) Collected: 11/18/20 0427    Specimen: Blood Updated: 11/18/20 0552     WBC 8.60 10*3/mm3      RBC 4.54 10*6/mm3      Hemoglobin 10.9 g/dL      Hematocrit 35.1 %      MCV 77.2 fL      MCH 24.0 pg      MCHC 31.1 g/dL      RDW 17.5 %      RDW-SD 47.3 fl      MPV 8.1 fL      Platelets 302 10*3/mm3      Neutrophil % 54.0 %      Lymphocyte % 26.4 %      Monocyte % 16.8 %      Eosinophil % 2.0 %      Basophil % 0.8 %      Neutrophils, Absolute 4.60 10*3/mm3      Lymphocytes, Absolute 2.30 10*3/mm3      Monocytes, Absolute 1.50 10*3/mm3      Eosinophils, Absolute 0.20 10*3/mm3      Basophils, Absolute 0.10 10*3/mm3      nRBC 0.5 /100 WBC     Blood Culture - Blood, Arm, Right [[id number]] Collected: 11/15/20 0127    Specimen: Blood from Arm, Right Updated: 11/18/20 0145     Blood Culture No growth at 3 days    Blood Culture - Blood, Arm, Left [[id number]] Collected: 11/15/20 0128    Specimen: Blood from Arm, Left Updated: 11/18/20 0145     Blood Culture No growth at 3 days    BNP [[id number]]  (Abnormal) Collected: 11/17/20 0842    Specimen: Blood Updated: 11/17/20 1902     proBNP 2,548.0 pg/mL     Narrative:      Among patients with dyspnea, NT-proBNP is highly sensitive for the detection of acute congestive heart failure. In addition NT-proBNP of <300 pg/ml effectively rules out acute congestive heart failure with 99% negative predictive value.    Results may be falsely decreased if patient taking Biotin.          Imaging Results (Last 72 Hours)     ** No results found for the last 72 hours. **        ECG/EMG Results (most recent)     Procedure Component Value Units Date/Time    ECG 12 Lead [[id number]] Collected: 11/14/20 2356     Updated: 11/14/20 2357     QT Interval 372 ms     Narrative:      HEART RATE= 97  bpm  RR Interval= 616  ms  NE Interval= 186  ms  P Horizontal Axis=   deg  P Front Axis= 66  deg  QRSD Interval= 96  ms  QT Interval= 372   ms  QRS Axis= -14  deg  T Wave Axis= -88  deg  - ABNORMAL ECG -  Sinus rhythm  Probable left atrial enlargement  Left ventricular hypertrophy  Anterior infarct, old  Nonspecific T abnormalities, lateral leads  When compared with ECG of 14-Nov-2020 18:34:37,  Significant repolarization change  Significant axis, voltage or hypertrophy change  Electronically Signed By:   Date and Time of Study: 2020-11-14 23:56:25    ECG 12 Lead [[id number]] Collected: 11/14/20 2356     Updated: 11/15/20 0511     QT Interval 372 ms     Narrative:      HEART RATE= 97  bpm  RR Interval= 616  ms  FL Interval= 186  ms  P Horizontal Axis=   deg  P Front Axis= 66  deg  QRSD Interval= 96  ms  QT Interval= 372  ms  QRS Axis= -14  deg  T Wave Axis= -88  deg  - ABNORMAL ECG -  Sinus rhythm  Probable left atrial enlargement  Left ventricular hypertrophy  Anterior infarct, old  Nonspecific T abnormalities, lateral leads  When compared with ECG of 14-Nov-2020 18:34:37,  Significant repolarization change  Significant axis, voltage or hypertrophy change  Electronically Signed By:   Date and Time of Study: 2020-11-14 23:56:25    Adult Transthoracic Echo Complete W/ Cont if Necessary Per Protocol [[id number]] Collected: 11/15/20 0936     Updated: 11/16/20 0949     BSA 2.3 m^2      RVIDd 3.5 cm      IVSd 1.3 cm      LVIDd 7.0 cm      LVIDs 6.6 cm      LVPWd 0.74 cm      IVS/LVPW 1.7     FS 4.9 %      EDV(Teich) 254.1 ml      ESV(Teich) 226.7 ml      EF(Teich) 10.8 %      EDV(cubed) 340.6 ml      ESV(cubed) 292.8 ml      EF(cubed) 14.0 %      LV mass(C)d 323.6 grams      LV mass(C)dI 143.3 grams/m^2      SV(Teich) 27.4 ml      SI(Teich) 12.1 ml/m^2      SV(cubed) 47.8 ml      SI(cubed) 21.2 ml/m^2      Ao root diam 3.8 cm      Ao root area 11.0 cm^2      ACS 2.5 cm      asc Aorta Diam 2.6 cm      LVOT diam 2.3 cm      LVOT area 4.3 cm^2      RVOT diam 2.7 cm      RVOT area 5.7 cm^2      EDV(MOD-sp4) 205.2 ml      ESV(MOD-sp4) 180.8 ml      EF(MOD-sp4) 11.9  %      SV(MOD-sp4) 24.5 ml      SI(MOD-sp4) 10.8 ml/m^2      Ao root area (BSA corrected) 1.7     LV Sims Vol (BSA corrected) 90.9 ml/m^2      LV Sys Vol (BSA corrected) 80.1 ml/m^2      Aortic R-R 0.65 sec      Aortic HR 92.7 BPM      MV E max osmar 84.0 cm/sec      MV A max osmar 64.1 cm/sec      MV E/A 1.3     MV V2 max 99.3 cm/sec      MV max PG 3.9 mmHg      MV V2 mean 55.1 cm/sec      MV mean PG 1.5 mmHg      MV V2 VTI 11.7 cm      MVA(VTI) 4.4 cm^2      MV dec slope 997.9 cm/sec^2      MV dec time 0.08 sec      Ao pk osmar 90.4 cm/sec      Ao max PG 3.3 mmHg      Ao max PG (full) 0.51 mmHg      Ao V2 mean 63.6 cm/sec      Ao mean PG 1.8 mmHg      Ao mean PG (full) 0.55 mmHg      Ao V2 VTI 14.8 cm      MAXIM(I,A) 3.5 cm^2      MAXIM(I,D) 3.5 cm^2      MAXIM(V,A) 3.9 cm^2      MAXIM(V,D) 3.9 cm^2      LV V1 max PG 2.8 mmHg      LV V1 mean PG 1.3 mmHg      LV V1 max 83.1 cm/sec      LV V1 mean 50.7 cm/sec      LV V1 VTI 12.2 cm      CO(Ao) 15.1 l/min      CI(Ao) 6.7 l/min/m^2      SV(Ao) 163.2 ml      SI(Ao) 72.3 ml/m^2      CO(LVOT) 4.8 l/min      CI(LVOT) 2.1 l/min/m^2      SV(LVOT) 51.9 ml      SV(RVOT) 55.4 ml      SI(LVOT) 23.0 ml/m^2      PA V2 max 66.6 cm/sec      PA max PG 1.8 mmHg      PA max PG (full) 0.02 mmHg      PA V2 mean 43.8 cm/sec      PA mean PG 0.92 mmHg      PA mean PG (full) 0.19 mmHg      PA V2 VTI 9.2 cm      PVA(I,A) 6.0 cm^2      BH CV ECHO PETER - PVA(I,D) 6.0 cm^2       CV ECHO PETER - PVA(V,A) 5.7 cm^2       CV ECHO PETER - PVA(V,D) 5.7 cm^2      PA acc time 0.09 sec      RV V1 max PG 1.8 mmHg      RV V1 mean PG 0.72 mmHg      RV V1 max 66.3 cm/sec      RV V1 mean 37.5 cm/sec      RV V1 VTI 9.7 cm      TR max osmar 249.8 cm/sec      RVSP(TR) 28.0 mmHg      RAP systole 3.0 mmHg      PA pr(Accel) 39.6 mmHg      Pulm Sys Osmar 35.4 cm/sec      Pulm Sims Osmar 65.2 cm/sec      Pulm S/D 0.54     Qp/Qs 1.1      CV ECHO PETER - BZI_BMI 29.6 kilograms/m^2       CV ECHO PETER - BSA(HAYCOCK) 2.3 m^2       CV  ECHO PETER - BZI_METRIC_WEIGHT 101.6 kg       CV ECHO PETER - BZI_METRIC_HEIGHT 185.4 cm      EF(MOD-bp) 12.0 %      LA dimension(2D) 5.3 cm     Narrative:      · Left ventricular systolic function is severely decreased.  · Left ventricular ejection fraction is 10 to 15%  · Akinetic septum, inferior wall and apex is noted  · The left ventricular cavity is mildly dilated.  · Left ventricular diastolic function was normal.  · Mildly reduced right ventricular systolic function noted.  · Mild mitral valve regurgitation is present  · Mild tricuspid valve regurgitation is present. Calculated right   ventricular systolic pressure from tricuspid regurgitation is 28.0 mmHg.       ECG 12 Lead [[id number]] Collected: 11/14/20 1834     Updated: 11/17/20 1835     QT Interval 421 ms     Narrative:      HEART RATE= 95  bpm  RR Interval= 636  ms  AR Interval= 191  ms  P Horizontal Axis= -21  deg  P Front Axis= 43  deg  QRSD Interval= 92  ms  QT Interval= 421  ms  QRS Axis= 194  deg  T Wave Axis= -60  deg  - ABNORMAL ECG -  Sinus rhythm  LAE, consider biatrial enlargement  Probable RVH w/ secondary repol abnormality  Inferior infarct, age indeterminate  Borderline ST elevation, anterior leads  Prolonged QT interval  When compared with ECG of 24-Mar-2020 10:07:43,  New or worsened ischemia or infarction  Significant repolarization change  Significant axis, voltage or hypertrophy change  Electronically Signed By: Richard Diaz (SHRUTHI) 17-Nov-2020 18:24:18  Date and Time of Study: 2020-11-14 18:34:37        CBC    Results from last 7 days   Lab Units 11/18/20  0427 11/17/20  0535 11/16/20  0320 11/15/20  0129 11/14/20  1919   WBC 10*3/mm3 8.60 9.70 10.20 12.20* 14.60*   HEMOGLOBIN g/dL 10.9* 10.8* 10.4* 10.9* 12.2*   PLATELETS 10*3/mm3 302 242 282 273 375     BMP   Results from last 7 days   Lab Units 11/18/20 0427 11/17/20  0842 11/17/20 0535 11/16/20 0320 11/15/20  0129 11/14/20  2136 11/14/20  1919   SODIUM mmol/L 135* 134*  --   134* 133*  --  128*   POTASSIUM mmol/L 4.5 4.4  --  4.1 4.7 5.1 5.3*   CHLORIDE mmol/L 99 100  --  98 100  --  97*   CO2 mmol/L 26.0 27.0  --  27.0 21.0*  --  18.0*   BUN mg/dL 23* 20  --  27* 39*  --  37*   CREATININE mg/dL 1.16 0.98  --  1.07 1.26  --  1.33*   GLUCOSE mg/dL 117* 101*  --  129* 190*  --  122*   MAGNESIUM mg/dL 1.9  --  1.8 2.1 1.8  --   --      CMP   Results from last 7 days   Lab Units 11/18/20  0427 11/17/20  0842 11/16/20  0320 11/15/20  0129 11/14/20  2136 11/14/20  1919   SODIUM mmol/L 135* 134* 134* 133*  --  128*   POTASSIUM mmol/L 4.5 4.4 4.1 4.7 5.1 5.3*   CHLORIDE mmol/L 99 100 98 100  --  97*   CO2 mmol/L 26.0 27.0 27.0 21.0*  --  18.0*   BUN mg/dL 23* 20 27* 39*  --  37*   CREATININE mg/dL 1.16 0.98 1.07 1.26  --  1.33*   GLUCOSE mg/dL 117* 101* 129* 190*  --  122*   ALBUMIN g/dL 3.20* 3.10* 2.90* 3.00*  --  3.00*   BILIRUBIN mg/dL 0.4 0.4 0.4 0.4  --  0.6   ALK PHOS U/L 135* 140* 145* 172*  --  197*   AST (SGOT) U/L 57* 86* 176* 404*  --  480*   ALT (SGPT) U/L 223* 279* 393* 584*  --  642*     Cardiac Studies:  Echo-   Results for orders placed during the hospital encounter of 11/14/20   Adult Transthoracic Echo Complete W/ Cont if Necessary Per Protocol    Narrative · Left ventricular systolic function is severely decreased.  · Left ventricular ejection fraction is 10 to 15%  · Akinetic septum, inferior wall and apex is noted  · The left ventricular cavity is mildly dilated.  · Left ventricular diastolic function was normal.  · Mildly reduced right ventricular systolic function noted.  · Mild mitral valve regurgitation is present  · Mild tricuspid valve regurgitation is present. Calculated right   ventricular systolic pressure from tricuspid regurgitation is 28.0 mmHg.        Stress Myoview-  Cath-      Medication Review:   Scheduled Meds:[MAR Hold] aspirin, 81 mg, Oral, Daily  carvedilol, 12.5 mg, Oral, BID With Meals  [MAR Hold] clopidogrel, 75 mg, Oral, Daily  [MAR Hold]  enoxaparin, 40 mg, Subcutaneous, Q24H  [MAR Hold] ferrous sulfate, 324 mg, Oral, Daily With Breakfast  [MAR Hold] furosemide, 40 mg, Intravenous, BID  [MAR Hold] isosorbide mononitrate, 30 mg, Oral, Q24H  lisinopril, 5 mg, Oral, Q24H  [MAR Hold] QUEtiapine, 50 mg, Oral, Nightly  [MAR Hold] sodium chloride, 10 mL, Intravenous, Q12H  [MAR Hold] traZODone, 100 mg, Oral, Nightly      Continuous Infusions:   PRN Meds:.[MAR Hold] Calcium Gluconate-NaCl **AND** [MAR Hold] calcium gluconate **AND** Calcium, Ionized  •  [MAR Hold] docusate sodium  •  [MAR Hold] hydrOXYzine  •  [MAR Hold] influenza vaccine  •  [MAR Hold] loperamide  •  [MAR Hold] LORazepam **OR** [MAR Hold] LORazepam **OR** [MAR Hold] LORazepam **OR** [MAR Hold] LORazepam **OR** [MAR Hold] LORazepam **OR** [MAR Hold] LORazepam  •  [MAR Hold] magnesium sulfate **OR** [MAR Hold] magnesium sulfate **OR** [MAR Hold] magnesium sulfate  •  [MAR Hold] melatonin  •  [MAR Hold] nitroglycerin  •  [MAR Hold] ondansetron **OR** [MAR Hold] ondansetron  •  [MAR Hold] potassium & sodium phosphates **OR** [MAR Hold] potassium & sodium phosphates  •  [MAR Hold] potassium chloride  •  potassium chloride  •  [MAR Hold] sodium chloride  •  [MAR Hold] sodium chloride      Assessment/Plan   Patient Active Problem List   Diagnosis   • Essential hypertension   • Syncope   • NICM (nonischemic cardiomyopathy) (CMS/[facility])   • Chest pain, atypical   • Dyspnea   • Hyponatremia   • CHF (congestive heart failure), NYHA class III, acute on chronic, systolic (CMS/[facility])   • Drug abuse (CMS/[facility])   • Non-ST elevation myocardial infarction (NSTEMI) (CMS/[facility])   • Precordial pain   • Chest pain   • DENNIS (acute kidney injury) (CMS/[facility])   • Hyperkalemia   • Transaminitis   • Anemia     Plan:    Patient underwent cardiac catheterization and it showed patent stent in LAD.  I would continue current treatment.  Continue aspirin and Plavix.  Patient is advised to be compliant with the medications.  Patient  can be discharged from cardiac standpoint either tonight or tomorrow    Solomon CRISTOBAL Lowe MD  11/18/20  17:39 EST

## 2020-11-18 NOTE — PLAN OF CARE
Goal Outcome Evaluation:  Plan of Care Reviewed With: patient  Progress: no change  Outcome Summary: Patinent has been awake most of this shift and wants to continually eat.  Patient very demanding and request continuous snacks even though he had eaten all his dinner, multiple sandwiches, ice creams and puddings.  When pt told there were no more sherberts he became irritated and stating staff was just lazy.  No other issues throughout the night.  Will continue to monitor.

## 2020-11-19 NOTE — PROGRESS NOTES
Continued Stay Note  MISTY Sousa     Patient Name: Asad Bethea  MRN: [id number]  Today's Date: 11/19/2020    Admit Date: 11/14/2020    Discharge Plan     Row Name 11/19/20 1604       Plan    Plan Comments  MITCH met with pt in the room wearing appropriate PPE (mask, goggles, standing 6ft away) SW informed pt that [facility] will not accepted due to pt not being able to walk long distances. MITCH informed pt about [facility] and they currently have beds available and could keep him for up to nine months. Pt stated that he is unsure if he wants to go to rehab at this time and is currently looking into the [facility] SW explained to pt that he could go to the [facility] after drug treatment because that would need to be addressed first. Pt stated that he needed more time to think about it. MITCH followed up with pt who reported that he is not feeling well today and would like to discuss rehab on 11/20/2020.        Discharge Codes    No documentation.       Expected Discharge Date and Time     Expected Discharge Date Expected Discharge Time    Nov 19, 2020         Nick HENDRICKSON   Cell: [phone]  Office: [phone]  Fax: [phone]

## 2020-11-19 NOTE — NURSING NOTE
Magnesium and Potassium level within normal range-no further orders from cardiology at this time. Will continue to monitor.

## 2020-11-19 NOTE — DISCHARGE PLACEMENT REQUEST
"Listed below is the cardiology recommendation and heart cath report. Patient is medically ready for discharge. Please advise on acceptance.    Nick Montaño MSW  Ph# [phone]  Fax# [phone]    Asad Sousa (44 y.o. Male)     Date of Birth Social Security Number Address Home Phone MRN    1975  [address] [facility] [place] [address] [phone] [id number]    Latter-day Marital Status          Religion        Admission Date Admission Type Admitting Provider Attending Provider Department, Room/Bed    11/14/20 Emergency Zara Thibodeaux, Zara Grant DO [facility] 2C MEDICAL INPATIENT, 242/1    Discharge Date Discharge Disposition Discharge Destination                       Attending Provider: Zara Thibodeaux DO    Allergies: No Known Allergies    Isolation: None   Infection: None   Code Status: CPR    Ht: 185.4 cm (73\")   Wt: 102 kg (224 lb 3.3 oz)    Admission Cmt: None   Principal Problem: None                Active Insurance as of 11/14/2020     Primary Coverage     Payor Plan Insurance Group Employer/Plan Group    [place] [facility] [facility] PLAN      Payor Plan Address Payor Plan Phone Number Payor Plan Fax Number Effective Dates    [address] [phone]  7/8/2019 - None Entered    [place] [id number]       Subscriber Name Subscriber Birth Date Member ID       ASAD SOUSA 1975 [id number]                 Emergency Contacts      (Rel.) Home Phone Work Phone Mobile Phone    ADRIÁN COLBY (Other) -- -- [phone]        [facility] CATH LAB  [address] [facility] [place] [address] [phone]             Asad Sousa  Cardiac Catheterization/Vascular Study  Order# [id number]  Reading physician: Solomon Lowe MD Ordering physician: Kathryn Peter APRN Study date: 11/18/20    Procedures    Cardiac Catheterization/Vascular Study      Patient Information    Patient Name   Asad Sousa MRN   [id number] " Sex   Male  (Age)   1975 (44 y.o.)   Race Ethnicity Encounter Category   White or  Not  or  Emergency    Printable Vessel Diagram    Printable Vessel Diagram      Physicians    Panel Physicians Referring Physician Case Authorizing Physician   Solomon Lowe MD (Primary) Shelly Ribeiro APRN Hankins, Kristie, APRN   Indications    CHF (congestive heart failure), NYHA class III, acute on chronic, systolic (CMS/formerly [facility]) [I50.23 (ICD-10-CM)]   Pre Procedure Diagnosis    HF       Conclusion    CARDIAC CATHETERIZATION REPORT        DATE OF PROCEDURE:  2020     INDICATION FOR PROCEDURE:     Unstable angina  Acute on chronic congestive heart failure  Dilated cardiomyopathy  S/p LAD stenting     PROCEDURE PERFORMED:     Left heart catheterization  coronary angiography  left ventriculography     PROCEDURE COMMENTS:      After informed consent was obtained, the patient was prepped and draped in the usual sterile manner.  Mild to moderate sedation was administered.  Right femoral artery was accessed without difficulty and 6 Micronesian arterial sheath was inserted.  Sheath was flushed with heparinized saline.     Using 6 Micronesian Petty catheters, first left coronary artery and the right coronary was electively engaged and appropriate views were taken.  A 6 Micronesian JR4 catheter was used to cross aortic valve and left heart catheterization was performed.  Left ventriculography was done in a OSHEA view     Patient tolerated the procedure well.     FINDINGS:     1. HEMODYNAMICS:       Aortic pressure: 98/60 mmHg     LVEDP: 10 to 12 mmHg     Gradient across aortic valve on pullback: No gradient across aortic valve        2. LEFT VENTRICULOGRAPHY: 10 to 15%        3. CORONARY ANGIOGRAPHY:            A: Left main coronary artery: Normal            B: Left anterior descending artery: Patent stent in LAD            C: Left circumflex coronary artery: Normal            D: Right coronary artery: Dominant and  normal     SUMMARY:      Patent stent in LAD  Severe LV dysfunction     RECOMMENDATIONS:      Medical treatment         Time Under Physician Observation    Name Panel   oSlomon Lowe MD Panel 1   Role: Primary   Time Period: 11/18/2020 1712 - 11/18/2020 1726    Total Sedation Time    Under my direct supervision, intravenous moderate sedation was administered during the course of this procedure with continuous monitoring of hemodynamic parameters. Total estimated time of moderate sedation was 32 minutes 21 seconds      Vessel Access    A 6 fr sheath was successfully inserted into the right femoral artery. Sheath insertion not delayed.    Sheath Disposition         Radiation     Event   5:33 PM Radiation Tracking   Details: Panel 1: Solomon Lowe MD   Cumulative Air Kerma (mGy) = 342.000; Fluoro Time (min) = 1.9; DAP (mGy-cm2) = 01977.000   User: CD      Total Contrast    Medication Name Total Dose   iopamidol (ISOVUE-370) 76 % injection 80 mL      Patient Hx Of Height, Weight, and Vitals    Height Weight BSA (Calculated - sq m) BMI (kg/m2) Pulse BP    102 kg (225 lb 5 oz)   92 117/85   Admission Information    Admission Date/Time Discharge Date/Time Room/Bed   11/14/20  07:02 PM  242/1   Medications  (Filter: Administrations occurring from 11/18/20 0000 to 11/18/20 1739)  Medication Rate/Dose/Volume Action  Date Time    fentaNYL citrate (PF) (SUBLIMAZE) injection (mcg) 25 mcg Given 11/18/20 1658    Total dose as of 11/18/20 1739         25 mcg         midazolam (VERSED) injection (mg) 1 mg Given 11/18/20 1659    Total dose as of 11/18/20 1739         1 mg         lidocaine (XYLOCAINE) 2% injection (mL) 10 mL Given 11/18/20 1718    Total dose as of 11/18/20 1739         10 mL         iopamidol (ISOVUE-370) 76 % injection (mL) 80 mL Given 11/18/20 1733    Total dose as of 11/18/20 1739         80 mL         aspirin chewable tablet 81 mg (mg) 81 mg Given 11/18/20 0820    Dosing weight:  102 kg *Not included in total MAR  Hold 1649    Total dose as of 11/18/20 1739         81 mg         *Administration not included in total         carvedilol (COREG) tablet 12.5 mg (mg) 12.5 mg Given 11/18/20 0821    Dosing weight:  102 kg         Total dose as of 11/18/20 1739         12.5 mg         clopidogrel (PLAVIX) tablet 75 mg (mg) 75 mg Given 11/18/20 0820    Dosing weight:  102 kg *Not included in total MAR Hold 1649    Total dose as of 11/18/20 1739         75 mg         *Administration not included in total         docusate sodium (COLACE) capsule 100 mg (mg) *Not included in total MAR Hold 11/18/20 1649    Dosing weight:  86.2 kg         Total dose as of 11/18/20 1739         Cannot be calculated         enoxaparin (LOVENOX) syringe 40 mg (mg) *40 mg Not Given 11/18/20 1543    Dosing weight:  86.2 kg *Not included in total MAR Hold 1649    Total dose as of 11/18/20 1739         Cannot be calculated         ferrous sulfate EC tablet 324 mg (mg) 324 mg Given 11/18/20 0821    Dosing weight:  102 kg *Not included in total MAR Hold 1649    Total dose as of 11/18/20 1739         324 mg         *Administration not included in total         furosemide (LASIX) injection 40 mg (mg) 40 mg Given 11/18/20 0821    Dosing weight:  102 kg *Not included in total MAR Hold 1649    Total dose as of 11/18/20 1739         40 mg         *Administration not included in total         hydrOXYzine (ATARAX) tablet 50 mg (mg) *Not included in total MAR Hold 11/18/20 1649    Dosing weight:  102 kg         Total dose as of 11/18/20 1739         Cannot be calculated         influenza vac split quad (FLUZONE,FLUARIX,AFLURIA,FLULAVAL) injection 0.5 mL (mL) *Not included in total MAR Hold 11/18/20 1649    Dosing weight:  102 kg         Total dose as of 11/18/20 1739         Cannot be calculated         isosorbide mononitrate (IMDUR) 24 hr tablet 30 mg (mg) 30 mg Given 11/18/20 0820    Dosing weight:  102 kg *Not included in total MAR Hold 1649    Total dose as of 11/18/20  1739         30 mg         *Administration not included in total         lisinopril (PRINIVIL,ZESTRIL) tablet 5 mg (mg) 5 mg Given 11/18/20 0821    Dosing weight:  102 kg         Total dose as of 11/18/20 1739         5 mg         loperamide (IMODIUM) capsule 4 mg (mg) *Not included in total MAR Hold 11/18/20 1649    Dosing weight:  102 kg         Total dose as of 11/18/20 1739         Cannot be calculated         melatonin tablet 5 mg (mg) *Not included in total MAR Hold 11/18/20 1649    Dosing weight:  86.2 kg         Total dose as of 11/18/20 1739         Cannot be calculated         nitroglycerin (NITROSTAT) SL tablet 0.4 mg (mg) *Not included in total MAR Hold 11/18/20 1649    Dosing weight:  86.2 kg         Total dose as of 11/18/20 1739         Cannot be calculated         potassium chloride (K-DUR,KLOR-CON) CR tablet 40 mEq (mEq) *Not included in total MAR Hold 11/18/20 1649    Dosing weight:  86.2 kg         Total dose as of 11/18/20 1739         Cannot be calculated         QUEtiapine (SEROquel) tablet 50 mg (mg) *Not included in total MAR Hold 11/18/20 1649    Dosing weight:  102 kg         Total dose as of 11/18/20 1739         Cannot be calculated         sodium chloride 0.9 % flush 10 mL (mL) *Not included in total MAR Hold 11/18/20 1649    Dosing weight:  86.2 kg         Total dose as of 11/18/20 1739         Cannot be calculated         sodium chloride 0.9 % flush 10 mL (mL) 10 mL Given 11/18/20 0821    Dosing weight:  86.2 kg *Not included in total MAR Hold 1649    Total dose as of 11/18/20 1739         10 mL         *Administration not included in total         sodium chloride 0.9 % flush 10 mL (mL) *Not included in total MAR Hold 11/18/20 1649    Dosing weight:  86.2 kg         Total dose as of 11/18/20 1739         Cannot be calculated         traZODone (DESYREL) tablet 100 mg (mg) *Not included in total MAR Hold 11/18/20 1649    Dosing weight:  102 kg         Total dose as of 11/18/20 1739          Cannot be calculated         Supplies    Name ID Temporary Type Charge Code Description Charge Code Quantity   TRY SKINPREP SCRB CHG 11585 No Supply   1   CATH DIAG IMPULSE PIG .056 6F 110CM 99969 No Diagnostic Catheter HC OR SUPPLY SHELL 272/NO CPT 545734 1   GW PTFE EMERALD HEPCOAT FC J TIP STD .035 3MM 150CM 79692 No Guidewire HC OR SUPPLY SHELL 272/ 904593 1   PK TRY HEART CATH 50 73818 No Supply   1   CATH DIAG IMPULSE FL4 6F 100CM 53760 No Diagnostic Catheter HC OR SUPPLY SHELL 272/NO CPT 100578 1   CATH DIAG IMPULSE FR4 6F 100CM 88118 No Diagnostic Catheter HC OR SUPPLY SHELL 272/NO CPT 311346 1   SHEATH INTRO PINN CORNRY .038 6F10CM 84734 No Supply HC OR SUPPLY SHELL 272/ 872192 1   Signed    Electronically signed by Solomon Lowe MD on 11/18/20 at 1739 EST    Link to Procedure Log    Procedure Log      Order-Level Documents:    Scan on 11/18/2020 1717 by [facility]: CARDIAC CATH HEMO DATA - SCAN         Results Routing Tracking    View Results Routing Information   Order Report    Order Details       Physician Progress Notes (last 24 hours) (Notes from 11/18/20 0903 through 11/19/20 0903)      Solomon Lowe MD at 11/18/20 1739             LOS: 0 days   Admiting Physician- Zara Thibodeaux DO    Reason For Followup:    Chest pain  Congestive heart failure  Acute on chronic systolic heart failure  Ischemic cardiomyopathy  Noncompliance    Subjective     Patient is feeling much better.  No chest pain    Objective     Hemodynamics are stable blood pressure is slightly on the lower side    Review of Systems:   Review of Systems   Constitution: Negative for chills and fever.   HENT: Negative for ear discharge and nosebleeds.    Eyes: Negative for discharge and redness.   Cardiovascular: Negative for chest pain, orthopnea, palpitations, paroxysmal nocturnal dyspnea and syncope.   Respiratory: Positive for shortness of breath. Negative for cough and wheezing.    Endocrine: Negative for heat  intolerance.   Skin: Negative for rash.   Musculoskeletal: Negative for arthritis and myalgias.   Gastrointestinal: Negative for abdominal pain, melena, nausea and vomiting.   Genitourinary: Negative for dysuria and hematuria.   Neurological: Negative for dizziness, light-headedness, numbness and tremors.   Psychiatric/Behavioral: Negative for depression. The patient is not nervous/anxious.          Vital Signs  Vitals:    11/17/20 2014 11/18/20 0335 11/18/20 0820 11/18/20 1117   BP: 116/76 118/72 118/85 117/85   BP Location:       Patient Position:       Pulse: 96 91 91 92   Resp: 22 24     Temp: 98.7 °F (37.1 °C) 99 °F (37.2 °C)  98.2 °F (36.8 °C)   TempSrc:  Oral  Oral   SpO2: 100% 94%     Weight:  102 kg (225 lb 5 oz)     Height:         Wt Readings from Last 1 Encounters:   11/18/20 102 kg (225 lb 5 oz)       Intake/Output Summary (Last 24 hours) at 11/18/2020 1739  Last data filed at 11/18/2020 1251  Gross per 24 hour   Intake 1200 ml   Output 600 ml   Net 600 ml     Physical Exam:  Constitutional:       Appearance: Well-developed.   Eyes:      General: No scleral icterus.        Right eye: No discharge.   HENT:      Head: Normocephalic and atraumatic.   Neck:      Thyroid: No thyromegaly.      Lymphadenopathy: No cervical adenopathy.   Pulmonary:      Effort: Pulmonary effort is normal. No respiratory distress.      Breath sounds: Normal breath sounds. No wheezing. No rales.   Cardiovascular:      Normal rate. Regular rhythm.      No gallop.   Edema:     Peripheral edema absent.   Abdominal:      Tenderness: There is no abdominal tenderness.   Skin:     Findings: No erythema or rash.   Neurological:      Mental Status: Alert and oriented to person, place, and time.         Results Review:   Lab Results (last 24 hours)     Procedure Component Value Units Date/Time    Comprehensive Metabolic Panel [[id number]]  (Abnormal) Collected: 11/18/20 0427    Specimen: Blood Updated: 11/18/20 0614     Glucose 117 mg/dL         BUN 23 mg/dL      Creatinine 1.16 mg/dL      Sodium 135 mmol/L      Potassium 4.5 mmol/L      Chloride 99 mmol/L      CO2 26.0 mmol/L      Calcium 8.3 mg/dL      Total Protein 6.1 g/dL      Albumin 3.20 g/dL      ALT (SGPT) 223 U/L      AST (SGOT) 57 U/L      Alkaline Phosphatase 135 U/L      Total Bilirubin 0.4 mg/dL      eGFR Non African Amer 68 mL/min/1.73      Globulin 2.9 gm/dL      A/G Ratio 1.1 g/dL      BUN/Creatinine Ratio 19.8     Anion Gap 10.0 mmol/L     Narrative:      GFR Normal >60  Chronic Kidney Disease <60  Kidney Failure <15      Magnesium [[id number]]  (Normal) Collected: 11/18/20 0427    Specimen: Blood Updated: 11/18/20 0614     Magnesium 1.9 mg/dL     CBC & Differential [[id number]]  (Abnormal) Collected: 11/18/20 0427    Specimen: Blood Updated: 11/18/20 0552    Narrative:      The following orders were created for panel order CBC & Differential.  Procedure                               Abnormality         Status                     ---------                               -----------         ------                     CBC Auto Differential[[id number]]        Abnormal            Final result                 Please view results for these tests on the individual orders.    CBC Auto Differential [[id number]]  (Abnormal) Collected: 11/18/20 0427    Specimen: Blood Updated: 11/18/20 0552     WBC 8.60 10*3/mm3      RBC 4.54 10*6/mm3      Hemoglobin 10.9 g/dL      Hematocrit 35.1 %      MCV 77.2 fL      MCH 24.0 pg      MCHC 31.1 g/dL      RDW 17.5 %      RDW-SD 47.3 fl      MPV 8.1 fL      Platelets 302 10*3/mm3      Neutrophil % 54.0 %      Lymphocyte % 26.4 %      Monocyte % 16.8 %      Eosinophil % 2.0 %      Basophil % 0.8 %      Neutrophils, Absolute 4.60 10*3/mm3      Lymphocytes, Absolute 2.30 10*3/mm3      Monocytes, Absolute 1.50 10*3/mm3      Eosinophils, Absolute 0.20 10*3/mm3      Basophils, Absolute 0.10 10*3/mm3      nRBC 0.5 /100 WBC     Blood Culture - Blood, Arm, Right [[id number]]  Collected: 11/15/20 0127    Specimen: Blood from Arm, Right Updated: 11/18/20 0145     Blood Culture No growth at 3 days    Blood Culture - Blood, Arm, Left [[id number]] Collected: 11/15/20 0128    Specimen: Blood from Arm, Left Updated: 11/18/20 0145     Blood Culture No growth at 3 days    BNP [[id number]]  (Abnormal) Collected: 11/17/20 0842    Specimen: Blood Updated: 11/17/20 1902     proBNP 2,548.0 pg/mL     Narrative:      Among patients with dyspnea, NT-proBNP is highly sensitive for the detection of acute congestive heart failure. In addition NT-proBNP of <300 pg/ml effectively rules out acute congestive heart failure with 99% negative predictive value.    Results may be falsely decreased if patient taking Biotin.          Imaging Results (Last 72 Hours)     ** No results found for the last 72 hours. **        ECG/EMG Results (most recent)     Procedure Component Value Units Date/Time    ECG 12 Lead [[id number]] Collected: 11/14/20 2356     Updated: 11/14/20 2357     QT Interval 372 ms     Narrative:      HEART RATE= 97  bpm  RR Interval= 616  ms  TX Interval= 186  ms  P Horizontal Axis=   deg  P Front Axis= 66  deg  QRSD Interval= 96  ms  QT Interval= 372  ms  QRS Axis= -14  deg  T Wave Axis= -88  deg  - ABNORMAL ECG -  Sinus rhythm  Probable left atrial enlargement  Left ventricular hypertrophy  Anterior infarct, old  Nonspecific T abnormalities, lateral leads  When compared with ECG of 14-Nov-2020 18:34:37,  Significant repolarization change  Significant axis, voltage or hypertrophy change  Electronically Signed By:   Date and Time of Study: 2020-11-14 23:56:25    ECG 12 Lead [[id number]] Collected: 11/14/20 2356     Updated: 11/15/20 0511     QT Interval 372 ms     Narrative:      HEART RATE= 97  bpm  RR Interval= 616  ms  TX Interval= 186  ms  P Horizontal Axis=   deg  P Front Axis= 66  deg  QRSD Interval= 96  ms  QT Interval= 372  ms  QRS Axis= -14  deg  T Wave Axis= -88  deg  - ABNORMAL ECG -  Sinus  rhythm  Probable left atrial enlargement  Left ventricular hypertrophy  Anterior infarct, old  Nonspecific T abnormalities, lateral leads  When compared with ECG of 14-Nov-2020 18:34:37,  Significant repolarization change  Significant axis, voltage or hypertrophy change  Electronically Signed By:   Date and Time of Study: 2020-11-14 23:56:25    Adult Transthoracic Echo Complete W/ Cont if Necessary Per Protocol [[id number]] Collected: 11/15/20 0936     Updated: 11/16/20 0949     BSA 2.3 m^2      RVIDd 3.5 cm      IVSd 1.3 cm      LVIDd 7.0 cm      LVIDs 6.6 cm      LVPWd 0.74 cm      IVS/LVPW 1.7     FS 4.9 %      EDV(Teich) 254.1 ml      ESV(Teich) 226.7 ml      EF(Teich) 10.8 %      EDV(cubed) 340.6 ml      ESV(cubed) 292.8 ml      EF(cubed) 14.0 %      LV mass(C)d 323.6 grams      LV mass(C)dI 143.3 grams/m^2      SV(Teich) 27.4 ml      SI(Teich) 12.1 ml/m^2      SV(cubed) 47.8 ml      SI(cubed) 21.2 ml/m^2      Ao root diam 3.8 cm      Ao root area 11.0 cm^2      ACS 2.5 cm      asc Aorta Diam 2.6 cm      LVOT diam 2.3 cm      LVOT area 4.3 cm^2      RVOT diam 2.7 cm      RVOT area 5.7 cm^2      EDV(MOD-sp4) 205.2 ml      ESV(MOD-sp4) 180.8 ml      EF(MOD-sp4) 11.9 %      SV(MOD-sp4) 24.5 ml      SI(MOD-sp4) 10.8 ml/m^2      Ao root area (BSA corrected) 1.7     LV Sims Vol (BSA corrected) 90.9 ml/m^2      LV Sys Vol (BSA corrected) 80.1 ml/m^2      Aortic R-R 0.65 sec      Aortic HR 92.7 BPM      MV E max anthony 84.0 cm/sec      MV A max anthony 64.1 cm/sec      MV E/A 1.3     MV V2 max 99.3 cm/sec      MV max PG 3.9 mmHg      MV V2 mean 55.1 cm/sec      MV mean PG 1.5 mmHg      MV V2 VTI 11.7 cm      MVA(VTI) 4.4 cm^2      MV dec slope 997.9 cm/sec^2      MV dec time 0.08 sec      Ao pk anthony 90.4 cm/sec      Ao max PG 3.3 mmHg      Ao max PG (full) 0.51 mmHg      Ao V2 mean 63.6 cm/sec      Ao mean PG 1.8 mmHg      Ao mean PG (full) 0.55 mmHg      Ao V2 VTI 14.8 cm      MAXIM(I,A) 3.5 cm^2      MAXIM(I,D) 3.5 cm^2       MAXIM(V,A) 3.9 cm^2      MAXIM(V,D) 3.9 cm^2      LV V1 max PG 2.8 mmHg      LV V1 mean PG 1.3 mmHg      LV V1 max 83.1 cm/sec      LV V1 mean 50.7 cm/sec      LV V1 VTI 12.2 cm      CO(Ao) 15.1 l/min      CI(Ao) 6.7 l/min/m^2      SV(Ao) 163.2 ml      SI(Ao) 72.3 ml/m^2      CO(LVOT) 4.8 l/min      CI(LVOT) 2.1 l/min/m^2      SV(LVOT) 51.9 ml      SV(RVOT) 55.4 ml      SI(LVOT) 23.0 ml/m^2      PA V2 max 66.6 cm/sec      PA max PG 1.8 mmHg      PA max PG (full) 0.02 mmHg      PA V2 mean 43.8 cm/sec      PA mean PG 0.92 mmHg      PA mean PG (full) 0.19 mmHg      PA V2 VTI 9.2 cm      PVA(I,A) 6.0 cm^2       CV ECHO PETER - PVA(I,D) 6.0 cm^2       CV ECHO PETER - PVA(V,A) 5.7 cm^2       CV ECHO PETER - PVA(V,D) 5.7 cm^2      PA acc time 0.09 sec      RV V1 max PG 1.8 mmHg      RV V1 mean PG 0.72 mmHg      RV V1 max 66.3 cm/sec      RV V1 mean 37.5 cm/sec      RV V1 VTI 9.7 cm      TR max osmar 249.8 cm/sec      RVSP(TR) 28.0 mmHg      RAP systole 3.0 mmHg      PA pr(Accel) 39.6 mmHg      Pulm Sys Osmar 35.4 cm/sec      Pulm Sims Osmar 65.2 cm/sec      Pulm S/D 0.54     Qp/Qs 1.1      CV ECHO PETER - BZI_BMI 29.6 kilograms/m^2       CV ECHO PETER - BSA(HAYCOCK) 2.3 m^2       CV ECHO PETER - BZI_METRIC_WEIGHT 101.6 kg       CV ECHO PETER - BZI_METRIC_HEIGHT 185.4 cm      EF(MOD-bp) 12.0 %      LA dimension(2D) 5.3 cm     Narrative:      · Left ventricular systolic function is severely decreased.  · Left ventricular ejection fraction is 10 to 15%  · Akinetic septum, inferior wall and apex is noted  · The left ventricular cavity is mildly dilated.  · Left ventricular diastolic function was normal.  · Mildly reduced right ventricular systolic function noted.  · Mild mitral valve regurgitation is present  · Mild tricuspid valve regurgitation is present. Calculated right   ventricular systolic pressure from tricuspid regurgitation is 28.0 mmHg.       ECG 12 Lead [[id number]] Collected: 11/14/20 1834     Updated: 11/17/20 1835        QT Interval 421 ms     Narrative:      HEART RATE= 95  bpm  RR Interval= 636  ms  OH Interval= 191  ms  P Horizontal Axis= -21  deg  P Front Axis= 43  deg  QRSD Interval= 92  ms  QT Interval= 421  ms  QRS Axis= 194  deg  T Wave Axis= -60  deg  - ABNORMAL ECG -  Sinus rhythm  LAE, consider biatrial enlargement  Probable RVH w/ secondary repol abnormality  Inferior infarct, age indeterminate  Borderline ST elevation, anterior leads  Prolonged QT interval  When compared with ECG of 24-Mar-2020 10:07:43,  New or worsened ischemia or infarction  Significant repolarization change  Significant axis, voltage or hypertrophy change  Electronically Signed By: Richard Diaz (SHRUTHI) 17-Nov-2020 18:24:18  Date and Time of Study: 2020-11-14 18:34:37        CBC    Results from last 7 days   Lab Units 11/18/20  0427 11/17/20  0535 11/16/20  0320 11/15/20  0129 11/14/20  1919   WBC 10*3/mm3 8.60 9.70 10.20 12.20* 14.60*   HEMOGLOBIN g/dL 10.9* 10.8* 10.4* 10.9* 12.2*   PLATELETS 10*3/mm3 302 242 282 273 375     BMP   Results from last 7 days   Lab Units 11/18/20  0427 11/17/20  0842 11/17/20  0535 11/16/20  0320 11/15/20  0129 11/14/20  2136 11/14/20  1919   SODIUM mmol/L 135* 134*  --  134* 133*  --  128*   POTASSIUM mmol/L 4.5 4.4  --  4.1 4.7 5.1 5.3*   CHLORIDE mmol/L 99 100  --  98 100  --  97*   CO2 mmol/L 26.0 27.0  --  27.0 21.0*  --  18.0*   BUN mg/dL 23* 20  --  27* 39*  --  37*   CREATININE mg/dL 1.16 0.98  --  1.07 1.26  --  1.33*   GLUCOSE mg/dL 117* 101*  --  129* 190*  --  122*   MAGNESIUM mg/dL 1.9  --  1.8 2.1 1.8  --   --      CMP   Results from last 7 days   Lab Units 11/18/20 0427 11/17/20 0842 11/16/20  0320 11/15/20  0129 11/14/20  2136 11/14/20  1919   SODIUM mmol/L 135* 134* 134* 133*  --  128*   POTASSIUM mmol/L 4.5 4.4 4.1 4.7 5.1 5.3*   CHLORIDE mmol/L 99 100 98 100  --  97*   CO2 mmol/L 26.0 27.0 27.0 21.0*  --  18.0*   BUN mg/dL 23* 20 27* 39*  --  37*   CREATININE mg/dL 1.16 0.98 1.07 1.26  --   1.33*   GLUCOSE mg/dL 117* 101* 129* 190*  --  122*   ALBUMIN g/dL 3.20* 3.10* 2.90* 3.00*  --  3.00*   BILIRUBIN mg/dL 0.4 0.4 0.4 0.4  --  0.6   ALK PHOS U/L 135* 140* 145* 172*  --  197*   AST (SGOT) U/L 57* 86* 176* 404*  --  480*   ALT (SGPT) U/L 223* 279* 393* 584*  --  642*     Cardiac Studies:  Echo-   Results for orders placed during the hospital encounter of 11/14/20   Adult Transthoracic Echo Complete W/ Cont if Necessary Per Protocol    Narrative · Left ventricular systolic function is severely decreased.  · Left ventricular ejection fraction is 10 to 15%  · Akinetic septum, inferior wall and apex is noted  · The left ventricular cavity is mildly dilated.  · Left ventricular diastolic function was normal.  · Mildly reduced right ventricular systolic function noted.  · Mild mitral valve regurgitation is present  · Mild tricuspid valve regurgitation is present. Calculated right   ventricular systolic pressure from tricuspid regurgitation is 28.0 mmHg.        Stress Myoview-  Cath-      Medication Review:   Scheduled Meds:[MAR Hold] aspirin, 81 mg, Oral, Daily  carvedilol, 12.5 mg, Oral, BID With Meals  [MAR Hold] clopidogrel, 75 mg, Oral, Daily  [MAR Hold] enoxaparin, 40 mg, Subcutaneous, Q24H  [MAR Hold] ferrous sulfate, 324 mg, Oral, Daily With Breakfast  [MAR Hold] furosemide, 40 mg, Intravenous, BID  [MAR Hold] isosorbide mononitrate, 30 mg, Oral, Q24H  lisinopril, 5 mg, Oral, Q24H  [MAR Hold] QUEtiapine, 50 mg, Oral, Nightly  [MAR Hold] sodium chloride, 10 mL, Intravenous, Q12H  [MAR Hold] traZODone, 100 mg, Oral, Nightly      Continuous Infusions:   PRN Meds:.[MAR Hold] Calcium Gluconate-NaCl **AND** [MAR Hold] calcium gluconate **AND** Calcium, Ionized  •  [MAR Hold] docusate sodium  •  [MAR Hold] hydrOXYzine  •  [MAR Hold] influenza vaccine  •  [MAR Hold] loperamide  •  [MAR Hold] LORazepam **OR** [MAR Hold] LORazepam **OR** [MAR Hold] LORazepam **OR** [MAR Hold] LORazepam **OR** [MAR Hold]  LORazepam **OR** [MAR Hold] LORazepam  •  [MAR Hold] magnesium sulfate **OR** [MAR Hold] magnesium sulfate **OR** [MAR Hold] magnesium sulfate  •  [MAR Hold] melatonin  •  [MAR Hold] nitroglycerin  •  [MAR Hold] ondansetron **OR** [MAR Hold] ondansetron  •  [MAR Hold] potassium & sodium phosphates **OR** [MAR Hold] potassium & sodium phosphates  •  [MAR Hold] potassium chloride  •  potassium chloride  •  [MAR Hold] sodium chloride  •  [MAR Hold] sodium chloride      Assessment/Plan   Patient Active Problem List   Diagnosis   • Essential hypertension   • Syncope   • NICM (nonischemic cardiomyopathy) (CMS/[facility])   • Chest pain, atypical   • Dyspnea   • Hyponatremia   • CHF (congestive heart failure), NYHA class III, acute on chronic, systolic (CMS/HCC)   • Drug abuse (CMS/[facility])   • Non-ST elevation myocardial infarction (NSTEMI) (CMS/[facility])   • Precordial pain   • Chest pain   • DENNIS (acute kidney injury) (CMS/[facility])   • Hyperkalemia   • Transaminitis   • Anemia     Plan:    Patient underwent cardiac catheterization and it showed patent stent in LAD.  I would continue current treatment.  Continue aspirin and Plavix.  Patient is advised to be compliant with the medications.  Patient can be discharged from cardiac standpoint either tonight or tomorrow    Solomon REYNOSO. MD Mynor  11/18/20  17:39 EST          Electronically signed by Solomon Lowe MD at 11/18/20 6851     Zara Thibodeaux DO at 11/18/20 1514                [facility] Medicine Services Daily Progress Note      Hospitalist Team  LOS 0 days      Patient Care Team:  Shelly Ribeiro APRN as PCP - General (Nurse Practitioner)    Patient Location: 242/1      Subjective   Subjective     Chief Complaint / Subjective  Chief Complaint   Patient presents with   • Chest Pain       Present on Admission:  • Chest pain  • Essential hypertension  • Drug abuse (CMS/[facility])  • CHF (congestive heart failure), NYHA class III, acute on chronic, systolic (CMS/[facility])  • DENNIS  "(acute kidney injury) (CMS/[facility])  • Hyponatremia  • Hyperkalemia  • Transaminitis  • Anemia      Brief Synopsis of Hospital Course/HPI  Mr. Bethea is a 44 y.o. male with past medical history of hypertension, substance-tobacco/alcohol abuse, heart failure with reduced ejection fraction and anemia who presents to [facility] complaining of chest pain.  Patient reports that he has a 3-day history of constant worsening left-sided chest pain which radiates to his left arm and back.  Pain is described as \"just really hurting\" rated 9/10 at its worst and 8/10 at the time of my exam.  Patient has a history of coronary artery disease underwent cardiac cath at this facility on 3/24/2020 with drug-eluting stent placed in the LAD.  Since that time patient has been minimally compliant with his scheduled medical therapy stating that he currently is taking his Coreg but has not had his Plavix or diuretics in some time.  Some associated orthopnea and PND is noted along with some nausea without vomiting, frequent episodes of tachycardia and palpitations, occasional peripheral edema and a recent subjective fever.  Patient reports that he has relapsed on his substance and alcohol abuse with his most recent use on 11/5/2020 when he states he smoked methamphetamine and drink alcohol.  He denies any history of IV drug use continues to smoke approximately 1 pack of cigarettes per day on average.  Urine output is noted as occurring somewhat less frequently and he does also report some chronic constipation.  Additionally patient notes a previous diagnosis of hepatitis C which he received over 1 year ago and has not been treated for.  He also reports having previous bowel resection.        11/15/20  Continues to report shortness of breath    11/16/20  Patient is complaining of some back pain. Denies any chest pain or sob. Reports his le swelling has improved.        Review of Systems   Cardiovascular: Negative for chest pain and " "palpitations.   Respiratory: Negative for cough and shortness of breath.    Musculoskeletal: Positive for back pain.         Objective   Objective      Vital Signs  Temp:  [98.2 °F (36.8 °C)-99 °F (37.2 °C)] 98.2 °F (36.8 °C)  Heart Rate:  [91-96] 92  Resp:  [22-24] 24  BP: (116-118)/(72-85) 117/85  Oxygen Therapy  SpO2: 94 %  Pulse Oximetry Type: Intermittent  Device (Oxygen Therapy): room air  Flowsheet Rows      First Filed Value   Admission Height  185.4 cm (73\") Documented at 11/14/2020 1752   Admission Weight  86.2 kg (190 lb) Documented at 11/14/2020 1752        Intake & Output (last 3 days)       11/15 0701 - 11/16 0700 11/16 0701 - 11/17 0700 11/17 0701 - 11/18 0700 11/18 0701 - 11/19 0700    P.O. 2060 960 1672 480    Total Intake(mL/kg) 2060 (19.8) 960 (9.1) 1672 (16.4) 480 (4.7)    Urine (mL/kg/hr) 2300 (0.9)   600 (0.7)    Total Output 2300   600    Net -240 +960 +1672 -120            Urine Unmeasured Occurrence  3 x 3 x     Stool Unmeasured Occurrence  2 x 3 x         Lines, Drains & Airways    Active LDAs     Name:   Placement date:   Placement time:   Site:   Days:    Peripheral IV 06/27/19 1800 Right Antecubital   06/27/19    1800    Antecubital   507    Peripheral IV 11/14/20 1913 Left Hand   11/14/20 1913    Hand   1                  Physical Exam:    Physical Exam  Vitals signs reviewed.   Constitutional:       General: He is not in acute distress.     Appearance: He is obese.   HENT:      Head: Normocephalic and atraumatic.      Mouth/Throat:      Mouth: Mucous membranes are moist.   Cardiovascular:      Rate and Rhythm: Normal rate and regular rhythm.   Pulmonary:      Effort: Pulmonary effort is normal. No respiratory distress.      Breath sounds: No wheezing or rales.   Abdominal:      General: Bowel sounds are normal. There is no distension.      Tenderness: There is no abdominal tenderness. There is no guarding.   Musculoskeletal:      Right lower leg: No edema.      Left lower leg: No " edema.   Skin:     General: Skin is warm and dry.      Findings: No rash.   Neurological:      Mental Status: He is alert.           Procedures:              Results Review:     I reviewed the patient's new clinical results.      Lab Results (last 24 hours)     Procedure Component Value Units Date/Time    Comprehensive Metabolic Panel [[id number]]  (Abnormal) Collected: 11/18/20 0427    Specimen: Blood Updated: 11/18/20 0614     Glucose 117 mg/dL      BUN 23 mg/dL      Creatinine 1.16 mg/dL      Sodium 135 mmol/L      Potassium 4.5 mmol/L      Chloride 99 mmol/L      CO2 26.0 mmol/L      Calcium 8.3 mg/dL      Total Protein 6.1 g/dL      Albumin 3.20 g/dL      ALT (SGPT) 223 U/L      AST (SGOT) 57 U/L      Alkaline Phosphatase 135 U/L      Total Bilirubin 0.4 mg/dL      eGFR Non African Amer 68 mL/min/1.73      Globulin 2.9 gm/dL      A/G Ratio 1.1 g/dL      BUN/Creatinine Ratio 19.8     Anion Gap 10.0 mmol/L     Narrative:      GFR Normal >60  Chronic Kidney Disease <60  Kidney Failure <15      Magnesium [[id number]]  (Normal) Collected: 11/18/20 0427    Specimen: Blood Updated: 11/18/20 0614     Magnesium 1.9 mg/dL     CBC & Differential [[id number]]  (Abnormal) Collected: 11/18/20 0427    Specimen: Blood Updated: 11/18/20 0552    Narrative:      The following orders were created for panel order CBC & Differential.  Procedure                               Abnormality         Status                     ---------                               -----------         ------                     CBC Auto Differential[[id number]]        Abnormal            Final result                 Please view results for these tests on the individual orders.    CBC Auto Differential [[id number]]  (Abnormal) Collected: 11/18/20 0427    Specimen: Blood Updated: 11/18/20 0552     WBC 8.60 10*3/mm3      RBC 4.54 10*6/mm3      Hemoglobin 10.9 g/dL      Hematocrit 35.1 %      MCV 77.2 fL      MCH 24.0 pg      MCHC 31.1 g/dL      RDW 17.5 %       RDW-SD 47.3 fl      MPV 8.1 fL      Platelets 302 10*3/mm3      Neutrophil % 54.0 %      Lymphocyte % 26.4 %      Monocyte % 16.8 %      Eosinophil % 2.0 %      Basophil % 0.8 %      Neutrophils, Absolute 4.60 10*3/mm3      Lymphocytes, Absolute 2.30 10*3/mm3      Monocytes, Absolute 1.50 10*3/mm3      Eosinophils, Absolute 0.20 10*3/mm3      Basophils, Absolute 0.10 10*3/mm3      nRBC 0.5 /100 WBC     Blood Culture - Blood, Arm, Right [[id number]] Collected: 11/15/20 0127    Specimen: Blood from Arm, Right Updated: 11/18/20 0145     Blood Culture No growth at 3 days    Blood Culture - Blood, Arm, Left [[id number]] Collected: 11/15/20 0128    Specimen: Blood from Arm, Left Updated: 11/18/20 0145     Blood Culture No growth at 3 days    BNP [[id number]]  (Abnormal) Collected: 11/17/20 0842    Specimen: Blood Updated: 11/17/20 1902     proBNP 2,548.0 pg/mL     Narrative:      Among patients with dyspnea, NT-proBNP is highly sensitive for the detection of acute congestive heart failure. In addition NT-proBNP of <300 pg/ml effectively rules out acute congestive heart failure with 99% negative predictive value.    Results may be falsely decreased if patient taking Biotin.          No results found for: HGBA1C  Results from last 7 days   Lab Units 11/14/20 1919   INR  1.51*               Microbiology Results (last 10 days)     Procedure Component Value - Date/Time    Blood Culture - Blood, Arm, Left [[id number]] Collected: 11/15/20 0128    Lab Status: Preliminary result Specimen: Blood from Arm, Left Updated: 11/18/20 0145     Blood Culture No growth at 3 days    Blood Culture - Blood, Arm, Right [[id number]] Collected: 11/15/20 0127    Lab Status: Preliminary result Specimen: Blood from Arm, Right Updated: 11/18/20 0145     Blood Culture No growth at 3 days    COVID-19, [facility] IN-HOUSE,NP Swab (NO TRANSPORT MEDIA) 2 HR TAT - Swab, Nasopharynx [[id number]]  (Normal) Collected: 11/14/20 1922    Lab Status: Final result  Specimen: Swab from Nasopharynx Updated: 11/14/20 2006     COVID19 Not Detected    Narrative:      Fact sheet for providers: https://www.fda.gov/media/984316/download     Fact sheet for patients: https://www.fda.gov/media/473566/download          ECG/EMG Results (most recent)     Procedure Component Value Units Date/Time    ECG 12 Lead [[id number]] Collected: 11/14/20 2356     Updated: 11/14/20 2357     QT Interval 372 ms     Narrative:      HEART RATE= 97  bpm  RR Interval= 616  ms  CA Interval= 186  ms  P Horizontal Axis=   deg  P Front Axis= 66  deg  QRSD Interval= 96  ms  QT Interval= 372  ms  QRS Axis= -14  deg  T Wave Axis= -88  deg  - ABNORMAL ECG -  Sinus rhythm  Probable left atrial enlargement  Left ventricular hypertrophy  Anterior infarct, old  Nonspecific T abnormalities, lateral leads  When compared with ECG of 14-Nov-2020 18:34:37,  Significant repolarization change  Significant axis, voltage or hypertrophy change  Electronically Signed By:   Date and Time of Study: 2020-11-14 23:56:25    ECG 12 Lead [[id number]] Collected: 11/14/20 2356     Updated: 11/15/20 0511     QT Interval 372 ms     Narrative:      HEART RATE= 97  bpm  RR Interval= 616  ms  CA Interval= 186  ms  P Horizontal Axis=   deg  P Front Axis= 66  deg  QRSD Interval= 96  ms  QT Interval= 372  ms  QRS Axis= -14  deg  T Wave Axis= -88  deg  - ABNORMAL ECG -  Sinus rhythm  Probable left atrial enlargement  Left ventricular hypertrophy  Anterior infarct, old  Nonspecific T abnormalities, lateral leads  When compared with ECG of 14-Nov-2020 18:34:37,  Significant repolarization change  Significant axis, voltage or hypertrophy change  Electronically Signed By:   Date and Time of Study: 2020-11-14 23:56:25    Adult Transthoracic Echo Complete W/ Cont if Necessary Per Protocol [[id number]] Collected: 11/15/20 0936     Updated: 11/16/20 0949     BSA 2.3 m^2      RVIDd 3.5 cm      IVSd 1.3 cm      LVIDd 7.0 cm      LVIDs 6.6 cm      LVPWd 0.74 cm       IVS/LVPW 1.7     FS 4.9 %      EDV(Teich) 254.1 ml      ESV(Teich) 226.7 ml      EF(Teich) 10.8 %      EDV(cubed) 340.6 ml      ESV(cubed) 292.8 ml      EF(cubed) 14.0 %      LV mass(C)d 323.6 grams      LV mass(C)dI 143.3 grams/m^2      SV(Teich) 27.4 ml      SI(Teich) 12.1 ml/m^2      SV(cubed) 47.8 ml      SI(cubed) 21.2 ml/m^2      Ao root diam 3.8 cm      Ao root area 11.0 cm^2      ACS 2.5 cm      asc Aorta Diam 2.6 cm      LVOT diam 2.3 cm      LVOT area 4.3 cm^2      RVOT diam 2.7 cm      RVOT area 5.7 cm^2      EDV(MOD-sp4) 205.2 ml      ESV(MOD-sp4) 180.8 ml      EF(MOD-sp4) 11.9 %      SV(MOD-sp4) 24.5 ml      SI(MOD-sp4) 10.8 ml/m^2      Ao root area (BSA corrected) 1.7     LV Sims Vol (BSA corrected) 90.9 ml/m^2      LV Sys Vol (BSA corrected) 80.1 ml/m^2      Aortic R-R 0.65 sec      Aortic HR 92.7 BPM      MV E max anthony 84.0 cm/sec      MV A max anthony 64.1 cm/sec      MV E/A 1.3     MV V2 max 99.3 cm/sec      MV max PG 3.9 mmHg      MV V2 mean 55.1 cm/sec      MV mean PG 1.5 mmHg      MV V2 VTI 11.7 cm      MVA(VTI) 4.4 cm^2      MV dec slope 997.9 cm/sec^2      MV dec time 0.08 sec      Ao pk anthony 90.4 cm/sec      Ao max PG 3.3 mmHg      Ao max PG (full) 0.51 mmHg      Ao V2 mean 63.6 cm/sec      Ao mean PG 1.8 mmHg      Ao mean PG (full) 0.55 mmHg      Ao V2 VTI 14.8 cm      MAXIM(I,A) 3.5 cm^2      MAXIM(I,D) 3.5 cm^2      MAXIM(V,A) 3.9 cm^2      MAXIM(V,D) 3.9 cm^2      LV V1 max PG 2.8 mmHg      LV V1 mean PG 1.3 mmHg      LV V1 max 83.1 cm/sec      LV V1 mean 50.7 cm/sec      LV V1 VTI 12.2 cm      CO(Ao) 15.1 l/min      CI(Ao) 6.7 l/min/m^2      SV(Ao) 163.2 ml      SI(Ao) 72.3 ml/m^2      CO(LVOT) 4.8 l/min      CI(LVOT) 2.1 l/min/m^2      SV(LVOT) 51.9 ml      SV(RVOT) 55.4 ml      SI(LVOT) 23.0 ml/m^2      PA V2 max 66.6 cm/sec      PA max PG 1.8 mmHg      PA max PG (full) 0.02 mmHg      PA V2 mean 43.8 cm/sec      PA mean PG 0.92 mmHg      PA mean PG (full) 0.19 mmHg      PA V2 VTI 9.2 cm       PVA(I,A) 6.0 cm^2       CV ECHO PETER - PVA(I,D) 6.0 cm^2       CV ECHO PETER - PVA(V,A) 5.7 cm^2       CV ECHO PETER - PVA(V,D) 5.7 cm^2      PA acc time 0.09 sec      RV V1 max PG 1.8 mmHg      RV V1 mean PG 0.72 mmHg      RV V1 max 66.3 cm/sec      RV V1 mean 37.5 cm/sec      RV V1 VTI 9.7 cm      TR max osmar 249.8 cm/sec      RVSP(TR) 28.0 mmHg      RAP systole 3.0 mmHg      PA pr(Accel) 39.6 mmHg      Pulm Sys Osmar 35.4 cm/sec      Pulm Sims Osmar 65.2 cm/sec      Pulm S/D 0.54     Qp/Qs 1.1      CV ECHO PETER - BZI_BMI 29.6 kilograms/m^2       CV ECHO PETER - BSA(HAYCOCK) 2.3 m^2       CV ECHO PETER - BZI_METRIC_WEIGHT 101.6 kg       CV ECHO PETER - BZI_METRIC_HEIGHT 185.4 cm      EF(MOD-bp) 12.0 %      LA dimension(2D) 5.3 cm     Narrative:      · Left ventricular systolic function is severely decreased.  · Left ventricular ejection fraction is 10 to 15%  · Akinetic septum, inferior wall and apex is noted  · The left ventricular cavity is mildly dilated.  · Left ventricular diastolic function was normal.  · Mildly reduced right ventricular systolic function noted.  · Mild mitral valve regurgitation is present  · Mild tricuspid valve regurgitation is present. Calculated right   ventricular systolic pressure from tricuspid regurgitation is 28.0 mmHg.       ECG 12 Lead [[id number]] Collected: 11/14/20 1834     Updated: 11/17/20 1835     QT Interval 421 ms     Narrative:      HEART RATE= 95  bpm  RR Interval= 636  ms  IN Interval= 191  ms  P Horizontal Axis= -21  deg  P Front Axis= 43  deg  QRSD Interval= 92  ms  QT Interval= 421  ms  QRS Axis= 194  deg  T Wave Axis= -60  deg  - ABNORMAL ECG -  Sinus rhythm  LAE, consider biatrial enlargement  Probable RVH w/ secondary repol abnormality  Inferior infarct, age indeterminate  Borderline ST elevation, anterior leads  Prolonged QT interval  When compared with ECG of 24-Mar-2020 10:07:43,  New or worsened ischemia or infarction  Significant repolarization  change  Significant axis, voltage or hypertrophy change  Electronically Signed By: Richard Diaz (SHRUTHI) 17-Nov-2020 18:24:18  Date and Time of Study: 2020-11-14 18:34:37               Results for orders placed during the hospital encounter of 11/14/20   Adult Transthoracic Echo Complete W/ Cont if Necessary Per Protocol    Narrative · Left ventricular systolic function is severely decreased.  · Left ventricular ejection fraction is 10 to 15%  · Akinetic septum, inferior wall and apex is noted  · The left ventricular cavity is mildly dilated.  · Left ventricular diastolic function was normal.  · Mildly reduced right ventricular systolic function noted.  · Mild mitral valve regurgitation is present  · Mild tricuspid valve regurgitation is present. Calculated right   ventricular systolic pressure from tricuspid regurgitation is 28.0 mmHg.          Us Liver    Result Date: 11/15/2020  Incidental right pleural effusion. Unremarkable images of the liver.  Electronically Signed By-Ky Yusuf MD On:11/15/2020 9:24 AM This report was finalized on [id number] by  Ky Yusuf MD.    Xr Chest 1 View    Result Date: 11/14/2020  Cardiomegaly without active disease  Electronically Signed By-Ky Yusuf MD On:11/14/2020 7:41 PM This report was finalized on [id number] by  Ky Yusuf MD.      Xrays, labs reviewed personally by physician.    Medication Review:   I have reviewed the patient's current medication list      Scheduled Meds  aspirin, 81 mg, Oral, Daily  carvedilol, 12.5 mg, Oral, BID With Meals  clopidogrel, 75 mg, Oral, Daily  enoxaparin, 40 mg, Subcutaneous, Q24H  ferrous sulfate, 324 mg, Oral, Daily With Breakfast  furosemide, 40 mg, Intravenous, BID  isosorbide mononitrate, 30 mg, Oral, Q24H  lisinopril, 5 mg, Oral, Q24H  QUEtiapine, 50 mg, Oral, Nightly  sodium chloride, 10 mL, Intravenous, Q12H  traZODone, 100 mg, Oral, Nightly        Meds Infusions       Meds PRN  Calcium Gluconate-NaCl **AND** calcium  gluconate **AND** Calcium, Ionized  •  docusate sodium  •  hydrOXYzine  •  influenza vaccine  •  LORazepam **OR** LORazepam **OR** LORazepam **OR** LORazepam **OR** LORazepam **OR** LORazepam  •  magnesium sulfate **OR** magnesium sulfate **OR** magnesium sulfate  •  melatonin  •  nitroglycerin  •  ondansetron **OR** ondansetron  •  potassium & sodium phosphates **OR** potassium & sodium phosphates  •  potassium chloride  •  potassium chloride  •  sodium chloride  •  sodium chloride      Assessment/Plan   Assessment/Plan     Active Hospital Problems    Diagnosis  POA   • Chest pain [R07.9]  Yes   • DENNIS (acute kidney injury) (CMS/[facility]) [N17.9]  Yes   • Hyperkalemia [E87.5]  Yes   • Transaminitis [R74.01]  Yes   • Anemia [D64.9]  Yes   • Drug abuse (CMS/[facility]) [F19.10]  Yes   • CHF (congestive heart failure), NYHA class III, acute on chronic, systolic (CMS/[facility]) [I50.23]  Yes   • Hyponatremia [E87.1]  Yes   • Essential hypertension [I10]  Yes      Resolved Hospital Problems   No resolved problems to display.         Chest pain  - serial troponin negative   - CXR- cardiomegaly without evidence of active disease.  - cardiac cath 3/24/2020 -one drug-eluting stent in the LAD at that time.  - continue aspirin, plavix, statin, bbl, acei, imdur  - cardiology following  - plan on cardiac cath on 11/19/2020     Ischemic dilated cardiomyopathy, Actute HFrEF  - echo -  LVEF 10-15%, Akinetic septum, inferior wall and apex, mild MR, mild TR, RVSP 28mmHg  - Dr. Lowe planning cardiac catheterization on Thursday   - continue diuresis with IV lasix  - strict I&Os  - daily weights      Acute Kidney Injury  - baseline Cr 0.8   - improving  - resume lisinopril   - UA reviewed      Hyperkalemia- resolved   -Potassium: 5.3  -EKG pending review  -Kayexalate ordered  -Continue cardiac monitoring  -Monitor while admitted     Hyponatremia  - improving   - Check urine and serum osmolality  - Holding Zoloft  - Monitor while  admitted     Transaminitis, Hepatitis C  - possibly component of hepatic congestion   - HIV Negative  - liver us unremarkable   - pt denies known history of hepatitis C, will need outpatient follow up with gastroenterology     Chronic diarrhea s/p oversewing of a bleeding duodenal ulcer  - previously had G tube and J tube   - follows with GI  - will add imodium prn     Leukocytosis  -WBCs: 14.60 with an increased absolute neutrophil and monocyte count noted on differential  -Check blood cultures, lactic acid, procalcitonin, CRP and sed rate  -Monitor while admitted     Iron deficiency Anemia  - Hgb stable   - iron profile reviewed   - no signs of bleeding noted   - continue ferrous sulfate     CAD  - continue aspirin, plavix, bbl, acei, imdur  - patient has been noncompliant with outpatient Plavix therapy   - telemetry      Hypertension  - Poorly controlled with a blood pressure on admission of 166/86 (pressure has normalized following reduction in anxiety as well as nitroglycerin administration in ED)  - Continue Coreg, lisinopril, lasix  - lisinopril initially held due to DENNIS and hyperkalemia  - Monitor with routine vs and make adjustments as needed      Depression/anxiety  - Continue trazodone and Seroquel     Drug abuse  - Patient reports methamphetamine use which she reports he smoked approximately 9 days prior.    - He denies any history of IV drug use  - Encourage cessation especially light of patient's comorbid conditions     Alcohol abuse  - Patient reports he generally drinks approximately 1 pint of liquor per day though he has had nothing to drink for the past 9 days  - Encourage cessation especially light of patient's comorbid conditions  - [facility] protocol ordered    DVT Prophylaxis  - enoxaparin     Patient is high risk requiring IV ativan         Code Status -   Code Status and Medical Interventions:   Ordered at: 11/14/20 9999     Code Status:    CPR     Medical Interventions (Level of Support Prior to  Arrest):    Full       Discharge Planning  Defer, patient is homeless, discussed with case management, pt is interested in drug rehab           Electronically signed by Zara Thibodeaux DO, 11/18/20, 15:14 EST.  [facility]          Electronically signed by Zara Thibodeaux DO at 11/18/20 1525

## 2020-11-19 NOTE — PLAN OF CARE
Goal Outcome Evaluation:  Plan of Care Reviewed With: patient  Progress: no change  Outcome Summary: Pt. with c/o nausea & vomiting today. d/C uncertain & has been made NPO.

## 2020-11-19 NOTE — PROGRESS NOTES
"      [facility] Medicine Services Daily Progress Note      Hospitalist Team  LOS 1 days      Patient Care Team:  Shelly Ribeiro APRN as PCP - General (Nurse Practitioner)    Patient Location: 242/1      Subjective   Subjective     Chief Complaint / Subjective  Chief Complaint   Patient presents with   • Chest Pain       Present on Admission:  • Chest pain  • Essential hypertension  • Drug abuse (CMS/[facility])  • CHF (congestive heart failure), NYHA class III, acute on chronic, systolic (CMS/[facility])  • DENNIS (acute kidney injury) (CMS/[facility])  • Hyponatremia  • Hyperkalemia  • Transaminitis  • Anemia      Brief Synopsis of Hospital Course/HPI  Mr. Bethea is a 44 y.o. male with past medical history of hypertension, substance-tobacco/alcohol abuse, heart failure with reduced ejection fraction and anemia who presents to [facility] complaining of chest pain.  Patient reports that he has a 3-day history of constant worsening left-sided chest pain which radiates to his left arm and back.  Pain is described as \"just really hurting\" rated 9/10 at its worst and 8/10 at the time of my exam.  Patient has a history of coronary artery disease underwent cardiac cath at this facility on 3/24/2020 with drug-eluting stent placed in the LAD.  Since that time patient has been minimally compliant with his scheduled medical therapy stating that he currently is taking his Coreg but has not had his Plavix or diuretics in some time.  Some associated orthopnea and PND is noted along with some nausea without vomiting, frequent episodes of tachycardia and palpitations, occasional peripheral edema and a recent subjective fever.  Patient reports that he has relapsed on his substance and alcohol abuse with his most recent use on 11/5/2020 when he states he smoked methamphetamine and drink alcohol.  He denies any history of IV drug use continues to smoke approximately 1 pack of cigarettes per day on average.  Urine output is " "noted as occurring somewhat less frequently and he does also report some chronic constipation.  Additionally patient notes a previous diagnosis of hepatitis C which he received over 1 year ago and has not been treated for.  He also reports having previous bowel resection.        11/15/20  Continues to report shortness of breath    11/16/20  Patient is complaining of some back pain. Denies any chest pain or sob. Reports his le swelling has improved.    11/17/20  Nursing reports patient is incontinent of stool     11/18/2020  PT underwent cardiac catheterization which showed patent stent    11/19/2020  Patient complaining of severe nausea and vomiting this am.       Review of Systems   Cardiovascular: Negative for chest pain and palpitations.   Respiratory: Negative for cough and shortness of breath.    Gastrointestinal: Positive for nausea and vomiting.         Objective   Objective      Vital Signs  Temp:  [97.7 °F (36.5 °C)-98.3 °F (36.8 °C)] 98.3 °F (36.8 °C)  Heart Rate:  [82-95] 82  Resp:  [17-24] 24  BP: ()/(60-82) 119/82  Oxygen Therapy  SpO2: 98 %  Pulse Oximetry Type: Intermittent  Device (Oxygen Therapy): room air  Flowsheet Rows      First Filed Value   Admission Height  185.4 cm (73\") Documented at 11/14/2020 1752   Admission Weight  86.2 kg (190 lb) Documented at 11/14/2020 1752        Intake & Output (last 3 days)       11/16 0701 - 11/17 0700 11/17 0701 - 11/18 0700 11/18 0701 - 11/19 0700 11/19 0701 - 11/20 0700    P.O. 960 1672 480     Total Intake(mL/kg) 960 (9.1) 1672 (16.4) 480 (4.7)     Urine (mL/kg/hr)   850 (0.3)     Stool   0     Total Output   850     Net +960 +1672 -370             Urine Unmeasured Occurrence 3 x 3 x      Stool Unmeasured Occurrence 2 x 3 x 1 x         Lines, Drains & Airways    Active LDAs     Name:   Placement date:   Placement time:   Site:   Days:    Peripheral IV 06/27/19 1800 Right Antecubital   06/27/19    1800    Antecubital   507    Peripheral IV 11/14/20 1913 " Left Hand   11/14/20 1913    Hand   1                  Physical Exam:    Physical Exam  Vitals signs reviewed.   Constitutional:       General: He is not in acute distress.     Appearance: He is obese.   HENT:      Head: Normocephalic and atraumatic.      Mouth/Throat:      Mouth: Mucous membranes are moist.   Cardiovascular:      Rate and Rhythm: Normal rate and regular rhythm.   Pulmonary:      Effort: Pulmonary effort is normal. No respiratory distress.      Breath sounds: No wheezing or rales.   Abdominal:      General: Bowel sounds are normal. There is no distension.      Tenderness: There is no abdominal tenderness. There is no guarding.   Musculoskeletal:      Right lower leg: No edema.      Left lower leg: No edema.   Skin:     General: Skin is warm and dry.      Findings: No rash.   Neurological:      Mental Status: He is alert.           Procedures:              Results Review:     I reviewed the patient's new clinical results.      Lab Results (last 24 hours)     Procedure Component Value Units Date/Time    Magnesium [[id number]]  (Abnormal) Collected: 11/19/20 0854    Specimen: Blood Updated: 11/19/20 0947     Magnesium 3.1 mg/dL     Comprehensive Metabolic Panel [[id number]]  (Abnormal) Collected: 11/19/20 0854    Specimen: Blood Updated: 11/19/20 0946     Glucose 159 mg/dL      BUN 24 mg/dL      Creatinine 1.36 mg/dL      Sodium 133 mmol/L      Potassium 5.1 mmol/L      Comment: Slight hemolysis detected by analyzer. Results may be affected.        Chloride 96 mmol/L      CO2 24.0 mmol/L      Calcium 9.5 mg/dL      Total Protein 6.8 g/dL      Albumin 3.50 g/dL      ALT (SGPT) 165 U/L      AST (SGOT) 35 U/L      Alkaline Phosphatase 143 U/L      Total Bilirubin 1.3 mg/dL      eGFR Non African Amer 57 mL/min/1.73      Globulin 3.3 gm/dL      A/G Ratio 1.1 g/dL      BUN/Creatinine Ratio 17.6     Anion Gap 13.0 mmol/L     Narrative:      GFR Normal >60  Chronic Kidney Disease <60  Kidney Failure <15       CBC & Differential [[id number]]  (Abnormal) Collected: 11/19/20 0739    Specimen: Blood Updated: 11/19/20 0756    Narrative:      The following orders were created for panel order CBC & Differential.  Procedure                               Abnormality         Status                     ---------                               -----------         ------                     CBC Auto Differential[[id number]]        Abnormal            Final result                 Please view results for these tests on the individual orders.    CBC Auto Differential [[id number]]  (Abnormal) Collected: 11/19/20 0739    Specimen: Blood Updated: 11/19/20 0756     WBC 12.90 10*3/mm3      RBC 5.52 10*6/mm3      Hemoglobin 13.2 g/dL      Hematocrit 42.3 %      MCV 76.5 fL      MCH 24.0 pg      MCHC 31.3 g/dL      RDW 17.8 %      RDW-SD 47.7 fl      MPV 7.8 fL      Platelets 334 10*3/mm3      Neutrophil % 87.2 %      Lymphocyte % 6.9 %      Monocyte % 3.7 %      Eosinophil % 1.5 %      Basophil % 0.7 %      Neutrophils, Absolute 11.30 10*3/mm3      Lymphocytes, Absolute 0.90 10*3/mm3      Monocytes, Absolute 0.50 10*3/mm3      Eosinophils, Absolute 0.20 10*3/mm3      Basophils, Absolute 0.10 10*3/mm3      nRBC 0.5 /100 WBC     Blood Culture - Blood, Arm, Right [[id number]] Collected: 11/15/20 0127    Specimen: Blood from Arm, Right Updated: 11/19/20 0145     Blood Culture No growth at 4 days    Blood Culture - Blood, Arm, Left [[id number]] Collected: 11/15/20 0128    Specimen: Blood from Arm, Left Updated: 11/19/20 0145     Blood Culture No growth at 4 days    Potassium [[id number]]  (Normal) Collected: 11/18/20 2253    Specimen: Blood Updated: 11/18/20 2320     Potassium 4.2 mmol/L     Magnesium [[id number]]  (Normal) Collected: 11/18/20 2253    Specimen: Blood Updated: 11/18/20 2320     Magnesium 2.0 mg/dL         No results found for: HGBA1C  Results from last 7 days   Lab Units 11/14/20  1919   INR  1.51*               Microbiology Results (last  10 days)     Procedure Component Value - Date/Time    Blood Culture - Blood, Arm, Left [[id number]] Collected: 11/15/20 0128    Lab Status: Preliminary result Specimen: Blood from Arm, Left Updated: 11/19/20 0145     Blood Culture No growth at 4 days    Blood Culture - Blood, Arm, Right [[id number]] Collected: 11/15/20 0127    Lab Status: Preliminary result Specimen: Blood from Arm, Right Updated: 11/19/20 0145     Blood Culture No growth at 4 days    COVID-19, [facility]OTT IN-HOUSE,NP Swab (NO TRANSPORT MEDIA) 2 HR TAT - Swab, Nasopharynx [[id number]]  (Normal) Collected: 11/14/20 1922    Lab Status: Final result Specimen: Swab from Nasopharynx Updated: 11/14/20 2006     COVID19 Not Detected    Narrative:      Fact sheet for providers: https://www.fda.gov/media/075681/download     Fact sheet for patients: https://www.fda.gov/media/731228/download          ECG/EMG Results (most recent)     Procedure Component Value Units Date/Time    ECG 12 Lead [[id number]] Collected: 11/14/20 2356     Updated: 11/14/20 2357     QT Interval 372 ms     Narrative:      HEART RATE= 97  bpm  RR Interval= 616  ms  OR Interval= 186  ms  P Horizontal Axis=   deg  P Front Axis= 66  deg  QRSD Interval= 96  ms  QT Interval= 372  ms  QRS Axis= -14  deg  T Wave Axis= -88  deg  - ABNORMAL ECG -  Sinus rhythm  Probable left atrial enlargement  Left ventricular hypertrophy  Anterior infarct, old  Nonspecific T abnormalities, lateral leads  When compared with ECG of 14-Nov-2020 18:34:37,  Significant repolarization change  Significant axis, voltage or hypertrophy change  Electronically Signed By:   Date and Time of Study: 2020-11-14 23:56:25    ECG 12 Lead [[id number]] Collected: 11/14/20 2356     Updated: 11/15/20 0511     QT Interval 372 ms     Narrative:      HEART RATE= 97  bpm  RR Interval= 616  ms  OR Interval= 186  ms  P Horizontal Axis=   deg  P Front Axis= 66  deg  QRSD Interval= 96  ms  QT Interval= 372  ms  QRS Axis= -14  deg  T Wave Axis= -88   deg  - ABNORMAL ECG -  Sinus rhythm  Probable left atrial enlargement  Left ventricular hypertrophy  Anterior infarct, old  Nonspecific T abnormalities, lateral leads  When compared with ECG of 14-Nov-2020 18:34:37,  Significant repolarization change  Significant axis, voltage or hypertrophy change  Electronically Signed By:   Date and Time of Study: 2020-11-14 23:56:25    Adult Transthoracic Echo Complete W/ Cont if Necessary Per Protocol [[id number]] Collected: 11/15/20 0936     Updated: 11/16/20 0949     BSA 2.3 m^2      RVIDd 3.5 cm      IVSd 1.3 cm      LVIDd 7.0 cm      LVIDs 6.6 cm      LVPWd 0.74 cm      IVS/LVPW 1.7     FS 4.9 %      EDV(Teich) 254.1 ml      ESV(Teich) 226.7 ml      EF(Teich) 10.8 %      EDV(cubed) 340.6 ml      ESV(cubed) 292.8 ml      EF(cubed) 14.0 %      LV mass(C)d 323.6 grams      LV mass(C)dI 143.3 grams/m^2      SV(Teich) 27.4 ml      SI(Teich) 12.1 ml/m^2      SV(cubed) 47.8 ml      SI(cubed) 21.2 ml/m^2      Ao root diam 3.8 cm      Ao root area 11.0 cm^2      ACS 2.5 cm      asc Aorta Diam 2.6 cm      LVOT diam 2.3 cm      LVOT area 4.3 cm^2      RVOT diam 2.7 cm      RVOT area 5.7 cm^2      EDV(MOD-sp4) 205.2 ml      ESV(MOD-sp4) 180.8 ml      EF(MOD-sp4) 11.9 %      SV(MOD-sp4) 24.5 ml      SI(MOD-sp4) 10.8 ml/m^2      Ao root area (BSA corrected) 1.7     LV Sims Vol (BSA corrected) 90.9 ml/m^2      LV Sys Vol (BSA corrected) 80.1 ml/m^2      Aortic R-R 0.65 sec      Aortic HR 92.7 BPM      MV E max anthony 84.0 cm/sec      MV A max anthony 64.1 cm/sec      MV E/A 1.3     MV V2 max 99.3 cm/sec      MV max PG 3.9 mmHg      MV V2 mean 55.1 cm/sec      MV mean PG 1.5 mmHg      MV V2 VTI 11.7 cm      MVA(VTI) 4.4 cm^2      MV dec slope 997.9 cm/sec^2      MV dec time 0.08 sec      Ao pk anthony 90.4 cm/sec      Ao max PG 3.3 mmHg      Ao max PG (full) 0.51 mmHg      Ao V2 mean 63.6 cm/sec      Ao mean PG 1.8 mmHg      Ao mean PG (full) 0.55 mmHg      Ao V2 VTI 14.8 cm      MAXIM(I,A) 3.5 cm^2       MAXIM(I,D) 3.5 cm^2      MAXIM(V,A) 3.9 cm^2      MAXIM(V,D) 3.9 cm^2      LV V1 max PG 2.8 mmHg      LV V1 mean PG 1.3 mmHg      LV V1 max 83.1 cm/sec      LV V1 mean 50.7 cm/sec      LV V1 VTI 12.2 cm      CO(Ao) 15.1 l/min      CI(Ao) 6.7 l/min/m^2      SV(Ao) 163.2 ml      SI(Ao) 72.3 ml/m^2      CO(LVOT) 4.8 l/min      CI(LVOT) 2.1 l/min/m^2      SV(LVOT) 51.9 ml      SV(RVOT) 55.4 ml      SI(LVOT) 23.0 ml/m^2      PA V2 max 66.6 cm/sec      PA max PG 1.8 mmHg      PA max PG (full) 0.02 mmHg      PA V2 mean 43.8 cm/sec      PA mean PG 0.92 mmHg      PA mean PG (full) 0.19 mmHg      PA V2 VTI 9.2 cm      PVA(I,A) 6.0 cm^2       CV ECHO PETER - PVA(I,D) 6.0 cm^2       CV ECHO PETER - PVA(V,A) 5.7 cm^2       CV ECHO PETER - PVA(V,D) 5.7 cm^2      PA acc time 0.09 sec      RV V1 max PG 1.8 mmHg      RV V1 mean PG 0.72 mmHg      RV V1 max 66.3 cm/sec      RV V1 mean 37.5 cm/sec      RV V1 VTI 9.7 cm      TR max osmar 249.8 cm/sec      RVSP(TR) 28.0 mmHg      RAP systole 3.0 mmHg      PA pr(Accel) 39.6 mmHg      Pulm Sys Osmar 35.4 cm/sec      Pulm Sims Osmar 65.2 cm/sec      Pulm S/D 0.54     Qp/Qs 1.1      CV ECHO PETER - BZI_BMI 29.6 kilograms/m^2       CV ECHO PETER - BSA(HAYCOCK) 2.3 m^2       CV ECHO PETER - BZI_METRIC_WEIGHT 101.6 kg       CV ECHO PETER - BZI_METRIC_HEIGHT 185.4 cm      EF(MOD-bp) 12.0 %      LA dimension(2D) 5.3 cm     Narrative:      · Left ventricular systolic function is severely decreased.  · Left ventricular ejection fraction is 10 to 15%  · Akinetic septum, inferior wall and apex is noted  · The left ventricular cavity is mildly dilated.  · Left ventricular diastolic function was normal.  · Mildly reduced right ventricular systolic function noted.  · Mild mitral valve regurgitation is present  · Mild tricuspid valve regurgitation is present. Calculated right   ventricular systolic pressure from tricuspid regurgitation is 28.0 mmHg.       ECG 12 Lead [[id number]] Collected: 11/14/20 6024      Updated: 11/17/20 1835     QT Interval 421 ms     Narrative:      HEART RATE= 95  bpm  RR Interval= 636  ms  WI Interval= 191  ms  P Horizontal Axis= -21  deg  P Front Axis= 43  deg  QRSD Interval= 92  ms  QT Interval= 421  ms  QRS Axis= 194  deg  T Wave Axis= -60  deg  - ABNORMAL ECG -  Sinus rhythm  LAE, consider biatrial enlargement  Probable RVH w/ secondary repol abnormality  Inferior infarct, age indeterminate  Borderline ST elevation, anterior leads  Prolonged QT interval  When compared with ECG of 24-Mar-2020 10:07:43,  New or worsened ischemia or infarction  Significant repolarization change  Significant axis, voltage or hypertrophy change  Electronically Signed By: Richard Diaz ([facility]) 17-Nov-2020 18:24:18  Date and Time of Study: 2020-11-14 18:34:37    ECG 12 Lead [[id number]] Collected: 11/18/20 2209     Updated: 11/19/20 0514     QT Interval 367 ms     Narrative:      HEART RATE= 94  bpm  RR Interval= 640  ms  WI Interval= 175  ms  P Horizontal Axis= -5  deg  P Front Axis= -8  deg  QRSD Interval= 99  ms  QT Interval= 367  ms  QRS Axis= 52  deg  T Wave Axis=   deg  - ABNORMAL ECG -  Sinus rhythm  Ventricular trigeminy  Probable left atrial enlargement  Anterior infarct, old  Nonspecific T abnormalities, lateral leads  Electronically Signed By:   Date and Time of Study: 2020-11-18 22:17:33               Results for orders placed during the hospital encounter of 11/14/20   Adult Transthoracic Echo Complete W/ Cont if Necessary Per Protocol    Narrative · Left ventricular systolic function is severely decreased.  · Left ventricular ejection fraction is 10 to 15%  · Akinetic septum, inferior wall and apex is noted  · The left ventricular cavity is mildly dilated.  · Left ventricular diastolic function was normal.  · Mildly reduced right ventricular systolic function noted.  · Mild mitral valve regurgitation is present  · Mild tricuspid valve regurgitation is present. Calculated right   ventricular systolic  pressure from tricuspid regurgitation is 28.0 mmHg.          Us Liver    Result Date: 11/15/2020  Incidental right pleural effusion. Unremarkable images of the liver.  Electronically Signed By-Ky Yusuf MD On:11/15/2020 9:24 AM This report was finalized on [id number] by  Ky Yusuf MD.    Xr Chest 1 View    Result Date: 11/14/2020  Cardiomegaly without active disease  Electronically Signed By-Ky Yusuf MD On:11/14/2020 7:41 PM This report was finalized on [id number] by  Ky Yusuf MD.      Xrays, labs reviewed personally by physician.    Medication Review:   I have reviewed the patient's current medication list      Scheduled Meds  aspirin, 81 mg, Oral, Daily  carvedilol, 12.5 mg, Oral, BID With Meals  clopidogrel, 75 mg, Oral, Daily  enoxaparin, 40 mg, Subcutaneous, Q24H  ferrous sulfate, 324 mg, Oral, Daily With Breakfast  [START ON 11/20/2020] furosemide, 40 mg, Oral, Daily  isosorbide mononitrate, 30 mg, Oral, Q24H  lisinopril, 5 mg, Oral, Q24H  QUEtiapine, 50 mg, Oral, Nightly  sodium chloride, 10 mL, Intravenous, Q12H  traZODone, 100 mg, Oral, Nightly        Meds Infusions       Meds PRN  atropine  •  Calcium Gluconate-NaCl **AND** calcium gluconate **AND** Calcium, Ionized  •  docusate sodium  •  hydrOXYzine  •  influenza vaccine  •  loperamide  •  magnesium sulfate **OR** magnesium sulfate **OR** magnesium sulfate  •  melatonin  •  nitroglycerin  •  ondansetron **OR** ondansetron  •  potassium & sodium phosphates **OR** potassium & sodium phosphates  •  potassium chloride  •  potassium chloride  •  sodium chloride  •  sodium chloride  •  sodium chloride      Assessment/Plan   Assessment/Plan     Active Hospital Problems    Diagnosis  POA   • Chest pain [R07.9]  Yes   • DENNIS (acute kidney injury) (CMS/HCC) [N17.9]  Yes   • Hyperkalemia [E87.5]  Yes   • Transaminitis [R74.01]  Yes   • Anemia [D64.9]  Yes   • Drug abuse (CMS/HCC) [F19.10]  Yes   • CHF (congestive heart failure), NYHA class III,  acute on chronic, systolic (CMS/HCC) [I50.23]  Yes   • Hyponatremia [E87.1]  Yes   • Essential hypertension [I10]  Yes      Resolved Hospital Problems   No resolved problems to display.         Chest pain  - serial troponin negative   - CXR- cardiomegaly without evidence of active disease.  - cardiac cath 3/24/2020 -one drug-eluting stent in the LAD at that time.  - continue aspirin, plavix, statin, bbl, acei, imdur  - cardiology following  - plan on cardiac cath on 11/19/2020     Ischemic dilated cardiomyopathy, Actute HFrEF  - echo -  LVEF 10-15%, Akinetic septum, inferior wall and apex, mild MR, mild TR, RVSP 28mmHg  - Dr. Lowe planning cardiac catheterization on Thursday   - continue diuresis with IV lasix  - strict I&Os  - daily weights     Nausea/Vomiting  - unclear etiology  - made npo for now  - zofran prn      Acute Kidney Injury  - baseline Cr 0.8   - improving  - resume lisinopril   - UA reviewed      Hyperkalemia- resolved   -Potassium: 5.3  -EKG pending review  -Kayexalate ordered  -Continue cardiac monitoring  -Monitor while admitted     Hyponatremia  - improving   - Check urine and serum osmolality  - Holding Zoloft  - Monitor while admitted     Transaminitis, Hepatitis C  - possibly component of hepatic congestion   - HIV Negative  - liver us unremarkable   - pt denies known history of hepatitis C, will need outpatient follow up with gastroenterology     Chronic diarrhea s/p oversewing of a bleeding duodenal ulcer  - previously had G tube and J tube   - follows with GI  - will add imodium prn     Leukocytosis  -WBCs: 14.60 with an increased absolute neutrophil and monocyte count noted on differential  -Check blood cultures, lactic acid, procalcitonin, CRP and sed rate  -Monitor while admitted     Iron deficiency Anemia  - Hgb stable   - iron profile reviewed   - no signs of bleeding noted   - continue ferrous sulfate     CAD  - continue aspirin, plavix, bbl, acei, imdur  - patient has been  noncompliant with outpatient Plavix therapy   - telemetry      Hypertension  - Poorly controlled with a blood pressure on admission of 166/86 (pressure has normalized following reduction in anxiety as well as nitroglycerin administration in ED)  - Continue Coreg, lisinopril, lasix  - lisinopril initially held due to DENNIS and hyperkalemia  - Monitor with routine vs and make adjustments as needed      Depression/anxiety  - Continue trazodone and Seroquel     Drug abuse  - Patient reports methamphetamine use which she reports he smoked approximately 9 days prior.    - He denies any history of IV drug use  - Encourage cessation especially light of patient's comorbid conditions     Alcohol abuse  - Patient reports he generally drinks approximately 1 pint of liquor per day though he has had nothing to drink for the past 9 days  - Encourage cessation especially light of patient's comorbid conditions  - CIWA protocol ordered  - dc ativan as patient should no longer be having withdrawal symptoms     DVT Prophylaxis  - enoxaparin             Code Status -   Code Status and Medical Interventions:   Ordered at: 11/18/20 1929     Level Of Support Discussed With:    Patient     Code Status:    CPR     Medical Interventions (Level of Support Prior to Arrest):    Full       Discharge Planning  Discharge tomorrow either to inpatient rehab vs homeless shelter will discuss again with          Electronically signed by Zara Thibodeaux DO, 11/19/20, 17:42 EST.  [facility] Hospitalist Team

## 2020-11-19 NOTE — PROGRESS NOTES
Continued Stay Note  [facility]     Patient Name: Asad Bethea  MRN: [id number]  Today's Date: 11/19/2020    Admit Date: 11/14/2020    Discharge Plan     Row Name 11/19/20 1124       Plan    Plan Comments  Cardiology cleared patient for discharge on 11/18, informed Wagner MEYER who is working on discharge placement for patient. Discharge anticipated today.        Expected Discharge Date and Time     Expected Discharge Date Expected Discharge Time    Nov 19, 2020         No direct contact with patient.    Macrina Graf RN

## 2020-11-19 NOTE — PROGRESS NOTES
LOS: 1 day   Admiting Physician- Zara Thibodeaux DO    Reason For Followup:    Congestive heart failure  Acute on chronic systolic heart failure  Ischemic cardiomyopathy  Chest pain    Subjective     Patient is complaining of nausea and retching.    Objective     Hemodynamics are stable    Review of Systems:   Review of Systems   Constitution: Negative for chills and fever.   HENT: Negative for ear discharge and nosebleeds.    Eyes: Negative for discharge and redness.   Cardiovascular: Positive for leg swelling. Negative for chest pain, orthopnea, palpitations, paroxysmal nocturnal dyspnea and syncope.   Respiratory: Positive for shortness of breath. Negative for cough and wheezing.    Endocrine: Negative for heat intolerance.   Skin: Negative for rash.   Musculoskeletal: Negative for arthritis and myalgias.   Gastrointestinal: Positive for nausea. Negative for abdominal pain, melena and vomiting.   Genitourinary: Negative for dysuria and hematuria.   Neurological: Negative for dizziness, light-headedness, numbness and tremors.   Psychiatric/Behavioral: Negative for depression. The patient is not nervous/anxious.          Vital Signs  Vitals:    11/18/20 1928 11/18/20 1958 11/18/20 2058 11/19/20 0501   BP: 111/67 117/78 109/71 119/82   BP Location: Right arm Right arm Right arm Right arm   Patient Position:  Lying Lying Lying   Pulse: 95 87 95 82   Resp: 18 18 17 24   Temp: 97.8 °F (36.6 °C) 97.7 °F (36.5 °C) 97.9 °F (36.6 °C) 98.3 °F (36.8 °C)   TempSrc: Axillary Oral Oral Oral   SpO2: 97% 98% 98% 98%   Weight:    102 kg (224 lb 3.3 oz)   Height:         Wt Readings from Last 1 Encounters:   11/19/20 102 kg (224 lb 3.3 oz)       Intake/Output Summary (Last 24 hours) at 11/19/2020 1705  Last data filed at 11/18/2020 1938  Gross per 24 hour   Intake --   Output 250 ml   Net -250 ml     Physical Exam:  Constitutional:       Appearance: Well-developed.   Eyes:      General: No scleral icterus.        Right eye: No  discharge.   HENT:      Head: Normocephalic and atraumatic.   Neck:      Thyroid: No thyromegaly.      Lymphadenopathy: No cervical adenopathy.   Pulmonary:      Effort: Pulmonary effort is normal. No respiratory distress.      Breath sounds: Normal breath sounds. No wheezing. No rales.   Cardiovascular:      Normal rate. Regular rhythm.      No gallop.   Edema:     Peripheral edema present.  Abdominal:      Tenderness: There is no abdominal tenderness.   Skin:     Findings: No erythema or rash.   Neurological:      Mental Status: Alert and oriented to person, place, and time.         Results Review:   Lab Results (last 24 hours)     Procedure Component Value Units Date/Time    Magnesium [[id number]]  (Abnormal) Collected: 11/19/20 0854    Specimen: Blood Updated: 11/19/20 0947     Magnesium 3.1 mg/dL     Comprehensive Metabolic Panel [[id number]]  (Abnormal) Collected: 11/19/20 0854    Specimen: Blood Updated: 11/19/20 0946     Glucose 159 mg/dL      BUN 24 mg/dL      Creatinine 1.36 mg/dL      Sodium 133 mmol/L      Potassium 5.1 mmol/L      Comment: Slight hemolysis detected by analyzer. Results may be affected.        Chloride 96 mmol/L      CO2 24.0 mmol/L      Calcium 9.5 mg/dL      Total Protein 6.8 g/dL      Albumin 3.50 g/dL      ALT (SGPT) 165 U/L      AST (SGOT) 35 U/L      Alkaline Phosphatase 143 U/L      Total Bilirubin 1.3 mg/dL      eGFR Non African Amer 57 mL/min/1.73      Globulin 3.3 gm/dL      A/G Ratio 1.1 g/dL      BUN/Creatinine Ratio 17.6     Anion Gap 13.0 mmol/L     Narrative:      GFR Normal >60  Chronic Kidney Disease <60  Kidney Failure <15      CBC & Differential [[id number]]  (Abnormal) Collected: 11/19/20 0739    Specimen: Blood Updated: 11/19/20 0756    Narrative:      The following orders were created for panel order CBC & Differential.  Procedure                               Abnormality         Status                     ---------                               -----------          ------                     CBC Auto Differential[[id number]]        Abnormal            Final result                 Please view results for these tests on the individual orders.    CBC Auto Differential [[id number]]  (Abnormal) Collected: 11/19/20 0739    Specimen: Blood Updated: 11/19/20 0756     WBC 12.90 10*3/mm3      RBC 5.52 10*6/mm3      Hemoglobin 13.2 g/dL      Hematocrit 42.3 %      MCV 76.5 fL      MCH 24.0 pg      MCHC 31.3 g/dL      RDW 17.8 %      RDW-SD 47.7 fl      MPV 7.8 fL      Platelets 334 10*3/mm3      Neutrophil % 87.2 %      Lymphocyte % 6.9 %      Monocyte % 3.7 %      Eosinophil % 1.5 %      Basophil % 0.7 %      Neutrophils, Absolute 11.30 10*3/mm3      Lymphocytes, Absolute 0.90 10*3/mm3      Monocytes, Absolute 0.50 10*3/mm3      Eosinophils, Absolute 0.20 10*3/mm3      Basophils, Absolute 0.10 10*3/mm3      nRBC 0.5 /100 WBC     Blood Culture - Blood, Arm, Right [[id number]] Collected: 11/15/20 0127    Specimen: Blood from Arm, Right Updated: 11/19/20 0145     Blood Culture No growth at 4 days    Blood Culture - Blood, Arm, Left [[id number]] Collected: 11/15/20 0128    Specimen: Blood from Arm, Left Updated: 11/19/20 0145     Blood Culture No growth at 4 days    Potassium [[id number]]  (Normal) Collected: 11/18/20 2253    Specimen: Blood Updated: 11/18/20 2320     Potassium 4.2 mmol/L     Magnesium [[id number]]  (Normal) Collected: 11/18/20 2253    Specimen: Blood Updated: 11/18/20 2320     Magnesium 2.0 mg/dL         Imaging Results (Last 72 Hours)     ** No results found for the last 72 hours. **        ECG/EMG Results (most recent)     Procedure Component Value Units Date/Time    ECG 12 Lead [[id number]] Collected: 11/14/20 2356     Updated: 11/14/20 2357     QT Interval 372 ms     Narrative:      HEART RATE= 97  bpm  RR Interval= 616  ms  CT Interval= 186  ms  P Horizontal Axis=   deg  P Front Axis= 66  deg  QRSD Interval= 96  ms  QT Interval= 372  ms  QRS Axis= -14  deg  T Wave Axis= -88   deg  - ABNORMAL ECG -  Sinus rhythm  Probable left atrial enlargement  Left ventricular hypertrophy  Anterior infarct, old  Nonspecific T abnormalities, lateral leads  When compared with ECG of 14-Nov-2020 18:34:37,  Significant repolarization change  Significant axis, voltage or hypertrophy change  Electronically Signed By:   Date and Time of Study: 2020-11-14 23:56:25    ECG 12 Lead [[id number]] Collected: 11/14/20 2356     Updated: 11/15/20 0511     QT Interval 372 ms     Narrative:      HEART RATE= 97  bpm  RR Interval= 616  ms  VT Interval= 186  ms  P Horizontal Axis=   deg  P Front Axis= 66  deg  QRSD Interval= 96  ms  QT Interval= 372  ms  QRS Axis= -14  deg  T Wave Axis= -88  deg  - ABNORMAL ECG -  Sinus rhythm  Probable left atrial enlargement  Left ventricular hypertrophy  Anterior infarct, old  Nonspecific T abnormalities, lateral leads  When compared with ECG of 14-Nov-2020 18:34:37,  Significant repolarization change  Significant axis, voltage or hypertrophy change  Electronically Signed By:   Date and Time of Study: 2020-11-14 23:56:25    Adult Transthoracic Echo Complete W/ Cont if Necessary Per Protocol [[id number]] Collected: 11/15/20 0936     Updated: 11/16/20 0949     BSA 2.3 m^2      RVIDd 3.5 cm      IVSd 1.3 cm      LVIDd 7.0 cm      LVIDs 6.6 cm      LVPWd 0.74 cm      IVS/LVPW 1.7     FS 4.9 %      EDV(Teich) 254.1 ml      ESV(Teich) 226.7 ml      EF(Teich) 10.8 %      EDV(cubed) 340.6 ml      ESV(cubed) 292.8 ml      EF(cubed) 14.0 %      LV mass(C)d 323.6 grams      LV mass(C)dI 143.3 grams/m^2      SV(Teich) 27.4 ml      SI(Teich) 12.1 ml/m^2      SV(cubed) 47.8 ml      SI(cubed) 21.2 ml/m^2      Ao root diam 3.8 cm      Ao root area 11.0 cm^2      ACS 2.5 cm      asc Aorta Diam 2.6 cm      LVOT diam 2.3 cm      LVOT area 4.3 cm^2      RVOT diam 2.7 cm      RVOT area 5.7 cm^2      EDV(MOD-sp4) 205.2 ml      ESV(MOD-sp4) 180.8 ml      EF(MOD-sp4) 11.9 %      SV(MOD-sp4) 24.5 ml       SI(MOD-sp4) 10.8 ml/m^2      Ao root area (BSA corrected) 1.7     LV Sims Vol (BSA corrected) 90.9 ml/m^2      LV Sys Vol (BSA corrected) 80.1 ml/m^2      Aortic R-R 0.65 sec      Aortic HR 92.7 BPM      MV E max osmar 84.0 cm/sec      MV A max osmar 64.1 cm/sec      MV E/A 1.3     MV V2 max 99.3 cm/sec      MV max PG 3.9 mmHg      MV V2 mean 55.1 cm/sec      MV mean PG 1.5 mmHg      MV V2 VTI 11.7 cm      MVA(VTI) 4.4 cm^2      MV dec slope 997.9 cm/sec^2      MV dec time 0.08 sec      Ao pk osmar 90.4 cm/sec      Ao max PG 3.3 mmHg      Ao max PG (full) 0.51 mmHg      Ao V2 mean 63.6 cm/sec      Ao mean PG 1.8 mmHg      Ao mean PG (full) 0.55 mmHg      Ao V2 VTI 14.8 cm      MAXIM(I,A) 3.5 cm^2      MAXIM(I,D) 3.5 cm^2      MAXIM(V,A) 3.9 cm^2      MAXIM(V,D) 3.9 cm^2      LV V1 max PG 2.8 mmHg      LV V1 mean PG 1.3 mmHg      LV V1 max 83.1 cm/sec      LV V1 mean 50.7 cm/sec      LV V1 VTI 12.2 cm      CO(Ao) 15.1 l/min      CI(Ao) 6.7 l/min/m^2      SV(Ao) 163.2 ml      SI(Ao) 72.3 ml/m^2      CO(LVOT) 4.8 l/min      CI(LVOT) 2.1 l/min/m^2      SV(LVOT) 51.9 ml      SV(RVOT) 55.4 ml      SI(LVOT) 23.0 ml/m^2      PA V2 max 66.6 cm/sec      PA max PG 1.8 mmHg      PA max PG (full) 0.02 mmHg      PA V2 mean 43.8 cm/sec      PA mean PG 0.92 mmHg      PA mean PG (full) 0.19 mmHg      PA V2 VTI 9.2 cm      PVA(I,A) 6.0 cm^2      BH CV ECHO PETER - PVA(I,D) 6.0 cm^2       CV ECHO PETER - PVA(V,A) 5.7 cm^2       CV ECHO PETER - PVA(V,D) 5.7 cm^2      PA acc time 0.09 sec      RV V1 max PG 1.8 mmHg      RV V1 mean PG 0.72 mmHg      RV V1 max 66.3 cm/sec      RV V1 mean 37.5 cm/sec      RV V1 VTI 9.7 cm      TR max osmar 249.8 cm/sec      RVSP(TR) 28.0 mmHg      RAP systole 3.0 mmHg      PA pr(Accel) 39.6 mmHg      Pulm Sys Osmar 35.4 cm/sec      Pulm Sims Osmar 65.2 cm/sec      Pulm S/D 0.54     Qp/Qs 1.1      CV ECHO PETER - BZI_BMI 29.6 kilograms/m^2       CV ECHO PETER - BSA(HAYCOCK) 2.3 m^2       CV ECHO PETER - BZI_METRIC_WEIGHT  101.6 kg       CV ECHO PETER - BZI_METRIC_HEIGHT 185.4 cm      EF(MOD-bp) 12.0 %      LA dimension(2D) 5.3 cm     Narrative:      · Left ventricular systolic function is severely decreased.  · Left ventricular ejection fraction is 10 to 15%  · Akinetic septum, inferior wall and apex is noted  · The left ventricular cavity is mildly dilated.  · Left ventricular diastolic function was normal.  · Mildly reduced right ventricular systolic function noted.  · Mild mitral valve regurgitation is present  · Mild tricuspid valve regurgitation is present. Calculated right   ventricular systolic pressure from tricuspid regurgitation is 28.0 mmHg.       ECG 12 Lead [[id number]] Collected: 11/14/20 1834     Updated: 11/17/20 1835     QT Interval 421 ms     Narrative:      HEART RATE= 95  bpm  RR Interval= 636  ms  AL Interval= 191  ms  P Horizontal Axis= -21  deg  P Front Axis= 43  deg  QRSD Interval= 92  ms  QT Interval= 421  ms  QRS Axis= 194  deg  T Wave Axis= -60  deg  - ABNORMAL ECG -  Sinus rhythm  LAE, consider biatrial enlargement  Probable RVH w/ secondary repol abnormality  Inferior infarct, age indeterminate  Borderline ST elevation, anterior leads  Prolonged QT interval  When compared with ECG of 24-Mar-2020 10:07:43,  New or worsened ischemia or infarction  Significant repolarization change  Significant axis, voltage or hypertrophy change  Electronically Signed By: Richard Diaz (SHRUTHI) 17-Nov-2020 18:24:18  Date and Time of Study: 2020-11-14 18:34:37    ECG 12 Lead [[id number]] Collected: 11/18/20 2209     Updated: 11/19/20 0514     QT Interval 367 ms     Narrative:      HEART RATE= 94  bpm  RR Interval= 640  ms  AL Interval= 175  ms  P Horizontal Axis= -5  deg  P Front Axis= -8  deg  QRSD Interval= 99  ms  QT Interval= 367  ms  QRS Axis= 52  deg  T Wave Axis=   deg  - ABNORMAL ECG -  Sinus rhythm  Ventricular trigeminy  Probable left atrial enlargement  Anterior infarct, old  Nonspecific T abnormalities, lateral  leads  Electronically Signed By:   Date and Time of Study: 2020-11-18 22:17:33        CBC    Results from last 7 days   Lab Units 11/19/20  0739 11/18/20 0427 11/17/20 0535 11/16/20  0320 11/15/20  0129 11/14/20  1919   WBC 10*3/mm3 12.90* 8.60 9.70 10.20 12.20* 14.60*   HEMOGLOBIN g/dL 13.2 10.9* 10.8* 10.4* 10.9* 12.2*   PLATELETS 10*3/mm3 334 302 242 282 273 375     BMP   Results from last 7 days   Lab Units 11/19/20  0854 11/18/20 2253 11/18/20 0427 11/17/20 0842 11/17/20  0535 11/16/20  0320 11/15/20  0129 11/14/20  2136 11/14/20  1919   SODIUM mmol/L 133*  --  135* 134*  --  134* 133*  --  128*   POTASSIUM mmol/L 5.1 4.2 4.5 4.4  --  4.1 4.7 5.1 5.3*   CHLORIDE mmol/L 96*  --  99 100  --  98 100  --  97*   CO2 mmol/L 24.0  --  26.0 27.0  --  27.0 21.0*  --  18.0*   BUN mg/dL 24*  --  23* 20  --  27* 39*  --  37*   CREATININE mg/dL 1.36*  --  1.16 0.98  --  1.07 1.26  --  1.33*   GLUCOSE mg/dL 159*  --  117* 101*  --  129* 190*  --  122*   MAGNESIUM mg/dL 3.1* 2.0 1.9  --  1.8 2.1 1.8  --   --      CMP   Results from last 7 days   Lab Units 11/19/20 0854 11/18/20 2253 11/18/20 0427 11/17/20 0842 11/16/20  0320 11/15/20  0129 11/14/20  2136 11/14/20  1919   SODIUM mmol/L 133*  --  135* 134* 134* 133*  --  128*   POTASSIUM mmol/L 5.1 4.2 4.5 4.4 4.1 4.7 5.1 5.3*   CHLORIDE mmol/L 96*  --  99 100 98 100  --  97*   CO2 mmol/L 24.0  --  26.0 27.0 27.0 21.0*  --  18.0*   BUN mg/dL 24*  --  23* 20 27* 39*  --  37*   CREATININE mg/dL 1.36*  --  1.16 0.98 1.07 1.26  --  1.33*   GLUCOSE mg/dL 159*  --  117* 101* 129* 190*  --  122*   ALBUMIN g/dL 3.50  --  3.20* 3.10* 2.90* 3.00*  --  3.00*   BILIRUBIN mg/dL 1.3*  --  0.4 0.4 0.4 0.4  --  0.6   ALK PHOS U/L 143*  --  135* 140* 145* 172*  --  197*   AST (SGOT) U/L 35  --  57* 86* 176* 404*  --  480*   ALT (SGPT) U/L 165*  --  223* 279* 393* 584*  --  642*     Cardiac Studies:  Echo-   Results for orders placed during the hospital encounter of 11/14/20   Adult  Transthoracic Echo Complete W/ Cont if Necessary Per Protocol    Narrative · Left ventricular systolic function is severely decreased.  · Left ventricular ejection fraction is 10 to 15%  · Akinetic septum, inferior wall and apex is noted  · The left ventricular cavity is mildly dilated.  · Left ventricular diastolic function was normal.  · Mildly reduced right ventricular systolic function noted.  · Mild mitral valve regurgitation is present  · Mild tricuspid valve regurgitation is present. Calculated right   ventricular systolic pressure from tricuspid regurgitation is 28.0 mmHg.        Stress Myoview-  Cath-      Medication Review:   Scheduled Meds:aspirin, 81 mg, Oral, Daily  carvedilol, 12.5 mg, Oral, BID With Meals  clopidogrel, 75 mg, Oral, Daily  enoxaparin, 40 mg, Subcutaneous, Q24H  ferrous sulfate, 324 mg, Oral, Daily With Breakfast  furosemide, 40 mg, Intravenous, BID  isosorbide mononitrate, 30 mg, Oral, Q24H  lisinopril, 5 mg, Oral, Q24H  QUEtiapine, 50 mg, Oral, Nightly  sodium chloride, 10 mL, Intravenous, Q12H  traZODone, 100 mg, Oral, Nightly      Continuous Infusions:   PRN Meds:.atropine  •  Calcium Gluconate-NaCl **AND** calcium gluconate **AND** Calcium, Ionized  •  docusate sodium  •  hydrOXYzine  •  influenza vaccine  •  loperamide  •  magnesium sulfate **OR** magnesium sulfate **OR** magnesium sulfate  •  melatonin  •  nitroglycerin  •  ondansetron **OR** ondansetron  •  potassium & sodium phosphates **OR** potassium & sodium phosphates  •  potassium chloride  •  potassium chloride  •  sodium chloride  •  sodium chloride  •  sodium chloride      Assessment/Plan   Patient Active Problem List   Diagnosis   • Essential hypertension   • Syncope   • NICM (nonischemic cardiomyopathy) (CMS/HCC)   • Chest pain, atypical   • Dyspnea   • Hyponatremia   • CHF (congestive heart failure), NYHA class III, acute on chronic, systolic (CMS/[facility])   • Drug abuse (CMS/[facility])   • Non-ST elevation myocardial  infarction (NSTEMI) (CMS/HCC)   • Precordial pain   • Chest pain   • DENNIS (acute kidney injury) (CMS/HCC)   • Hyperkalemia   • Transaminitis   • Anemia     Plan:    Patient is complaining of mild nausea.  Etiology is unclear to me.  LFTs are elevated but they are improving.  Cardiac catheterization showed patent LAD stent.  Severe left ventricular dysfunction noted.  I would continue current therapy.  I would change Lasix to 40 mg daily.    Solomon Lowe MD  11/19/20  17:05 EST

## 2020-11-20 PROBLEM — J69.0 ASPIRATION PNEUMONIA DUE TO VOMIT (HCC): Status: ACTIVE | Noted: 2020-01-01

## 2020-11-20 NOTE — PROCEDURES
"Non-Tunneled Catheter    Date/Time: 11/20/2020 4:50 AM  Performed by: Jeremías Robles APRN  Authorized by: Jeremías Robles APRN   Consent: Verbal consent obtained. Written consent obtained.  Consent given by: Next of kin.  Patient understanding: patient states understanding of the procedure being performed  Patient consent: the patient's understanding of the procedure matches consent given  Procedure consent: procedure consent matches procedure scheduled  Relevant documents: relevant documents present and verified  Test results: test results available and properly labeled  Site marked: the operative site was marked  Imaging studies: imaging studies available  Required items: required blood products, implants, devices, and special equipment available  Patient identity confirmed: arm band, anonymous protocol, patient vented/unresponsive and hospital-assigned identification number  Time out: Immediately prior to procedure a \"time out\" was called to verify the correct patient, procedure, equipment, support staff and site/side marked as required.  Indications: vascular access  Anesthesia: local infiltration    Anesthesia:  Local Anesthetic: lidocaine 1% without epinephrine  Anesthetic total: 5 mL    Sedation:  Patient sedated: no    Preparation: skin prepped with ChloraPrep  Skin prep agent dried: skin prep agent completely dried prior to procedure  Sterile barriers: all five maximum sterile barriers used - cap, mask, sterile gown, sterile gloves, and large sterile sheet  Hand hygiene: hand hygiene performed prior to central venous catheter insertion  Location details: right femoral (Changed previous central line over guidewire for hemodialysis access.  Chose this site to avoid additional venous punctures given that patient is cool and coagulopathic.  )  Patient position: flat  Catheter type: triple lumen  Catheter size: 12 Fr (12 Fr. x 24 cm [place] High Pressure Triple Lumen Catheter)  Number of attempts: " 1  Successful placement: yes  Post-procedure: line sutured and dressing applied  Assessment: blood return through all ports and free fluid flow  Patient tolerance: patient tolerated the procedure well with no immediate complications        Electronically signed by ZACK Hazel, 11/20/20, 5:44 AM EST.

## 2020-11-20 NOTE — PROCEDURES
Intubation    Date/Time: 11/19/2020 11:36 PM  Performed by: Jeremías Robles APRN  Authorized by: Jeremías Robles APRN   Consent: The procedure was performed in an emergent situation.  Required items: required blood products, implants, devices, and special equipment available  Indications: respiratory failure and  airway protection (Active cardiopulmonary arrest.)  Intubation method: GlideScope.  Patient status: unconscious  Preoxygenation: BVM  Pretreatment medications: none  Paralytic: none  Laryngoscope size: Mac 3  Tube size: 7.5 mm  Tube type: cuffed  Number of attempts: 1  Cricoid pressure: no  Cords visualized: yes  Post-procedure assessment: CO2 detector  Breath sounds: equal and absent over the epigastrium  ETT to lip: 26 cm  Tube secured with: umbilical tape  Chest x-ray interpreted by me.  Chest x-ray findings: endotracheal tube too low  Tube repositioned: tube repositioned successfully (Repositioned to 25 at lip.)  Patient tolerance: patient tolerated the procedure well with no immediate complications        Electronically signed by ZACK Hazel, 11/20/20, 1:59 AM EST.

## 2020-11-20 NOTE — PROGRESS NOTES
LOS: 6 days   Admiting Physician- Daija Stuart MD    Reason For Followup:    Cardiac arrest  Aspiration pneumonia  Acute on chronic systolic heart failure  Ischemic cardiomyopathy      Subjective     Patient is sedated and intubated.    Objective     Patient is on multiple inotropic support.  Patient is on epinephrine, Levophed, phenylephrine and dopamine.    Review of Systems:   Review of Systems   Unable to perform ROS: intubated         Vital Signs  Vitals:    11/20/20 1513 11/20/20 1530 11/20/20 1600 11/20/20 1700   BP:       Pulse: 77  78 80   Resp: (!) 32      Temp:  (!) 90.3 °F (32.4 °C)     TempSrc:  Core     SpO2: (!) 78%  (!) 74% (!) 72%   Weight:       Height:         Wt Readings from Last 1 Encounters:   11/20/20 102 kg (225 lb 8.5 oz)       Intake/Output Summary (Last 24 hours) at 11/20/2020 1728  Last data filed at 11/20/2020 1600  Gross per 24 hour   Intake 7832 ml   Output 559 ml   Net 7273 ml     Physical Exam:  Constitutional:       Appearance: Well-developed.   Eyes:      General: No scleral icterus.        Right eye: No discharge.   HENT:      Head: Normocephalic and atraumatic.   Neck:      Thyroid: No thyromegaly.      Lymphadenopathy: No cervical adenopathy.   Pulmonary:      Effort: Pulmonary effort is normal. No respiratory distress.      Breath sounds: Normal breath sounds. No wheezing. No rales.   Cardiovascular:      Normal rate. Regular rhythm.      No gallop.   Edema:     Peripheral edema absent.   Abdominal:      Tenderness: There is no abdominal tenderness.   Skin:     Findings: No erythema or rash.   Neurological:      Mental Status: Alert and oriented to person, place, and time.         Results Review:   Lab Results (last 24 hours)     Procedure Component Value Units Date/Time    POC Glucose Once [[id number]]  (Abnormal) Collected: 11/20/20 1413    Specimen: Blood Updated: 11/20/20 1414     Glucose 119 mg/dL      Comment: Serial Number: [id number]:  [id number]        Blood Gas, Arterial - [[id number]]  (Abnormal) Collected: 11/20/20 1347    Specimen: Arterial Blood Updated: 11/20/20 1353     Site Arterial Line     Edis's Test N/A     pH, Arterial 7.101 pH units      pCO2, Arterial 53.7 mm Hg      pO2, Arterial 64.0 mm Hg      HCO3, Arterial 16.7 mmol/L      Base Excess, Arterial -13.0 mmol/L      Comment: Serial Number: [id number]:  [id number]        O2 Saturation, Arterial 82.7 %      CO2 Content 18.4 mmol/L      Barometric Pressure for Blood Gas --     Comment: N/A        Modality Adult Vent     FIO2 100 %      Ventilator Mode ;AC     Set Tidal Volume 32     PEEP 14     Hemodilution No     Respiratory Rate 550    Respiratory Panel, PCR (WITHOUT COVID) - Wash, Lung, L [[id number]]  (Normal) Collected: 11/20/20 1111    Specimen: Wash from Lung, L Updated: 11/20/20 1248     ADENOVIRUS, PCR Not Detected     Coronavirus 229E Not Detected     Coronavirus HKU1 Not Detected     Coronavirus NL63 Not Detected     Coronavirus OC43 Not Detected     Human Metapneumovirus Not Detected     Human Rhinovirus/Enterovirus Not Detected     Influenza B PCR Not Detected     Parainfluenza Virus 1 Not Detected     Parainfluenza Virus 2 Not Detected     Parainfluenza Virus 3 Not Detected     Parainfluenza Virus 4 Not Detected     Bordetella pertussis pcr Not Detected     Influenza A H1 2009 PCR Not Detected     Chlamydophila pneumoniae PCR Not Detected     Mycoplasma pneumo by PCR Not Detected     Influenza A PCR Not Detected     Influenza A H3 Not Detected     Influenza A H1 Not Detected     RSV, PCR Not Detected     Bordetella parapertussis PCR Not Detected    Narrative:      The coronavirus on the RVP is NOT COVID-19 and is NOT indicative of infection with COVID-19.     Blood Gas, Arterial - [[id number]]  (Abnormal) Collected: 11/20/20 1119    Specimen: Arterial Blood Updated: 11/20/20 1247     Site Arterial Line     Edis's Test N/A     pH, Arterial 7.112 pH units      pCO2, Arterial 61.9 mm Hg       pO2, Arterial 60.1 mm Hg      HCO3, Arterial 19.7 mmol/L      Base Excess, Arterial -10.3 mmol/L      Comment: Serial Number: [id number]:  [id number]        O2 Saturation, Arterial 80.1 %      CO2 Content 21.6 mmol/L      Barometric Pressure for Blood Gas --     Comment: N/A        Modality Adult Vent     FIO2 100 %      Ventilator Mode ;AC     Set Tidal Volume 550     PEEP 14     Hemodilution No     Respiratory Rate 30    Blood Gas, Arterial - [[id number]]  (Abnormal) Collected: 11/20/20 1156    Specimen: Arterial Blood Updated: 11/20/20 1245     Site Arterial Line     Edis's Test N/A     pH, Arterial 7.131 pH units      pCO2, Arterial 55.2 mm Hg      pO2, Arterial 65.8 mm Hg      HCO3, Arterial 18.4 mmol/L      Base Excess, Arterial -10.9 mmol/L      Comment: Serial Number: [id number]:  [id number]        O2 Saturation, Arterial 84.9 %      CO2 Content 20.1 mmol/L      Barometric Pressure for Blood Gas --     Comment: N/A        Modality Adult Vent     FIO2 100 %      Ventilator Mode ;AC     Set Tidal Volume 550     PEEP 14     Hemodilution No     Respiratory Rate 30    Non-gynecologic Cytology [[id number]] Collected: 11/20/20 1111    Specimen: Wash from Lung, L Updated: 11/20/20 1238    Respiratory Culture - Wash, Lung, L [[id number]] Collected: 11/20/20 1111    Specimen: Wash from Lung, L Updated: 11/20/20 1224     Gram Stain Many (4+) WBCs per low power field      Many (4+) Gram negative bacilli      Few (2+) Yeast with hyphae seen      Many (4+) Mixed bacterial morphotypes seen on Gram Stain    Pneumocystis PCR - Wash, Lung [[id number]] Collected: 11/20/20 1154    Specimen: Wash from Lung Updated: 11/20/20 1154    Cytomegalovirus (CMV) By PCR - Wash, Lung, L [[id number]] Collected: 11/20/20 1111    Specimen: Wash from Lung, L Updated: 11/20/20 1153    Fungus Culture - Wash, Lung, L [[id number]] Collected: 11/20/20 1111    Specimen: Wash from Lung, L Updated: 11/20/20 1153    AFB Culture - Wash, Lung, L [[id number]]  Collected: 11/20/20 1111    Specimen: Wash from Lung, L Updated: 11/20/20 1153    aPTT [[id number]]  (Abnormal) Collected: 11/20/20 1101    Specimen: Blood Updated: 11/20/20 1149     PTT >139.0 seconds     CBC & Differential [[id number]]  (Abnormal) Collected: 11/20/20 1100    Specimen: Blood Updated: 11/20/20 1145    Narrative:      The following orders were created for panel order CBC & Differential.  Procedure                               Abnormality         Status                     ---------                               -----------         ------                     CBC Auto Differential[[id number]]        Abnormal            Final result                 Please view results for these tests on the individual orders.    CBC Auto Differential [[id number]]  (Abnormal) Collected: 11/20/20 1100    Specimen: Blood Updated: 11/20/20 1145     WBC 2.60 10*3/mm3      RBC 5.00 10*6/mm3      Hemoglobin 11.8 g/dL      Hematocrit 39.6 %      MCV 79.2 fL      MCH 23.6 pg      MCHC 29.8 g/dL      RDW 17.5 %      RDW-SD 48.6 fl      MPV 8.1 fL      Platelets 214 10*3/mm3     Scan Slide [[id number]] Collected: 11/20/20 1100    Specimen: Blood Updated: 11/20/20 1145     Scan Slide --     Comment: See Manual Differential Results       Manual Differential [[id number]]  (Abnormal) Collected: 11/20/20 1100    Specimen: Blood Updated: 11/20/20 1145     Neutrophil % 5.0 %      Lymphocyte % 17.0 %      Monocyte % 8.0 %      Bands %  24.0 %      Metamyelocyte % 19.0 %      Myelocyte % 9.0 %      Promyelocyte % 3.0 %      Atypical Lymphocyte % 9.0 %      Blasts % 3.0 %      Comment: Reviewed by Pathologist within the past 30 days on [id number] .         Other Cells % 3.0 %      Neutrophils Absolute 0.75 10*3/mm3      Lymphocytes Absolute 0.44 10*3/mm3      Monocytes Absolute 0.21 10*3/mm3      nRBC 1.0 /100 WBC      Anisocytosis Slight/1+     Crenated RBC's Mod/2+     Microcytes Slight/1+     Poikilocytes Slight/1+     WBC Morphology Normal      Large Platelets Slight/1+    CK Total & CKMB [[id number]]  (Abnormal) Collected: 11/20/20 1100    Specimen: Blood Updated: 11/20/20 1140     CKMB 106.70 ng/mL      Creatine Kinase 4,649 U/L     Narrative:      CKMB results may be falsely decreased if patient taking Biotin.    Lactic Acid, Plasma [[id number]]  (Abnormal) Collected: 11/20/20 1100    Specimen: Blood Updated: 11/20/20 1136     Lactate 9.0 mmol/L     Vancomycin, Random [[id number]]  (Normal) Collected: 11/20/20 1100    Specimen: Blood Updated: 11/20/20 1130     Vancomycin Random 19.50 mcg/mL     Comprehensive Metabolic Panel [[id number]]  (Abnormal) Collected: 11/20/20 1100    Specimen: Blood Updated: 11/20/20 1127     Glucose 157 mg/dL      BUN 56 mg/dL      Creatinine 2.08 mg/dL      Sodium 134 mmol/L      Potassium 4.5 mmol/L      Chloride 98 mmol/L      CO2 18.0 mmol/L      Calcium 7.3 mg/dL      Total Protein 4.6 g/dL      Albumin 2.20 g/dL      ALT (SGPT) 241 U/L      AST (SGOT) 360 U/L      Alkaline Phosphatase 92 U/L      Total Bilirubin 0.9 mg/dL      eGFR Non African Amer 35 mL/min/1.73      Globulin 2.4 gm/dL      A/G Ratio 0.9 g/dL      BUN/Creatinine Ratio 26.9     Anion Gap 18.0 mmol/L     Narrative:      GFR Normal >60  Chronic Kidney Disease <60  Kidney Failure <15      Troponin [[id number]]  (Normal) Collected: 11/20/20 1100    Specimen: Blood Updated: 11/20/20 1127     Troponin T <0.010 ng/mL     Narrative:      Troponin T Reference Range:  <= 0.03 ng/mL-   Negative for AMI  >0.03 ng/mL-     Abnormal for myocardial necrosis.  Clinicians would have to utilize clinical acumen, EKG, Troponin and serial changes to determine if it is an Acute Myocardial Infarction or myocardial injury due to an underlying chronic condition.       Results may be falsely decreased if patient taking Biotin.      Magnesium [[id number]]  (Abnormal) Collected: 11/20/20 1100    Specimen: Blood Updated: 11/20/20 1124     Magnesium 3.4 mg/dL     Phosphorus [[id number]]   (Abnormal) Collected: 11/20/20 1100    Specimen: Blood Updated: 11/20/20 1124     Phosphorus 7.8 mg/dL     Calcium, Ionized [[id number]]  (Abnormal) Collected: 11/20/20 1100    Specimen: Blood Updated: 11/20/20 1113     Ionized Calcium 1.03 mmol/L     Pathology Consultation [[id number]] Collected: 11/20/20 0628    Specimen: Blood, Venous Line Updated: 11/20/20 1005     Final Diagnosis --     Leukopenia with circulating immature granulocytes  Microcytic anemia  No blasts identified       Case Report --     Surgical Pathology Report                         Case: [id number]                                  Authorizing Provider:  Jeremías Robles APRN      Collected:           11/20/2020 06:28 AM          Ordering Location:     [facility]       Received:            11/20/2020 08:32 AM                                 [facility]                                                     Pathologist:           Olaf Soto MD                                                             Specimen:    Blood, Venous Line                                                                         Urinalysis, Microscopic Only - Urine, Clean Catch [[id number]]  (Abnormal) Collected: 11/20/20 0714    Specimen: Urine, Clean Catch Updated: 11/20/20 0835     RBC, UA 13-20 /HPF      WBC, UA 6-12 /HPF      Bacteria, UA 1+ /HPF      Squamous Epithelial Cells, UA None Seen /HPF      Hyaline Casts, UA None Seen /LPF      Fine Granular Casts, UA 0-2 /LPF      Amorphous Crystals, UA Moderate/2+ /HPF      Methodology Manual Light Microscopy    Urine Culture - Urine, Urine, Clean Catch [[id number]] Collected: 11/20/20 0714    Specimen: Urine, Clean Catch Updated: 11/20/20 0835    Path Consult Reflex [[id number]] Collected: 11/20/20 0628    Specimen: Blood Updated: 11/20/20 0826     Pathology Review Yes    CBC & Differential [[id number]]  (Abnormal) Collected: 11/20/20 0628    Specimen: Blood Updated: 11/20/20 0826    Narrative:       The following orders were created for panel order CBC & Differential.  Procedure                               Abnormality         Status                     ---------                               -----------         ------                     CBC Auto Differential[[id number]]        Abnormal            Final result                 Please view results for these tests on the individual orders.    CBC Auto Differential [[id number]]  (Abnormal) Collected: 11/20/20 0628    Specimen: Blood Updated: 11/20/20 0826     WBC 2.10 10*3/mm3      RBC 5.02 10*6/mm3      Hemoglobin 11.9 g/dL      Hematocrit 39.2 %      MCV 78.1 fL      MCH 23.7 pg      MCHC 30.3 g/dL      RDW 17.1 %      RDW-SD 46.8 fl      MPV 7.9 fL      Platelets 255 10*3/mm3     Scan Slide [[id number]] Collected: 11/20/20 0628    Specimen: Blood Updated: 11/20/20 0826     Scan Slide --     Comment: See Manual Differential Results       Manual Differential [[id number]]  (Abnormal) Collected: 11/20/20 0628    Specimen: Blood Updated: 11/20/20 0826     Neutrophil % 12.0 %      Lymphocyte % 24.0 %      Monocyte % 8.0 %      Bands %  43.0 %      Metamyelocyte % 10.0 %      Myelocyte % 2.0 %      Atypical Lymphocyte % 1.0 %      Neutrophils Absolute 1.16 10*3/mm3      Lymphocytes Absolute 0.50 10*3/mm3      Monocytes Absolute 0.17 10*3/mm3      Billy Cells Slight/1+     Hypochromia Slight/1+     WBC Morphology Normal     Platelet Morphology Normal    Urinalysis With Culture If Indicated - Urine, Clean Catch [[id number]]  (Abnormal) Collected: 11/20/20 0714    Specimen: Urine, Clean Catch Updated: 11/20/20 0817     Color, UA Dark Yellow     Appearance, UA Turbid     pH, UA 5.5     Specific Gravity, UA 1.020     Glucose, UA Negative     Ketones, UA Trace     Bilirubin, UA Small (1+)     Comment: Confirmation testing is unavailable.  A serum bilirubin is recommended for further assessment.        Blood, UA Large (3+)     Protein,  mg/dL (2+)     Leuk Esterase, UA  Small (1+)     Nitrite, UA Positive     Urobilinogen, UA 1.0 E.U./dL    MRSA Screen, PCR (Inpatient) - Swab, Nares [[id number]]  (Normal) Collected: 11/20/20 0606    Specimen: Swab from Nares Updated: 11/20/20 0753     MRSA PCR No MRSA Detected    Blood Gas, Arterial - [[id number]]  (Abnormal) Collected: 11/20/20 0737    Specimen: Arterial Blood Updated: 11/20/20 0748     Site Arterial Line     Edis's Test N/A     pH, Arterial 7.182 pH units      pCO2, Arterial 59.5 mm Hg      pO2, Arterial 54.3 mm Hg      HCO3, Arterial 22.3 mmol/L      Base Excess, Arterial -6.7 mmol/L      Comment: Serial Number: [id number]:  [id number]        O2 Saturation, Arterial 78.6 %      CO2 Content 24.1 mmol/L      Barometric Pressure for Blood Gas --     Comment: N/A        Modality Adult Vent     FIO2 100 %      Ventilator Mode ;AC     Set Tidal Volume 550     PEEP 10     Hemodilution No     Respiratory Rate 30    CK Total & CKMB [[id number]]  (Abnormal) Collected: 11/20/20 0628    Specimen: Blood Updated: 11/20/20 0723     CKMB 75.90 ng/mL      Creatine Kinase 3,678 U/L     Narrative:      CKMB results may be falsely decreased if patient taking Biotin.    Phosphorus [[id number]]  (Abnormal) Collected: 11/20/20 0628    Specimen: Blood Updated: 11/20/20 0712     Phosphorus 8.3 mg/dL     Magnesium [[id number]]  (Abnormal) Collected: 11/20/20 0628    Specimen: Blood Updated: 11/20/20 0708     Magnesium 3.6 mg/dL     Comprehensive Metabolic Panel [[id number]]  (Abnormal) Collected: 11/20/20 0628    Specimen: Blood Updated: 11/20/20 0707     Glucose 143 mg/dL      BUN 56 mg/dL      Creatinine 1.97 mg/dL      Sodium 138 mmol/L      Potassium 4.8 mmol/L      Chloride 99 mmol/L      CO2 20.0 mmol/L      Calcium 7.8 mg/dL      Total Protein 4.8 g/dL      Albumin 2.20 g/dL      ALT (SGPT) 205 U/L      AST (SGOT) 286 U/L      Alkaline Phosphatase 102 U/L      Total Bilirubin 0.8 mg/dL      eGFR Non African Amer 37 mL/min/1.73      Globulin 2.6 gm/dL         A/G Ratio 0.8 g/dL      BUN/Creatinine Ratio 28.4     Anion Gap 19.0 mmol/L     Narrative:      GFR Normal >60  Chronic Kidney Disease <60  Kidney Failure <15      Troponin [[id number]]  (Normal) Collected: 11/20/20 0628    Specimen: Blood Updated: 11/20/20 0706     Troponin T 0.012 ng/mL     Narrative:      Troponin T Reference Range:  <= 0.03 ng/mL-   Negative for AMI  >0.03 ng/mL-     Abnormal for myocardial necrosis.  Clinicians would have to utilize clinical acumen, EKG, Troponin and serial changes to determine if it is an Acute Myocardial Infarction or myocardial injury due to an underlying chronic condition.       Results may be falsely decreased if patient taking Biotin.      Lactic Acid, Plasma [[id number]]  (Abnormal) Collected: 11/20/20 0628    Specimen: Blood Updated: 11/20/20 0659     Lactate 8.4 mmol/L     Lactic Acid, Reflex [[id number]] Updated: 11/20/20 0659    Specimen: Blood     Calcium, Ionized [[id number]]  (Abnormal) Collected: 11/20/20 0628    Specimen: Blood Updated: 11/20/20 0653     Ionized Calcium 1.08 mmol/L     POC Glucose Once [[id number]]  (Abnormal) Collected: 11/20/20 0436    Specimen: Blood Updated: 11/20/20 0440     Glucose 105 mg/dL      Comment: Serial Number: [id number]:  [id number]       Blood Gas, Arterial - [[id number]] Collected: 11/20/20 0436    Specimen: Arterial Blood Updated: 11/20/20 0440    Blood Gas, Arterial - [[id number]]  (Abnormal) Collected: 11/20/20 0144    Specimen: Arterial Blood Updated: 11/20/20 0423     Site Arterial Line     Edis's Test N/A     pH, Arterial 6.948 pH units      pCO2, Arterial 88.0 mm Hg      pO2, Arterial 156.5 mm Hg      HCO3, Arterial 19.2 mmol/L      Base Excess, Arterial -14.1 mmol/L      Comment: Serial Number: [id number]:  [id number]        O2 Saturation, Arterial 97.5 %      CO2 Content 21.9 mmol/L      Barometric Pressure for Blood Gas --     Comment: N/A        Modality Adult Vent     FIO2 100 %      Ventilator Mode ;AC     Set Tidal  Volume 500     PEEP 5     Hemodilution No     Respiratory Rate 20    Scan Slide [[id number]] Collected: 11/20/20 0220    Specimen: Blood Updated: 11/20/20 0406     Scan Slide --     Comment: See Manual Differential Results       Manual Differential [[id number]]  (Abnormal) Collected: 11/20/20 0220    Specimen: Blood Updated: 11/20/20 0406     Neutrophil % 13.0 %      Lymphocyte % 19.0 %      Monocyte % 7.0 %      Bands %  49.0 %      Metamyelocyte % 8.0 %      Myelocyte % 1.0 %      Atypical Lymphocyte % 3.0 %      Neutrophils Absolute 8.06 10*3/mm3      Lymphocytes Absolute 2.47 10*3/mm3      Monocytes Absolute 0.91 10*3/mm3      nRBC 1.0 /100 WBC      Anisocytosis Slight/1+     Billy Cells Slight/1+     WBC Morphology Normal     Giant Platelets Mod/2+    CBC & Differential [[id number]]  (Abnormal) Collected: 11/20/20 0220    Specimen: Blood Updated: 11/20/20 0406    Narrative:      The following orders were created for panel order CBC & Differential.  Procedure                               Abnormality         Status                     ---------                               -----------         ------                     CBC Auto Differential[[id number]]        Abnormal            Final result                 Please view results for these tests on the individual orders.    CBC Auto Differential [[id number]]  (Abnormal) Collected: 11/20/20 0220    Specimen: Blood Updated: 11/20/20 0406     WBC 13.00 10*3/mm3      RBC 4.82 10*6/mm3      Hemoglobin 11.4 g/dL      Hematocrit 38.1 %      MCV 79.0 fL      MCH 23.7 pg      MCHC 30.0 g/dL      RDW 17.3 %      RDW-SD 48.6 fl      MPV 8.2 fL      Platelets 316 10*3/mm3     Lactic Acid, Reflex Timer (This will reflex a repeat order 3-3:15 hours after ordered.) [[id number]] Collected: 11/20/20 0013    Specimen: Blood Updated: 11/20/20 0400     Hold Tube Hold for add-ons.     Comment: Auto resulted.       Blood Culture - Blood, Blood, Central Line [[id number]] Collected: 11/20/20  0300    Specimen: Blood, Central Line Updated: 11/20/20 0335    Lactic Acid, Plasma [[id number]]  (Abnormal) Collected: 11/20/20 0220    Specimen: Blood Updated: 11/20/20 0308     Lactate 10.9 mmol/L     Comprehensive Metabolic Panel [[id number]]  (Abnormal) Collected: 11/20/20 0220    Specimen: Blood Updated: 11/20/20 0307     Glucose 116 mg/dL      BUN 52 mg/dL      Creatinine 2.02 mg/dL      Sodium 135 mmol/L      Potassium 6.5 mmol/L      Chloride 97 mmol/L      CO2 18.0 mmol/L      Calcium 8.1 mg/dL      Total Protein 5.1 g/dL      Albumin 2.60 g/dL      ALT (SGPT) 157 U/L      AST (SGOT) 121 U/L      Alkaline Phosphatase 137 U/L      Total Bilirubin 0.8 mg/dL      eGFR Non African Amer 36 mL/min/1.73      Globulin 2.5 gm/dL      A/G Ratio 1.0 g/dL      BUN/Creatinine Ratio 25.7     Anion Gap 20.0 mmol/L     Narrative:      GFR Normal >60  Chronic Kidney Disease <60  Kidney Failure <15      CK Total & CKMB [[id number]]  (Abnormal) Collected: 11/20/20 0220    Specimen: Blood Updated: 11/20/20 0302     CKMB 21.49 ng/mL      Creatine Kinase 839 U/L     Narrative:      CKMB results may be falsely decreased if patient taking Biotin.    Lipid Panel [[id number]]  (Abnormal) Collected: 11/20/20 0220    Specimen: Blood Updated: 11/20/20 0302     Total Cholesterol 44 mg/dL      Triglycerides 18 mg/dL      HDL Cholesterol 25 mg/dL      LDL Cholesterol  9 mg/dL      VLDL Cholesterol 10 mg/dL      LDL/HDL Ratio 0.62    Narrative:      Cholesterol Reference Ranges  (U.S. Department of Health and Human Services ATP III Classifications)    Desirable          <200 mg/dL  Borderline High    200-239 mg/dL  High Risk          >240 mg/dL      Triglyceride Reference Ranges  (U.S. Department of Health and Human Services ATP III Classifications)    Normal           <150 mg/dL  Borderline High  150-199 mg/dL  High             200-499 mg/dL  Very High        >500 mg/dL    HDL Reference Ranges  (U.S. Department of Health and Human Services  ATP III Classifcations)    Low     <40 mg/dl (major risk factor for CHD)  High    >60 mg/dl ('negative' risk factor for CHD)        LDL Reference Ranges  (U.S. Department of Health and Human Services ATP III Classifcations)    Optimal          <100 mg/dL  Near Optimal     100-129 mg/dL  Borderline High  130-159 mg/dL  High             160-189 mg/dL  Very High        >189 mg/dL    Troponin [[id number]]  (Normal) Collected: 11/20/20 0220    Specimen: Blood Updated: 11/20/20 0302     Troponin T 0.020 ng/mL     Narrative:      Troponin T Reference Range:  <= 0.03 ng/mL-   Negative for AMI  >0.03 ng/mL-     Abnormal for myocardial necrosis.  Clinicians would have to utilize clinical acumen, EKG, Troponin and serial changes to determine if it is an Acute Myocardial Infarction or myocardial injury due to an underlying chronic condition.       Results may be falsely decreased if patient taking Biotin.      Magnesium [[id number]]  (Abnormal) Collected: 11/20/20 0220    Specimen: Blood Updated: 11/20/20 0257     Magnesium 3.9 mg/dL     Phosphorus [[id number]]  (Abnormal) Collected: 11/20/20 0220    Specimen: Blood Updated: 11/20/20 0257     Phosphorus 10.4 mg/dL     aPTT [[id number]]  (Abnormal) Collected: 11/20/20 0220    Specimen: Blood Updated: 11/20/20 0255     PTT 45.1 seconds      Comment: Result checked        Calcium, Ionized [[id number]]  (Abnormal) Collected: 11/20/20 0220    Specimen: Blood Updated: 11/20/20 0246     Ionized Calcium 1.11 mmol/L     Blood Culture - Blood, Blood, Arterial Line [[id number]] Collected: 11/20/20 0209    Specimen: Blood, Arterial Line Updated: 11/20/20 0237    Blood Culture - Blood, Arm, Right [[id number]] Collected: 11/15/20 0127    Specimen: Blood from Arm, Right Updated: 11/20/20 0145     Blood Culture No growth at 5 days    Blood Culture - Blood, Arm, Left [[id number]] Collected: 11/15/20 0128    Specimen: Blood from Arm, Left Updated: 11/20/20 0145     Blood Culture No growth at 5 days     "aPTT [[id number]]  (Abnormal) Collected: 11/20/20 0013    Specimen: Blood Updated: 11/20/20 0124     PTT 60.6 seconds     Procalcitonin [[id number]]  (Abnormal) Collected: 11/20/20 0013    Specimen: Blood Updated: 11/20/20 0120     Procalcitonin 44.06 ng/mL     Narrative:      As a Marker for Sepsis (Non-Neonates):   1. <0.5 ng/mL represents a low risk of severe sepsis and/or septic shock.  1. >2 ng/mL represents a high risk of severe sepsis and/or septic shock.    As a Marker for Lower Respiratory Tract Infections that require antibiotic therapy:  PCT on Admission     Antibiotic Therapy             6-12 Hrs later  > 0.5                Strongly Recommended            >0.25 - <0.5         Recommended  0.1 - 0.25           Discouraged                   Remeasure/reassess PCT  <0.1                 Strongly Discouraged          Remeasure/reassess PCT      As 28 day mortality risk marker: \"Change in Procalcitonin Result\" (> 80 % or <=80 %) if Day 0 (or Day 1) and Day 4 values are available. Refer to http://[email or web address]-pct-[email or web address]   Change in PCT <=80 %   A decrease of PCT levels below or equal to 80 % defines a positive change in PCT test result representing a higher risk for 28-day all-cause mortality of patients diagnosed with severe sepsis or septic shock.  Change in PCT > 80 %   A decrease of PCT levels of more than 80 % defines a negative change in PCT result representing a lower risk for 28-day all-cause mortality of patients diagnosed with severe sepsis or septic shock.                Results may be falsely decreased if patient taking Biotin.     Protime-INR [[id number]]  (Abnormal) Collected: 11/20/20 0013    Specimen: Blood Updated: 11/20/20 0117     Protime 16.1 Seconds      INR 1.49    Extra Tubes [[id number]] Collected: 11/20/20 0013    Specimen: Blood Updated: 11/20/20 0115    Narrative:      The following orders were created for panel order Extra Tubes.  Procedure                               " Abnormality         Status                     ---------                               -----------         ------                     Light Blue Top[[id number]]                                   Final result               Gold Top - SST[[id number]]                                   Final result                 Please view results for these tests on the individual orders.    Light Blue Top [[id number]] Collected: 11/20/20 0013    Specimen: Blood Updated: 11/20/20 0115     Extra Tube hold for add-on     Comment: Auto resulted       Gold Top - SST [[id number]] Collected: 11/20/20 0013    Specimen: Blood Updated: 11/20/20 0115     Extra Tube Hold for add-ons.     Comment: Auto resulted.       Lactic Acid, Plasma [[id number]]  (Abnormal) Collected: 11/20/20 0013    Specimen: Blood Updated: 11/20/20 0056     Lactate 18.2 mmol/L     Calcium, Ionized [[id number]]  (Normal) Collected: 11/20/20 0013    Specimen: Blood Updated: 11/20/20 0051     Ionized Calcium 1.20 mmol/L     Amylase [[id number]]  (Normal) Collected: 11/20/20 0013    Specimen: Blood Updated: 11/20/20 0050     Amylase 36 U/L     Lipase [[id number]]  (Abnormal) Collected: 11/20/20 0013    Specimen: Blood Updated: 11/20/20 0050     Lipase 9 U/L     POC Glucose Once [[id number]]  (Normal) Collected: 11/20/20 0048    Specimen: Blood Updated: 11/20/20 0049     Glucose 84 mg/dL      Comment: Serial Number: [id number]:  [id number]       Comprehensive Metabolic Panel [[id number]]  (Abnormal) Collected: 11/20/20 0013    Specimen: Blood Updated: 11/20/20 0046     Glucose 155 mg/dL      BUN 49 mg/dL      Creatinine 2.28 mg/dL      Sodium 135 mmol/L      Potassium 6.4 mmol/L      Chloride 91 mmol/L      CO2 15.0 mmol/L      Calcium 9.4 mg/dL      Total Protein 5.8 g/dL      Albumin 2.70 g/dL      ALT (SGPT) 123 U/L      AST (SGOT) 46 U/L      Alkaline Phosphatase 124 U/L      Total Bilirubin 0.8 mg/dL      eGFR Non African Amer 31 mL/min/1.73      Globulin 3.1 gm/dL      A/G  Ratio 0.9 g/dL      BUN/Creatinine Ratio 21.5     Anion Gap 29.0 mmol/L     Narrative:      GFR Normal >60  Chronic Kidney Disease <60  Kidney Failure <15      Magnesium [[id number]]  (Abnormal) Collected: 11/20/20 0013    Specimen: Blood Updated: 11/20/20 0046     Magnesium 4.4 mg/dL     CBC & Differential [[id number]]  (Abnormal) Collected: 11/20/20 0013    Specimen: Blood Updated: 11/20/20 0045    Narrative:      The following orders were created for panel order CBC & Differential.  Procedure                               Abnormality         Status                     ---------                               -----------         ------                     CBC Auto Differential[[id number]]        Abnormal            Final result                 Please view results for these tests on the individual orders.    CBC Auto Differential [[id number]]  (Abnormal) Collected: 11/20/20 0013    Specimen: Blood Updated: 11/20/20 0045     WBC 8.90 10*3/mm3      RBC 5.11 10*6/mm3      Hemoglobin 12.4 g/dL      Hematocrit 41.5 %      Comment: Result checked         MCV 81.2 fL      MCH 24.3 pg      MCHC 29.9 g/dL      RDW 17.8 %      RDW-SD 52.1 fl      MPV 8.6 fL      Platelets 309 10*3/mm3      Neutrophil % 75.5 %      Lymphocyte % 18.7 %      Monocyte % 5.2 %      Eosinophil % 0.2 %      Basophil % 0.4 %      Neutrophils, Absolute 6.80 10*3/mm3      Lymphocytes, Absolute 1.70 10*3/mm3      Monocytes, Absolute 0.50 10*3/mm3      Eosinophils, Absolute 0.00 10*3/mm3      Basophils, Absolute 0.00 10*3/mm3      nRBC 1.2 /100 WBC     Troponin [[id number]]  (Normal) Collected: 11/20/20 0013    Specimen: Blood Updated: 11/20/20 0040     Troponin T 0.014 ng/mL     Narrative:      Troponin T Reference Range:  <= 0.03 ng/mL-   Negative for AMI  >0.03 ng/mL-     Abnormal for myocardial necrosis.  Clinicians would have to utilize clinical acumen, EKG, Troponin and serial changes to determine if it is an Acute Myocardial Infarction or myocardial  injury due to an underlying chronic condition.       Results may be falsely decreased if patient taking Biotin.      Phosphorus [[id number]]  (Abnormal) Collected: 11/20/20 0013    Specimen: Blood Updated: 11/20/20 0040     Phosphorus 11.4 mg/dL         Imaging Results (Last 72 Hours)     Procedure Component Value Units Date/Time    XR Chest 1 View [[id number]] Collected: 11/20/20 0941     Updated: 11/20/20 0947    Narrative:         DATE OF EXAM:   11/20/2020 8:37 AM     PROCEDURE:   XR CHEST 1 VW-     INDICATIONS:   ett placement; R07.9-Chest pain, unspecified; I50.23-Acute on chronic  systolic (congestive) heart failure; J96.01-Acute respiratory failure  with hypoxia; I46.9-Cardiac arrest, cause unspecified; N17.9-Acute  kidney failure, unspecified; E87.5-Hyperkalemia; A41.9-Sepsis,  unspecified organism; R65.21-Severe sepsis with septic shock;  J69.0-Pneumonitis due to inhalation of food and vomit     COMPARISON:  Portable chest radiograph 11/20/2020 2:50 AM     TECHNIQUE:   Portable chest radiograph.     FINDINGS:    Endotracheal tube tip 6.3 cm above the arnold. Esophagogastric tube  below the diaphragm. There is a left IJ central venous catheter with the  tip at cavoatrial junction. Stable cardiomegaly. Extensive airspace  disease involving the right lung and overlying the left lung base again  noted most consistent with multifocal pneumonia. No pneumothorax. No  large pleural effusion.       Impression:      1. ET tube tip 6.3 cm above the arnold.  2. Stable esophagogastric tube and left IJ central venous catheter.  3. Airspace disease throughout the right lung and at the left lung base  concerning for multifocal pneumonia.   4. No pneumothorax.     Electronically Signed By-Gerardo Avilez MD On:11/20/2020 9:45 AM  This report was finalized on [id number] by  Gerardo Avilez MD.    XR Chest 1 View [[id number]] Collected: 11/20/20 0720     Updated: 11/20/20 0725    Narrative:         DATE OF EXAM:   11/20/2020 3:00  AM     PROCEDURE:   XR CHEST 1 VW-     INDICATIONS:   line placement; R07.9-Chest pain, unspecified; I50.23-Acute on chronic  systolic (congestive) heart failure; J96.01-Acute respiratory failure  with hypoxia; I46.9-Cardiac arrest, cause unspecified     COMPARISON:  11/20/2020     TECHNIQUE:   Portable chest radiograph.     FINDINGS:    Endotracheal tube tip has been retracted and is now 5 cm above the  arnold. Esophagogastric tube below the diaphragm. Placement of a left  internal jugular central venous catheter with the tip at the cavoatrial  junction. The heart is enlarged. No pneumothorax. No large pleural  effusion. Bilateral areas of lower lobe consolidation which again may  relate to pneumonia.       Impression:      1. ET tube tip now 5 cm above the arnold.  2. Esophagogastric tube below the diaphragm.  3. Placement of a left internal jugular central venous catheter with the  tip at the cavoatrial junction. No pneumothorax.  4. Stable bilateral medial lower lobe airspace disease concerning for  pneumonia and/or aspiration.  5. Stable cardiomegaly.     Electronically Signed By-Gerardo Avilez MD On:11/20/2020 7:23 AM  This report was finalized on [id number] by  Gerardo Avilez MD.    CT Abdomen Pelvis Without Contrast [[id number]] Collected: 11/19/20 2345     Updated: 11/20/20 0157    Narrative:      CT CHEST, ABDOMEN AND PELVIS WITHOUT CONTRAST    CLINICAL INDICATION: Status post cardiac arrest. Possible bowel obstruction.    COMPARISON: 3/22/2020.    TECHNIQUE: Noncontrast CT images of the chest, abdomen and pelvis were obtained. CT dose lowering techniques were used, to include: automated exposure control, adjustment for patient size, and or use of iterative reconstruction.    FINDINGS:    Cardiovascular: Cardiomegaly without pericardial effusion. Coronary stent present.    Mediastinum: Endotracheal tube tip at the arnold. Enteric tube in the stomach. No mediastinal or hilar lymphadenopathy. Trachea and  central airways are patent. Thyroid is normal. Esophagus is normal.     Lungs and pleura: Bibasilar pulmonary consolidations are present with air bronchograms. Other streaky airspace densities are seen in the upper lobes. No pleural effusion or pneumothorax.     Liver and biliary system: The liver is normal in size and configuration without focal lesion. No intra or extrahepatic biliary ductal dilatation is noted. The gallbladder is normal without stones, wall thickening or adjacent fluid.     Pancreas: The pancreas is unremarkable.     Spleen: The spleen is normal.    Adrenals: The adrenal glands are within normal limits.     Kidneys: The kidneys are symmetric in size and enhancement and without focal lesion or hydronephrosis.    Gastrointestinal: There is diffuse dilatation of both small and large bowel. No focal transition point is seen.    Mesentery and retroperitoneum: Embolic material seen in the mid abdomen. No mesenteric or retroperitoneal adenopathy. No abnormal fluid collection, mass or free air. Vascular structures are patent and within normal limits.    Pelvis: The urinary bladder is decompressed by Houser catheter. Rectum is normal. No free fluid. No deep pelvic or inguinal adenopathy. Iliac vessels are patent and normal.    Reproductive: No acute abnormality.     Body wall: Normal.    Bones: No acute osseous abnormality.      Impression:      Impression:   1. Diffuse small and large bowel dilatation without focal transition point. Findings may be on the basis of ileus. Follow-up is warranted to exclude mechanical obstruction. Bowel ischemia not excluded without the presence of IV contrast.  2. Bibasilar pulmonary consolidations with air bronchograms which may reflect pneumonia or aspiration.  3. Cardiomegaly.  4. Recommend retracting endotracheal tube 5.0 cm.    Electronically signed by:  Ryan Joseph M.D.    11/19/2020 11:56 PM    CT Chest Without Contrast [[id number]] Collected: 11/19/20 7844      Updated: 11/20/20 0157    Narrative:      CT CHEST, ABDOMEN AND PELVIS WITHOUT CONTRAST    CLINICAL INDICATION: Status post cardiac arrest. Possible bowel obstruction.    COMPARISON: 3/22/2020.    TECHNIQUE: Noncontrast CT images of the chest, abdomen and pelvis were obtained. CT dose lowering techniques were used, to include: automated exposure control, adjustment for patient size, and or use of iterative reconstruction.    FINDINGS:    Cardiovascular: Cardiomegaly without pericardial effusion. Coronary stent present.    Mediastinum: Endotracheal tube tip at the arnold. Enteric tube in the stomach. No mediastinal or hilar lymphadenopathy. Trachea and central airways are patent. Thyroid is normal. Esophagus is normal.     Lungs and pleura: Bibasilar pulmonary consolidations are present with air bronchograms. Other streaky airspace densities are seen in the upper lobes. No pleural effusion or pneumothorax.     Liver and biliary system: The liver is normal in size and configuration without focal lesion. No intra or extrahepatic biliary ductal dilatation is noted. The gallbladder is normal without stones, wall thickening or adjacent fluid.     Pancreas: The pancreas is unremarkable.     Spleen: The spleen is normal.    Adrenals: The adrenal glands are within normal limits.     Kidneys: The kidneys are symmetric in size and enhancement and without focal lesion or hydronephrosis.    Gastrointestinal: There is diffuse dilatation of both small and large bowel. No focal transition point is seen.    Mesentery and retroperitoneum: Embolic material seen in the mid abdomen. No mesenteric or retroperitoneal adenopathy. No abnormal fluid collection, mass or free air. Vascular structures are patent and within normal limits.    Pelvis: The urinary bladder is decompressed by Houser catheter. Rectum is normal. No free fluid. No deep pelvic or inguinal adenopathy. Iliac vessels are patent and normal.    Reproductive: No acute  abnormality.     Body wall: Normal.    Bones: No acute osseous abnormality.      Impression:      Impression:   1. Diffuse small and large bowel dilatation without focal transition point. Findings may be on the basis of ileus. Follow-up is warranted to exclude mechanical obstruction. Bowel ischemia not excluded without the presence of IV contrast.  2. Bibasilar pulmonary consolidations with air bronchograms which may reflect pneumonia or aspiration.  3. Cardiomegaly.  4. Recommend retracting endotracheal tube 5.0 cm.    Electronically signed by:  Ryan Joseph M.D.    11/19/2020 11:56 PM    CT Head Without Contrast [[id number]] Collected: 11/19/20 2340     Updated: 11/20/20 0143    Narrative:      EXAMINATION: CT HEAD WO CONTRAST    DATE: 11/20/2020 1:25 AM     INDICATION: Cardiopulmonary arrest     COMPARISON: 7/13/2019.     TECHNIQUE: Thin section noncontrast axial images were obtained through the head. Coronal reformats were created.  CT dose lowering techniques were used, to include: automated exposure control, adjustment for patient size, and or use of iterative   reconstruction.     FINDINGS:     Intracranial contents:    No acute intracranial hemorrhage, evidence of acute territorial infarct, mass, mass effect or hydrocephalus. Cerebral volume is normal for age. Gray-white differentiation is preserved.    Bones and extracranial soft tissues:     Chronic swelling in the right parietal scalp unchanged from prior. No fracture or focal osseous lesion in the calvarium or skull base. Paranasal sinuses are clear where visualized. Mastoid air cells are clear. Orbits are unremarkable.         Impression:        No acute intracranial abnormality visible by CT. MRI has greater sensitivity.      This examination was interpreted by Mynor Mandujano M.D.    Electronically signed by:  Mynor Mandujano M.D.    11/19/2020 11:42 PM    XR Chest 1 View [[id number]] Collected: 11/19/20 2239     Updated: 11/20/20 0047     Narrative:      FRONTAL VIEW OF THE CHEST    CLINICAL INDICATION: Respiratory failure.    COMPARISON: 11/14/2020.    FINDINGS: An enteric tube terminates in the stomach. Another catheter terminates in the mid chest. Cardiomegaly stable. Bilateral perihilar prominence noted. No new consolidation.      Impression:      1. Bilateral perihilar prominence may reflect early edema or atypical infection.  2. Enteric tube in the stomach. A second catheter terminates in the mid chest and is also felt to be located in the esophagus. If this is an endotracheal tube, the tip is partially into the right main bronchus and requires retracting 5.0 cm.    Electronically signed by:  Ryan Joseph M.D.    11/19/2020 10:46 PM        ECG/EMG Results (most recent)     Procedure Component Value Units Date/Time    ECG 12 Lead [[id number]] Collected: 11/14/20 2356     Updated: 11/14/20 2357     QT Interval 372 ms     Narrative:      HEART RATE= 97  bpm  RR Interval= 616  ms  AK Interval= 186  ms  P Horizontal Axis=   deg  P Front Axis= 66  deg  QRSD Interval= 96  ms  QT Interval= 372  ms  QRS Axis= -14  deg  T Wave Axis= -88  deg  - ABNORMAL ECG -  Sinus rhythm  Probable left atrial enlargement  Left ventricular hypertrophy  Anterior infarct, old  Nonspecific T abnormalities, lateral leads  When compared with ECG of 14-Nov-2020 18:34:37,  Significant repolarization change  Significant axis, voltage or hypertrophy change  Electronically Signed By:   Date and Time of Study: 2020-11-14 23:56:25    ECG 12 Lead [[id number]] Collected: 11/14/20 2356     Updated: 11/15/20 0511     QT Interval 372 ms     Narrative:      HEART RATE= 97  bpm  RR Interval= 616  ms  AK Interval= 186  ms  P Horizontal Axis=   deg  P Front Axis= 66  deg  QRSD Interval= 96  ms  QT Interval= 372  ms  QRS Axis= -14  deg  T Wave Axis= -88  deg  - ABNORMAL ECG -  Sinus rhythm  Probable left atrial enlargement  Left ventricular hypertrophy  Anterior infarct, old  Nonspecific T  abnormalities, lateral leads  When compared with ECG of 14-Nov-2020 18:34:37,  Significant repolarization change  Significant axis, voltage or hypertrophy change  Electronically Signed By:   Date and Time of Study: 2020-11-14 23:56:25    Adult Transthoracic Echo Complete W/ Cont if Necessary Per Protocol [[id number]] Collected: 11/15/20 0936     Updated: 11/16/20 0949     BSA 2.3 m^2      RVIDd 3.5 cm      IVSd 1.3 cm      LVIDd 7.0 cm      LVIDs 6.6 cm      LVPWd 0.74 cm      IVS/LVPW 1.7     FS 4.9 %      EDV(Teich) 254.1 ml      ESV(Teich) 226.7 ml      EF(Teich) 10.8 %      EDV(cubed) 340.6 ml      ESV(cubed) 292.8 ml      EF(cubed) 14.0 %      LV mass(C)d 323.6 grams      LV mass(C)dI 143.3 grams/m^2      SV(Teich) 27.4 ml      SI(Teich) 12.1 ml/m^2      SV(cubed) 47.8 ml      SI(cubed) 21.2 ml/m^2      Ao root diam 3.8 cm      Ao root area 11.0 cm^2      ACS 2.5 cm      asc Aorta Diam 2.6 cm      LVOT diam 2.3 cm      LVOT area 4.3 cm^2      RVOT diam 2.7 cm      RVOT area 5.7 cm^2      EDV(MOD-sp4) 205.2 ml      ESV(MOD-sp4) 180.8 ml      EF(MOD-sp4) 11.9 %      SV(MOD-sp4) 24.5 ml      SI(MOD-sp4) 10.8 ml/m^2      Ao root area (BSA corrected) 1.7     LV Sims Vol (BSA corrected) 90.9 ml/m^2      LV Sys Vol (BSA corrected) 80.1 ml/m^2      Aortic R-R 0.65 sec      Aortic HR 92.7 BPM      MV E max anthony 84.0 cm/sec      MV A max anthony 64.1 cm/sec      MV E/A 1.3     MV V2 max 99.3 cm/sec      MV max PG 3.9 mmHg      MV V2 mean 55.1 cm/sec      MV mean PG 1.5 mmHg      MV V2 VTI 11.7 cm      MVA(VTI) 4.4 cm^2      MV dec slope 997.9 cm/sec^2      MV dec time 0.08 sec      Ao pk anthony 90.4 cm/sec      Ao max PG 3.3 mmHg      Ao max PG (full) 0.51 mmHg      Ao V2 mean 63.6 cm/sec      Ao mean PG 1.8 mmHg      Ao mean PG (full) 0.55 mmHg      Ao V2 VTI 14.8 cm      MAXIM(I,A) 3.5 cm^2      MAXIM(I,D) 3.5 cm^2      MAXIM(V,A) 3.9 cm^2      MAXIM(V,D) 3.9 cm^2      LV V1 max PG 2.8 mmHg      LV V1 mean PG 1.3 mmHg      LV V1 max  83.1 cm/sec      LV V1 mean 50.7 cm/sec      LV V1 VTI 12.2 cm      CO(Ao) 15.1 l/min      CI(Ao) 6.7 l/min/m^2      SV(Ao) 163.2 ml      SI(Ao) 72.3 ml/m^2      CO(LVOT) 4.8 l/min      CI(LVOT) 2.1 l/min/m^2      SV(LVOT) 51.9 ml      SV(RVOT) 55.4 ml      SI(LVOT) 23.0 ml/m^2      PA V2 max 66.6 cm/sec      PA max PG 1.8 mmHg      PA max PG (full) 0.02 mmHg      PA V2 mean 43.8 cm/sec      PA mean PG 0.92 mmHg      PA mean PG (full) 0.19 mmHg      PA V2 VTI 9.2 cm      PVA(I,A) 6.0 cm^2       CV ECHO PETER - PVA(I,D) 6.0 cm^2       CV ECHO PETER - PVA(V,A) 5.7 cm^2       CV ECHO PETER - PVA(V,D) 5.7 cm^2      PA acc time 0.09 sec      RV V1 max PG 1.8 mmHg      RV V1 mean PG 0.72 mmHg      RV V1 max 66.3 cm/sec      RV V1 mean 37.5 cm/sec      RV V1 VTI 9.7 cm      TR max osmar 249.8 cm/sec      RVSP(TR) 28.0 mmHg      RAP systole 3.0 mmHg      PA pr(Accel) 39.6 mmHg      Pulm Sys Osmar 35.4 cm/sec      Pulm Sims Osmar 65.2 cm/sec      Pulm S/D 0.54     Qp/Qs 1.1      CV ECHO PETER - BZI_BMI 29.6 kilograms/m^2       CV ECHO PETER - BSA(HAYCOCK) 2.3 m^2       CV ECHO PETER - BZI_METRIC_WEIGHT 101.6 kg       CV ECHO PETER - BZI_METRIC_HEIGHT 185.4 cm      EF(MOD-bp) 12.0 %      LA dimension(2D) 5.3 cm     Narrative:      · Left ventricular systolic function is severely decreased.  · Left ventricular ejection fraction is 10 to 15%  · Akinetic septum, inferior wall and apex is noted  · The left ventricular cavity is mildly dilated.  · Left ventricular diastolic function was normal.  · Mildly reduced right ventricular systolic function noted.  · Mild mitral valve regurgitation is present  · Mild tricuspid valve regurgitation is present. Calculated right   ventricular systolic pressure from tricuspid regurgitation is 28.0 mmHg.       ECG 12 Lead [[id number]] Collected: 11/14/20 1834     Updated: 11/17/20 1835     QT Interval 421 ms     Narrative:      HEART RATE= 95  bpm  RR Interval= 636  ms  VA Interval= 191  ms  P  Horizontal Axis= -21  deg  P Front Axis= 43  deg  QRSD Interval= 92  ms  QT Interval= 421  ms  QRS Axis= 194  deg  T Wave Axis= -60  deg  - ABNORMAL ECG -  Sinus rhythm  LAE, consider biatrial enlargement  Probable RVH w/ secondary repol abnormality  Inferior infarct, age indeterminate  Borderline ST elevation, anterior leads  Prolonged QT interval  When compared with ECG of 24-Mar-2020 10:07:43,  New or worsened ischemia or infarction  Significant repolarization change  Significant axis, voltage or hypertrophy change  Electronically Signed By: Richard Diaz ([facility]) 17-Nov-2020 18:24:18  Date and Time of Study: 2020-11-14 18:34:37    ECG 12 Lead [[id number]] Collected: 11/18/20 2209     Updated: 11/19/20 0514     QT Interval 367 ms     Narrative:      HEART RATE= 94  bpm  RR Interval= 640  ms  RI Interval= 175  ms  P Horizontal Axis= -5  deg  P Front Axis= -8  deg  QRSD Interval= 99  ms  QT Interval= 367  ms  QRS Axis= 52  deg  T Wave Axis=   deg  - ABNORMAL ECG -  Sinus rhythm  Ventricular trigeminy  Probable left atrial enlargement  Anterior infarct, old  Nonspecific T abnormalities, lateral leads  Electronically Signed By:   Date and Time of Study: 2020-11-18 22:17:33        CBC    Results from last 7 days   Lab Units 11/20/20  1100 11/20/20  0628 11/20/20 0220 11/20/20  0013 11/19/20  0739 11/18/20  0427 11/17/20  0535   WBC 10*3/mm3 2.60* 2.10* 13.00* 8.90 12.90* 8.60 9.70   HEMOGLOBIN g/dL 11.8* 11.9* 11.4* 12.4* 13.2 10.9* 10.8*   PLATELETS 10*3/mm3 214 255 316 309 334 302 242     BMP   Results from last 7 days   Lab Units 11/20/20  1100 11/20/20  0628 11/20/20  0220 11/20/20  0013 11/19/20  0854 11/18/20  2253 11/18/20  0427 11/17/20  0842   SODIUM mmol/L 134* 138 135* 135* 133*  --  135* 134*   POTASSIUM mmol/L 4.5 4.8 6.5* 6.4* 5.1 4.2 4.5 4.4   CHLORIDE mmol/L 98 99 97* 91* 96*  --  99 100   CO2 mmol/L 18.0* 20.0* 18.0* 15.0* 24.0  --  26.0 27.0   BUN mg/dL 56* 56* 52* 49* 24*  --  23* 20   CREATININE  mg/dL 2.08* 1.97* 2.02* 2.28* 1.36*  --  1.16 0.98   GLUCOSE mg/dL 157* 143* 116* 155* 159*  --  117* 101*   MAGNESIUM mg/dL 3.4* 3.6* 3.9* 4.4* 3.1* 2.0 1.9  --    PHOSPHORUS mg/dL 7.8* 8.3* 10.4* 11.4*  --   --   --   --      CMP   Results from last 7 days   Lab Units 11/20/20  1100 11/20/20  0628 11/20/20  0220 11/20/20  0013 11/19/20  0854 11/18/20  2253 11/18/20  0427 11/17/20  0842   SODIUM mmol/L 134* 138 135* 135* 133*  --  135* 134*   POTASSIUM mmol/L 4.5 4.8 6.5* 6.4* 5.1 4.2 4.5 4.4   CHLORIDE mmol/L 98 99 97* 91* 96*  --  99 100   CO2 mmol/L 18.0* 20.0* 18.0* 15.0* 24.0  --  26.0 27.0   BUN mg/dL 56* 56* 52* 49* 24*  --  23* 20   CREATININE mg/dL 2.08* 1.97* 2.02* 2.28* 1.36*  --  1.16 0.98   GLUCOSE mg/dL 157* 143* 116* 155* 159*  --  117* 101*   ALBUMIN g/dL 2.20* 2.20* 2.60* 2.70* 3.50  --  3.20* 3.10*   BILIRUBIN mg/dL 0.9 0.8 0.8 0.8 1.3*  --  0.4 0.4   ALK PHOS U/L 92 102 137* 124* 143*  --  135* 140*   AST (SGOT) U/L 360* 286* 121* 46* 35  --  57* 86*   ALT (SGPT) U/L 241* 205* 157* 123* 165*  --  223* 279*   AMYLASE U/L  --   --   --  36  --   --   --   --    LIPASE U/L  --   --   --  9*  --   --   --   --      Cardiac Studies:  Echo-   Results for orders placed during the hospital encounter of 11/14/20   Adult Transthoracic Echo Complete W/ Cont if Necessary Per Protocol    Narrative · Left ventricular systolic function is severely decreased.  · Left ventricular ejection fraction is 10 to 15%  · Akinetic septum, inferior wall and apex is noted  · The left ventricular cavity is mildly dilated.  · Left ventricular diastolic function was normal.  · Mildly reduced right ventricular systolic function noted.  · Mild mitral valve regurgitation is present  · Mild tricuspid valve regurgitation is present. Calculated right   ventricular systolic pressure from tricuspid regurgitation is 28.0 mmHg.        Stress Myoview-  Cath-      Medication Review:   Scheduled Meds:artificial tears, , Both Eyes,  Q2H  aspirin, 81 mg, Oral, Daily  cefepime, 2 g, Intravenous, Q12H  chlorhexidine, 15 mL, Mouth/Throat, Q12H  clopidogrel, 75 mg, Oral, Daily  folic acid (FOLVITE) IVPB, 1 mg, Intravenous, Daily  hydrocortisone sodium succinate, 100 mg, Intravenous, Q8H  ipratropium-albuterol, 3 mL, Nebulization, Q4H - RT  levETIRAcetam, 500 mg, Intravenous, Q12H  pantoprazole, 40 mg, Intravenous, Q24H  sodium chloride, 10 mL, Intravenous, Q12H  thiamine (VITAMIN B1) IVPB, 100 mg, Intravenous, Daily      Continuous Infusions:DOBUTamine, 2-10 mcg/kg/min, Last Rate: 10 mcg/kg/min (11/20/20 1529)  DOPamine, 2-20 mcg/kg/min  EPINEPHrine, 0.02-0.3 mcg/kg/min, Last Rate: 0.5 mcg/kg/min (11/20/20 1318)  epoprostenol, 50 ng/kg/min (Ideal), Last Rate: 50 ng/kg/min (11/20/20 1440)  fentanyl 10 mcg/mL,  mcg/hr, Last Rate: 50 mcg/hr (11/20/20 0426)  furosemide (LASIX) 100 mg in 100mL NS infusion, 40 mg/hr, Last Rate: Stopped (11/20/20 1640)  heparin, 14.7 Units/kg/hr, Last Rate: Stopped (11/20/20 1528)  insulin, 1-20 Units/hr  midazolam, 1 mg/hr, Last Rate: 0.5 mg/hr (11/20/20 1530)  norepinephrine, 0.02-0.3 mcg/kg/min, Last Rate: 0.35 mcg/kg/min (11/20/20 1317)  Pharmacy to dose vancomycin,   phenylephrine, 0.5-6 mcg/kg/min, Last Rate: 3 mcg/kg/min (11/20/20 1500)  Phoxillum 4 K/2.5 CA, 1,500 mL/hr, Last Rate: 1,500 mL/hr (11/20/20 1445)  Phoxillum 4 K/2.5 CA, 1,500 mL/hr, Last Rate: 1,500 mL/hr (11/20/20 1445)  Phoxillum 4 K/2.5 CA, 1,500 mL/hr, Last Rate: 1,500 mL/hr (11/20/20 1445)  propofol, 5-50 mcg/kg/min  sodium bicarbonate drip (greater than 75 mEq/bag), 150 mEq, Last Rate: 50 mL/hr at 11/20/20 0856  vasopressin, 0.03 Units/min, Last Rate: 0.03 Units/min (11/20/20 1044)      PRN Meds:.•  acetaminophen **OR** acetaminophen  •  acetaminophen  •  aluminum-magnesium hydroxide-simethicone  •  atropine  •  bisacodyl  •  Calcium Gluconate-NaCl **AND** calcium gluconate **AND** Calcium, Ionized  •  calcium gluconate **OR** calcium  gluconate  •  dextrose  •  docusate sodium  •  fentanyl 10 mcg/mL  •  heparin (porcine)  •  heparin (porcine)  •  influenza vaccine  •  ipratropium-albuterol  •  lidocaine  •  lidocaine  •  magnesium hydroxide  •  magnesium sulfate **OR** magnesium sulfate **OR** magnesium sulfate  •  magnesium sulfate  •  nitroglycerin  •  ondansetron **OR** ondansetron  •  ondansetron **OR** ondansetron  •  [COMPLETED] vancomycin **AND** Pharmacy to dose vancomycin  •  potassium chloride  •  propofol  •  sodium chloride  •  sodium chloride  •  sodium chloride  •  sodium chloride  •  sodium phosphate IVPB  •  vancomycin  •  vecuronium      Assessment/Plan   Patient Active Problem List   Diagnosis   • Essential hypertension   • Syncope   • NICM (nonischemic cardiomyopathy) (CMS/[facility])   • Chest pain, atypical   • Dyspnea   • Hyponatremia   • CHF (congestive heart failure), NYHA class III, acute on chronic, systolic (CMS/[facility])   • Drug abuse (CMS/[facility])   • Non-ST elevation myocardial infarction (NSTEMI) (CMS/[facility])   • Precordial pain   • Chest pain   • DENNIS (acute kidney injury) (CMS/[facility])   • Hyperkalemia   • Transaminitis   • Anemia   • Aspiration pneumonia due to vomit (CMS/[facility])     Plan:    Events noted last night.  Apparently patient had cardiac arrest possible due to aspiration and asystole.  Patient got intubated.  Patient is on multiple inotropic agents.  Patient is on CRRT.  At present blood pressure is reasonable and rhythm is stable.  However long-term prognosis of the patient is poor.  Patient has been noncompliant and was drinking.  Patient stopped taking his aspirin and Plavix and other medications.  We shall follow.  Continue supportive therapy.    Solomon Lowe MD  11/20/20  17:28 EST

## 2020-11-20 NOTE — TREATMENT PLAN
Repeat labs came back potassium down to 4.8  Increase bicarb drip  Hold off CRRT  Recheck labs in few hours  Discussed with the nurse

## 2020-11-20 NOTE — PROGRESS NOTES
"Pharmacy Antimicrobial Dosing Service    Subjective:  Asad Bethea is a 44 y.o.male admitted with chest pain. Pharmacy has been consulted to dose Vancomycin for possible sepsis.    PMH:      Assessment/Plan    1. Day #1 Vancomycin: Pulse dosing d/t renal dysfxn. 1500mg (~17mg/kg DBW) IV x1 dose.  Random level ordered for 11/230 at 1300.  Re-dose when less than 20.    2. Day #1 Cefepime: 2gm IV q12h for estCrCl 30-59 mL/min.    Will continue to monitor drug levels, renal function, culture and sensitivities, and patient clinical status.       Objective:  Relevant clinical data and objective history reviewed:  185.4 cm (73\")   102 kg (224 lb 3.3 oz)   Ideal body weight: 79.9 kg (176 lb 2.4 oz)  Adjusted ideal body weight: 88.6 kg (195 lb 5.9 oz)  Body mass index is 29.58 kg/m².        Results from last 7 days   Lab Units 11/20/20  0220 11/20/20  0013 11/19/20  0854   CREATININE mg/dL 2.02* 2.28* 1.36*     Estimated Creatinine Clearance: 57.9 mL/min (A) (by C-G formula based on SCr of 2.02 mg/dL (H)).  I/O last 3 completed shifts:  In: 480 [P.O.:480]  Out: 850 [Urine:850]    Results from last 7 days   Lab Units 11/20/20  0220 11/20/20  0013 11/19/20  0739   WBC 10*3/mm3 13.00* 8.90 12.90*     Temperature    11/20/20 0230 11/20/20 0300 11/20/20 0330   Temp: 92.7 °F (33.7 °C) (!) 91.9 °F (33.3 °C) (!) 91.6 °F (33.1 °C)     Baseline culture/source/susceptibility:  Microbiology Results (last 10 days)       Procedure Component Value - Date/Time    Blood Culture - Blood, Arm, Left [[id number]] Collected: 11/15/20 0128    Lab Status: Final result Specimen: Blood from Arm, Left Updated: 11/20/20 0145     Blood Culture No growth at 5 days    Blood Culture - Blood, Arm, Right [[id number]] Collected: 11/15/20 0127    Lab Status: Final result Specimen: Blood from Arm, Right Updated: 11/20/20 0145     Blood Culture No growth at 5 days    COVID-19, [facility] IN-HOUSE,NP Swab (NO TRANSPORT MEDIA) 2 HR TAT - Swab, Nasopharynx " [[id number]]  (Normal) Collected: 11/14/20 1922    Lab Status: Final result Specimen: Swab from Nasopharynx Updated: 11/14/20 2006     COVID19 Not Detected    Narrative:      Fact sheet for providers: https://www.fda.gov/media/663325/download     Fact sheet for patients: https://www.fda.gov/media/626932/download             Anti-Infectives (From admission, onward)      Ordered     Dose/Rate Route Frequency Start Stop    11/20/20 0034  cefepime 2 gm IVPB in 100 ml NS (MBP)     Ordering Provider: Jeremías Robles APRN    2 g  over 4 Hours Intravenous Every 12 Hours 11/20/20 1400 11/27/20 1359    11/20/20 0034  vancomycin 1500 mg/500 mL 0.9% NS IVPB ([facility])     Ordering Provider: Jeremías Robles APRN    1,500 mg Intravenous Once 11/20/20 0230 11/20/20 0309    11/20/20 0034  cefepime 2 gm IVPB in 100 ml NS (MBP)     Ordering Provider: Jeremías Robles APRN    2 g  over 30 Minutes Intravenous Once 11/20/20 0200      11/20/20 0034  Pharmacy to dose vancomycin     Ordering Provider: Jeremías Robles APRN     Does not apply Continuous PRN 11/20/20 0032 11/27/20 0031            Fernie Estrada  11/20/20 03:44 EST

## 2020-11-20 NOTE — PROCEDURES
"Insert Arterial Line at Bedside    Date/Time: 11/20/2020 1:50 AM  Performed by: Jeremías Robles APRN  Authorized by: Jeremías Robles APRN   Consent: The procedure was performed in an emergent situation. Verbal consent obtained.  Risks and benefits: risks, benefits and alternatives were discussed  Consent given by: Next of kin.  Required items: required blood products, implants, devices, and special equipment available  Patient identity confirmed: arm band, anonymous protocol, patient vented/unresponsive and hospital-assigned identification number  Time out: Immediately prior to procedure a \"time out\" was called to verify the correct patient, procedure, equipment, support staff and site/side marked as required.  Preparation: Patient was prepped and draped in the usual sterile fashion.  Indications: multiple ABGs, respiratory failure and hemodynamic monitoring  Location: right femoral  Anesthesia: local infiltration    Anesthesia:  Local Anesthetic: lidocaine 1% without epinephrine  Anesthetic total: 3 mL    Sedation:  Patient sedated: no    Needle gauge: 20  Seldinger technique: Seldinger technique used  Number of attempts: 1  Post-procedure: line sutured and dressing applied  Post-procedure CMS: unchanged  Patient tolerance: patient tolerated the procedure well with no immediate complications  Comments: Arterial waveform visualized with dicrotic notch.        Electronically signed by ZACK Hazel, 11/20/20, 5:48 AM EST.    "

## 2020-11-20 NOTE — SIGNIFICANT NOTE
ST received orders for Cognitive-Communication Disorder per Post Target Temperature Management order set. Pt with medical status change and is now intubated, therefore not appropriate for skilled ST services at this time. SLP to sign off - please re-consult once extubated and medically stable/appropriate.

## 2020-11-20 NOTE — PROCEDURES
Insert Central Line At Bedside    Date/Time: 11/19/2020 11:53 PM  Performed by: Jeremías Robles APRN  Authorized by: Jeremías Robles APRN   Consent: The procedure was performed in an emergent situation.  Consent given by: Patient is active cardiopulmonary arrest with no vascular access.  Required items: required blood products, implants, devices, and special equipment available  Indications: vascular access    Sedation:  Patient sedated: no    Preparation: skin prepped with ChloraPrep  Skin prep agent dried: skin prep agent completely dried prior to procedure  Use of maximum sterile barriers during central venous catheter insertion: Gloves, gown, cap, and mask used.  Was during code, unable to use large sterile sheet, will replace once patient is stable to ensure sterility.  Hand hygiene: hand hygiene performed prior to central venous catheter insertion  Location details: right femoral  Patient position: flat  Catheter type: triple lumen  Catheter size: 7 Fr  Ultrasound guidance: yes  Number of attempts: 2  Successful placement: yes  Post-procedure: line sutured and dressing applied  Assessment: blood return through all ports and free fluid flow  Patient tolerance: patient tolerated the procedure well with no immediate complications        Electronically signed by ZACK Hazel, 11/20/20, 2:02 AM EST.

## 2020-11-20 NOTE — PLAN OF CARE
Problem: Adult Inpatient Plan of Care  Goal: Plan of Care Review  Goal: Absence of Hospital-Acquired Illness or Injury  Intervention: Identify and Manage Fall Risk  Description: Perform standard risk assessment with a validated tool or comprehensive approach appropriate to the patient on admission; reassess fall risk frequently, with change in status or transfer to another level of care.  Communicate fall injury risk to interprofessional healthcare team.  Determine need for increased observation, equipment and environmental modification, such as low bed and signage, as well as supportive, nonskid footwear.  Adjust safety measures to individual developmental age, stage and identified risk factors.  Reinforce the importance of safety and physical activity with patient and family.  Perform regular intentional rounding to assess need for position change, pain assessment, personal needs, including assistance with toileting.  Intervention: Prevent Skin Injury  Description: Assess skin risk on admission and at regular intervals throughout hospital stay.  Keep all areas of skin (especially folds) clean and dry.  Maintain adequate skin hydration.  Relieve and redistribute pressure and protect bony prominences; implement measures based on patient-specific risk factors.  Match turning and repositioning schedule to clinical condition.  Encourage weight shift frequently; assist with reposition if unable to complete independently.  Float heels off bed. Avoid pressure on the Achilles tendon.  Keep skin free from extended contact with medical devices.  Use aids (e.g., slide boards, mechanical lift) during transfer.  Intervention: Prevent and Manage VTE (venous thromboembolism) Risk  Description: Assess for VTE risk.  Encourage/assist with early ambulation.  Initiate and maintain compression or other therapy, as indicated based on identified risk in accordance with organizational protocol/provider order.  Encourage both active and  passive leg exercises while in bed, if unable to ambulate.  Intervention: Prevent Infection  Description: Maintain skin and mucous membrane integrity; promote hand, oral and pulmonary hygiene.  Optimize fluid balance, nutrition, sleep and glycemic control to maximize infection resistance.  Identify potential sources of infection early to prevent or mitigate progression of infection (e.g., wound, lines, devices).  Evaluate ongoing need for invasive devices; remove promptly when no longer indicated.  Goal: Optimal Comfort and Wellbeing  Intervention: Provide Person-Centered Care  Description: Use a family-focused approach to care.  Develop trust and rapport by proactively providing information, encouraging questions, addressing concerns and offering reassurance.  Acknowledge emotional response to hospitalization.  Recognize and utilize personal coping strategies.  Honor spiritual and cultural preferences.   Goal Outcome Evaluation:  Plan of Care Reviewed With: significant other  Progress: no change

## 2020-11-20 NOTE — PLAN OF CARE
OT eval orders received and acknowledged, however pt is on hypothermia protocol. Re-order OT when pt is appropriate. pt currently intubated. OT to sign off.     Cindy Montoya, OTPAL, OTR

## 2020-11-20 NOTE — PROGRESS NOTES
"Pharmacy Antimicrobial Dosing Service    Subjective:  Asad Bethea is a 44 y.o.male admitted with chest pain. Pharmacy has been consulted to dose Vancomycin for possible sepsis        Assessment/Plan    1. Day #2 Vancomycin: Pulse dosing d/t renal dysfxn. Patient received 1500 mg (~17mg/kg DBW) IV x1 dose last night. Random level resulted as 19.5 mcg/ml ~8 hours after previous dose was given. Unsure of future CRRT plans, so will give 1500 mg (~15 mg/kg ABW) x1 dose and obtain a random level tomorrow with AM labs. Will re-dose when level expected to be < 20 mcg/ml.    2. Day #2 Cefepime: 2gm IV q12h for estCrCl 30-59 mL/min.    Will continue to monitor drug levels, renal function, culture and sensitivities, and patient clinical status.       Objective:  Relevant clinical data and objective history reviewed:  185.4 cm (73\")   102 kg (225 lb 8.5 oz)   Ideal body weight: 79.9 kg (176 lb 2.4 oz)  Adjusted ideal body weight: 88.9 kg (195 lb 14.4 oz)  Body mass index is 29.76 kg/m².    Results from last 7 days   Lab Units 11/20/20  1100   VANCOMYCIN RM mcg/mL 19.50     Results from last 7 days   Lab Units 11/20/20  1100 11/20/20  0628 11/20/20  0220   CREATININE mg/dL 2.08* 1.97* 2.02*     Estimated Creatinine Clearance: 56.3 mL/min (A) (by C-G formula based on SCr of 2.08 mg/dL (H)).  I/O last 3 completed shifts:  In: 3575 [I.V.:3575]  Out: 272 [Urine:272]    Results from last 7 days   Lab Units 11/20/20  1100 11/20/20  0628 11/20/20  0220   WBC 10*3/mm3 2.60* 2.10* 13.00*     Temperature    11/20/20 0630 11/20/20 0800 11/20/20 1159   Temp: (!) 91.4 °F (33 °C) (!) 91.4 °F (33 °C) (!) 91.4 °F (33 °C)     Baseline culture/source/susceptibility:  Microbiology Results (last 10 days)       Procedure Component Value - Date/Time    Respiratory Culture - Wash, Lung, L [[id number]] Collected: 11/20/20 1111    Lab Status: Preliminary result Specimen: Wash from Lung, L Updated: 11/20/20 1224     Gram Stain Many (4+) WBCs per low " power field      Many (4+) Gram negative bacilli      Few (2+) Yeast with hyphae seen      Many (4+) Mixed bacterial morphotypes seen on Gram Stain    Respiratory Panel, PCR (WITHOUT COVID) - Wash, Lung, L [[id number]]  (Normal) Collected: 11/20/20 1111    Lab Status: Final result Specimen: Wash from Lung, L Updated: 11/20/20 1248     ADENOVIRUS, PCR Not Detected     Coronavirus 229E Not Detected     Coronavirus HKU1 Not Detected     Coronavirus NL63 Not Detected     Coronavirus OC43 Not Detected     Human Metapneumovirus Not Detected     Human Rhinovirus/Enterovirus Not Detected     Influenza B PCR Not Detected     Parainfluenza Virus 1 Not Detected     Parainfluenza Virus 2 Not Detected     Parainfluenza Virus 3 Not Detected     Parainfluenza Virus 4 Not Detected     Bordetella pertussis pcr Not Detected     Influenza A H1 2009 PCR Not Detected     Chlamydophila pneumoniae PCR Not Detected     Mycoplasma pneumo by PCR Not Detected     Influenza A PCR Not Detected     Influenza A H3 Not Detected     Influenza A H1 Not Detected     RSV, PCR Not Detected     Bordetella parapertussis PCR Not Detected    Narrative:      The coronavirus on the RVP is NOT COVID-19 and is NOT indicative of infection with COVID-19.     MRSA Screen, PCR (Inpatient) - Swab, Nares [[id number]]  (Normal) Collected: 11/20/20 0606    Lab Status: Final result Specimen: Swab from Nares Updated: 11/20/20 0753     MRSA PCR No MRSA Detected    Blood Culture - Blood, Arm, Left [[id number]] Collected: 11/15/20 0128    Lab Status: Final result Specimen: Blood from Arm, Left Updated: 11/20/20 0145     Blood Culture No growth at 5 days    Blood Culture - Blood, Arm, Right [[id number]] Collected: 11/15/20 0127    Lab Status: Final result Specimen: Blood from Arm, Right Updated: 11/20/20 0145     Blood Culture No growth at 5 days    COVID-19, [facility] IN-HOUSE,NP Swab (NO TRANSPORT MEDIA) 2 HR TAT - Swab, Nasopharynx [[id number]]  (Normal) Collected: 11/14/20 1922     Lab Status: Final result Specimen: Swab from Nasopharynx Updated: 11/14/20 2006     COVID19 Not Detected    Narrative:      Fact sheet for providers: https://www.fda.gov/media/564979/download     Fact sheet for patients: https://www.fda.gov/media/200454/download             Anti-Infectives (From admission, onward)      Ordered     Dose/Rate Route Frequency Start Stop    11/20/20 0034  cefepime 2 gm IVPB in 100 ml NS (MBP)     Ordering Provider: Jeremías Robles APRN    2 g  over 4 Hours Intravenous Every 12 Hours 11/20/20 1400 11/27/20 1359    11/20/20 0352  !Vancomycin Level Draw Needed     Ordering Provider: Jeremías Robles APRN     Does not apply Once 11/20/20 1300      11/20/20 0352  vancomycin 1500 mg/500 mL 0.9% NS IVPB ([facility])     Ordering Provider: Jeremías Robles APRN    1,500 mg Intravenous As Needed 11/20/20 0351 11/27/20 0350    11/20/20 0034  vancomycin 1500 mg/500 mL 0.9% NS IVPB ([facility])     Ordering Provider: Jeremías Robles APRN    1,500 mg Intravenous Once 11/20/20 0230 11/20/20 0309    11/20/20 0034  cefepime 2 gm IVPB in 100 ml NS (MBP)     Ordering Provider: Jeremías Robles APRN    2 g  over 30 Minutes Intravenous Once 11/20/20 0200 11/20/20 0404    11/20/20 0034  Pharmacy to dose vancomycin     Ordering Provider: Jeremías Robles APRN     Does not apply Continuous PRN 11/20/20 0032 11/27/20 0031            Silke Nolasco, PharmD  11/20/20 13:15 EST

## 2020-11-20 NOTE — NURSING NOTE
Pt.'s wife called and stated he was talking to  and said he was having hard time breathing. Went into pt.'s room and checked o2 sats which was 85%. Placed 2LO2 on pt. Went back up into the 90's. Will continue to monitor.

## 2020-11-20 NOTE — OP NOTE
Pre-op Diagnosis: Aspiration pneumonia due to vomit, unspecified laterality, unspecified part of lung (CMS/HCC) [J69.0]    Post-op Diagnosis: Aspiration pneumonia due to vomit, unspecified laterality, unspecified part of lung (CMS/HCC) [J69.0]    Surgeon: Daija Stuart MD    Anesthesia: IV sedation    Operation: Flexible fiberoptic bronchoscopy, diagnostic BAL, therapeutic aspiration     Findings: See below    Specimen: Bronchial wash right lung    Estimated Blood Loss: none      Complications: None    Indications and History:  The patient with cardiac arrest with witnessed aspiration.  No consent available however deemed an emergency procedure.     Description of Procedure:  The bronchoscope was passed through the endotracheal tube.The scope was then passed into the trachea.  1% buffered lidocaine was used topically on the arnold.  Careful inspection of the tracheal lumen was accomplished.     An entire bronchial exam was performed including the right and left bronchi with the following findings:     Endobronchial findings:   Evidence of obvious aspiration noted with presence of dark-colored material noted in the distal trachea and arnold extending to right mainstem, right upper lobe, right middle lobe, right lower lobe predominantly superior segment.  Patient required extensive lavage with normal saline at right mainstem, right upper lobe, right middle lobe and right lower lobe segments.  The washings were collected for microbiology and cytology.    Next the scope was taken to the left side.  Left mainstem, left upper lobe, lingula and left lower lobe relatively spared.  And no evidence of endobronchial lesions or excessive secretions noted.    Impression  Aspiration of vomitus with pneumonitis/pneumonia    Plan  Follow microbiology studies of bronchial wash.  Continue antibiotics      The Patient tolerated the procedure well. There were no immediate complications.    Time of procedure not included in critical  care time    THIS DOCUMENT HAS BEEN ELECTRONICALLY SIGNED BY  Daija Stuart MD  13:26 EST

## 2020-11-20 NOTE — ATTESTATION SEPSIS FOCUSED EXAM
SEPTIC SHOCK FOCUSED EXAM ATTESTATION    I attest that I have reassessed tissue perfusion after the fluid bolus given.    Electronically signed by ZACK Hazel, 11/20/20, 2:00 AM EST.

## 2020-11-20 NOTE — CONSULTS
Chief Complaint:   Evaluate for seizure like activity, s/p cardiopulmonary arrest.     HPI:   The patient is a 44 year old gentleman with multiple medical problems including hypertension, substance abuse-tobacco/alcohol, CHF with EF about 10-15 %, GERD, Hep C disease who presented to ER of [facility] secondary to worsening of chest pain for 3 days.  He underwent cardiac cath., with stent placement in March 2020 in [facility].  He was transferred from [facility] to ICU.  The patient also had hyperkalemia with potassium of 6.2.  Yesterday the patient coded with no pulse for about 26 minutes.  He also had seizure like activity witnessed by staff as well as seizure like activity that was also witnessed by staff.  He present he is on Hypothermia protocol and being on Fentanyl, Versed and Narcuran.      ROS:  Unable to obtain secondary to patient's condition.     PMH:  Past Medical History:   Diagnosis Date   • Abnormal nuclear stress test 6/28/2019   • Acute back pain 7/8/2019   • ADHD (attention deficit hyperactivity disorder)    • Alcohol dependence (CMS/[facility])    • Anxiety    • Arthritis    • Clotting disorder (CMS/[facility])    • Depression    • Depression with anxiety 7/14/2019   • Essential hypertension 6/28/2019   • Generalized abdominal pain 8/2/2019   • GERD (gastroesophageal reflux disease)    • H/O heart artery stent 03/24/2020   • Hepatitis C 06/2019   • Hypertensive urgency 7/8/2019   • Hyponatremia 8/4/2019   • IBS (irritable bowel syndrome)    • Intractable vomiting with nausea 7/13/2019    Added automatically from request for surgery [id number]   • Medically noncompliant 7/8/2019   • Melena 7/14/2019   • NICM (nonischemic cardiomyopathy) (CMS/[facility]) 7/14/2019   • Non-ST elevation myocardial infarction (NSTEMI) (CMS/[facility]) 3/24/2020   • Substance abuse (CMS/[facility])    • Tobacco dependency      Social History     Socioeconomic History   • Marital status:      Spouse name: Not on file   • Number of children: Not on file   • Years of  "education: Not on file   • Highest education level: Not on file   Tobacco Use   • Smoking status: Former Smoker     Packs/day: 1.00     Years: 20.00     Pack years: 20.00     Types: Cigarettes     Quit date: 2019     Years since quittin.3   • Smokeless tobacco: Former User   Substance and Sexual Activity   • Alcohol use: Not Currently     Frequency: Never     Comment: use to drink half gallon of liquor per day; down to 1 pint of liquor per day; quit 2019   • Drug use: Not Currently     Types: Cocaine(coke), Methamphetamines     Comment: last used meth about 2 months ago   • Sexual activity: Not Currently     Partners: Male   Social History Narrative    He reports extensive history of depression, anxiety, and substance abuse. He's been diagnosed with ADD and bipolar disorder, and has been treated at [facility] and [facility]. Medications in the past have made him feel \"slow\" and \"dumb\". He reports that his mind is always going \"a million miles a minute\", and he \"can't get shit done\" because he can't focus. He reports very low self esteem. He reports his son's mother has told him they'd be better off if he was dead and he wonders if that's true, but states tearfully that he doesn't want to die. He has quit drinking and doing drugs in the past, and says he went to [employer] a lot. He's never had a sponsor or really participated in AA. He did cocaine on and off for around 10 years but quit when his son was born 9 yrs ago. He says that cocaine calmed him down. In the past couple years, he started using meth which calms him down if he snorts it, but amps him up if he smokes it. Alcohol calms him down. He reports feeling very scared right now and worried that he's going to die. He seems ready to address his substance abuse and try to live a healthier lifestyle. His son is his main motivation.      Past Surgical History:   Procedure Laterality Date   • CARDIAC CATHETERIZATION     • CARDIAC CATHETERIZATION N/A " 7/9/2019    Procedure: LEFT HEART CATH with possible PCI;  Surgeon: PAL Johnson MD;  Location: [facility] CATH INVASIVE LOCATION;  Service: Cardiovascular   • CARDIAC CATHETERIZATION N/A 3/24/2020    Procedure: LEFT HEART CATH with possible PCI;  Surgeon: Solomon Lowe MD;  Location: [facility] CATH INVASIVE LOCATION;  Service: Cardiovascular;  Laterality: N/A;  Local and IV sedation   • CARDIAC CATHETERIZATION N/A 3/24/2020    Procedure: Stent ATUL coronary;  Surgeon: Solomon Lowe MD;  Location: [facility] CATH INVASIVE LOCATION;  Service: Cardiovascular;  Laterality: N/A;   • CARDIAC CATHETERIZATION Left 11/18/2020    Procedure: Cardiac Catheterization/Vascular Study;  Surgeon: Solomon Lowe MD;  Location: [facility] CATH INVASIVE LOCATION;  Service: Cardiovascular;  Laterality: Left;   • CARDIOVASCULAR STRESS TEST  2019   • ECHO - CONVERTED  2019   • ECHO - CONVERTED  2020   • ENDOSCOPY N/A 7/14/2019    Procedure: ESOPHAGOGASTRODUODENOSCOPY WITH SCLEROTHERAPY, BICAP CAUTERY OF DUODENAL ULCER AND HEMOSTASIS SPRAY OF DUODENAL ULCER;  Surgeon: Wesly Myers MD;  Location: [facility] ENDOSCOPY;  Service: Gastroenterology   • EXPLORATORY LAPAROTOMY N/A 7/16/2019    Procedure: LAPAROTOMY EXPLORATORY;  Surgeon: Patience Peng MD;  Location: [facility] MAIN OR;  Service: General   • WRIST SURGERY N/A      Family History   Problem Relation Age of Onset   • Heart disease Mother    • Heart attack Mother    • Colon cancer Mother    • Heart disease Brother    • Heart attack Brother    • Heart disease Brother    • Heart attack Brother        Labs:  Lab Results (last 24 hours)     Procedure Component Value Units Date/Time    Calcium, Ionized [[id number]]  (Abnormal) Collected: 11/20/20 1100    Specimen: Blood Updated: 11/20/20 1113     Ionized Calcium 1.03 mmol/L     CBC Auto Differential [[id number]]  (Abnormal) Collected: 11/20/20 1100    Specimen: Blood Updated: 11/20/20 1108     WBC 2.60 10*3/mm3      RBC 5.00 10*6/mm3      Hemoglobin  11.8 g/dL      Hematocrit 39.6 %      MCV 79.2 fL      MCH 23.6 pg      MCHC 29.8 g/dL      RDW 17.5 %      RDW-SD 48.6 fl      MPV 8.1 fL      Platelets 214 10*3/mm3     Scan Slide [[id number]] Collected: 11/20/20 1100    Specimen: Blood Updated: 11/20/20 1107    Magnesium [[id number]] Collected: 11/20/20 1100    Specimen: Blood Updated: 11/20/20 1103    Phosphorus [[id number]] Collected: 11/20/20 1100    Specimen: Blood Updated: 11/20/20 1103    Comprehensive Metabolic Panel [[id number]] Collected: 11/20/20 1100    Specimen: Blood Updated: 11/20/20 1103    Troponin [[id number]] Collected: 11/20/20 1100    Specimen: Blood Updated: 11/20/20 1103    Vancomycin, Random [[id number]] Collected: 11/20/20 1100    Specimen: Blood Updated: 11/20/20 1103    aPTT [[id number]] Collected: 11/20/20 1101    Specimen: Blood Updated: 11/20/20 1103    CBC & Differential [[id number]]  (Abnormal) Collected: 11/20/20 1100    Specimen: Blood Updated: 11/20/20 1103    Narrative:      The following orders were created for panel order CBC & Differential.  Procedure                               Abnormality         Status                     ---------                               -----------         ------                     CBC Auto Differential[[id number]]        Abnormal            Preliminary result           Please view results for these tests on the individual orders.    CK Total & CKMB [[id number]] Collected: 11/20/20 1100    Specimen: Blood Updated: 11/20/20 1103    CK [[id number]] Collected: 11/20/20 1100    Specimen: Blood Updated: 11/20/20 1103    Lactic Acid, Plasma [[id number]] Collected: 11/20/20 1100    Specimen: Blood Updated: 11/20/20 1102    Pathology Consultation [[id number]] Collected: 11/20/20 0628    Specimen: Blood, Venous Line Updated: 11/20/20 1005     Final Diagnosis --     Leukopenia with circulating immature granulocytes  Microcytic anemia  No blasts identified       Case Report --     Surgical Pathology Report                          Case: [id number]                                  Authorizing Provider:  Jeremías Robles APRN      Collected:           11/20/2020 06:28 AM          Ordering Location:     [facility]       Received:            11/20/2020 08:32 AM                                 CARDIOVASCULAR CARE UNIT                                                     Pathologist:           Olaf Soto MD                                                             Specimen:    Blood, Venous Line                                                                         Urinalysis, Microscopic Only - Urine, Clean Catch [[id number]]  (Abnormal) Collected: 11/20/20 0714    Specimen: Urine, Clean Catch Updated: 11/20/20 0835     RBC, UA 13-20 /HPF      WBC, UA 6-12 /HPF      Bacteria, UA 1+ /HPF      Squamous Epithelial Cells, UA None Seen /HPF      Hyaline Casts, UA None Seen /LPF      Fine Granular Casts, UA 0-2 /LPF      Amorphous Crystals, UA Moderate/2+ /HPF      Methodology Manual Light Microscopy    Urine Culture - Urine, Urine, Clean Catch [[id number]] Collected: 11/20/20 0714    Specimen: Urine, Clean Catch Updated: 11/20/20 0835    Path Consult Reflex [[id number]] Collected: 11/20/20 0628    Specimen: Blood Updated: 11/20/20 0826     Pathology Review Yes    CBC & Differential [[id number]]  (Abnormal) Collected: 11/20/20 0628    Specimen: Blood Updated: 11/20/20 0826    Narrative:      The following orders were created for panel order CBC & Differential.  Procedure                               Abnormality         Status                     ---------                               -----------         ------                     CBC Auto Differential[[id number]]        Abnormal            Final result                 Please view results for these tests on the individual orders.    CBC Auto Differential [[id number]]  (Abnormal) Collected: 11/20/20 0628    Specimen: Blood Updated: 11/20/20 0826     WBC 2.10 10*3/mm3      RBC 5.02  10*6/mm3      Hemoglobin 11.9 g/dL      Hematocrit 39.2 %      MCV 78.1 fL      MCH 23.7 pg      MCHC 30.3 g/dL      RDW 17.1 %      RDW-SD 46.8 fl      MPV 7.9 fL      Platelets 255 10*3/mm3     Scan Slide [[id number]] Collected: 11/20/20 0628    Specimen: Blood Updated: 11/20/20 0826     Scan Slide --     Comment: See Manual Differential Results       Manual Differential [[id number]]  (Abnormal) Collected: 11/20/20 0628    Specimen: Blood Updated: 11/20/20 0826     Neutrophil % 12.0 %      Lymphocyte % 24.0 %      Monocyte % 8.0 %      Bands %  43.0 %      Metamyelocyte % 10.0 %      Myelocyte % 2.0 %      Atypical Lymphocyte % 1.0 %      Neutrophils Absolute 1.16 10*3/mm3      Lymphocytes Absolute 0.50 10*3/mm3      Monocytes Absolute 0.17 10*3/mm3      [place] Cells Slight/1+     Hypochromia Slight/1+     WBC Morphology Normal     Platelet Morphology Normal    Urinalysis With Culture If Indicated - Urine, Clean Catch [[id number]]  (Abnormal) Collected: 11/20/20 0714    Specimen: Urine, Clean Catch Updated: 11/20/20 0817     Color, UA Dark Yellow     Appearance, UA Turbid     pH, UA 5.5     Specific Gravity, UA 1.020     Glucose, UA Negative     Ketones, UA Trace     Bilirubin, UA Small (1+)     Comment: Confirmation testing is unavailable.  A serum bilirubin is recommended for further assessment.        Blood, UA Large (3+)     Protein,  mg/dL (2+)     Leuk Esterase, UA Small (1+)     Nitrite, UA Positive     Urobilinogen, UA 1.0 E.U./dL    MRSA Screen, PCR (Inpatient) - Swab, Nares [[id number]]  (Normal) Collected: 11/20/20 0606    Specimen: Swab from Nares Updated: 11/20/20 0753     MRSA PCR No MRSA Detected    Blood Gas, Arterial - [[id number]]  (Abnormal) Collected: 11/20/20 0737    Specimen: Arterial Blood Updated: 11/20/20 0748     Site Arterial Line     Edis's Test N/A     pH, Arterial 7.182 pH units      pCO2, Arterial 59.5 mm Hg      pO2, Arterial 54.3 mm Hg      HCO3, Arterial 22.3 mmol/L      Base  Excess, Arterial -6.7 mmol/L      Comment: Serial Number: [id number]  [id number]        O2 Saturation, Arterial 78.6 %      CO2 Content 24.1 mmol/L      Barometric Pressure for Blood Gas --     Comment: N/A        Modality Adult Vent     FIO2 100 %      Ventilator Mode ;AC     Set Tidal Volume 550     PEEP 10     Hemodilution No     Respiratory Rate 30    CK Total & CKMB [[id number]]  (Abnormal) Collected: 11/20/20 0628    Specimen: Blood Updated: 11/20/20 0723     CKMB 75.90 ng/mL      Creatine Kinase 3,678 U/L     Narrative:      CKMB results may be falsely decreased if patient taking Biotin.    Phosphorus [[id number]]  (Abnormal) Collected: 11/20/20 0628    Specimen: Blood Updated: 11/20/20 0712     Phosphorus 8.3 mg/dL     Magnesium [[id number]]  (Abnormal) Collected: 11/20/20 0628    Specimen: Blood Updated: 11/20/20 0708     Magnesium 3.6 mg/dL     Comprehensive Metabolic Panel [[id number]]  (Abnormal) Collected: 11/20/20 0628    Specimen: Blood Updated: 11/20/20 0707     Glucose 143 mg/dL      BUN 56 mg/dL      Creatinine 1.97 mg/dL      Sodium 138 mmol/L      Potassium 4.8 mmol/L      Chloride 99 mmol/L      CO2 20.0 mmol/L      Calcium 7.8 mg/dL      Total Protein 4.8 g/dL      Albumin 2.20 g/dL      ALT (SGPT) 205 U/L      AST (SGOT) 286 U/L      Alkaline Phosphatase 102 U/L      Total Bilirubin 0.8 mg/dL      eGFR Non African Amer 37 mL/min/1.73      Globulin 2.6 gm/dL      A/G Ratio 0.8 g/dL      BUN/Creatinine Ratio 28.4     Anion Gap 19.0 mmol/L     Narrative:      GFR Normal >60  Chronic Kidney Disease <60  Kidney Failure <15      Troponin [[id number]]  (Normal) Collected: 11/20/20 0628    Specimen: Blood Updated: 11/20/20 0706     Troponin T 0.012 ng/mL     Narrative:      Troponin T Reference Range:  <= 0.03 ng/mL-   Negative for AMI  >0.03 ng/mL-     Abnormal for myocardial necrosis.  Clinicians would have to utilize clinical acumen, EKG, Troponin and serial changes to determine if it is an Acute  Myocardial Infarction or myocardial injury due to an underlying chronic condition.       Results may be falsely decreased if patient taking Biotin.      Lactic Acid, Plasma [[id number]]  (Abnormal) Collected: 11/20/20 0628    Specimen: Blood Updated: 11/20/20 0659     Lactate 8.4 mmol/L     Lactic Acid, Reflex [[id number]] Updated: 11/20/20 0659    Specimen: Blood     Calcium, Ionized [[id number]]  (Abnormal) Collected: 11/20/20 0628    Specimen: Blood Updated: 11/20/20 0653     Ionized Calcium 1.08 mmol/L     POC Glucose Once [[id number]]  (Abnormal) Collected: 11/20/20 0436    Specimen: Blood Updated: 11/20/20 0440     Glucose 105 mg/dL      Comment: Serial Number: [id number]:  [id number]       Blood Gas, Arterial - [[id number]] Collected: 11/20/20 0436    Specimen: Arterial Blood Updated: 11/20/20 0440    Blood Gas, Arterial - [[id number]]  (Abnormal) Collected: 11/20/20 0144    Specimen: Arterial Blood Updated: 11/20/20 0423     Site Arterial Line     Edis's Test N/A     pH, Arterial 6.948 pH units      pCO2, Arterial 88.0 mm Hg      pO2, Arterial 156.5 mm Hg      HCO3, Arterial 19.2 mmol/L      Base Excess, Arterial -14.1 mmol/L      Comment: Serial Number: [id number]:  [id number]        O2 Saturation, Arterial 97.5 %      CO2 Content 21.9 mmol/L      Barometric Pressure for Blood Gas --     Comment: N/A        Modality Adult Vent     FIO2 100 %      Ventilator Mode ;AC     Set Tidal Volume 500     PEEP 5     Hemodilution No     Respiratory Rate 20    Scan Slide [[id number]] Collected: 11/20/20 0220    Specimen: Blood Updated: 11/20/20 0406     Scan Slide --     Comment: See Manual Differential Results       Manual Differential [[id number]]  (Abnormal) Collected: 11/20/20 0220    Specimen: Blood Updated: 11/20/20 0406     Neutrophil % 13.0 %      Lymphocyte % 19.0 %      Monocyte % 7.0 %      Bands %  49.0 %      Metamyelocyte % 8.0 %      Myelocyte % 1.0 %      Atypical Lymphocyte % 3.0 %      Neutrophils Absolute 8.06  10*3/mm3      Lymphocytes Absolute 2.47 10*3/mm3      Monocytes Absolute 0.91 10*3/mm3      nRBC 1.0 /100 WBC      Anisocytosis Slight/1+     [place] Cells Slight/1+     WBC Morphology Normal     Giant Platelets Mod/2+    CBC & Differential [[id number]]  (Abnormal) Collected: 11/20/20 0220    Specimen: Blood Updated: 11/20/20 0406    Narrative:      The following orders were created for panel order CBC & Differential.  Procedure                               Abnormality         Status                     ---------                               -----------         ------                     CBC Auto Differential[[id number]]        Abnormal            Final result                 Please view results for these tests on the individual orders.    CBC Auto Differential [[id number]]  (Abnormal) Collected: 11/20/20 0220    Specimen: Blood Updated: 11/20/20 0406     WBC 13.00 10*3/mm3      RBC 4.82 10*6/mm3      Hemoglobin 11.4 g/dL      Hematocrit 38.1 %      MCV 79.0 fL      MCH 23.7 pg      MCHC 30.0 g/dL      RDW 17.3 %      RDW-SD 48.6 fl      MPV 8.2 fL      Platelets 316 10*3/mm3     Lactic Acid, Reflex Timer (This will reflex a repeat order 3-3:15 hours after ordered.) [[id number]] Collected: 11/20/20 0013    Specimen: Blood Updated: 11/20/20 0400     Hold Tube Hold for add-ons.     Comment: Auto resulted.       Blood Culture - Blood, Blood, Central Line [[id number]] Collected: 11/20/20 0300    Specimen: Blood, Central Line Updated: 11/20/20 0335    Lactic Acid, Plasma [[id number]]  (Abnormal) Collected: 11/20/20 0220    Specimen: Blood Updated: 11/20/20 0308     Lactate 10.9 mmol/L     Comprehensive Metabolic Panel [[id number]]  (Abnormal) Collected: 11/20/20 0220    Specimen: Blood Updated: 11/20/20 0307     Glucose 116 mg/dL      BUN 52 mg/dL      Creatinine 2.02 mg/dL      Sodium 135 mmol/L      Potassium 6.5 mmol/L      Chloride 97 mmol/L      CO2 18.0 mmol/L      Calcium 8.1 mg/dL      Total Protein 5.1 g/dL      Albumin  2.60 g/dL      ALT (SGPT) 157 U/L      AST (SGOT) 121 U/L      Alkaline Phosphatase 137 U/L      Total Bilirubin 0.8 mg/dL      eGFR Non African Amer 36 mL/min/1.73      Globulin 2.5 gm/dL      A/G Ratio 1.0 g/dL      BUN/Creatinine Ratio 25.7     Anion Gap 20.0 mmol/L     Narrative:      GFR Normal >60  Chronic Kidney Disease <60  Kidney Failure <15      CK Total & CKMB [[id number]]  (Abnormal) Collected: 11/20/20 0220    Specimen: Blood Updated: 11/20/20 0302     CKMB 21.49 ng/mL      Creatine Kinase 839 U/L     Narrative:      CKMB results may be falsely decreased if patient taking Biotin.    Lipid Panel [[id number]]  (Abnormal) Collected: 11/20/20 0220    Specimen: Blood Updated: 11/20/20 0302     Total Cholesterol 44 mg/dL      Triglycerides 18 mg/dL      HDL Cholesterol 25 mg/dL      LDL Cholesterol  9 mg/dL      VLDL Cholesterol 10 mg/dL      LDL/HDL Ratio 0.62    Narrative:      Cholesterol Reference Ranges  (U.S. Department of Health and Human Services ATP III Classifications)    Desirable          <200 mg/dL  Borderline High    200-239 mg/dL  High Risk          >240 mg/dL      Triglyceride Reference Ranges  (U.S. Department of Health and Human Services ATP III Classifications)    Normal           <150 mg/dL  Borderline High  150-199 mg/dL  High             200-499 mg/dL  Very High        >500 mg/dL    HDL Reference Ranges  (U.S. Department of Health and Human Services ATP III Classifcations)    Low     <40 mg/dl (major risk factor for CHD)  High    >60 mg/dl ('negative' risk factor for CHD)        LDL Reference Ranges  (U.S. Department of Health and Human Services ATP III Classifcations)    Optimal          <100 mg/dL  Near Optimal     100-129 mg/dL  Borderline High  130-159 mg/dL  High             160-189 mg/dL  Very High        >189 mg/dL    Troponin [[id number]]  (Normal) Collected: 11/20/20 0220    Specimen: Blood Updated: 11/20/20 0302     Troponin T 0.020 ng/mL     Narrative:      Troponin T Reference  Range:  <= 0.03 ng/mL-   Negative for AMI  >0.03 ng/mL-     Abnormal for myocardial necrosis.  Clinicians would have to utilize clinical acumen, EKG, Troponin and serial changes to determine if it is an Acute Myocardial Infarction or myocardial injury due to an underlying chronic condition.       Results may be falsely decreased if patient taking Biotin.      Magnesium [[id number]]  (Abnormal) Collected: 11/20/20 0220    Specimen: Blood Updated: 11/20/20 0257     Magnesium 3.9 mg/dL     Phosphorus [[id number]]  (Abnormal) Collected: 11/20/20 0220    Specimen: Blood Updated: 11/20/20 0257     Phosphorus 10.4 mg/dL     aPTT [[id number]]  (Abnormal) Collected: 11/20/20 0220    Specimen: Blood Updated: 11/20/20 0255     PTT 45.1 seconds      Comment: Result checked        Calcium, Ionized [[id number]]  (Abnormal) Collected: 11/20/20 0220    Specimen: Blood Updated: 11/20/20 0246     Ionized Calcium 1.11 mmol/L     Blood Culture - Blood, Blood, Arterial Line [[id number]] Collected: 11/20/20 0209    Specimen: Blood, Arterial Line Updated: 11/20/20 0237    Blood Culture - Blood, Arm, Right [[id number]] Collected: 11/15/20 0127    Specimen: Blood from Arm, Right Updated: 11/20/20 0145     Blood Culture No growth at 5 days    Blood Culture - Blood, Arm, Left [[id number]] Collected: 11/15/20 0128    Specimen: Blood from Arm, Left Updated: 11/20/20 0145     Blood Culture No growth at 5 days    aPTT [[id number]]  (Abnormal) Collected: 11/20/20 0013    Specimen: Blood Updated: 11/20/20 0124     PTT 60.6 seconds     Procalcitonin [[id number]]  (Abnormal) Collected: 11/20/20 0013    Specimen: Blood Updated: 11/20/20 0120     Procalcitonin 44.06 ng/mL     Narrative:      As a Marker for Sepsis (Non-Neonates):   1. <0.5 ng/mL represents a low risk of severe sepsis and/or septic shock.  1. >2 ng/mL represents a high risk of severe sepsis and/or septic shock.    As a Marker for Lower Respiratory Tract Infections that require antibiotic  "therapy:  PCT on Admission     Antibiotic Therapy             6-12 Hrs later  > 0.5                Strongly Recommended            >0.25 - <0.5         Recommended  0.1 - 0.25           Discouraged                   Remeasure/reassess PCT  <0.1                 Strongly Discouraged          Remeasure/reassess PCT      As 28 day mortality risk marker: \"Change in Procalcitonin Result\" (> 80 % or <=80 %) if Day 0 (or Day 1) and Day 4 values are available. Refer to http://www.[employer] [place]-pct-calculator.com/   Change in PCT <=80 %   A decrease of PCT levels below or equal to 80 % defines a positive change in PCT test result representing a higher risk for 28-day all-cause mortality of patients diagnosed with severe sepsis or septic shock.  Change in PCT > 80 %   A decrease of PCT levels of more than 80 % defines a negative change in PCT result representing a lower risk for 28-day all-cause mortality of patients diagnosed with severe sepsis or septic shock.                Results may be falsely decreased if patient taking Biotin.     Protime-INR [[id number]]  (Abnormal) Collected: 11/20/20 0013    Specimen: Blood Updated: 11/20/20 0117     Protime 16.1 Seconds      INR 1.49    Extra Tubes [[id number]] Collected: 11/20/20 0013    Specimen: Blood Updated: 11/20/20 0115    Narrative:      The following orders were created for panel order Extra Tubes.  Procedure                               Abnormality         Status                     ---------                               -----------         ------                     Light Blue Top[[id number]]                                   Final result               Gold Top - SST[[id number]]                                   Final result                 Please view results for these tests on the individual orders.    Light Blue Top [[id number]] Collected: 11/20/20 0013    Specimen: Blood Updated: 11/20/20 0115     Extra Tube hold for add-on     Comment: Auto resulted       Gold Top - SST " [[id number]] Collected: 11/20/20 0013    Specimen: Blood Updated: 11/20/20 0115     Extra Tube Hold for add-ons.     Comment: Auto resulted.       Lactic Acid, Plasma [[id number]]  (Abnormal) Collected: 11/20/20 0013    Specimen: Blood Updated: 11/20/20 0056     Lactate 18.2 mmol/L     Calcium, Ionized [[id number]]  (Normal) Collected: 11/20/20 0013    Specimen: Blood Updated: 11/20/20 0051     Ionized Calcium 1.20 mmol/L     Amylase [[id number]]  (Normal) Collected: 11/20/20 0013    Specimen: Blood Updated: 11/20/20 0050     Amylase 36 U/L     Lipase [[id number]]  (Abnormal) Collected: 11/20/20 0013    Specimen: Blood Updated: 11/20/20 0050     Lipase 9 U/L     POC Glucose Once [[id number]]  (Normal) Collected: 11/20/20 0048    Specimen: Blood Updated: 11/20/20 0049     Glucose 84 mg/dL      Comment: Serial Number: [id number]:  [id number]       Comprehensive Metabolic Panel [[id number]]  (Abnormal) Collected: 11/20/20 0013    Specimen: Blood Updated: 11/20/20 0046     Glucose 155 mg/dL      BUN 49 mg/dL      Creatinine 2.28 mg/dL      Sodium 135 mmol/L      Potassium 6.4 mmol/L      Chloride 91 mmol/L      CO2 15.0 mmol/L      Calcium 9.4 mg/dL      Total Protein 5.8 g/dL      Albumin 2.70 g/dL      ALT (SGPT) 123 U/L      AST (SGOT) 46 U/L      Alkaline Phosphatase 124 U/L      Total Bilirubin 0.8 mg/dL      eGFR Non African Amer 31 mL/min/1.73      Globulin 3.1 gm/dL      A/G Ratio 0.9 g/dL      BUN/Creatinine Ratio 21.5     Anion Gap 29.0 mmol/L     Narrative:      GFR Normal >60  Chronic Kidney Disease <60  Kidney Failure <15      Magnesium [[id number]]  (Abnormal) Collected: 11/20/20 0013    Specimen: Blood Updated: 11/20/20 0046     Magnesium 4.4 mg/dL     CBC & Differential [[id number]]  (Abnormal) Collected: 11/20/20 0013    Specimen: Blood Updated: 11/20/20 0045    Narrative:      The following orders were created for panel order CBC & Differential.  Procedure                               Abnormality          Status                     ---------                               -----------         ------                     CBC Auto Differential[[id number]]        Abnormal            Final result                 Please view results for these tests on the individual orders.    CBC Auto Differential [[id number]]  (Abnormal) Collected: 11/20/20 0013    Specimen: Blood Updated: 11/20/20 0045     WBC 8.90 10*3/mm3      RBC 5.11 10*6/mm3      Hemoglobin 12.4 g/dL      Hematocrit 41.5 %      Comment: Result checked         MCV 81.2 fL      MCH 24.3 pg      MCHC 29.9 g/dL      RDW 17.8 %      RDW-SD 52.1 fl      MPV 8.6 fL      Platelets 309 10*3/mm3      Neutrophil % 75.5 %      Lymphocyte % 18.7 %      Monocyte % 5.2 %      Eosinophil % 0.2 %      Basophil % 0.4 %      Neutrophils, Absolute 6.80 10*3/mm3      Lymphocytes, Absolute 1.70 10*3/mm3      Monocytes, Absolute 0.50 10*3/mm3      Eosinophils, Absolute 0.00 10*3/mm3      Basophils, Absolute 0.00 10*3/mm3      nRBC 1.2 /100 WBC     Troponin [[id number]]  (Normal) Collected: 11/20/20 0013    Specimen: Blood Updated: 11/20/20 0040     Troponin T 0.014 ng/mL     Narrative:      Troponin T Reference Range:  <= 0.03 ng/mL-   Negative for AMI  >0.03 ng/mL-     Abnormal for myocardial necrosis.  Clinicians would have to utilize clinical acumen, EKG, Troponin and serial changes to determine if it is an Acute Myocardial Infarction or myocardial injury due to an underlying chronic condition.       Results may be falsely decreased if patient taking Biotin.      Phosphorus [[id number]]  (Abnormal) Collected: 11/20/20 0013    Specimen: Blood Updated: 11/20/20 0040     Phosphorus 11.4 mg/dL             Medications:  Schedule Meds  !Vancomycin Level Draw Needed, , Does not apply, Once  artificial tears, , Both Eyes, Q2H  aspirin, 81 mg, Oral, Daily  cefepime, 2 g, Intravenous, Q12H  chlorhexidine, 15 mL, Mouth/Throat, Q12H  clopidogrel, 75 mg, Oral, Daily  folic acid (FOLVITE) IVPB, 1 mg,  Intravenous, Daily  hydrocortisone sodium succinate, 100 mg, Intravenous, Q8H  ipratropium-albuterol, 3 mL, Nebulization, Q4H - RT  levETIRAcetam, 500 mg, Intravenous, Q12H  pantoprazole, 40 mg, Intravenous, Q24H  sodium bicarbonate, 50 mEq, Intravenous, Once  sodium chloride, 10 mL, Intravenous, Q12H  thiamine (VITAMIN B1) IVPB, 100 mg, Intravenous, Daily          MED'S PRN  acetaminophen **OR** acetaminophen  •  acetaminophen  •  aluminum-magnesium hydroxide-simethicone  •  atropine  •  bisacodyl  •  Calcium Gluconate-NaCl **AND** calcium gluconate **AND** Calcium, Ionized  •  calcium gluconate **OR** calcium gluconate  •  dextrose  •  docusate sodium  •  fentanyl 10 mcg/mL  •  heparin (porcine)  •  heparin (porcine)  •  influenza vaccine  •  ipratropium-albuterol  •  lidocaine  •  lidocaine  •  magnesium hydroxide  •  magnesium sulfate **OR** magnesium sulfate **OR** magnesium sulfate  •  magnesium sulfate  •  nitroglycerin  •  ondansetron **OR** ondansetron  •  ondansetron **OR** ondansetron  •  [COMPLETED] vancomycin **AND** Pharmacy to dose vancomycin  •  potassium chloride  •  propofol  •  sodium chloride  •  sodium chloride  •  sodium chloride  •  sodium chloride  •  sodium phosphate IVPB  •  vancomycin  •  vecuronium    Vitals:  Vitals:    11/20/20 1100   BP: 133/56   Pulse: 87   Resp:    Temp: 98   SpO2:          Physical Exam:  The patient is intubated and sedated.  Head NC, Neck supple. Lungs CTA, CV  S1-S2 no murmur.  Abdomen soft.  Ext no edema.   Neurologic Exam:  The patient is sedated.  No posturing noted.  CN Pupils about 4 mm minimum reaction to light.  Corneal reflexes absent. Motor no spontaneous activity noted., Reflexes absent, plantar mute.  Cerebellum and gait is deferred secondary to patient's condition.      Impression:  The patient is a 44 year old gentleman with multiple medical problems who had a cardiopulmonary arrest without any pulse for about 26 minutes.  He is being sedated on  hypothermia protocol.  He had a seizure like activity which may be a provoked seizure.  Will continue anti epileptic medications.      Recomendations:  Will obtain an EEG.  Will follow.

## 2020-11-20 NOTE — PROCEDURES
"Insert Central Line At Bedside    Date/Time: 11/20/2020 3:50 AM  Performed by: Jeremías Robles APRN  Authorized by: Jeremías Robles APRN   Consent: The procedure was performed in an emergent situation. Verbal consent obtained.  Risks and benefits: risks, benefits and alternatives were discussed  Consent given by: Next of kin.  Patient understanding: patient states understanding of the procedure being performed  Patient consent: the patient's understanding of the procedure matches consent given  Procedure consent: procedure consent matches procedure scheduled  Relevant documents: relevant documents present and verified  Test results: test results available and properly labeled  Site marked: the operative site was marked  Imaging studies: imaging studies available  Required items: required blood products, implants, devices, and special equipment available  Patient identity confirmed: arm band, anonymous protocol, patient vented/unresponsive and hospital-assigned identification number  Time out: Immediately prior to procedure a \"time out\" was called to verify the correct patient, procedure, equipment, support staff and site/side marked as required.  Indications: vascular access  Anesthesia: local infiltration    Anesthesia:  Local Anesthetic: lidocaine 1% without epinephrine  Anesthetic total: 10 mL    Sedation:  Patient sedated: no    Preparation: skin prepped with ChloraPrep  Skin prep agent dried: skin prep agent completely dried prior to procedure  Sterile barriers: all five maximum sterile barriers used - cap, mask, sterile gown, sterile gloves, and large sterile sheet  Hand hygiene: hand hygiene performed prior to central venous catheter insertion  Location details: left internal jugular  Patient position: flat  Catheter type: triple lumen  Catheter size: 7 Fr  Ultrasound guidance: yes  Sterile ultrasound techniques: sterile gel and sterile probe covers were used  Number of attempts: 1  Successful placement: " yes  Post-procedure: line sutured and dressing applied  Assessment: blood return through all ports,  placement verified by x-ray and no pneumothorax on x-ray  Patient tolerance: patient tolerated the procedure well with no immediate complications  Comments: Replaced, due to previous central line placed in emergent situation, plan to remove femoral central line and place non-tunneled catheter if patient to go on CRRT.  Awaiting repeat labs.         (0) independent

## 2020-11-20 NOTE — PLAN OF CARE
Problem: Adult Inpatient Plan of Care  Goal: Plan of Care Review  Recent Flowsheet Documentation  Taken 11/20/2020 0750 by Candace Rhoades PT  Outcome Summary: PT order received and acknowledged. Pt s/p CP arrest. Now intubated and sedated. Not appropriate for PT at this time. Will sign off, w/ new order to be issued when pt is medically stable and able to participate in rx.  PT did not enter room.

## 2020-11-20 NOTE — CODE DOCUMENTATION
Earlier in the night, patient had developed shortness of breath, in which the patient's family called and discussed with the nurse.  Patient was found to be at 85% oxygen saturation and was started on 2 LPM nasal cannula.  Throughout this evening, patient was reported to have been continuing to rip his leads off, and had went to the rest room with liquid stool on several accounts.  It was reported that patient was given lomotil, with last (3rd dose) at 22:34 on 11/19/2020.  Patient was becoming increasingly confused, and was last found in his bed, in which he had an incontinent stool.  Nursing assistants were attempting to clean him up when he started vomiting and rolled back.  Code 4 was called.  Compressions and ACLS medications were given.  Once patient had a secured airway and was intubated, he had lost his IV access.  Epinephrine was given down ETT until central line able to be placed emergently.  Family was called during the code and was on their way into the facility.  Patient remained asystolic until last pulse check, in which he had rhythm of sinus rhythm, with first degree block.  Patient was started on vasopressors, and once stabilized, patient will transfer to [facility] to begin TTM, as he remains unresponsive.  See intubation and central line note.  Of note, central line was place in an emergent situation, once patient stabilized, it will be replaced in sterile circumstances.  ROSC obtained at 11/20/2020 at 00:00.    Patient's significant other and underage son came in to see patient.  Informed of the critical nature of the situation and poor prognosis.  Patient's significant other did state that they are not , but that patient does have an adult son over the age of 20.  She was going to inform him of these changes and to let him know that he would be the legal next of kin.  She was informed that patient would be undergoing TTM as long as there were no contraindications.  Care will now be transferred  over pulmonary/intensivist service.      Electronically signed by ZACK Hazel, 11/20/20, 12:35 AM EST.

## 2020-11-20 NOTE — SIGNIFICANT NOTE
11/20/20 0730   Transfer of Nursing Care   Nurse Report Given To Elzbieta NELSON   Transfer of Care Comment 2 RN bedside handoff w/ skin assessment - no new skin injuries noted at this time

## 2020-11-20 NOTE — NURSING NOTE
Patient became a code blue. Pt. Was given cpr and code 4 was called. Pt. Had feces all over his bed. Pt. Is going to [facility]. Pt.  Had vomittedthe feces up and was intubated and ng tube was placed at bedside.

## 2020-11-20 NOTE — CONSULTS
Nephrology Consult Note                                                [facility]      Patient Identification:  Name: Asad Bethea  Age: 44 y.o.  Sex: male  :  1975  MRN: [id number]               Requesting Physician: Daija Stuart MD  Reason for Consultation: management recommendations      History of Present Illness:    Patient is a 44-year-old male patient with history of hypertension substance abuse alcohol abuse congestive heart failure who was admitted with chest pain initially admitted to the floor with recent history of PCI to his LAD reportedly he has not been very compliant with his medications after he was admitted to the hospital a experience cardiac arrest around midnight intubated and moved to the ICU currently on 4 pressors with no urine output with severe metabolic acidosis pH was 6.9 at 1:44 AM with high potassium 6.4 after aggressive medical therapy has repeat potassium was 6.5 prompting renal consultation  Problem List:  Patient Active Problem List   Diagnosis   • Essential hypertension   • Syncope   • NICM (nonischemic cardiomyopathy) (CMS/[facility])   • Chest pain, atypical   • Dyspnea   • Hyponatremia   • CHF (congestive heart failure), NYHA class III, acute on chronic, systolic (CMS/[facility])   • Drug abuse (CMS/[facility])   • Non-ST elevation myocardial infarction (NSTEMI) (CMS/[facility])   • Precordial pain   • Chest pain   • DENNIS (acute kidney injury) (CMS/[facility])   • Hyperkalemia   • Transaminitis   • Anemia     Past Medical History:  Past Medical History:   Diagnosis Date   • Abnormal nuclear stress test 2019   • Acute back pain 2019   • ADHD (attention deficit hyperactivity disorder)    • Alcohol dependence (CMS/[facility])    • Anxiety    • Arthritis    • Clotting disorder (CMS/[facility])    • Depression    • Depression with anxiety 2019   • Essential hypertension 2019   • Generalized abdominal pain 2019   • GERD (gastroesophageal reflux disease)    • H/O heart  artery stent 03/24/2020   • Hepatitis C 06/2019   • Hypertensive urgency 7/8/2019   • Hyponatremia 8/4/2019   • IBS (irritable bowel syndrome)    • Intractable vomiting with nausea 7/13/2019    Added automatically from request for surgery [id number]   • Medically noncompliant 7/8/2019   • Melena 7/14/2019   • NICM (nonischemic cardiomyopathy) (CMS/[facility]) 7/14/2019   • Non-ST elevation myocardial infarction (NSTEMI) (CMS/[facility]) 3/24/2020   • Substance abuse (CMS/[facility])    • Tobacco dependency      Past Surgical History:  Past Surgical History:   Procedure Laterality Date   • CARDIAC CATHETERIZATION     • CARDIAC CATHETERIZATION N/A 7/9/2019    Procedure: LEFT HEART CATH with possible PCI;  Surgeon: PAL Johnson MD;  Location: [facility] CATH INVASIVE LOCATION;  Service: Cardiovascular   • CARDIAC CATHETERIZATION N/A 3/24/2020    Procedure: LEFT HEART CATH with possible PCI;  Surgeon: Solomon Lowe MD;  Location: [facility] CATH INVASIVE LOCATION;  Service: Cardiovascular;  Laterality: N/A;  Local and IV sedation   • CARDIAC CATHETERIZATION N/A 3/24/2020    Procedure: Stent ATUL coronary;  Surgeon: Solomon Lowe MD;  Location: [facility] CATH INVASIVE LOCATION;  Service: Cardiovascular;  Laterality: N/A;   • CARDIAC CATHETERIZATION Left 11/18/2020    Procedure: Cardiac Catheterization/Vascular Study;  Surgeon: Solomon Lowe MD;  Location: [facility] CATH INVASIVE LOCATION;  Service: Cardiovascular;  Laterality: Left;   • CARDIOVASCULAR STRESS TEST  2019   • ECHO - CONVERTED  2019   • ECHO - CONVERTED  2020   • ENDOSCOPY N/A 7/14/2019    Procedure: ESOPHAGOGASTRODUODENOSCOPY WITH SCLEROTHERAPY, BICAP CAUTERY OF DUODENAL ULCER AND HEMOSTASIS SPRAY OF DUODENAL ULCER;  Surgeon: Wesly Myers MD;  Location: [facility] ENDOSCOPY;  Service: Gastroenterology   • EXPLORATORY LAPAROTOMY N/A 7/16/2019    Procedure: LAPAROTOMY EXPLORATORY;  Surgeon: Patience Peng MD;  Location: [facility] MAIN OR;  Service: General   • WRIST SURGERY N/A       Home  Meds:  No medications prior to admission.     Current Meds:     Current Facility-Administered Medications:   •  !Vancomycin Level Draw Needed, , Does not apply, Once, Jeremías Robles APRN  •  acetaminophen (TYLENOL) tablet 650 mg, 650 mg, Oral, Q4H PRN **OR** acetaminophen (TYLENOL) suppository 650 mg, 650 mg, Rectal, Q4H PRN, Jeremías Robles APRN  •  acetaminophen (TYLENOL) tablet 650 mg, 650 mg, Oral, Q6H PRN, Ghada Donato PA-C  •  aluminum-magnesium hydroxide-simethicone (MAALOX MAX) 400-400-40 MG/5ML suspension 15 mL, 15 mL, Oral, Q6H PRN, Jeremías Robles APRN  •  artificial tears ophthalmic ointment, , Both Eyes, Q2H, Jeremías Robles APRN  •  aspirin chewable tablet 81 mg, 81 mg, Oral, Daily, Solomon Lowe MD, 81 mg at 11/19/20 0931  •  atropine sulfate injection 0.5-1 mg, 0.5-1 mg, Intravenous, Q5 Min PRN, Solomon Lowe MD  •  bisacodyl (DULCOLAX) suppository 10 mg, 10 mg, Rectal, Daily PRN, Jeremías Robles APRN  •  calcium gluconate 1g/50ml 0.675% NaCl IV SOLN, 1 g, Intravenous, PRN **AND** calcium gluconate 2-0.675 GM/100ML NACL IVPB, 2 g, Intravenous, PRN **AND** Calcium, Ionized, , , PRN, Solomon Lowe MD  •  calcium gluconate 2-0.675 GM/100ML NACL IVPB, 2 g, Intravenous, Once, Jeremías Robles APRN  •  cefepime 2 gm IVPB in 100 ml NS (MBP), 2 g, Intravenous, Q12H, Jeremías Robles APRN  •  chlorhexidine (PERIDEX) 0.12 % solution 15 mL, 15 mL, Mouth/Throat, Q12H, Jeremías Robles APRN, 15 mL at 11/20/20 0238  •  clopidogrel (PLAVIX) tablet 75 mg, 75 mg, Oral, Daily, Solomon Lowe MD, 75 mg at 11/19/20 0931  •  dextrose (D50W) 25 g/ 50mL Intravenous Solution 50 mL, 50 mL, Intravenous, Once **FOLLOWED BY** insulin regular (humuLIN R,novoLIN R) injection 12 Units, 12 Units, Intravenous, Once, Jeremías Robles APRN  •  DOBUTamine (DOBUTREX) 1 mg/mL infusion, 2-10 mcg/kg/min, Intravenous, Titrated, Jeremías Robles APRN, Last Rate: 45.9 mL/hr at 11/20/20 0101, 7.5 mcg/kg/min at 11/20/20 0101  •   docusate sodium (COLACE) capsule 100 mg, 100 mg, Oral, BID PRN, Solomon Lowe MD, 100 mg at 11/19/20 0234  •  DOPamine 400 mg in 250 mL D5W infusion, 2-20 mcg/kg/min, Intravenous, Titrated, Jeremías Robles APRN  •  EPINEPHrine (ADRENALIN) 10 mg in sodium chloride 0.9 % 250 mL infusion, 0.02-0.3 mcg/kg/min, Intravenous, Titrated, Jeremías Robles APRN, Last Rate: 45.9 mL/hr at 11/20/20 0327, 0.3 mcg/kg/min at 11/20/20 0327  •  fentaNYL 1000mcg in 100 mL NS infusion,  mcg/hr, Intravenous, Continuous PRN, Jeremías Robles APRN  •  folic acid 1 mg in sodium chloride 0.9 % 50 mL IVPB, 1 mg, Intravenous, Daily, Jeremías Robles APRN, Last Rate: 100 mL/hr at 11/20/20 0253, 1 mg at 11/20/20 0253  •  hydrOXYzine (ATARAX) tablet 50 mg, 50 mg, Oral, TID PRN, Solomon Lowe MD, 50 mg at 11/19/20 2027  •  influenza vac split quad (FLUZONE,FLUARIX,AFLURIA,FLULAVAL) injection 0.5 mL, 0.5 mL, Intramuscular, During Hospitalization, Solomon Lowe MD  •  ipratropium-albuterol (DUO-NEB) nebulizer solution 3 mL, 3 mL, Nebulization, Q2H PRN, Jeremías Rolbes APRN  •  levETIRAcetam in NaCl 0.54% (KEPPRA) IVPB 1,500 mg, 1,500 mg, Intravenous, Once, Jeremías Robles APRN  •  levETIRAcetam in NaCl 0.82% (KEPPRA) IVPB 500 mg, 500 mg, Intravenous, Q12H, Jeremías Robles APRN  •  magnesium hydroxide (MILK OF MAGNESIA) suspension 2400 mg/10mL 10 mL, 10 mL, Oral, Daily PRN, Jeremías Robles APRN  •  Magnesium Sulfate 2 gram Bolus, followed by 8 gram infusion (total Mg dose 10 grams)- Mg less than or equal to 1mg/dL, 2 g, Intravenous, PRN **OR** Magnesium Sulfate 2 gram / 50mL Infusion (GIVE X 3 BAGS TO EQUAL 6GM TOTAL DOSE) - Mg 1.1 - 1.5 mg/dl, 2 g, Intravenous, PRN **OR** Magnesium Sulfate 4 gram infusion- Mg 1.6-1.9 mg/dL, 4 g, Intravenous, PRN, Solomon Lowe MD, Stopped at 11/15/20 1007  •  melatonin tablet 5 mg, 5 mg, Oral, Nightly PRN, Solomon Lowe MD, 5 mg at 11/14/20 2303  •  Midazolam (VERSED) 50 mg in 50mL NS infusion, 1 mg/hr,  Intravenous, Titrated, Jeremías Robles APRN, Last Rate: 1 mL/hr at 11/20/20 0252, 1 mg/hr at 11/20/20 0252  •  nicotine (NICODERM CQ) 21 MG/24HR patch 1 patch, 1 patch, Transdermal, Nightly, Ghada Donato PA-C, 1 patch at 11/19/20 2224  •  nitroglycerin (NITROSTAT) SL tablet 0.4 mg, 0.4 mg, Sublingual, Q5 Min PRN, Solomon Lowe MD  •  norepinephrine (LEVOPHED) 16 mg in 250 mL NS infusion, 0.02-0.3 mcg/kg/min, Intravenous, Titrated, Jeremías Robles APRN, Last Rate: 28.7 mL/hr at 11/20/20 0105, 0.3 mcg/kg/min at 11/20/20 0105  •  ondansetron (ZOFRAN) tablet 4 mg, 4 mg, Oral, Q6H PRN **OR** ondansetron (ZOFRAN) injection 4 mg, 4 mg, Intravenous, Q6H PRN, Solomon Lowe MD, 4 mg at 11/19/20 1728  •  ondansetron (ZOFRAN) tablet 4 mg, 4 mg, Oral, Q6H PRN **OR** ondansetron (ZOFRAN) injection 4 mg, 4 mg, Intravenous, Q6H PRN, Jeremías Robles, APRN  •  pantoprazole (PROTONIX) injection 40 mg, 40 mg, Intravenous, Q24H, Jeremías Robles APRN, 40 mg at 11/20/20 0238  •  [COMPLETED] vancomycin 1500 mg/500 mL 0.9% NS IVPB ([facility]), 1,500 mg, Intravenous, Once, 1,500 mg at 11/20/20 0309 **AND** Pharmacy to dose vancomycin, , Does not apply, Continuous PRN, Jeremías Robles, APRN  •  phenylephrine (FAIZA-SYNEPHRINE) 100 mg in 250 mL NS infusion, 0.5-6 mcg/kg/min, Intravenous, Titrated, Jeremías Robles APRN, Last Rate: 7.65 mL/hr at 11/20/20 0103, 0.5 mcg/kg/min at 11/20/20 0103  •  potassium chloride 20 mEq in 50 mL IVPB, 20 mEq, Intravenous, Q1H PRN, Jeremías Robles APRN  •  propofol (DIPRIVAN) infusion 10 mg/mL 100 mL, 5-50 mcg/kg/min, Intravenous, Continuous PRN, Jeremías Robles APRN  •  sodium bicarbonate 8.4 % 150 mEq in dextrose (D5W) 5 % 1,000 mL infusion (greater than 75 mEq), 150 mEq, Intravenous, Continuous, Jeremías Robles APRN, Last Rate: 75 mL/hr at 11/20/20 0307, Currently Infusing at 11/20/20 0307  •  sodium bicarbonate injection 8.4% 50 mEq, 50 mEq, Intravenous, Once, Jeremías Robles APRN  •  sodium chloride  0.9 % bolus 3,060 mL, 30 mL/kg, Intravenous, PRN, Jeremías Robles, APRN  •  sodium chloride 0.9 % flush 10 mL, 10 mL, Intravenous, PRN, Solomon Lowe MD, 10 mL at 20  •  sodium chloride 0.9 % flush 10 mL, 10 mL, Intravenous, Q12H, Solomon Lowe MD, 10 mL at 20  •  sodium chloride 0.9 % flush 10 mL, 10 mL, Intravenous, PRN, Solomon Lowe MD, 10 mL at 20  •  sodium chloride 0.9 % infusion 250 mL, 250 mL, Intravenous, Once PRN, Solomon Lowe MD  •  thiamine (B-1) 100 mg in sodium chloride 0.9 % 100 mL IVPB, 100 mg, Intravenous, Daily, Jeremías Robles APRNILO, Last Rate: 200 mL/hr at 20, 100 mg at 20  •  traMADol (ULTRAM) tablet 50 mg, 50 mg, Oral, Q12H PRN, Ghada Donato PA-C, 50 mg at 20  •  vancomycin 1500 mg/500 mL 0.9% NS IVPB ([facility]), 1,500 mg, Intravenous, PRN, Jeremías Robles, APRN  •  vasopressin 20 units/100 mL (0.2 units/mL) 0.9% NS infusion, 0.03 Units/min, Intravenous, Titrated, Jeremías Robles APRN, Last Rate: 9 mL/hr at 20 010, 0.03 Units/min at 20 010  •  vecuronium (NORCURON) injection 10 mg, 10 mg, Intravenous, Q2H PRN, Jeremías Robles APRN    Allergies:  No Known Allergies  Social History:   Social History     Tobacco Use   • Smoking status: Former Smoker     Packs/day: 1.00     Years: 20.00     Pack years: 20.00     Types: Cigarettes     Quit date: 2019     Years since quittin.3   • Smokeless tobacco: Former User   Substance Use Topics   • Alcohol use: Not Currently     Frequency: Never     Comment: use to drink half gallon of liquor per day; down to 1 pint of liquor per day; quit 2019      Family History:  Family History   Problem Relation Age of Onset   • Heart disease Mother    • Heart attack Mother    • Colon cancer Mother    • Heart disease Brother    • Heart attack Brother    • Heart disease Brother    • Heart attack Brother         Review of Systems  Unobtainable    Objective:  Vitals:   BP (!) 72/17  "  Pulse 76   Temp (!) 91.6 °F (33.1 °C)   Resp 23   Ht 185.4 cm (73\")   Wt 102 kg (224 lb 3.3 oz)   SpO2 98%   BMI 29.58 kg/m²   I/O:     Intake/Output Summary (Last 24 hours) at 11/20/2020 0412  Last data filed at 11/20/2020 0327  Gross per 24 hour   Intake 2568 ml   Output --   Net 2568 ml       Exam:  General Appearance: On the vent on 4 pressors no urine output 100% oxygen  Head:  Normocephalic, without obvious abnormality, atraumatic  Eyes:  PERRL, conjunctiva/corneas clear     Neck:  Supple,  no adenopathy;      Lungs:  Decreased BS occasion ronchi  Heart:  Regular rate and rhythm, S1 and S2 normal  Abdomen:  Soft, non-tender, bowel sounds active   Extremities: trace edema  Pulses: 2+ and symmetric all extremities  Skin:  No rashes or lesions  Data Review:  All labs (24hrs):   Recent Results (from the past 24 hour(s))   CBC Auto Differential    Collection Time: 11/19/20  7:39 AM    Specimen: Blood   Result Value Ref Range    WBC 12.90 (H) 3.40 - 10.80 10*3/mm3    RBC 5.52 4.14 - 5.80 10*6/mm3    Hemoglobin 13.2 13.0 - 17.7 g/dL    Hematocrit 42.3 37.5 - 51.0 %    MCV 76.5 (L) 79.0 - 97.0 fL    MCH 24.0 (L) 26.6 - 33.0 pg    MCHC 31.3 (L) 31.5 - 35.7 g/dL    RDW 17.8 (H) 12.3 - 15.4 %    RDW-SD 47.7 37.0 - 54.0 fl    MPV 7.8 6.0 - 12.0 fL    Platelets 334 140 - 450 10*3/mm3    Neutrophil % 87.2 (H) 42.7 - 76.0 %    Lymphocyte % 6.9 (L) 19.6 - 45.3 %    Monocyte % 3.7 (L) 5.0 - 12.0 %    Eosinophil % 1.5 0.3 - 6.2 %    Basophil % 0.7 0.0 - 1.5 %    Neutrophils, Absolute 11.30 (H) 1.70 - 7.00 10*3/mm3    Lymphocytes, Absolute 0.90 0.70 - 3.10 10*3/mm3    Monocytes, Absolute 0.50 0.10 - 0.90 10*3/mm3    Eosinophils, Absolute 0.20 0.00 - 0.40 10*3/mm3    Basophils, Absolute 0.10 0.00 - 0.20 10*3/mm3    nRBC 0.5 (H) 0.0 - 0.2 /100 WBC   Comprehensive Metabolic Panel    Collection Time: 11/19/20  8:54 AM    Specimen: Blood   Result Value Ref Range    Glucose 159 (H) 65 - 99 mg/dL    BUN 24 (H) 6 - 20 mg/dL    " Creatinine 1.36 (H) 0.76 - 1.27 mg/dL    Sodium 133 (L) 136 - 145 mmol/L    Potassium 5.1 3.5 - 5.2 mmol/L    Chloride 96 (L) 98 - 107 mmol/L    CO2 24.0 22.0 - 29.0 mmol/L    Calcium 9.5 8.6 - 10.5 mg/dL    Total Protein 6.8 6.0 - 8.5 g/dL    Albumin 3.50 3.50 - 5.20 g/dL    ALT (SGPT) 165 (H) 1 - 41 U/L    AST (SGOT) 35 1 - 40 U/L    Alkaline Phosphatase 143 (H) 39 - 117 U/L    Total Bilirubin 1.3 (H) 0.0 - 1.2 mg/dL    eGFR Non African Amer 57 (L) >60 mL/min/1.73    Globulin 3.3 gm/dL    A/G Ratio 1.1 g/dL    BUN/Creatinine Ratio 17.6 7.0 - 25.0    Anion Gap 13.0 5.0 - 15.0 mmol/L   Magnesium    Collection Time: 11/19/20  8:54 AM    Specimen: Blood   Result Value Ref Range    Magnesium 3.1 (H) 1.6 - 2.6 mg/dL   CBC Auto Differential    Collection Time: 11/20/20 12:13 AM    Specimen: Blood   Result Value Ref Range    WBC 8.90 3.40 - 10.80 10*3/mm3    RBC 5.11 4.14 - 5.80 10*6/mm3    Hemoglobin 12.4 (L) 13.0 - 17.7 g/dL    Hematocrit 41.5 37.5 - 51.0 %    MCV 81.2 79.0 - 97.0 fL    MCH 24.3 (L) 26.6 - 33.0 pg    MCHC 29.9 (L) 31.5 - 35.7 g/dL    RDW 17.8 (H) 12.3 - 15.4 %    RDW-SD 52.1 37.0 - 54.0 fl    MPV 8.6 6.0 - 12.0 fL    Platelets 309 140 - 450 10*3/mm3    Neutrophil % 75.5 42.7 - 76.0 %    Lymphocyte % 18.7 (L) 19.6 - 45.3 %    Monocyte % 5.2 5.0 - 12.0 %    Eosinophil % 0.2 (L) 0.3 - 6.2 %    Basophil % 0.4 0.0 - 1.5 %    Neutrophils, Absolute 6.80 1.70 - 7.00 10*3/mm3    Lymphocytes, Absolute 1.70 0.70 - 3.10 10*3/mm3    Monocytes, Absolute 0.50 0.10 - 0.90 10*3/mm3    Eosinophils, Absolute 0.00 0.00 - 0.40 10*3/mm3    Basophils, Absolute 0.00 0.00 - 0.20 10*3/mm3    nRBC 1.2 (H) 0.0 - 0.2 /100 WBC   Troponin    Collection Time: 11/20/20 12:13 AM    Specimen: Blood   Result Value Ref Range    Troponin T 0.014 0.000 - 0.030 ng/mL   Comprehensive Metabolic Panel    Collection Time: 11/20/20 12:13 AM    Specimen: Blood   Result Value Ref Range    Glucose 155 (H) 65 - 99 mg/dL    BUN 49 (H) 6 - 20 mg/dL     Creatinine 2.28 (H) 0.76 - 1.27 mg/dL    Sodium 135 (L) 136 - 145 mmol/L    Potassium 6.4 (C) 3.5 - 5.2 mmol/L    Chloride 91 (L) 98 - 107 mmol/L    CO2 15.0 (L) 22.0 - 29.0 mmol/L    Calcium 9.4 8.6 - 10.5 mg/dL    Total Protein 5.8 (L) 6.0 - 8.5 g/dL    Albumin 2.70 (L) 3.50 - 5.20 g/dL    ALT (SGPT) 123 (H) 1 - 41 U/L    AST (SGOT) 46 (H) 1 - 40 U/L    Alkaline Phosphatase 124 (H) 39 - 117 U/L    Total Bilirubin 0.8 0.0 - 1.2 mg/dL    eGFR Non African Amer 31 (L) >60 mL/min/1.73    Globulin 3.1 gm/dL    A/G Ratio 0.9 g/dL    BUN/Creatinine Ratio 21.5 7.0 - 25.0    Anion Gap 29.0 (H) 5.0 - 15.0 mmol/L   Magnesium    Collection Time: 11/20/20 12:13 AM    Specimen: Blood   Result Value Ref Range    Magnesium 4.4 (H) 1.6 - 2.6 mg/dL   Phosphorus    Collection Time: 11/20/20 12:13 AM    Specimen: Blood   Result Value Ref Range    Phosphorus 11.4 (H) 2.5 - 4.5 mg/dL   Lactic Acid, Plasma    Collection Time: 11/20/20 12:13 AM    Specimen: Blood   Result Value Ref Range    Lactate 18.2 (C) 0.5 - 2.0 mmol/L   Light Blue Top    Collection Time: 11/20/20 12:13 AM   Result Value Ref Range    Extra Tube hold for add-on    Gold Top - SST    Collection Time: 11/20/20 12:13 AM   Result Value Ref Range    Extra Tube Hold for add-ons.    Protime-INR    Collection Time: 11/20/20 12:13 AM    Specimen: Blood   Result Value Ref Range    Protime 16.1 (H) 9.6 - 11.7 Seconds    INR 1.49 (H) 0.93 - 1.10   aPTT    Collection Time: 11/20/20 12:13 AM    Specimen: Blood   Result Value Ref Range    PTT 60.6 (C) 24.0 - 31.0 seconds   Calcium, Ionized    Collection Time: 11/20/20 12:13 AM    Specimen: Blood   Result Value Ref Range    Ionized Calcium 1.20 1.20 - 1.30 mmol/L   Procalcitonin    Collection Time: 11/20/20 12:13 AM    Specimen: Blood   Result Value Ref Range    Procalcitonin 44.06 (H) 0.00 - 0.25 ng/mL   Amylase    Collection Time: 11/20/20 12:13 AM    Specimen: Blood   Result Value Ref Range    Amylase 36 28 - 100 U/L   Lipase     Collection Time: 11/20/20 12:13 AM    Specimen: Blood   Result Value Ref Range    Lipase 9 (L) 13 - 60 U/L   Lactic Acid, Reflex Timer (This will reflex a repeat order 3-3:15 hours after ordered.)    Collection Time: 11/20/20 12:13 AM    Specimen: Blood   Result Value Ref Range    Hold Tube Hold for add-ons.    POC Glucose Once    Collection Time: 11/20/20 12:48 AM    Specimen: Blood   Result Value Ref Range    Glucose 84 70 - 105 mg/dL   Comprehensive Metabolic Panel    Collection Time: 11/20/20  2:20 AM    Specimen: Blood   Result Value Ref Range    Glucose 116 (H) 65 - 99 mg/dL    BUN 52 (H) 6 - 20 mg/dL    Creatinine 2.02 (H) 0.76 - 1.27 mg/dL    Sodium 135 (L) 136 - 145 mmol/L    Potassium 6.5 (C) 3.5 - 5.2 mmol/L    Chloride 97 (L) 98 - 107 mmol/L    CO2 18.0 (L) 22.0 - 29.0 mmol/L    Calcium 8.1 (L) 8.6 - 10.5 mg/dL    Total Protein 5.1 (L) 6.0 - 8.5 g/dL    Albumin 2.60 (L) 3.50 - 5.20 g/dL    ALT (SGPT) 157 (H) 1 - 41 U/L    AST (SGOT) 121 (H) 1 - 40 U/L    Alkaline Phosphatase 137 (H) 39 - 117 U/L    Total Bilirubin 0.8 0.0 - 1.2 mg/dL    eGFR Non African Amer 36 (L) >60 mL/min/1.73    Globulin 2.5 gm/dL    A/G Ratio 1.0 g/dL    BUN/Creatinine Ratio 25.7 (H) 7.0 - 25.0    Anion Gap 20.0 (H) 5.0 - 15.0 mmol/L   Troponin    Collection Time: 11/20/20  2:20 AM    Specimen: Blood   Result Value Ref Range    Troponin T 0.020 0.000 - 0.030 ng/mL   CK Total & CKMB    Collection Time: 11/20/20  2:20 AM    Specimen: Blood   Result Value Ref Range    CKMB 21.49 (H) <=10.40 ng/mL    Creatine Kinase 839 (H) 20 - 200 U/L   Lactic Acid, Plasma    Collection Time: 11/20/20  2:20 AM    Specimen: Blood   Result Value Ref Range    Lactate 10.9 (C) 0.5 - 2.0 mmol/L   aPTT    Collection Time: 11/20/20  2:20 AM    Specimen: Blood   Result Value Ref Range    PTT 45.1 (H) 24.0 - 31.0 seconds   Calcium, Ionized    Collection Time: 11/20/20  2:20 AM    Specimen: Blood   Result Value Ref Range    Ionized Calcium 1.11 (L) 1.20 - 1.30  mmol/L   Magnesium    Collection Time: 11/20/20  2:20 AM    Specimen: Blood   Result Value Ref Range    Magnesium 3.9 (H) 1.6 - 2.6 mg/dL   Phosphorus    Collection Time: 11/20/20  2:20 AM    Specimen: Blood   Result Value Ref Range    Phosphorus 10.4 (H) 2.5 - 4.5 mg/dL   Lipid Panel    Collection Time: 11/20/20  2:20 AM    Specimen: Blood   Result Value Ref Range    Total Cholesterol 44 0 - 200 mg/dL    Triglycerides 18 0 - 150 mg/dL    HDL Cholesterol 25 (L) 40 - 60 mg/dL    LDL Cholesterol  9 0 - 100 mg/dL    VLDL Cholesterol 10 5 - 40 mg/dL    LDL/HDL Ratio 0.62    CBC Auto Differential    Collection Time: 11/20/20  2:20 AM    Specimen: Blood   Result Value Ref Range    WBC 13.00 (H) 3.40 - 10.80 10*3/mm3    RBC 4.82 4.14 - 5.80 10*6/mm3    Hemoglobin 11.4 (L) 13.0 - 17.7 g/dL    Hematocrit 38.1 37.5 - 51.0 %    MCV 79.0 79.0 - 97.0 fL    MCH 23.7 (L) 26.6 - 33.0 pg    MCHC 30.0 (L) 31.5 - 35.7 g/dL    RDW 17.3 (H) 12.3 - 15.4 %    RDW-SD 48.6 37.0 - 54.0 fl    MPV 8.2 6.0 - 12.0 fL    Platelets 316 140 - 450 10*3/mm3   Scan Slide    Collection Time: 11/20/20  2:20 AM    Specimen: Blood   Result Value Ref Range    Scan Slide     Manual Differential    Collection Time: 11/20/20  2:20 AM    Specimen: Blood   Result Value Ref Range    Neutrophil % 13.0 (L) 42.7 - 76.0 %    Lymphocyte % 19.0 (L) 19.6 - 45.3 %    Monocyte % 7.0 5.0 - 12.0 %    Bands %  49.0 (H) 0.0 - 5.0 %    Metamyelocyte % 8.0 (H) 0.0 - 0.0 %    Myelocyte % 1.0 (H) 0.0 - 0.0 %    Atypical Lymphocyte % 3.0 0.0 - 5.0 %    Neutrophils Absolute 8.06 (H) 1.70 - 7.00 10*3/mm3    Lymphocytes Absolute 2.47 0.70 - 3.10 10*3/mm3    Monocytes Absolute 0.91 (H) 0.10 - 0.90 10*3/mm3    nRBC 1.0 (H) 0.0 - 0.2 /100 WBC    Anisocytosis Slight/1+ None Seen    Garibaldi Cells Slight/1+ None Seen    WBC Morphology Normal Normal    Giant Platelets Mod/2+ None Seen       Current Facility-Administered Medications:   •  !Vancomycin Level Draw Needed, , Does not apply, Once,  Jeremías Robles APRN  •  acetaminophen (TYLENOL) tablet 650 mg, 650 mg, Oral, Q4H PRN **OR** acetaminophen (TYLENOL) suppository 650 mg, 650 mg, Rectal, Q4H PRN, Jeremías Robles APRN  •  acetaminophen (TYLENOL) tablet 650 mg, 650 mg, Oral, Q6H PRN, Ghada Donato PA-C  •  aluminum-magnesium hydroxide-simethicone (MAALOX MAX) 400-400-40 MG/5ML suspension 15 mL, 15 mL, Oral, Q6H PRN, Jeremías Robles APRN  •  artificial tears ophthalmic ointment, , Both Eyes, Q2H, Jeremías Robles APRN  •  aspirin chewable tablet 81 mg, 81 mg, Oral, Daily, Solomon Lowe MD, 81 mg at 11/19/20 0931  •  atropine sulfate injection 0.5-1 mg, 0.5-1 mg, Intravenous, Q5 Min PRN, Solomon Lowe MD  •  bisacodyl (DULCOLAX) suppository 10 mg, 10 mg, Rectal, Daily PRN, Jeremías Robles APRN  •  calcium gluconate 1g/50ml 0.675% NaCl IV SOLN, 1 g, Intravenous, PRN **AND** calcium gluconate 2-0.675 GM/100ML NACL IVPB, 2 g, Intravenous, PRN **AND** Calcium, Ionized, , , PRN, Solomon Lowe MD  •  calcium gluconate 2-0.675 GM/100ML NACL IVPB, 2 g, Intravenous, Once, Jeremías Robles APRN  •  cefepime 2 gm IVPB in 100 ml NS (MBP), 2 g, Intravenous, Q12H, Jeremías Robles APRN  •  chlorhexidine (PERIDEX) 0.12 % solution 15 mL, 15 mL, Mouth/Throat, Q12H, Jeremías Robles APRN, 15 mL at 11/20/20 0238  •  clopidogrel (PLAVIX) tablet 75 mg, 75 mg, Oral, Daily, Solomon Lowe MD, 75 mg at 11/19/20 0931  •  dextrose (D50W) 25 g/ 50mL Intravenous Solution 50 mL, 50 mL, Intravenous, Once **FOLLOWED BY** insulin regular (humuLIN R,novoLIN R) injection 12 Units, 12 Units, Intravenous, Once, Jeremías Robles APRN  •  DOBUTamine (DOBUTREX) 1 mg/mL infusion, 2-10 mcg/kg/min, Intravenous, Titrated, Jeremías Robles APRN, Last Rate: 45.9 mL/hr at 11/20/20 0101, 7.5 mcg/kg/min at 11/20/20 0101  •  docusate sodium (COLACE) capsule 100 mg, 100 mg, Oral, BID PRN, Solomon Lowe MD, 100 mg at 11/19/20 0234  •  DOPamine 400 mg in 250 mL D5W infusion, 2-20 mcg/kg/min,  Intravenous, Titrated, Jeremías Robles APRN  •  EPINEPHrine (ADRENALIN) 10 mg in sodium chloride 0.9 % 250 mL infusion, 0.02-0.3 mcg/kg/min, Intravenous, Titrated, Jeremías Robles APRN, Last Rate: 45.9 mL/hr at 11/20/20 0327, 0.3 mcg/kg/min at 11/20/20 0327  •  fentaNYL 1000mcg in 100 mL NS infusion,  mcg/hr, Intravenous, Continuous PRN, Jeremías Robles APRN  •  folic acid 1 mg in sodium chloride 0.9 % 50 mL IVPB, 1 mg, Intravenous, Daily, Jeremías Robles APRN, Last Rate: 100 mL/hr at 11/20/20 0253, 1 mg at 11/20/20 0253  •  hydrOXYzine (ATARAX) tablet 50 mg, 50 mg, Oral, TID PRN, Solomon Lowe MD, 50 mg at 11/19/20 2027  •  influenza vac split quad (FLUZONE,FLUARIX,AFLURIA,FLULAVAL) injection 0.5 mL, 0.5 mL, Intramuscular, During Hospitalization, Solomon Lowe MD  •  ipratropium-albuterol (DUO-NEB) nebulizer solution 3 mL, 3 mL, Nebulization, Q2H PRN, Jeremías Robles APRN  •  levETIRAcetam in NaCl 0.54% (KEPPRA) IVPB 1,500 mg, 1,500 mg, Intravenous, Once, Jeremías Robles APRN  •  levETIRAcetam in NaCl 0.82% (KEPPRA) IVPB 500 mg, 500 mg, Intravenous, Q12H, Jeremías Robles APRN  •  magnesium hydroxide (MILK OF MAGNESIA) suspension 2400 mg/10mL 10 mL, 10 mL, Oral, Daily PRN, Jeremías Robles APRN  •  Magnesium Sulfate 2 gram Bolus, followed by 8 gram infusion (total Mg dose 10 grams)- Mg less than or equal to 1mg/dL, 2 g, Intravenous, PRN **OR** Magnesium Sulfate 2 gram / 50mL Infusion (GIVE X 3 BAGS TO EQUAL 6GM TOTAL DOSE) - Mg 1.1 - 1.5 mg/dl, 2 g, Intravenous, PRN **OR** Magnesium Sulfate 4 gram infusion- Mg 1.6-1.9 mg/dL, 4 g, Intravenous, PRN, Solomon Lowe MD, Stopped at 11/15/20 1007  •  melatonin tablet 5 mg, 5 mg, Oral, Nightly PRN, Solomon Lowe MD, 5 mg at 11/14/20 2303  •  Midazolam (VERSED) 50 mg in 50mL NS infusion, 1 mg/hr, Intravenous, Titrated, Jeremías Robles APRN, Last Rate: 1 mL/hr at 11/20/20 0252, 1 mg/hr at 11/20/20 0252  •  nicotine (NICODERM CQ) 21 MG/24HR patch 1 patch, 1  patch, Transdermal, Nightly, Ghada Donato PA-C, 1 patch at 11/19/20 2224  •  nitroglycerin (NITROSTAT) SL tablet 0.4 mg, 0.4 mg, Sublingual, Q5 Min PRN, Solomon Lowe MD  •  norepinephrine (LEVOPHED) 16 mg in 250 mL NS infusion, 0.02-0.3 mcg/kg/min, Intravenous, Titrated, Jeremías Robles APRN, Last Rate: 28.7 mL/hr at 11/20/20 0105, 0.3 mcg/kg/min at 11/20/20 0105  •  ondansetron (ZOFRAN) tablet 4 mg, 4 mg, Oral, Q6H PRN **OR** ondansetron (ZOFRAN) injection 4 mg, 4 mg, Intravenous, Q6H PRN, Solomon Lowe MD, 4 mg at 11/19/20 1728  •  ondansetron (ZOFRAN) tablet 4 mg, 4 mg, Oral, Q6H PRN **OR** ondansetron (ZOFRAN) injection 4 mg, 4 mg, Intravenous, Q6H PRN, Jeremías Robles APRN  •  pantoprazole (PROTONIX) injection 40 mg, 40 mg, Intravenous, Q24H, Jeremías Robles APRN, 40 mg at 11/20/20 0238  •  [COMPLETED] vancomycin 1500 mg/500 mL 0.9% NS IVPB ([facility]), 1,500 mg, Intravenous, Once, 1,500 mg at 11/20/20 0309 **AND** Pharmacy to dose vancomycin, , Does not apply, Continuous PRN, Jeremías Robles E, APRN  •  phenylephrine (FAIZA-SYNEPHRINE) 100 mg in 250 mL NS infusion, 0.5-6 mcg/kg/min, Intravenous, Titrated, Jeremías Robles APRN, Last Rate: 7.65 mL/hr at 11/20/20 0103, 0.5 mcg/kg/min at 11/20/20 0103  •  potassium chloride 20 mEq in 50 mL IVPB, 20 mEq, Intravenous, Q1H PRN, Jeremías Robles, APRN  •  propofol (DIPRIVAN) infusion 10 mg/mL 100 mL, 5-50 mcg/kg/min, Intravenous, Continuous PRN, Jeremías Robles APRN  •  sodium bicarbonate 8.4 % 150 mEq in dextrose (D5W) 5 % 1,000 mL infusion (greater than 75 mEq), 150 mEq, Intravenous, Continuous, Jeremías Robles APRN, Last Rate: 75 mL/hr at 11/20/20 0307, Currently Infusing at 11/20/20 0307  •  sodium bicarbonate injection 8.4% 50 mEq, 50 mEq, Intravenous, Once, Jeremías Robles APRN  •  sodium chloride 0.9 % bolus 3,060 mL, 30 mL/kg, Intravenous, PRN, Jeremías Robles APRN  •  sodium chloride 0.9 % flush 10 mL, 10 mL, Intravenous, PRN, Solomon Lowe MD, 10 mL at  11/16/20 2055  •  sodium chloride 0.9 % flush 10 mL, 10 mL, Intravenous, Q12H, Solomon Lowe MD, 10 mL at 11/19/20 2027  •  sodium chloride 0.9 % flush 10 mL, 10 mL, Intravenous, PRN, Solomon Lowe MD, 10 mL at 11/17/20 0445  •  sodium chloride 0.9 % infusion 250 mL, 250 mL, Intravenous, Once PRN, Solomon Lowe MD  •  thiamine (B-1) 100 mg in sodium chloride 0.9 % 100 mL IVPB, 100 mg, Intravenous, Daily, Jeremías Robles APRN, Last Rate: 200 mL/hr at 11/20/20 0337, 100 mg at 11/20/20 0337  •  traMADol (ULTRAM) tablet 50 mg, 50 mg, Oral, Q12H PRN, Ghada Donato PA-C, 50 mg at 11/19/20 2234  •  vancomycin 1500 mg/500 mL 0.9% NS IVPB ([facility]), 1,500 mg, Intravenous, PRN, Jeremías Robles, APRN  •  vasopressin 20 units/100 mL (0.2 units/mL) 0.9% NS infusion, 0.03 Units/min, Intravenous, Titrated, Jeremías Robles APRN, Last Rate: 9 mL/hr at 11/20/20 0102, 0.03 Units/min at 11/20/20 0102  •  vecuronium (NORCURON) injection 10 mg, 10 mg, Intravenous, Q2H PRN, Jeremías Robles, APRN    Assessment:  Acute kidney injury oliguric ATN from cardiac arrest  Severe hyperkalemia  Severe metabolic acidosis  Cardiac arrest  Hypotension  Shock  Respiratory failure  History of duodenal ulcer  Leukocytosis  Anemia  Depression  Anxiety  Drug abuse  Alcohol abuse    Recommendations:    Patient is an acute kidney injury with hyperkalemia metabolic acidosis started on bicarb drip we will increase the bicarb drip to 150 cc an hour  Given the low blood pressure and unresponsive to medical therapy for hyperkalemia we will proceed with CRRT  In the meanwhile recheck ABG and recheck labs  Plan is to do CRRT for 12 hours to lower his toxins  We will follow with cardiology and intensivist  Avoid nephrotoxic medication  Patient critically ill  Spent 33 minutes critical care time  Discussed with the nurse  Family not available at this point    Neena Robb MD  11/20/2020  04:12 EST

## 2020-11-20 NOTE — NURSING NOTE
[facility] NP called to bedside d/t pt acutely having a large amount of stool-like liquid in subglottic suction tube, as well as running from nose and mouth. This finding accompanied by decreased SpO2

## 2020-11-20 NOTE — CONSULTS
"PULMONARY/CRITICAL CARE CONSULT NOTE     PATIENT NAME:  Asad Bethea       :  1975       MRN:  [id number]       ROOM:  [facility] CVCU 2213/1     PRIMARY CARE PROVIDER  Shelly Ribeiro APRN    REFERRING PROVIDER     Daija Stuart MD    REASON FOR CONSULTATION  Cardiopulmonary arrest with ROSC    SUBJECTIVE     CHIEF COMPLAINT:   Cardiopulmonary arrest, unresponsive, unable to participate    HISTORY OF PRESENT ILLNESS:  Asad Bethea is a 44 y.o. male  has a past medical history of Abnormal nuclear stress test (2019), Acute back pain (2019), ADHD (attention deficit hyperactivity disorder), Alcohol dependence (CMS/[facility]), Anxiety, Arthritis, Clotting disorder (CMS/[facility]), Depression, Depression with anxiety (2019), Essential hypertension (2019), Generalized abdominal pain (2019), GERD (gastroesophageal reflux disease), H/O heart artery stent (2020), Hepatitis C (2019), Hypertensive urgency (2019), Hyponatremia (2019), IBS (irritable bowel syndrome), Intractable vomiting with nausea (2019), Medically noncompliant (2019), Melena (2019), NICM (nonischemic cardiomyopathy) (CMS/[facility]) (2019), Non-ST elevation myocardial infarction (NSTEMI) (CMS/[facility]) (3/24/2020), Substance abuse (CMS/[facility]), and Tobacco dependency.  Patient presented to [facility] on 2020 with complaint of chest pain.  The following has been culminated from review of patient's chart and discussion with nursing staff, as patient has been seen following a cardiopulmonary arrest, and is unresponsive, unable to participate.  On admission, it was reported that he had a 3-day history of constant worsening, left-sided chest pain which radiated to his left arm and back.  He had described the pain as \"just really hurting\".  Patient does have a history of CAD in which he underwent cardiac catheterization on 3/24/2020 and a drug-eluting stent was placed in the LAD.  It was reported that since that time, " patient had been minimally compliant with his scheduled medical therapy, in which that he was taking his Coreg, but had not taken his Plavix or diuretics in some time.  He did admit to some associated orthopnea with nausea without vomiting, frequent episodes of tachycardia and palpitations, occasional peripheral edema and a recent subjective fever.  Patient also had reported that he relapsed on his substance and alcohol abuse with the most recent use on 11/5/2020, in which he stated that he smoked methamphetamine and drink alcohol.  Patient denied any history of IV drug use but reported continuing to smoke 1 pack of cigarettes daily.  He reported on admission that his urine output is somewhat less frequent and reported chronic constipation.  Patient does have a previous diagnosis of hepatitis C, in which he was diagnosed approximately 1 year ago and is treatment naïve.  Additionally reported was a previous bowel resection.  After discussion with patient's significant other, it was stated that patient was having issues with bleeding, in which patient did have to receive coils for its resolution.  On admission, urine drug screen was obtained and positive for amphetamine/methamphetamines as well as THC.    Patient was admitted to the hospitalist service and was being treated for an acute on chronic exacerbation of CHF.  Patient had an echocardiogram that revealed a reduced ejection fraction of 10-15%.  Serial troponins were obtained and negative.  Cardiology was consulted, and patient underwent cardiac catheterization on 11/18/2020, which the findings resulted in a patent LAD stent with severe LV dysfunction.  Discharge planning had been trying to find patient rehab placement for his drug treatment.  On 11/18/2020, patient also had reported some diarrhea, in which Lomotil was ordered.  Patient received approximately 3 doses of this medication during this admission.  In the a.m. of 11/19/2020, it had been reported by  nursing staff that patient had an increased appetite, and was eating quite a bit of food.  Then furthermore on the following late a.m./noon time patient was having complaints of severe nausea and vomiting.  In the late evening on 11/19/2020, patient's wife called the nursing unit, as patient had told her that he was feeling short of breath.  Oxygen saturation was checked, and patient had an oxygen saturation of 85% on room air.  He was placed on 2 L/min via nasal cannula with resolve.  It was noted by nursing staff that patient did start to get confused, pulling his cardiac leads off, was starting to get verbally angry with staff, stating that he wanted to get up to go to the bathroom.  He was becoming incontinent of stool, which is new for this patient.  It was felt that patient may be starting to have DTs, and alcohol withdrawal protocol had been ordered at some point during this admission.  It was reported that patient has a 1 pint of alcohol daily habit of drinking.  Patient had got up to go to the bathroom, was incontinent of stool, and then proceeded to his bed, in which his bed was covered in stool, as well as the floor covered in stool next to his bed.  Nursing staff was attempting to clean patient, when patient went back and started vomiting, and was witnessed aspirating.  At this point, patient went unresponsive, no pulse was found, and code 4 was initiated.  Patient underwent ACLS protocols, intubated, and subsequently had central line placement. ROSC was obtained at 11/20/2020 00:00, with total code time of 34 minutes.  This was a witnessed arrest, with immediate initiated CPR.  Patient remained unresponsive following ROSC.  TTM protocol will be initiated following CT imaging.  There is concern that patient may have developed an ileus or obstruction, which may have prompted this episode of emesis and aspiration of vomitus.  Of note, foul fecal contents were noted in patient's oropharynx, being suctioned  from NG tube, which was also noted as coming from his rectum and seen in the bed.  This plan of care was discussed with patient's family, in which they were agreeable to proceed with this.  Patient will be transferred to ICU, pulmonary/intensivist consultation was requested for further evaluation and treatment of patient condition.      Thank you for this consultation, we will follow along on this patient.        REVIEW OF SYSTEMS:  Review of systems could not be obtained due to   patient unresponsive. patient intubated. emergent nature of case.    HISTORY:  Past Medical History:   Diagnosis Date   • Abnormal nuclear stress test 6/28/2019   • Acute back pain 7/8/2019   • ADHD (attention deficit hyperactivity disorder)    • Alcohol dependence (CMS/[facility])    • Anxiety    • Arthritis    • Clotting disorder (CMS/[facility])    • Depression    • Depression with anxiety 7/14/2019   • Essential hypertension 6/28/2019   • Generalized abdominal pain 8/2/2019   • GERD (gastroesophageal reflux disease)    • H/O heart artery stent 03/24/2020   • Hepatitis C 06/2019   • Hypertensive urgency 7/8/2019   • Hyponatremia 8/4/2019   • IBS (irritable bowel syndrome)    • Intractable vomiting with nausea 7/13/2019    Added automatically from request for surgery [id number]   • Medically noncompliant 7/8/2019   • Melena 7/14/2019   • NICM (nonischemic cardiomyopathy) (CMS/[facility]) 7/14/2019   • Non-ST elevation myocardial infarction (NSTEMI) (CMS/[facility]) 3/24/2020   • Substance abuse (CMS/[facility])    • Tobacco dependency      Past Surgical History:   Procedure Laterality Date   • CARDIAC CATHETERIZATION     • CARDIAC CATHETERIZATION N/A 7/9/2019    Procedure: LEFT HEART CATH with possible PCI;  Surgeon: PAL Johnson MD;  Location: [facility] CATH INVASIVE LOCATION;  Service: Cardiovascular   • CARDIAC CATHETERIZATION N/A 3/24/2020    Procedure: LEFT HEART CATH with possible PCI;  Surgeon: Solomon Lowe MD;  Location: [facility] CATH INVASIVE LOCATION;  Service:  Cardiovascular;  Laterality: N/A;  Local and IV sedation   • CARDIAC CATHETERIZATION N/A 3/24/2020    Procedure: Stent ATUL coronary;  Surgeon: Solomon Lowe MD;  Location: [facility] CATH INVASIVE LOCATION;  Service: Cardiovascular;  Laterality: N/A;   • CARDIAC CATHETERIZATION Left 2020    Procedure: Cardiac Catheterization/Vascular Study;  Surgeon: Solomon Lowe MD;  Location: [facility] CATH INVASIVE LOCATION;  Service: Cardiovascular;  Laterality: Left;   • CARDIOVASCULAR STRESS TEST     • ECHO - CONVERTED     • ECHO - CONVERTED     • ENDOSCOPY N/A 2019    Procedure: ESOPHAGOGASTRODUODENOSCOPY WITH SCLEROTHERAPY, BICAP CAUTERY OF DUODENAL ULCER AND HEMOSTASIS SPRAY OF DUODENAL ULCER;  Surgeon: Wesly Myers MD;  Location: [facility] ENDOSCOPY;  Service: Gastroenterology   • EXPLORATORY LAPAROTOMY N/A 2019    Procedure: LAPAROTOMY EXPLORATORY;  Surgeon: Patience Peng MD;  Location: [facility] MAIN OR;  Service: General   • WRIST SURGERY N/A      Family History   Problem Relation Age of Onset   • Heart disease Mother    • Heart attack Mother    • Colon cancer Mother    • Heart disease Brother    • Heart attack Brother    • Heart disease Brother    • Heart attack Brother      Social History     Socioeconomic History   • Marital status:      Spouse name: Not on file   • Number of children: Not on file   • Years of education: Not on file   • Highest education level: Not on file   Tobacco Use   • Smoking status: Former Smoker     Packs/day: 1.00     Years: 20.00     Pack years: 20.00     Types: Cigarettes     Quit date: 2019     Years since quittin.3   • Smokeless tobacco: Former User   Substance and Sexual Activity   • Alcohol use: Not Currently     Frequency: Never     Comment: use to drink half gallon of liquor per day; down to 1 pint of liquor per day; quit 2019   • Drug use: Not Currently     Types: Cocaine(coke), Methamphetamines     Comment: last used meth about 2 months  "ago   • Sexual activity: Not Currently     Partners: Male   Social History Narrative    He reports extensive history of depression, anxiety, and substance abuse. He's been diagnosed with ADD and bipolar disorder, and has been treated at [facility] and [facility]. Medications in the past have made him feel \"slow\" and \"dumb\". He reports that his mind is always going \"a million miles a minute\", and he \"can't get shit done\" because he can't focus. He reports very low self esteem. He reports his son's mother has told him they'd be better off if he was dead and he wonders if that's true, but states tearfully that he doesn't want to die. He has quit drinking and doing drugs in the past, and says he went to [employer] a lot. He's never had a sponsor or really participated in AA. He did cocaine on and off for around 10 years but quit when his son was born 9 yrs ago. He says that cocaine calmed him down. In the past couple years, he started using meth which calms him down if he snorts it, but amps him up if he smokes it. Alcohol calms him down. He reports feeling very scared right now and worried that he's going to die. He seems ready to address his substance abuse and try to live a healthier lifestyle. His son is his main motivation.         HOME MEDICATIONS:   Prior to Admission medications    Not on File       ALLERGIES:  Patient has no known allergies.    OBJECTIVE     VITAL SIGNS:  /56 (BP Location: Right arm, Patient Position: Lying)   Pulse 86   Temp 98 °F (36.7 °C) (Oral)   Resp 23   Ht 185.4 cm (73\")   Wt 102 kg (224 lb 3.3 oz)   SpO2 91%   BMI 29.58 kg/m²     Wt Readings from Last 3 Encounters:   11/19/20 102 kg (224 lb 3.3 oz)   05/14/20 93.7 kg (206 lb 8 oz)   04/22/20 92.1 kg (203 lb)       INTAKE/OUTPUT:  No intake or output data in the 24 hours ending 11/20/20 0035    PHYSICAL EXAM:   Constitutional: Chronically ill-appearing, slightly jaundiced, toxic appearance, no acute distress  Eyes:  PERRL, " conjunctiva normal, unable to assess EOMs  HENT:  Atraumatic, external ears normal, nose normal, oropharynx moist, no pharyngeal exudates, OETT in place, NG in place. Neck-normal range of motion, no tenderness, supple, trachea midline  Respiratory:  Decreased bibasilar breath sounds, scattered rhonchi, without wheezes, non-labored respirations without accessory muscle use  Cardiovascular:  Normal rate, normal rhythm, no murmurs, no gallops, no rubs   GI:  Soft, distended, hypoactive bowel sounds, nontender, no organomegaly, no mass, no rebound, no guarding   :  No costovertebral angle tenderness, bañuelos catheter with no urine output  Musculoskeletal:  No edema, no tenderness, no deformities  Integument:  Well hydrated, no rash   Lymphatic:  No lymphadenopathy noted   Neurologic:  Unresponsive, does not withdrawal to pain, not following commands, no obvious unilateral deficits, but difficult to assess   Psychiatric:  Intubated, unresponsive      RESULTS     LABS:  Lab Results (last 24 hours)     Procedure Component Value Units Date/Time    CBC & Differential [[id number]]  (Abnormal) Collected: 11/19/20 0739    Specimen: Blood Updated: 11/19/20 0756    Narrative:      The following orders were created for panel order CBC & Differential.  Procedure                               Abnormality         Status                     ---------                               -----------         ------                     CBC Auto Differential[[id number]]        Abnormal            Final result                 Please view results for these tests on the individual orders.    CBC Auto Differential [[id number]]  (Abnormal) Collected: 11/19/20 0739    Specimen: Blood Updated: 11/19/20 0756     WBC 12.90 10*3/mm3      RBC 5.52 10*6/mm3      Hemoglobin 13.2 g/dL      Hematocrit 42.3 %      MCV 76.5 fL      MCH 24.0 pg      MCHC 31.3 g/dL      RDW 17.8 %      RDW-SD 47.7 fl      MPV 7.8 fL      Platelets 334 10*3/mm3      Neutrophil % 87.2  %      Lymphocyte % 6.9 %      Monocyte % 3.7 %      Eosinophil % 1.5 %      Basophil % 0.7 %      Neutrophils, Absolute 11.30 10*3/mm3      Lymphocytes, Absolute 0.90 10*3/mm3      Monocytes, Absolute 0.50 10*3/mm3      Eosinophils, Absolute 0.20 10*3/mm3      Basophils, Absolute 0.10 10*3/mm3      nRBC 0.5 /100 WBC     Comprehensive Metabolic Panel [[id number]]  (Abnormal) Collected: 11/19/20 0854    Specimen: Blood Updated: 11/19/20 0946     Glucose 159 mg/dL      BUN 24 mg/dL      Creatinine 1.36 mg/dL      Sodium 133 mmol/L      Potassium 5.1 mmol/L      Comment: Slight hemolysis detected by analyzer. Results may be affected.        Chloride 96 mmol/L      CO2 24.0 mmol/L      Calcium 9.5 mg/dL      Total Protein 6.8 g/dL      Albumin 3.50 g/dL      ALT (SGPT) 165 U/L      AST (SGOT) 35 U/L      Alkaline Phosphatase 143 U/L      Total Bilirubin 1.3 mg/dL      eGFR Non African Amer 57 mL/min/1.73      Globulin 3.3 gm/dL      A/G Ratio 1.1 g/dL      BUN/Creatinine Ratio 17.6     Anion Gap 13.0 mmol/L     Narrative:      GFR Normal >60  Chronic Kidney Disease <60  Kidney Failure <15      Magnesium [[id number]]  (Abnormal) Collected: 11/19/20 0854    Specimen: Blood Updated: 11/19/20 0947     Magnesium 3.1 mg/dL     CBC & Differential [[id number]] Collected: 11/20/20 0013    Specimen: Blood Updated: 11/20/20 0016    Narrative:      The following orders were created for panel order CBC & Differential.  Procedure                               Abnormality         Status                     ---------                               -----------         ------                     CBC Auto Differential[[id number]]                            In process                   Please view results for these tests on the individual orders.    CBC Auto Differential [[id number]] Collected: 11/20/20 0013    Specimen: Blood Updated: 11/20/20 0016    Troponin [[id number]] Collected: 11/20/20 0013    Specimen: Blood Updated: 11/20/20 0016     Comprehensive Metabolic Panel [[id number]] Collected: 11/20/20 0013    Specimen: Blood Updated: 11/20/20 0016    Magnesium [[id number]] Collected: 11/20/20 0013    Specimen: Blood Updated: 11/20/20 0016    Phosphorus [[id number]] Collected: 11/20/20 0013    Specimen: Blood Updated: 11/20/20 0016    Lactic Acid, Plasma [[id number]] Collected: 11/20/20 0013    Specimen: Blood Updated: 11/20/20 0016            MICRO:  Microbiology Results (last 10 days)     Procedure Component Value - Date/Time    Blood Culture - Blood, Arm, Left [[id number]] Collected: 11/15/20 0128    Lab Status: Preliminary result Specimen: Blood from Arm, Left Updated: 11/19/20 0145     Blood Culture No growth at 4 days    Blood Culture - Blood, Arm, Right [[id number]] Collected: 11/15/20 0127    Lab Status: Preliminary result Specimen: Blood from Arm, Right Updated: 11/19/20 0145     Blood Culture No growth at 4 days    COVID-19, [facility]BBOTT IN-HOUSE,NP Swab (NO TRANSPORT MEDIA) 2 HR TAT - Swab, Nasopharynx [[id number]]  (Normal) Collected: 11/14/20 1922    Lab Status: Final result Specimen: Swab from Nasopharynx Updated: 11/14/20 2006     COVID19 Not Detected    Narrative:      Fact sheet for providers: https://www.fda.gov/media/416627/download     Fact sheet for patients: https://www.fda.gov/media/958600/download            RADIOLOGY STUDIES:  Imaging Results (Last 72 Hours)     Procedure Component Value Units Date/Time    XR Chest 1 View [[id number]] Resulted: 11/20/20 0029     Updated: 11/20/20 0030                   ECHOCARDIOGRAM:  Results for orders placed during the hospital encounter of 11/14/20   Adult Transthoracic Echo Complete W/ Cont if Necessary Per Protocol    Narrative · Left ventricular systolic function is severely decreased.  · Left ventricular ejection fraction is 10 to 15%  · Akinetic septum, inferior wall and apex is noted  · The left ventricular cavity is mildly dilated.  · Left ventricular diastolic function was normal.  · Mildly  reduced right ventricular systolic function noted.  · Mild mitral valve regurgitation is present  · Mild tricuspid valve regurgitation is present. Calculated right   ventricular systolic pressure from tricuspid regurgitation is 28.0 mmHg.             I reviewed the patient's new clinical results.      HOSPITAL MEDICATIONS     SCHEDULED MEDICATIONS:  artificial tears, , Both Eyes, Q2H  aspirin, 81 mg, Oral, Daily  cefepime, 2 g, Intravenous, Once  cefepime, 2 g, Intravenous, Q8H  clopidogrel, 75 mg, Oral, Daily  folic acid (FOLVITE) IVPB, 1 mg, Intravenous, Daily  nicotine, 1 patch, Transdermal, Nightly  sodium chloride, 10 mL, Intravenous, Q12H  thiamine (VITAMIN B1) IVPB, 100 mg, Intravenous, Daily  vancomycin, 20 mg/kg, Intravenous, Once         CONTINUOUS INFUSIONS:    EPINEPHrine, 0.02-0.3 mcg/kg/min  fentanyl 10 mcg/mL,  mcg/hr  norepinephrine, 0.02-0.3 mcg/kg/min  Pharmacy to dose vancomycin,   propofol, 5-50 mcg/kg/min         PRN MEDICATIONS:   •  acetaminophen **OR** acetaminophen  •  acetaminophen  •  aluminum-magnesium hydroxide-simethicone  •  atropine  •  bisacodyl  •  Calcium Gluconate-NaCl **AND** calcium gluconate **AND** Calcium, Ionized  •  docusate sodium  •  fentanyl 10 mcg/mL  •  hydrOXYzine  •  influenza vaccine  •  ipratropium-albuterol  •  loperamide  •  magnesium hydroxide  •  magnesium sulfate **OR** magnesium sulfate **OR** magnesium sulfate  •  melatonin  •  nitroglycerin  •  ondansetron **OR** ondansetron  •  ondansetron **OR** ondansetron  •  vancomycin **AND** Pharmacy to dose vancomycin  •  potassium chloride  •  propofol  •  sodium chloride  •  sodium chloride  •  sodium chloride  •  sodium chloride  •  traMADol  •  vecuronium       ASSESSMENT & PLAN     Cardiopulmonary arrest with ROSC, likely as result of hypoxia due to aspiration of vomitus  -Code time: 34 minutes, mainly asystolic until very last check  -Very poor prognosis, but will proceed with TTM due to continued  unresponsiveness  -Sedation: will change propofol to Versed gtt due to CHF, Fentanyl  -BIS monitoring  -Norcuron IVP for shivering per protocol  -Monitor and trend labs  -TTM Protocol ordered.  -Troponin 0.014, monitor serial labs  -Cardiology also following    Acute encephalopathy/unresponsiveness, R/O anoxic injury  -CT head-negative for acute abnormalities  -Neurology consultation  -Further workup pending clinical progress    Septic shock, likely complicated by cardiogenic shock  -IVF bolus of 30 mL/kg per sepsis protocol  -Continue IVFs  -Vasopressors: Levophed, phenylephrine, and vasopressin; will add dobutamine  -Lactic Acid 18.2, monitor and trend until normalized  -Repeat blood Cultures-ordered and pending; previous blood cultures had been obtained and were negative with no growth at 5 days.  -CT chest reviewed: Concern for aspiration pneumonia.  -CT abdomen/pelvis reviewed: Revealed diffuse small and large bowel dilatation without focal transition point suggestive of possible ileus, bowel ischemia could not be excluded.  Was limited due to inability to use IV contrast.  -UA-pending  -Procalcitonin-44.06  -Previously COVID-negative  -Cefepime, Vancomycin initiated; deescalate pending culture results  -Remains on multiple vasopressors.    Aspiration pneumonia, secondary to aspiration of vomitus  -Aggressive pulmonary toileting  -DuoNeb scheduled  -Antibiotics as above.    Lactic acidosis/metabolic acidosis with increased anion gap  -Serum bicarbonate ordered IV push, will check ABG  -Consider starting bicarbonate drip.    Acute respiratory failure with hypoxia  -Required placement of oxygen supplementation prior to code event, baseline room air  -Intubated, now with aspiration pneumonia  -Likely a component of COPD that is undiagnosed, related to smoking history    DENNIS  -Likely ATN related to hypotension  -BUN/Creatnine elevated to 49/2.28  -Avoid further hypotension, NSAIDs, and nephrotoxic  medications  -Consider nephrology consultation    Acute hyperkalemia  -Of note, patient did receive Kayexalate on one account earlier in this admission, but previously had maintained relatively normal potassium levels.  -K 6.4 post resuscitation, medically treated with sodium bicarbonate, D50, and IV insulin.  Recheck ordered.  -For further issues with hyperkalemia, will consult nephrology.  -Monitor and trend labs.    Likely ileus, possible obstruction  -NG placed with foul, fecal matter suctioned, continue LIWS  -CT Abd/Pelvis reviewed.  -Further orders pending clinical progress.    CAD, H/O PCI  -Previously on ASA, Plavix  -Underwent cardiac cath on 11/18/2020, patent LAD stent was noted  -Cardiology following.    Acute on chronic exacerbation of heart failure with reduced ejection fraction/ischemic dilated cardiomyopathy  -Reviewed previous echocardiogram, LVEF 10-15%.  -Added dobutamine  -Previously being diuresed, but will hold on diuretics at this time due to hypotension  -Cardiology following.    Transaminitis, history of hepatitis C, treatment naïve  -Recommend outpatient follow-up with GI  -Initially transaminitis was likely related to hepatic portal congestion due to CHF exacerbation, had resolved/improved  -Transaminitis will likely worsen, likely will develop shock liver  -Monitor coags    Episodic constipation, resolved  -This was reported on admission, and had been resolved.    Chronic diarrhea, secondary to oversewing of a bleeding duodenal ulcer  -It was reported that patient previously had a G-tube/J-tube, follows with GI.  -Imodium had been added, is now discontinued  -This diarrhea, worsened to the point of incontinence, more suggestive of a developing ileus/obstruction  -Additional orders as above.  -Keep strict n.p.o. at this time.    Alcohol abuse/methamphetamine abuse/marijuana use/tobacco abuse  -CIWA protocol ordered earlier in admission.  -Patient had reported he generally drinks  approximately 1 pint of liquor per day, though he reported that he did not have any alcohol for the prior 9 days to admission.  -HIV negative, denied IV drug use; reported that he smokes methamphetamine and marijuana  -Counseled on cessation when appropriate, prior to discharge    Anxiety/Depression  -Meds currently on hold, will resume when clinically appropriate    GERD, H/O gastric ulcer  -Continue PPI IV    Code Status: CPR, Full Interventions  VTE Prophylaxis: SCDs  PUD Prophylaxis: Protonix    I discussed the patient's findings and my recommendations with family and nursing staff.  I have discussed plan with on-call attending physician, who agrees with this plan of care.  This note has been scribed by me for Dr. Stuart.    Appropriate PPE worn during assessment of patient per established guidelines.    Electronically signed by ZACK Hazel, 11/20/20, 01:25 AM EST.    Attending physician statement:  Patient remains critically ill.  Total critical care time spent is 32 minutes which does not include any time for procedures.  Critical care time is exclusive of time spent by the nurse practitioner.  Above note scribed by nurse practitioner for me and later reviewed by me for accuracy . I've examined the patient and reviewed all labs and images.  We will proceed with bronchoscopy.  Severely elevated oxygen requirements.  On 900% oxygen.  Increase PEEP to 14.  On very high minute ventilation.  We will add Flolan.  Continue paralytics and sedation.  Remains on multiple vasopressors as well as inotropic support.  Continue bicarbonate drip however increase the rate.  Discussed with nephrology to start CRRT.  Patient has a Shiley.  2D echocardiogram reviewed.  Continue hypothermia protocol.    I have directly participated in the evaluation and management of this patient.  Daija Stuart MD

## 2020-11-21 NOTE — CODE DOCUMENTATION
At 00:03, patient heart rate dropped and rhythm was changed to PEA arrest.  CODE 4 was initiated with BVM ventilation, CPR, and ACLS medications.  Patient was also attempted to transcutaneously pace in which there was evidence of capture on cardiac monitoring; however, it did not yield improvement in patient's blood pressure.  Family was contacted during code time, in which it was again requested to continue to do everything to save patient's life.  ROSC obtained at 00:17.  Patient continues on maximal vasopressor and inotropic support.  During code, patient was disconnected and blood returned from hemodialysis machine.  ABG was obtained, labs were ordered, and CXR was ordered.  All results pending at time of documentation.    Electronically signed by ZACK Hazel, 11/21/20, 12:31 AM MABEL.

## 2020-11-21 NOTE — SIGNIFICANT NOTE
Called and discussed case with patient's emergency contact, Krystal.  Informed of patient's code event, maximal vasopressors, external pacemaking, and inability to continue with CRRT.  She has been called during each code event and had requested continuation.  A discussion was had with her that patient's prognosis is extremely poor and patient will likely have had an anoxic injury from the repeat insults of loss of perfusion and continued hypotension.  It was related to her that if this patient survives, which is unlikely, he would have very severe deficits and would likely never return to his baseline.  It was recommended to consider changing patient's code status to DNR.  Krystal expressed that she did not feel comfortable making this decision, but also didn't want him to suffer.  She was assured that if changing to a DNR status, we would continue to attempt all current interventions in place, but would cease to intervene if patient's heart stopped.  She stated that she wanted to call and talk to some of her family members and would call back.  It was clarified that she would keep him as a full code status until she returned the call.  Patient's BP on maximal support was 53/44 on his arterial line, and this was communicated to patient's emergency contact.  She was informed that he would likely code very soon.  She expressed understanding.  Updated nursing staff of this discussion with patient's emergency contact.    Electronically signed by ZACK Hazel, 11/21/20, 6:45 AM MABEL.

## 2020-11-21 NOTE — CODE DOCUMENTATION
Patient with 3rd code in last few hours. Paced transcutaneously, but no pulses present. Patient ventilated prior to code. 3 amps bicarb given prior to start of code, Epi given at 0710 and 0716. 1 amp calcium gluconate given at 0710. CPR initiated at 0710 and continued (with exception of pulse checks) until end of code. 3 call attempts made to ex wife (emergency contact) during this time. No answer.  Time of death called by Mirigaston Harmon at 0720.

## 2020-11-21 NOTE — CODE DOCUMENTATION
At 06:13, patient lost his BP and was in PEA.  Code 4 was called.  Patient received BVM, CPR, and ACLS medications as documented.  See nursing documentation for specific details of code event.  ROSC was achieved at 06:24.  Total code time: 11 minutes.  Family was called and informed during code event.  It was requested from emergency contact to continue to attempt all life-saving measures.    Electronically signed by ZACK Hazel, 11/21/20, 06:30 AM MABEL.

## 2020-11-21 NOTE — NURSING NOTE
Paper flowsheets used at bedside for TTM & CRRT. Unable to chart electronically d/t pt's generally dire condition throughout the shift. Majority of paper flowsheets damaged w/ unidentified liquid during 0614 am Code 4 sequence.

## 2020-11-21 NOTE — DISCHARGE SUMMARY
PULMONARY/CRITICAL CARE DEATH SUMMARY    PATIENT NAME:     Asad Bethea  :     1975  MRN:     [id number]    ADMISSION DATE:  2020      DATE/TIME OF DEATH:    2020 at 7:20 AM     SMOKING STATUS: Tobacco abuse current at the time of admission.  Current methamphetamine smoker at the time of admission      CAUSE OF DEATH  Acute respiratory failure secondary to hypoxia related to aspiration of emesis  Cardiopulmonary arrest likely precipitated by hypoxic event  Septic shock secondary to aspiration pneumonia  Cardiogenic shock secondary to cardiac arrest  NSTEMI    FINAL DIAGNOSES  Acute hypoxic respiratory failure secondary to hypoxic event related to aspiration of emesis  Cardiopulmonary arrest likely precipitated by hypoxic event  Septic shock secondary to aspiration pneumonia  Cardiogenic shock secondary to cardiac arrest  Acute encephalopathy, likely secondary to anoxic brain injury  Aspiration pneumonia  Lactic acidosis  Acute kidney injury secondary to ATN related to hypotension and shock  Polysubstance abuse with alcohol, methamphetamine and marijuana  Hepatitis C, treatment naïve  Coronary artery disease  Ischemic, dilated cardiomyopathy, EF 10 to 15%  Acute on chronic systolic heart failure, NYHA class III  CAD with history of PCI  Tobacco abuse    HOSPITAL COURSE    Asad Bethea was a 44 y.o. male who had a past medical history of abnormal nuclear stress test, ADHD, alcohol dependence, anxiety, arthritis, clotting disorder, depression, anxiety, essential hypertension, GERD, CAD with H/O coronary artery sten, Hepatitis C-treatment naïve, IBS, medical noncompliance, ischemic cardiomyopathy, non-ST elevation myocardial infarction substance abuse with methamphetamine and marijuana, tobacco abuse in remission.    The patient presented to [facility] on 2020 with complaint of chest pain. On admission, it was reported that the patient had a 3-day history of constant, progressive worsening of  "left-sided chest pain which radiated to his left arm and back.  He had described the pain as \"just really hurting\".   The patient reported that his chest pain was associated with orthopnea, nausea without vomiting, frequent episodes of tachycardia and palpitations, occasional peripheral edema and a recent subjective fever.    He reported that he had noticed a decrease in urinary output and reported that he had chronic constipation.  It was also reported that the patient had recently required coils for some type of bleeding.  On admission the patient had a urine tox screen which was positive for amphetamine/methamphetamine as well as THC.    Patient had a history of CAD for which he had undergone cardiac catheterization on 3/24/2020 with a drug-eluting stent placed in the LAD.  It was reported that since that time had compliance issues with prescribed medical therapy.  He reported that he had been taking his Coreg but had not taken Plavix or diuretics for some time. The patient also reported that he had relapsed on his substance abuse and had smoked methamphetamine and consumed a large amount of alcohol with his most recent use being 11/5/2020.  He reported that he generally drank 1 to 1.5 pints of liquor per day.  He reported that he smoked 1 pack of cigarettes daily.      The patient was admitted to the hospitalist service and was being treated for an acute on chronic exacerbation of CHF, chest pain, DENNIS as well as other comorbidities.    The patient had an echocardiogram which revealed a reduced ejection fraction of 10-15%.  Serial troponins were obtained and negative.  Cardiology was consulted, and patient underwent cardiac catheterization on 11/18/2020 with findings of a patent LAD stent and severe LV dysfunction.    The patient progressed well and discharge planning had been trying to find patient rehab placement for his drug treatment.  On 11/18/2020, the patient reportedly had diarrhea which was treated with " Lomotil.    On 11/19/2020 the patient developed complaints of severe nausea and vomiting.  In the late evening on 11/19/2020 the patient became incontinent of stool.  While nursing staff was attempting to clean patient, the patient began to vomit and was witnessed to aspirate.    The patient then became unresponsive and pulseless and code 4 was initiated with immediate resuscitation efforts per ACLS protocols including intubation ROSC was obtained and the patient was transferred to the ICU for further aggressive medical management.  Code/resuscitation time was 34 minutes.  As this was a witnessed arrest with immediate initiation of resuscitative efforts the patient was found to be a good candidate for targeted therapeutic hypothermia.    CT of the head was negative for acute intracranial findings.  CT of the chest revealed infiltrates concerning for aspiration pneumonia.  CT of the abdomen/pelvis revealed diffuse small and large bowel dilation without focal transition point suggestive of possible ileus, bowel ischemia could not be excluded.  On 10/20/2020 the patient underwent a therapeutic/diagnostic bronchoscopy which revealed obvious aspiration with the presence of dark-colored material noted in the distal trachea and arnold extending into the right mainstem, right upper lobe, right middle lobe and right lower lobe.    The patient developed DENNIS for which nephrology was consulted.  Due to AKA with hyperkalemia and systemic acidosis a temporary dialysis catheter was placed and the patient was started on CRRT.  The patient failed to improve and became increasingly hypotensive requiring multiple vasopressor support.  At 0003 on 11/21/2020 the patient had a PEA arrest in the ICU with immediate initiation of resuscitation efforts with CPR and ACLS protocol.  He had been intubated prior to the code event and was easily bagged with 100% O2.  ROSC was obtained however the patient had a second PEA arrest at 0613 and again  underwent a code/resuscitation with ROSC.  The patient required transcutaneous pacing following the code event.  The patient had a third PEA arrest less than an hour later.  During this event the patient did not experience a return of spontaneous circulation despite CPR, 100% O2, ACLS medications per protocol, and correction of electrolytes.  The transcutaneous pacer was turned off and the patient had no palpable pulse and asystole on the monitor.  Resuscitative efforts were concluded and the patient was pronounced dead at 7:20 AM.  The family was notified by nursing staff.    Time of death noted to be on 11/21/2020 at 7:20 AM .     PROCEDURES PERFORMED  Procedure(s):  BRONCHOSCOPY AT BEDSIDE with bilateral lung washing  11/20 0450 Non-Tunneled Catheter  11/20 0350 Insert Central Line At Bedside  11/20 0150 Insert Arterial Line at Bedside  11/19 2353 Insert Central Line At Bedside  11/19 2336 Intubation    CONSULTING PHYSICIANS  Consults     Date and Time Order Name Status Description    11/20/2020 0557 Inpatient Neurology Consult General Completed     11/20/2020 0314 Inpatient Nephrology Consult Completed     11/14/2020 2226 Inpatient Cardiology Consult Completed     11/14/2020 2100 Hospitalist (on-call MD unless specified) Completed           RESULTS REVIEW  LABS  Lab Results (last 24 hours)     Procedure Component Value Units Date/Time    Blood Gas, Arterial - [[id number]]  (Abnormal) Collected: 11/20/20 1119    Specimen: Arterial Blood Updated: 11/20/20 1247     Site Arterial Line     Edis's Test N/A     pH, Arterial 7.112 pH units      pCO2, Arterial 61.9 mm Hg      pO2, Arterial 60.1 mm Hg      HCO3, Arterial 19.7 mmol/L      Base Excess, Arterial -10.3 mmol/L      Comment: Serial Number: [id number]:  [id number]        O2 Saturation, Arterial 80.1 %      CO2 Content 21.6 mmol/L      Barometric Pressure for Blood Gas --     Comment: N/A        Modality Adult Vent     FIO2 100 %      Ventilator Mode ;AC     Set  Tidal Volume 550     PEEP 14     Hemodilution No     Respiratory Rate 30    Pneumocystis PCR - Wash, Lung [[id number]] Collected: 11/20/20 1154    Specimen: Wash from Lung Updated: 11/20/20 1154    Blood Gas, Arterial - [[id number]]  (Abnormal) Collected: 11/20/20 1156    Specimen: Arterial Blood Updated: 11/20/20 1245     Site Arterial Line     Edis's Test N/A     pH, Arterial 7.131 pH units      pCO2, Arterial 55.2 mm Hg      pO2, Arterial 65.8 mm Hg      HCO3, Arterial 18.4 mmol/L      Base Excess, Arterial -10.9 mmol/L      Comment: Serial Number: [id number]:  [id number]        O2 Saturation, Arterial 84.9 %      CO2 Content 20.1 mmol/L      Barometric Pressure for Blood Gas --     Comment: N/A        Modality Adult Vent     FIO2 100 %      Ventilator Mode ;AC     Set Tidal Volume 550     PEEP 14     Hemodilution No     Respiratory Rate 30    Blood Gas, Arterial - [[id number]]  (Abnormal) Collected: 11/20/20 1347    Specimen: Arterial Blood Updated: 11/20/20 1353     Site Arterial Line     Edis's Test N/A     pH, Arterial 7.101 pH units      pCO2, Arterial 53.7 mm Hg      pO2, Arterial 64.0 mm Hg      HCO3, Arterial 16.7 mmol/L      Base Excess, Arterial -13.0 mmol/L      Comment: Serial Number: [id number]:  [id number]        O2 Saturation, Arterial 82.7 %      CO2 Content 18.4 mmol/L      Barometric Pressure for Blood Gas --     Comment: N/A        Modality Adult Vent     FIO2 100 %      Ventilator Mode ;AC     Set Tidal Volume 32     PEEP 14     Hemodilution No     Respiratory Rate 550    POC Glucose Once [[id number]]  (Abnormal) Collected: 11/20/20 1413    Specimen: Blood Updated: 11/20/20 1414     Glucose 119 mg/dL      Comment: Serial Number: [id number]:  [id number]       Troponin [[id number]]  (Normal) Collected: 11/20/20 1807    Specimen: Blood Updated: 11/20/20 1850     Troponin T 0.019 ng/mL     Narrative:      Troponin T Reference Range:  <= 0.03 ng/mL-   Negative for AMI  >0.03 ng/mL-     Abnormal  for myocardial necrosis.  Clinicians would have to utilize clinical acumen, EKG, Troponin and serial changes to determine if it is an Acute Myocardial Infarction or myocardial injury due to an underlying chronic condition.       Results may be falsely decreased if patient taking Biotin.      POC Glucose Once [[id number]]  (Normal) Collected: 11/20/20 1831    Specimen: Blood Updated: 11/20/20 1833     Glucose 76 mg/dL      Comment: Serial Number: [id number]:  [id number]       Blood Gas, Arterial - [[id number]]  (Abnormal) Collected: 11/20/20 1835    Specimen: Arterial Blood Updated: 11/20/20 1846     Site Arterial Line     Edis's Test Negative     pH, Arterial 7.076 pH units      pCO2, Arterial 48.8 mm Hg      pO2, Arterial 69.9 mm Hg      HCO3, Arterial 14.3 mmol/L      Base Excess, Arterial -15.6 mmol/L      Comment: Serial Number: [id number]:  [id number]        O2 Saturation, Arterial 85.3 %      CO2 Content 15.8 mmol/L      Barometric Pressure for Blood Gas --     Comment: N/A        Modality Adult Vent     FIO2 100 %      Ventilator Mode ;AC     Set Tidal Volume 550     PEEP 14     Hemodilution No     Respiratory Rate 32    Lactic Acid, Reflex [[id number]]  (Abnormal) Collected: 11/20/20 1903    Specimen: Blood Updated: 11/20/20 1933     Lactate 12.0 mmol/L     aPTT [[id number]]  (Abnormal) Collected: 11/20/20 1903    Specimen: Blood Updated: 11/20/20 1936     .4 seconds     CBC & Differential [[id number]]  (Abnormal) Collected: 11/20/20 1903    Specimen: Blood Updated: 11/20/20 2014    Narrative:      The following orders were created for panel order CBC & Differential.  Procedure                               Abnormality         Status                     ---------                               -----------         ------                     CBC Auto Differential[[id number]]        Abnormal            Final result                 Please view results for these tests on the individual orders.    Calcium,  Ionized [[id number]]  (Abnormal) Collected: 11/20/20 1903    Specimen: Blood Updated: 11/20/20 1926     Ionized Calcium 0.98 mmol/L     Magnesium [[id number]]  (Abnormal) Collected: 11/20/20 1903    Specimen: Blood Updated: 11/20/20 1932     Magnesium 3.0 mg/dL     Phosphorus [[id number]]  (Abnormal) Collected: 11/20/20 1903    Specimen: Blood Updated: 11/20/20 1932     Phosphorus 8.1 mg/dL     CBC Auto Differential [[id number]]  (Abnormal) Collected: 11/20/20 1903    Specimen: Blood Updated: 11/20/20 2014     WBC 4.20 10*3/mm3      RBC 5.10 10*6/mm3      Hemoglobin 12.1 g/dL      Hematocrit 40.9 %      MCV 80.3 fL      MCH 23.7 pg      MCHC 29.5 g/dL      RDW 17.7 %      RDW-SD 50.8 fl      MPV 8.0 fL      Platelets 138 10*3/mm3     Comprehensive Metabolic Panel [[id number]]  (Abnormal) Collected: 11/20/20 1903    Specimen: Blood Updated: 11/20/20 1932     Glucose 75 mg/dL      BUN 40 mg/dL      Creatinine 1.54 mg/dL      Sodium 138 mmol/L      Potassium 5.6 mmol/L      Chloride 104 mmol/L      CO2 13.0 mmol/L      Calcium 7.0 mg/dL      Total Protein 4.8 g/dL      Albumin 2.10 g/dL      ALT (SGPT) 331 U/L      AST (SGOT) 540 U/L      Alkaline Phosphatase 98 U/L      Total Bilirubin 1.0 mg/dL      eGFR Non African Amer 49 mL/min/1.73      Globulin 2.7 gm/dL      A/G Ratio 0.8 g/dL      BUN/Creatinine Ratio 26.0     Anion Gap 21.0 mmol/L     Narrative:      GFR Normal >60  Chronic Kidney Disease <60  Kidney Failure <15      Scan Slide [[id number]] Collected: 11/20/20 1903    Specimen: Blood Updated: 11/20/20 2014     Scan Slide --     Comment: See Manual Differential Results       Manual Differential [[id number]]  (Abnormal) Collected: 11/20/20 1903    Specimen: Blood Updated: 11/20/20 2014     Neutrophil % 16.0 %      Lymphocyte % 15.0 %      Monocyte % 6.0 %      Bands %  30.0 %      Metamyelocyte % 25.0 %      Myelocyte % 6.0 %      Atypical Lymphocyte % 2.0 %      Neutrophils Absolute 1.93 10*3/mm3      Lymphocytes  Absolute 0.63 10*3/mm3      Monocytes Absolute 0.25 10*3/mm3      Anisocytosis Slight/1+     Crenated RBC's Mod/2+     WBC Morphology Normal     Giant Platelets Large/3+    POC Glucose Once [[id number]]  (Abnormal) Collected: 11/20/20 2048    Specimen: Blood Updated: 11/20/20 2049     Glucose 55 mg/dL      Comment: Serial Number: [id number]:  [id number]       POC Glucose Once [[id number]]  (Abnormal) Collected: 11/20/20 2123    Specimen: Blood Updated: 11/20/20 2124     Glucose 138 mg/dL      Comment: Serial Number: [id number]:  [id number]       Blood Gas, Arterial - [[id number]]  (Abnormal) Collected: 11/20/20 2213    Specimen: Arterial Blood Updated: 11/20/20 2221     Site Arterial Line     Edis's Test N/A     pH, Arterial 6.990 pH units      pCO2, Arterial 54.5 mm Hg      pO2, Arterial 67.7 mm Hg      HCO3, Arterial 13.1 mmol/L      Base Excess, Arterial -18.2 mmol/L      Comment: Serial Number: [id number]:  [id number]        O2 Saturation, Arterial 80.3 %      CO2 Content 14.8 mmol/L      Temperature 90.5 C      Barometric Pressure for Blood Gas --     Comment: N/A        Modality Adult Vent     FIO2 100 %      Ventilator Mode ;AC     Set Tidal Volume 550     PEEP 14     pCO2, Temperature Corrected 44.7 mm Hg      pH, Temp Corrected 7.045 pH Units      pO2, Temperature Corrected 49.8 mm Hg      Hemodilution No     Respiratory Rate 32    POC Glucose Once [[id number]]  (Abnormal) Collected: 11/20/20 2213    Specimen: Blood Updated: 11/20/20 2217     Glucose 131 mg/dL      Comment: Serial Number: [id number]:  [id number]       Blood Gas, Arterial - [[id number]]  (Abnormal) Collected: 11/20/20 2356    Specimen: Arterial Blood Updated: 11/21/20 0032     Site Arterial Line     Edis's Test N/A     pH, Arterial 7.072 pH units      pCO2, Arterial 50.9 mm Hg      pO2, Arterial 59.7 mm Hg      HCO3, Arterial 14.8 mmol/L      Base Excess, Arterial -14.8 mmol/L      Comment: Serial Number: [id number]:  [id number]         O2 Saturation, Arterial 78.4 %      CO2 Content 16.4 mmol/L      Barometric Pressure for Blood Gas --     Comment: N/A        Modality Adult Vent     FIO2 100 %      Ventilator Mode ;AC     Set Tidal Volume 480     PEEP 14     Hemodilution No     Respiratory Rate 32    POC Glucose Once [[id number]]  (Abnormal) Collected: 11/20/20 2356    Specimen: Blood Updated: 11/20/20 2358     Glucose 193 mg/dL      Comment: Serial Number: [id number]:  [id number]       Blood Gas, Arterial - [[id number]]  (Abnormal) Collected: 11/21/20 0019    Specimen: Arterial Blood Updated: 11/21/20 0031     Site Arterial Line     Edis's Test N/A     pH, Arterial 7.153 pH units      pCO2, Arterial 84.2 mm Hg      pO2, Arterial 58.7 mm Hg      HCO3, Arterial 29.5 mmol/L      Base Excess, Arterial -0.3 mmol/L      Comment: Serial Number: [id number]:  [id number]        O2 Saturation, Arterial 79.7 %      Temperature 90.5 C      Barometric Pressure for Blood Gas --     Comment: N/A        Modality Bagging     FIO2 100 %      pCO2, Temperature Corrected 69.2 mm Hg      pH, Temp Corrected 7.211 pH Units      pO2, Temperature Corrected 43.0 mm Hg      Hemodilution No    POCT Electrolytes +HGB +HCT [[id number]]  (Abnormal) Collected: 11/21/20 0019    Specimen: Blood Updated: 11/21/20 0030     Sodium 155 mmol/L      POC Potassium 6.0 mmol/L      Ionized Calcium 2.76 mmol/L      Comment: Serial Number: [id number]:  [id number]        Glucose 209 mg/dL      Hematocrit 27 %      Hemoglobin 9.0 g/dL     POC Lactate [[id number]]  (Abnormal) Collected: 11/21/20 0019    Specimen: Blood Updated: 11/21/20 0029     Lactate 17.7 mmol/L      Comment: Serial Number: [id number]:  [id number]       POC Glucose Once [[id number]]  (Abnormal) Collected: 11/21/20 0019    Specimen: Blood Updated: 11/21/20 0024     Glucose 209 mg/dL      Comment: Serial Number: [id number]:  [id number]       aPTT [[id number]]  (Abnormal) Collected: 11/21/20 0037    Specimen: Blood Updated: 11/21/20 0137      .7 seconds     CBC & Differential [[id number]]  (Abnormal) Collected: 11/21/20 0037    Specimen: Blood Updated: 11/21/20 0201    Narrative:      The following orders were created for panel order CBC & Differential.  Procedure                               Abnormality         Status                     ---------                               -----------         ------                     CBC Auto Differential[[id number]]        Abnormal            Final result                 Please view results for these tests on the individual orders.    CBC Auto Differential [[id number]]  (Abnormal) Collected: 11/21/20 0037    Specimen: Blood Updated: 11/21/20 0201     WBC 3.40 10*3/mm3      RBC 3.73 10*6/mm3      Hemoglobin 8.9 g/dL      Hematocrit 30.6 %      MCV 82.0 fL      MCH 23.8 pg      MCHC 29.0 g/dL      RDW 17.8 %      RDW-SD 52.1 fl      MPV 9.0 fL      Platelets 97 10*3/mm3     Scan Slide [[id number]] Collected: 11/21/20 0037    Specimen: Blood Updated: 11/21/20 0201     Scan Slide --     Comment: See Manual Differential Results       Manual Differential [[id number]]  (Abnormal) Collected: 11/21/20 0037    Specimen: Blood Updated: 11/21/20 0201     Neutrophil % 25.0 %      Lymphocyte % 29.0 %      Monocyte % 9.0 %      Eosinophil % 6.0 %      Bands %  18.0 %      Metamyelocyte % 8.0 %      Atypical Lymphocyte % 5.0 %      Neutrophils Absolute 1.46 10*3/mm3      Lymphocytes Absolute 0.99 10*3/mm3      Monocytes Absolute 0.31 10*3/mm3      Eosinophils Absolute 0.20 10*3/mm3      Anisocytosis Slight/1+     Billy Cells Mod/2+     Microcytes Slight/1+     Polychromasia Slight/1+     WBC Morphology Normal     Platelet Estimate Decreased    CK Total & CKMB [[id number]]  (Abnormal) Collected: 11/21/20 0038    Specimen: Blood Updated: 11/21/20 0137     CKMB 90.40 ng/mL      Creatine Kinase 3,487 U/L     Narrative:      CKMB results may be falsely decreased if patient taking Biotin.    Vancomycin, Random [[id number]]   (Normal) Collected: 11/21/20 0038    Specimen: Blood Updated: 11/21/20 0127     Vancomycin Random 20.80 mcg/mL     Comprehensive Metabolic Panel [[id number]]  (Abnormal) Collected: 11/21/20 0038    Specimen: Blood Updated: 11/21/20 0138     Glucose 175 mg/dL      BUN 34 mg/dL      Creatinine 1.61 mg/dL      Sodium 147 mmol/L      Potassium 5.2 mmol/L      Chloride 101 mmol/L      CO2 16.0 mmol/L      Calcium 8.6 mg/dL      Total Protein 3.3 g/dL      Albumin 1.80 g/dL      ALT (SGPT) 646 U/L      AST (SGOT) 1,485 U/L      Alkaline Phosphatase 77 U/L      Total Bilirubin 0.9 mg/dL      eGFR Non African Amer 47 mL/min/1.73      Globulin 1.5 gm/dL      A/G Ratio 1.2 g/dL      BUN/Creatinine Ratio 21.1     Anion Gap 30.0 mmol/L     Narrative:      GFR Normal >60  Chronic Kidney Disease <60  Kidney Failure <15      Troponin [[id number]]  (Normal) Collected: 11/21/20 0038    Specimen: Blood Updated: 11/21/20 0131     Troponin T <0.010 ng/mL     Narrative:      Troponin T Reference Range:  <= 0.03 ng/mL-   Negative for AMI  >0.03 ng/mL-     Abnormal for myocardial necrosis.  Clinicians would have to utilize clinical acumen, EKG, Troponin and serial changes to determine if it is an Acute Myocardial Infarction or myocardial injury due to an underlying chronic condition.       Results may be falsely decreased if patient taking Biotin.      Magnesium [[id number]]  (Abnormal) Collected: 11/21/20 0038    Specimen: Blood Updated: 11/21/20 0125     Magnesium 2.7 mg/dL     Phosphorus [[id number]]  (Abnormal) Collected: 11/21/20 0038    Specimen: Blood Updated: 11/21/20 0127     Phosphorus 10.6 mg/dL     Calcium, Ionized [[id number]]  (Abnormal) Collected: 11/21/20 0038    Specimen: Blood Updated: 11/21/20 0114     Ionized Calcium 1.14 mmol/L     Lactic Acid, Plasma [[id number]]  (Abnormal) Collected: 11/21/20 0038    Specimen: Blood Updated: 11/21/20 0139     Lactate 23.1 mmol/L     POC Glucose Once [[id number]]  (Abnormal) Collected:  11/21/20 0102    Specimen: Blood Updated: 11/21/20 0103     Glucose 170 mg/dL      Comment: Serial Number: [id number]:  [id number]       POC Glucose Once [[id number]]  (Abnormal) Collected: 11/21/20 0214    Specimen: Blood Updated: 11/21/20 0215     Glucose 168 mg/dL      Comment: Serial Number: [id number]:  [id number]       POC Glucose Once [[id number]]  (Abnormal) Collected: 11/21/20 0402    Specimen: Blood Updated: 11/21/20 0404     Glucose 144 mg/dL      Comment: Serial Number: [id number]:  [id number]       Potassium [[id number]]  (Abnormal) Collected: 11/21/20 0404    Specimen: Blood Updated: 11/21/20 0429     Potassium 5.5 mmol/L     Blood Gas, Arterial - [[id number]] Collected: 11/21/20 0421    Specimen: Arterial Blood Updated: 11/21/20 0424    POC Glucose Once [[id number]]  (Abnormal) Collected: 11/21/20 0514    Specimen: Blood Updated: 11/21/20 0515     Glucose 158 mg/dL      Comment: Serial Number: [id number]:  [id number]       POC Glucose Once [[id number]]  (Abnormal) Collected: 11/21/20 0612    Specimen: Blood Updated: 11/21/20 0614     Glucose 161 mg/dL      Comment: Serial Number: [id number]:  [id number]       Blood Gas, Arterial - [[id number]] Collected: 11/21/20 0646    Specimen: Arterial Blood Updated: 11/21/20 0651    POCT Electrolytes +HGB +HCT [[id number]] Collected: 11/21/20 0646    Specimen: Blood Updated: 11/21/20 0651    POC Lactate [[id number]] Collected: 11/21/20 0646    Specimen: Blood Updated: 11/21/20 0651    POC Glucose Once [[id number]]  (Abnormal) Collected: 11/21/20 0646    Specimen: Blood Updated: 11/21/20 0651     Glucose 205 mg/dL      Comment: Serial Number: [id number]:  [id number]       POC Glucose Once [[id number]]  (Abnormal) Collected: 11/21/20 0714    Specimen: Blood Updated: 11/21/20 0715     Glucose 167 mg/dL      Comment: Serial Number: [id number]:  [id number]             MICRO:  All cultures reviewed and negative, or pending with no growth unless  otherwise designated in chart below.  Microbiology Results Abnormal     Procedure Component Value - Date/Time    Urine Culture - Urine, Urine, Clean Catch [[id number]]  (Normal) Collected: 11/20/20 0714    Lab Status: Final result Specimen: Urine, Clean Catch Updated: 11/21/20 1039     Urine Culture No growth    Respiratory Culture - Wash, Lung, L [[id number]] Collected: 11/20/20 1111    Lab Status: Preliminary result Specimen: Wash from Lung, L Updated: 11/21/20 0925     Respiratory Culture Scant growth (1+) Normal Respiratory Adriana: NO S.aureus/MRSA or Pseudomonas aeruginosa     Gram Stain Many (4+) WBCs per low power field      Many (4+) Gram negative bacilli      Few (2+) Yeast with hyphae seen      Many (4+) Mixed bacterial morphotypes seen on Gram Stain    Blood Culture - Blood, Blood, Central Line [[id number]] Collected: 11/20/20 0300    Lab Status: Preliminary result Specimen: Blood, Central Line Updated: 11/21/20 0345     Blood Culture No growth at 24 hours    Blood Culture - Blood, Blood, Arterial Line [[id number]] Collected: 11/20/20 0209    Lab Status: Preliminary result Specimen: Blood, Arterial Line Updated: 11/21/20 0245     Blood Culture No growth at 24 hours    Respiratory Panel, PCR (WITHOUT COVID) - Wash, Lung, L [[id number]]  (Normal) Collected: 11/20/20 1111    Lab Status: Final result Specimen: Wash from Lung, L Updated: 11/20/20 1248     ADENOVIRUS, PCR Not Detected     Coronavirus 229E Not Detected     Coronavirus HKU1 Not Detected     Coronavirus NL63 Not Detected     Coronavirus OC43 Not Detected     Human Metapneumovirus Not Detected     Human Rhinovirus/Enterovirus Not Detected     Influenza B PCR Not Detected     Parainfluenza Virus 1 Not Detected     Parainfluenza Virus 2 Not Detected     Parainfluenza Virus 3 Not Detected     Parainfluenza Virus 4 Not Detected     Bordetella pertussis pcr Not Detected     Influenza A H1 2009 PCR Not Detected     Chlamydophila pneumoniae PCR Not Detected      Mycoplasma pneumo by PCR Not Detected     Influenza A PCR Not Detected     Influenza A H3 Not Detected     Influenza A H1 Not Detected     RSV, PCR Not Detected     Bordetella parapertussis PCR Not Detected    Narrative:      The coronavirus on the RVP is NOT COVID-19 and is NOT indicative of infection with COVID-19.     MRSA Screen, PCR (Inpatient) - Swab, Nares [[id number]]  (Normal) Collected: 11/20/20 0606    Lab Status: Final result Specimen: Swab from Nares Updated: 11/20/20 0753     MRSA PCR No MRSA Detected    Blood Culture - Blood, Arm, Right [[id number]] Collected: 11/15/20 0127    Lab Status: Final result Specimen: Blood from Arm, Right Updated: 11/20/20 0145     Blood Culture No growth at 5 days    Blood Culture - Blood, Arm, Left [[id number]] Collected: 11/15/20 0128    Lab Status: Final result Specimen: Blood from Arm, Left Updated: 11/20/20 0145     Blood Culture No growth at 5 days    COVID-19, [facility] IN-HOUSE,NP Swab (NO TRANSPORT MEDIA) 2 HR TAT - Swab, Nasopharynx [[id number]]  (Normal) Collected: 11/14/20 1922    Lab Status: Final result Specimen: Swab from Nasopharynx Updated: 11/14/20 2006     COVID19 Not Detected    Narrative:      Fact sheet for providers: https://www.fda.gov/media/247383/download     Fact sheet for patients: https://www.fda.gov/media/883746/download             RADIOLOGY:  Ct Abdomen Pelvis Without Contrast    Result Date: 11/19/2020  Impression: 1. Diffuse small and large bowel dilatation without focal transition point. Findings may be on the basis of ileus. Follow-up is warranted to exclude mechanical obstruction. Bowel ischemia not excluded without the presence of IV contrast. 2. Bibasilar pulmonary consolidations with air bronchograms which may reflect pneumonia or aspiration. 3. Cardiomegaly. 4. Recommend retracting endotracheal tube 5.0 cm. Electronically signed by:  Ryan Joseph M.D.  11/19/2020 11:56 PM    Ct Head Without Contrast    Result Date: 11/19/2020  No acute  intracranial abnormality visible by CT. MRI has greater sensitivity. This examination was interpreted by Mynor Mandujano M.D. Electronically signed by:  Mynor Mandujano M.D.  11/19/2020 11:42 PM    Ct Chest Without Contrast    Result Date: 11/19/2020  Impression: 1. Diffuse small and large bowel dilatation without focal transition point. Findings may be on the basis of ileus. Follow-up is warranted to exclude mechanical obstruction. Bowel ischemia not excluded without the presence of IV contrast. 2. Bibasilar pulmonary consolidations with air bronchograms which may reflect pneumonia or aspiration. 3. Cardiomegaly. 4. Recommend retracting endotracheal tube 5.0 cm. Electronically signed by:  Ryan Joseph M.D.  11/19/2020 11:56 PM    Us Liver    Result Date: 11/15/2020  Incidental right pleural effusion. Unremarkable images of the liver.  Electronically Signed By-Ky Yusuf MD On:11/15/2020 9:24 AM This report was finalized on [id number] by  Ky Yusuf MD.    Xr Chest 1 View    Result Date: 11/21/2020  Similar cardiomegaly and bilateral airspace opacities, may reflect edema and/or infection. Electronically signed by:  Kathy Lopez M.D.  11/21/2020 1:02 AM    Xr Chest 1 View    Result Date: 11/20/2020  1. ET tube tip 6.3 cm above the anrold. 2. Stable esophagogastric tube and left IJ central venous catheter. 3. Airspace disease throughout the right lung and at the left lung base concerning for multifocal pneumonia. 4. No pneumothorax.  Electronically Signed By-Gerardo Avilez MD On:11/20/2020 9:45 AM This report was finalized on [id number] by  Gerardo Avilez MD.    Xr Chest 1 View    Result Date: 11/20/2020  1. ET tube tip now 5 cm above the arnold. 2. Esophagogastric tube below the diaphragm. 3. Placement of a left internal jugular central venous catheter with the tip at the cavoatrial junction. No pneumothorax. 4. Stable bilateral medial lower lobe airspace disease concerning for pneumonia and/or  aspiration. 5. Stable cardiomegaly.  Electronically Signed By-Gerardo Avilez MD On:11/20/2020 7:23 AM This report was finalized on [id number] by  Gerardo Avilez MD.    Xr Chest 1 View    Result Date: 11/19/2020  1. Bilateral perihilar prominence may reflect early edema or atypical infection. 2. Enteric tube in the stomach. A second catheter terminates in the mid chest and is also felt to be located in the esophagus. If this is an endotracheal tube, the tip is partially into the right main bronchus and requires retracting 5.0 cm. Electronically signed by:  Ryan Joseph M.D.  11/19/2020 10:46 PM    Xr Chest 1 View    Result Date: 11/14/2020  Cardiomegaly without active disease  Electronically Signed By-Ky Yusuf MD On:11/14/2020 7:41 PM This report was finalized on [id number] by  Ky Yusuf MD.      For more specific information regarding this patient’s hospital stay at [facility], please refer to patient’s full medical record.  This death summary has been scribed by this nurse practitioner for the attending physician, Dr. Narciso Harmon, APRN  11/21/2020

## 2020-11-22 LAB
BACTERIA SPEC RESP CULT: ABNORMAL
BACTERIA SPEC RESP CULT: ABNORMAL
GRAM STN SPEC: ABNORMAL

## 2020-11-23 LAB
LAB AP CASE REPORT: NORMAL
LAB AP CLINICAL INFORMATION: NORMAL
PATH REPORT.FINAL DX SPEC: NORMAL
PATH REPORT.GROSS SPEC: NORMAL

## 2020-11-23 NOTE — PAYOR COMM NOTE
"This is discharge notice for Asad Sousa, auth# [id number]  Pt  in acute care on 20 .    ANGELINA BRUNO RN  UTILIZATION REVIEW  [facility]  PH: [phone]  FX: [phone]      Asad Sousa (Dcsd. Male)     Date of Birth Social Security Number Address Home Phone MRN    1975  [id number] [facility] [place] [facility] [phone] [id number]    Mosque Marital Status          Hinduism        Admission Date Admission Type Admitting Provider Attending Provider Department, Room/Bed    20 Emergency Daija Stuart MD  [facility] CARDIOVASCULAR CARE UNIT,     Discharge Date Discharge Disposition Discharge Destination        2020               Attending Provider: (none)   Allergies: No Known Allergies    Isolation: None   Infection: None   Code Status: Prior    Ht: 185.4 cm (73\")   Wt: 102 kg (225 lb 8.5 oz)    Admission Cmt: None   Principal Problem: Aspiration pneumonia due to vomit (CMS/[facility]) [J69.0] More...                 Active Insurance as of 2020     Primary Coverage     Payor Plan Insurance Group Employer/Plan Group    [place] [employer] [facility]      Payor Plan Address Payor Plan Phone Number Payor Plan Fax Number Effective Dates    [address] [phone]  2019 - None Entered    [place] [address]       Subscriber Name Subscriber Birth Date Member ID       ASAD SOUSA 1975 [id number]                 Emergency Contacts      (Rel.) Home Phone Work Phone Mobile Phone    ADRIÁN COLBY (Other) -- -- [phone]               Discharge Summary      Day, N at 20 0720          PULMONARY/CRITICAL CARE DEATH SUMMARY    PATIENT NAME:     Asad Sousa  :     1975  MRN:     [id number]    ADMISSION DATE:  2020      DATE/TIME OF DEATH:    2020 at 7:20 AM     SMOKING STATUS: Tobacco abuse current at the time of admission.  Current " "methamphetamine smoker at the time of admission      CAUSE OF DEATH  Acute respiratory failure secondary to hypoxia related to aspiration of emesis  Cardiopulmonary arrest likely precipitated by hypoxic event  Septic shock secondary to aspiration pneumonia  Cardiogenic shock secondary to cardiac arrest  NSTEMI    FINAL DIAGNOSES  Acute hypoxic respiratory failure secondary to hypoxic event related to aspiration of emesis  Cardiopulmonary arrest likely precipitated by hypoxic event  Septic shock secondary to aspiration pneumonia  Cardiogenic shock secondary to cardiac arrest  Acute encephalopathy, likely secondary to anoxic brain injury  Aspiration pneumonia  Lactic acidosis  Acute kidney injury secondary to ATN related to hypotension and shock  Polysubstance abuse with alcohol, methamphetamine and marijuana  Hepatitis C, treatment naïve  Coronary artery disease  Ischemic, dilated cardiomyopathy, EF 10 to 15%  Acute on chronic systolic heart failure, NYHA class III  CAD with history of PCI  Tobacco abuse    HOSPITAL COURSE    Asad Bethea was a 44 y.o. male who had a past medical history of abnormal nuclear stress test, ADHD, alcohol dependence, anxiety, arthritis, clotting disorder, depression, anxiety, essential hypertension, GERD, CAD with H/O coronary artery sten, Hepatitis C-treatment naïve, IBS, medical noncompliance, ischemic cardiomyopathy, non-ST elevation myocardial infarction substance abuse with methamphetamine and marijuana, tobacco abuse in remission.    The patient presented to [facility] on 11/14/2020 with complaint of chest pain. On admission, it was reported that the patient had a 3-day history of constant, progressive worsening of left-sided chest pain which radiated to his left arm and back.  He had described the pain as \"just really hurting\".   The patient reported that his chest pain was associated with orthopnea, nausea without vomiting, frequent episodes of tachycardia and palpitations, " occasional peripheral edema and a recent subjective fever.    He reported that he had noticed a decrease in urinary output and reported that he had chronic constipation.  It was also reported that the patient had recently required coils for some type of bleeding.  On admission the patient had a urine tox screen which was positive for amphetamine/methamphetamine as well as THC.    Patient had a history of CAD for which he had undergone cardiac catheterization on 3/24/2020 with a drug-eluting stent placed in the LAD.  It was reported that since that time had compliance issues with prescribed medical therapy.  He reported that he had been taking his Coreg but had not taken Plavix or diuretics for some time. The patient also reported that he had relapsed on his substance abuse and had smoked methamphetamine and consumed a large amount of alcohol with his most recent use being 11/5/2020.  He reported that he generally drank 1 to 1.5 pints of liquor per day.  He reported that he smoked 1 pack of cigarettes daily.      The patient was admitted to the hospitalist service and was being treated for an acute on chronic exacerbation of CHF, chest pain, DENNIS as well as other comorbidities.    The patient had an echocardiogram which revealed a reduced ejection fraction of 10-15%.  Serial troponins were obtained and negative.  Cardiology was consulted, and patient underwent cardiac catheterization on 11/18/2020 with findings of a patent LAD stent and severe LV dysfunction.    The patient progressed well and discharge planning had been trying to find patient rehab placement for his drug treatment.  On 11/18/2020, the patient reportedly had diarrhea which was treated with Lomotil.    On 11/19/2020 the patient developed complaints of severe nausea and vomiting.  In the late evening on 11/19/2020 the patient became incontinent of stool.  While nursing staff was attempting to clean patient, the patient began to vomit and was witnessed  to aspirate.    The patient then became unresponsive and pulseless and code 4 was initiated with immediate resuscitation efforts per ACLS protocols including intubation ROSC was obtained and the patient was transferred to the ICU for further aggressive medical management.  Code/resuscitation time was 34 minutes.  As this was a witnessed arrest with immediate initiation of resuscitative efforts the patient was found to be a good candidate for targeted therapeutic hypothermia.    CT of the head was negative for acute intracranial findings.  CT of the chest revealed infiltrates concerning for aspiration pneumonia.  CT of the abdomen/pelvis revealed diffuse small and large bowel dilation without focal transition point suggestive of possible ileus, bowel ischemia could not be excluded.  On 10/20/2020 the patient underwent a therapeutic/diagnostic bronchoscopy which revealed obvious aspiration with the presence of dark-colored material noted in the distal trachea and arnold extending into the right mainstem, right upper lobe, right middle lobe and right lower lobe.    The patient developed DENNIS for which nephrology was consulted.  Due to AKA with hyperkalemia and systemic acidosis a temporary dialysis catheter was placed and the patient was started on CRRT.  The patient failed to improve and became increasingly hypotensive requiring multiple vasopressor support.  At 0003 on 11/21/2020 the patient had a PEA arrest in the ICU with immediate initiation of resuscitation efforts with CPR and ACLS protocol.  He had been intubated prior to the code event and was easily bagged with 100% O2.  ROSC was obtained however the patient had a second PEA arrest at 0613 and again underwent a code/resuscitation with ROSC.  The patient required transcutaneous pacing following the code event.  The patient had a third PEA arrest less than an hour later.  During this event the patient did not experience a return of spontaneous circulation  despite CPR, 100% O2, ACLS medications per protocol, and correction of electrolytes.  The transcutaneous pacer was turned off and the patient had no palpable pulse and asystole on the monitor.  Resuscitative efforts were concluded and the patient was pronounced dead at 7:20 AM.  The family was notified by nursing staff.    Time of death noted to be on 11/21/2020 at 7:20 AM .     PROCEDURES PERFORMED  Procedure(s):  BRONCHOSCOPY AT BEDSIDE with bilateral lung washing  11/20 0450 Non-Tunneled Catheter  11/20 0350 Insert Central Line At Bedside  11/20 0150 Insert Arterial Line at Bedside  11/19 2353 Insert Central Line At Bedside  11/19 2336 Intubation    CONSULTING PHYSICIANS  Consults     Date and Time Order Name Status Description    11/20/2020 0557 Inpatient Neurology Consult General Completed     11/20/2020 0314 Inpatient Nephrology Consult Completed     11/14/2020 2226 Inpatient Cardiology Consult Completed     11/14/2020 2100 Hospitalist (on-call MD unless specified) Completed           RESULTS REVIEW  LABS  Lab Results (last 24 hours)     Procedure Component Value Units Date/Time    Blood Gas, Arterial - [[id number]]  (Abnormal) Collected: 11/20/20 1119    Specimen: Arterial Blood Updated: 11/20/20 1247     Site Arterial Line     Edis's Test N/A     pH, Arterial 7.112 pH units      pCO2, Arterial 61.9 mm Hg      pO2, Arterial 60.1 mm Hg      HCO3, Arterial 19.7 mmol/L      Base Excess, Arterial -10.3 mmol/L      Comment: Serial Number: [id number]:  [id number]        O2 Saturation, Arterial 80.1 %      CO2 Content 21.6 mmol/L      Barometric Pressure for Blood Gas --     Comment: N/A        Modality Adult Vent     FIO2 100 %      Ventilator Mode ;AC     Set Tidal Volume 550     PEEP 14     Hemodilution No     Respiratory Rate 30    Pneumocystis PCR - Wash, Lung [[id number]] Collected: 11/20/20 1154    Specimen: Wash from Lung Updated: 11/20/20 1154    Blood Gas, Arterial - [[id number]]  (Abnormal) Collected:  11/20/20 1156    Specimen: Arterial Blood Updated: 11/20/20 1245     Site Arterial Line     Edis's Test N/A     pH, Arterial 7.131 pH units      pCO2, Arterial 55.2 mm Hg      pO2, Arterial 65.8 mm Hg      HCO3, Arterial 18.4 mmol/L      Base Excess, Arterial -10.9 mmol/L      Comment: Serial Number: [id number]:  [id number]        O2 Saturation, Arterial 84.9 %      CO2 Content 20.1 mmol/L      Barometric Pressure for Blood Gas --     Comment: N/A        Modality Adult Vent     FIO2 100 %      Ventilator Mode ;AC     Set Tidal Volume 550     PEEP 14     Hemodilution No     Respiratory Rate 30    Blood Gas, Arterial - [[id number]]  (Abnormal) Collected: 11/20/20 1347    Specimen: Arterial Blood Updated: 11/20/20 1353     Site Arterial Line     Edis's Test N/A     pH, Arterial 7.101 pH units      pCO2, Arterial 53.7 mm Hg      pO2, Arterial 64.0 mm Hg      HCO3, Arterial 16.7 mmol/L      Base Excess, Arterial -13.0 mmol/L      Comment: Serial Number: [id number]:  [id number]        O2 Saturation, Arterial 82.7 %      CO2 Content 18.4 mmol/L      Barometric Pressure for Blood Gas --     Comment: N/A        Modality Adult Vent     FIO2 100 %      Ventilator Mode ;AC     Set Tidal Volume 32     PEEP 14     Hemodilution No     Respiratory Rate 550    POC Glucose Once [[id number]]  (Abnormal) Collected: 11/20/20 1413    Specimen: Blood Updated: 11/20/20 1414     Glucose 119 mg/dL      Comment: Serial Number: [id number]:  [id number]       Troponin [[id number]]  (Normal) Collected: 11/20/20 1807    Specimen: Blood Updated: 11/20/20 1850     Troponin T 0.019 ng/mL     Narrative:      Troponin T Reference Range:  <= 0.03 ng/mL-   Negative for AMI  >0.03 ng/mL-     Abnormal for myocardial necrosis.  Clinicians would have to utilize clinical acumen, EKG, Troponin and serial changes to determine if it is an Acute Myocardial Infarction or myocardial injury due to an underlying chronic condition.       Results may be falsely  decreased if patient taking Biotin.      POC Glucose Once [[id number]]  (Normal) Collected: 11/20/20 1831    Specimen: Blood Updated: 11/20/20 1833     Glucose 76 mg/dL      Comment: Serial Number: [id number]:  [id number]       Blood Gas, Arterial - [[id number]]  (Abnormal) Collected: 11/20/20 1835    Specimen: Arterial Blood Updated: 11/20/20 1846     Site Arterial Line     Edis's Test Negative     pH, Arterial 7.076 pH units      pCO2, Arterial 48.8 mm Hg      pO2, Arterial 69.9 mm Hg      HCO3, Arterial 14.3 mmol/L      Base Excess, Arterial -15.6 mmol/L      Comment: Serial Number: [id number]:  [id number]        O2 Saturation, Arterial 85.3 %      CO2 Content 15.8 mmol/L      Barometric Pressure for Blood Gas --     Comment: N/A        Modality Adult Vent     FIO2 100 %      Ventilator Mode ;AC     Set Tidal Volume 550     PEEP 14     Hemodilution No     Respiratory Rate 32    Lactic Acid, Reflex [[id number]]  (Abnormal) Collected: 11/20/20 1903    Specimen: Blood Updated: 11/20/20 1933     Lactate 12.0 mmol/L     aPTT [[id number]]  (Abnormal) Collected: 11/20/20 1903    Specimen: Blood Updated: 11/20/20 1936     .4 seconds     CBC & Differential [[id number]]  (Abnormal) Collected: 11/20/20 1903    Specimen: Blood Updated: 11/20/20 2014    Narrative:      The following orders were created for panel order CBC & Differential.  Procedure                               Abnormality         Status                     ---------                               -----------         ------                     CBC Auto Differential[[id number]]        Abnormal            Final result                 Please view results for these tests on the individual orders.    Calcium, Ionized [[id number]]  (Abnormal) Collected: 11/20/20 1903    Specimen: Blood Updated: 11/20/20 1926     Ionized Calcium 0.98 mmol/L     Magnesium [[id number]]  (Abnormal) Collected: 11/20/20 1903    Specimen: Blood Updated: 11/20/20 1932     Magnesium 3.0  mg/dL     Phosphorus [[id number]]  (Abnormal) Collected: 11/20/20 1903    Specimen: Blood Updated: 11/20/20 1932     Phosphorus 8.1 mg/dL     CBC Auto Differential [[id number]]  (Abnormal) Collected: 11/20/20 1903    Specimen: Blood Updated: 11/20/20 2014     WBC 4.20 10*3/mm3      RBC 5.10 10*6/mm3      Hemoglobin 12.1 g/dL      Hematocrit 40.9 %      MCV 80.3 fL      MCH 23.7 pg      MCHC 29.5 g/dL      RDW 17.7 %      RDW-SD 50.8 fl      MPV 8.0 fL      Platelets 138 10*3/mm3     Comprehensive Metabolic Panel [[id number]]  (Abnormal) Collected: 11/20/20 1903    Specimen: Blood Updated: 11/20/20 1932     Glucose 75 mg/dL      BUN 40 mg/dL      Creatinine 1.54 mg/dL      Sodium 138 mmol/L      Potassium 5.6 mmol/L      Chloride 104 mmol/L      CO2 13.0 mmol/L      Calcium 7.0 mg/dL      Total Protein 4.8 g/dL      Albumin 2.10 g/dL      ALT (SGPT) 331 U/L      AST (SGOT) 540 U/L      Alkaline Phosphatase 98 U/L      Total Bilirubin 1.0 mg/dL      eGFR Non African Amer 49 mL/min/1.73      Globulin 2.7 gm/dL      A/G Ratio 0.8 g/dL      BUN/Creatinine Ratio 26.0     Anion Gap 21.0 mmol/L     Narrative:      GFR Normal >60  Chronic Kidney Disease <60  Kidney Failure <15      Scan Slide [[id number]] Collected: 11/20/20 1903    Specimen: Blood Updated: 11/20/20 2014     Scan Slide --     Comment: See Manual Differential Results       Manual Differential [[id number]]  (Abnormal) Collected: 11/20/20 1903    Specimen: Blood Updated: 11/20/20 2014     Neutrophil % 16.0 %      Lymphocyte % 15.0 %      Monocyte % 6.0 %      Bands %  30.0 %      Metamyelocyte % 25.0 %      Myelocyte % 6.0 %      Atypical Lymphocyte % 2.0 %      Neutrophils Absolute 1.93 10*3/mm3      Lymphocytes Absolute 0.63 10*3/mm3      Monocytes Absolute 0.25 10*3/mm3      Anisocytosis Slight/1+     Crenated RBC's Mod/2+     WBC Morphology Normal     Giant Platelets Large/3+    POC Glucose Once [[id number]]  (Abnormal) Collected: 11/20/20 2048    Specimen: Blood  Updated: 11/20/20 2049     Glucose 55 mg/dL      Comment: Serial Number: [id number]:  [id number]       POC Glucose Once [[id number]]  (Abnormal) Collected: 11/20/20 2123    Specimen: Blood Updated: 11/20/20 2124     Glucose 138 mg/dL      Comment: Serial Number: [id number]:  [id number]       Blood Gas, Arterial - [[id number]]  (Abnormal) Collected: 11/20/20 2213    Specimen: Arterial Blood Updated: 11/20/20 2221     Site Arterial Line     Edis's Test N/A     pH, Arterial 6.990 pH units      pCO2, Arterial 54.5 mm Hg      pO2, Arterial 67.7 mm Hg      HCO3, Arterial 13.1 mmol/L      Base Excess, Arterial -18.2 mmol/L      Comment: Serial Number: [id number]:  [id number]        O2 Saturation, Arterial 80.3 %      CO2 Content 14.8 mmol/L      Temperature 90.5 C      Barometric Pressure for Blood Gas --     Comment: N/A        Modality Adult Vent     FIO2 100 %      Ventilator Mode ;AC     Set Tidal Volume 550     PEEP 14     pCO2, Temperature Corrected 44.7 mm Hg      pH, Temp Corrected 7.045 pH Units      pO2, Temperature Corrected 49.8 mm Hg      Hemodilution No     Respiratory Rate 32    POC Glucose Once [[id number]]  (Abnormal) Collected: 11/20/20 2213    Specimen: Blood Updated: 11/20/20 2217     Glucose 131 mg/dL      Comment: Serial Number: [id number]:  [id number]       Blood Gas, Arterial - [[id number]]  (Abnormal) Collected: 11/20/20 2356    Specimen: Arterial Blood Updated: 11/21/20 0032     Site Arterial Line     Edis's Test N/A     pH, Arterial 7.072 pH units      pCO2, Arterial 50.9 mm Hg      pO2, Arterial 59.7 mm Hg      HCO3, Arterial 14.8 mmol/L      Base Excess, Arterial -14.8 mmol/L      Comment: Serial Number: [id number]:  [id number]        O2 Saturation, Arterial 78.4 %      CO2 Content 16.4 mmol/L      Barometric Pressure for Blood Gas --     Comment: N/A        Modality Adult Vent     FIO2 100 %      Ventilator Mode ;AC     Set Tidal Volume 480     PEEP 14     Hemodilution No      Respiratory Rate 32    POC Glucose Once [[id number]]  (Abnormal) Collected: 11/20/20 2356    Specimen: Blood Updated: 11/20/20 2358     Glucose 193 mg/dL      Comment: Serial Number: [id number]:  [id number]       Blood Gas, Arterial - [[id number]]  (Abnormal) Collected: 11/21/20 0019    Specimen: Arterial Blood Updated: 11/21/20 0031     Site Arterial Line     Edis's Test N/A     pH, Arterial 7.153 pH units      pCO2, Arterial 84.2 mm Hg      pO2, Arterial 58.7 mm Hg      HCO3, Arterial 29.5 mmol/L      Base Excess, Arterial -0.3 mmol/L      Comment: Serial Number: [id number]:  [id number]        O2 Saturation, Arterial 79.7 %      Temperature 90.5 C      Barometric Pressure for Blood Gas --     Comment: N/A        Modality Bagging     FIO2 100 %      pCO2, Temperature Corrected 69.2 mm Hg      pH, Temp Corrected 7.211 pH Units      pO2, Temperature Corrected 43.0 mm Hg      Hemodilution No    POCT Electrolytes +HGB +HCT [[id number]]  (Abnormal) Collected: 11/21/20 0019    Specimen: Blood Updated: 11/21/20 0030     Sodium 155 mmol/L      POC Potassium 6.0 mmol/L      Ionized Calcium 2.76 mmol/L      Comment: Serial Number: [id number]:  [id number]        Glucose 209 mg/dL      Hematocrit 27 %      Hemoglobin 9.0 g/dL     POC Lactate [[id number]]  (Abnormal) Collected: 11/21/20 0019    Specimen: Blood Updated: 11/21/20 0029     Lactate 17.7 mmol/L      Comment: Serial Number: [id number]:  [id number]       POC Glucose Once [[id number]]  (Abnormal) Collected: 11/21/20 0019    Specimen: Blood Updated: 11/21/20 0024     Glucose 209 mg/dL      Comment: Serial Number: [id number]:  [id number]       aPTT [[id number]]  (Abnormal) Collected: 11/21/20 0037    Specimen: Blood Updated: 11/21/20 0137     .7 seconds     CBC & Differential [[id number]]  (Abnormal) Collected: 11/21/20 0037    Specimen: Blood Updated: 11/21/20 0201    Narrative:      The following orders were created for panel order CBC & Differential.  Procedure                                Abnormality         Status                     ---------                               -----------         ------                     CBC Auto Differential[[id number]]        Abnormal            Final result                 Please view results for these tests on the individual orders.    CBC Auto Differential [[id number]]  (Abnormal) Collected: 11/21/20 0037    Specimen: Blood Updated: 11/21/20 0201     WBC 3.40 10*3/mm3      RBC 3.73 10*6/mm3      Hemoglobin 8.9 g/dL      Hematocrit 30.6 %      MCV 82.0 fL      MCH 23.8 pg      MCHC 29.0 g/dL      RDW 17.8 %      RDW-SD 52.1 fl      MPV 9.0 fL      Platelets 97 10*3/mm3     Scan Slide [[id number]] Collected: 11/21/20 0037    Specimen: Blood Updated: 11/21/20 0201     Scan Slide --     Comment: See Manual Differential Results       Manual Differential [[id number]]  (Abnormal) Collected: 11/21/20 0037    Specimen: Blood Updated: 11/21/20 0201     Neutrophil % 25.0 %      Lymphocyte % 29.0 %      Monocyte % 9.0 %      Eosinophil % 6.0 %      Bands %  18.0 %      Metamyelocyte % 8.0 %      Atypical Lymphocyte % 5.0 %      Neutrophils Absolute 1.46 10*3/mm3      Lymphocytes Absolute 0.99 10*3/mm3      Monocytes Absolute 0.31 10*3/mm3      Eosinophils Absolute 0.20 10*3/mm3      Anisocytosis Slight/1+     Stanchfield Cells Mod/2+     Microcytes Slight/1+     Polychromasia Slight/1+     WBC Morphology Normal     Platelet Estimate Decreased    CK Total & CKMB [[id number]]  (Abnormal) Collected: 11/21/20 0038    Specimen: Blood Updated: 11/21/20 0137     CKMB 90.40 ng/mL      Creatine Kinase 3,487 U/L     Narrative:      CKMB results may be falsely decreased if patient taking Biotin.    Vancomycin, Random [[id number]]  (Normal) Collected: 11/21/20 0038    Specimen: Blood Updated: 11/21/20 0127     Vancomycin Random 20.80 mcg/mL     Comprehensive Metabolic Panel [[id number]]  (Abnormal) Collected: 11/21/20 0038    Specimen: Blood Updated: 11/21/20 0138     Glucose 175  mg/dL      BUN 34 mg/dL      Creatinine 1.61 mg/dL      Sodium 147 mmol/L      Potassium 5.2 mmol/L      Chloride 101 mmol/L      CO2 16.0 mmol/L      Calcium 8.6 mg/dL      Total Protein 3.3 g/dL      Albumin 1.80 g/dL      ALT (SGPT) 646 U/L      AST (SGOT) 1,485 U/L      Alkaline Phosphatase 77 U/L      Total Bilirubin 0.9 mg/dL      eGFR Non African Amer 47 mL/min/1.73      Globulin 1.5 gm/dL      A/G Ratio 1.2 g/dL      BUN/Creatinine Ratio 21.1     Anion Gap 30.0 mmol/L     Narrative:      GFR Normal >60  Chronic Kidney Disease <60  Kidney Failure <15      Troponin [[id number]]  (Normal) Collected: 11/21/20 0038    Specimen: Blood Updated: 11/21/20 0131     Troponin T <0.010 ng/mL     Narrative:      Troponin T Reference Range:  <= 0.03 ng/mL-   Negative for AMI  >0.03 ng/mL-     Abnormal for myocardial necrosis.  Clinicians would have to utilize clinical acumen, EKG, Troponin and serial changes to determine if it is an Acute Myocardial Infarction or myocardial injury due to an underlying chronic condition.       Results may be falsely decreased if patient taking Biotin.      Magnesium [[id number]]  (Abnormal) Collected: 11/21/20 0038    Specimen: Blood Updated: 11/21/20 0125     Magnesium 2.7 mg/dL     Phosphorus [[id number]]  (Abnormal) Collected: 11/21/20 0038    Specimen: Blood Updated: 11/21/20 0127     Phosphorus 10.6 mg/dL     Calcium, Ionized [[id number]]  (Abnormal) Collected: 11/21/20 0038    Specimen: Blood Updated: 11/21/20 0114     Ionized Calcium 1.14 mmol/L     Lactic Acid, Plasma [[id number]]  (Abnormal) Collected: 11/21/20 0038    Specimen: Blood Updated: 11/21/20 0139     Lactate 23.1 mmol/L     POC Glucose Once [[id number]]  (Abnormal) Collected: 11/21/20 0102    Specimen: Blood Updated: 11/21/20 0103     Glucose 170 mg/dL      Comment: Serial Number: [id number]:  [id number]       POC Glucose Once [[id number]]  (Abnormal) Collected: 11/21/20 0214    Specimen: Blood Updated: 11/21/20 0215      Glucose 168 mg/dL      Comment: Serial Number: [id number]:  [id number]       POC Glucose Once [[id number]]  (Abnormal) Collected: 11/21/20 0402    Specimen: Blood Updated: 11/21/20 0404     Glucose 144 mg/dL      Comment: Serial Number: [id number]:  [id number]       Potassium [[id number]]  (Abnormal) Collected: 11/21/20 0404    Specimen: Blood Updated: 11/21/20 0429     Potassium 5.5 mmol/L     Blood Gas, Arterial - [[id number]] Collected: 11/21/20 0421    Specimen: Arterial Blood Updated: 11/21/20 0424    POC Glucose Once [[id number]]  (Abnormal) Collected: 11/21/20 0514    Specimen: Blood Updated: 11/21/20 0515     Glucose 158 mg/dL      Comment: Serial Number: [id number]:  [id number]       POC Glucose Once [[id number]]  (Abnormal) Collected: 11/21/20 0612    Specimen: Blood Updated: 11/21/20 0614     Glucose 161 mg/dL      Comment: Serial Number: [id number]:  [id number]       Blood Gas, Arterial - [[id number]] Collected: 11/21/20 0646    Specimen: Arterial Blood Updated: 11/21/20 0651    POCT Electrolytes +HGB +HCT [[id number]] Collected: 11/21/20 0646    Specimen: Blood Updated: 11/21/20 0651    POC Lactate [[id number]] Collected: 11/21/20 0646    Specimen: Blood Updated: 11/21/20 0651    POC Glucose Once [[id number]]  (Abnormal) Collected: 11/21/20 0646    Specimen: Blood Updated: 11/21/20 0651     Glucose 205 mg/dL      Comment: Serial Number: [id number]:  [id number]       POC Glucose Once [[id number]]  (Abnormal) Collected: 11/21/20 0714    Specimen: Blood Updated: 11/21/20 0715     Glucose 167 mg/dL      Comment: Serial Number: [id number]:  [id number]             MICRO:  All cultures reviewed and negative, or pending with no growth unless otherwise designated in chart below.  Microbiology Results Abnormal     Procedure Component Value - Date/Time    Urine Culture - Urine, Urine, Clean Catch [[id number]]  (Normal) Collected: 11/20/20 0714    Lab Status: Final result Specimen: Urine, Clean  Catch Updated: 11/21/20 1039     Urine Culture No growth    Respiratory Culture - Wash, Lung, L [[id number]] Collected: 11/20/20 1111    Lab Status: Preliminary result Specimen: Wash from Lung, L Updated: 11/21/20 0925     Respiratory Culture Scant growth (1+) Normal Respiratory Adriana: NO S.aureus/MRSA or Pseudomonas aeruginosa     Gram Stain Many (4+) WBCs per low power field      Many (4+) Gram negative bacilli      Few (2+) Yeast with hyphae seen      Many (4+) Mixed bacterial morphotypes seen on Gram Stain    Blood Culture - Blood, Blood, Central Line [[id number]] Collected: 11/20/20 0300    Lab Status: Preliminary result Specimen: Blood, Central Line Updated: 11/21/20 0345     Blood Culture No growth at 24 hours    Blood Culture - Blood, Blood, Arterial Line [[id number]] Collected: 11/20/20 0209    Lab Status: Preliminary result Specimen: Blood, Arterial Line Updated: 11/21/20 0245     Blood Culture No growth at 24 hours    Respiratory Panel, PCR (WITHOUT COVID) - Wash, Lung, L [[id number]]  (Normal) Collected: 11/20/20 1111    Lab Status: Final result Specimen: Wash from Lung, L Updated: 11/20/20 1248     ADENOVIRUS, PCR Not Detected     Coronavirus 229E Not Detected     Coronavirus HKU1 Not Detected     Coronavirus NL63 Not Detected     Coronavirus OC43 Not Detected     Human Metapneumovirus Not Detected     Human Rhinovirus/Enterovirus Not Detected     Influenza B PCR Not Detected     Parainfluenza Virus 1 Not Detected     Parainfluenza Virus 2 Not Detected     Parainfluenza Virus 3 Not Detected     Parainfluenza Virus 4 Not Detected     Bordetella pertussis pcr Not Detected     Influenza A H1 2009 PCR Not Detected     Chlamydophila pneumoniae PCR Not Detected     Mycoplasma pneumo by PCR Not Detected     Influenza A PCR Not Detected     Influenza A H3 Not Detected     Influenza A H1 Not Detected     RSV, PCR Not Detected     Bordetella parapertussis PCR Not Detected    Narrative:      The coronavirus on the RVP  is NOT COVID-19 and is NOT indicative of infection with COVID-19.     MRSA Screen, PCR (Inpatient) - Swab, Nares [[id number]]  (Normal) Collected: 11/20/20 0606    Lab Status: Final result Specimen: Swab from Nares Updated: 11/20/20 0753     MRSA PCR No MRSA Detected    Blood Culture - Blood, Arm, Right [[id number]] Collected: 11/15/20 0127    Lab Status: Final result Specimen: Blood from Arm, Right Updated: 11/20/20 0145     Blood Culture No growth at 5 days    Blood Culture - Blood, Arm, Left [[id number]] Collected: 11/15/20 0128    Lab Status: Final result Specimen: Blood from Arm, Left Updated: 11/20/20 0145     Blood Culture No growth at 5 days    COVID-19, [facility] IN-HOUSE,NP Swab (NO TRANSPORT MEDIA) 2 HR TAT - Swab, Nasopharynx [[id number]]  (Normal) Collected: 11/14/20 1922    Lab Status: Final result Specimen: Swab from Nasopharynx Updated: 11/14/20 2006     COVID19 Not Detected    Narrative:      Fact sheet for providers: https://www.fda.gov/media/072667/download     Fact sheet for patients: https://www.fda.gov/media/954466/download             RADIOLOGY:  Ct Abdomen Pelvis Without Contrast    Result Date: 11/19/2020  Impression: 1. Diffuse small and large bowel dilatation without focal transition point. Findings may be on the basis of ileus. Follow-up is warranted to exclude mechanical obstruction. Bowel ischemia not excluded without the presence of IV contrast. 2. Bibasilar pulmonary consolidations with air bronchograms which may reflect pneumonia or aspiration. 3. Cardiomegaly. 4. Recommend retracting endotracheal tube 5.0 cm. Electronically signed by:  Ryan Joseph M.D.  11/19/2020 11:56 PM    Ct Head Without Contrast    Result Date: 11/19/2020  No acute intracranial abnormality visible by CT. MRI has greater sensitivity. This examination was interpreted by Mynor Mandujano M.D. Electronically signed by:  Mynor Mandujano M.D.  11/19/2020 11:42 PM    Ct Chest Without Contrast    Result Date:  11/19/2020  Impression: 1. Diffuse small and large bowel dilatation without focal transition point. Findings may be on the basis of ileus. Follow-up is warranted to exclude mechanical obstruction. Bowel ischemia not excluded without the presence of IV contrast. 2. Bibasilar pulmonary consolidations with air bronchograms which may reflect pneumonia or aspiration. 3. Cardiomegaly. 4. Recommend retracting endotracheal tube 5.0 cm. Electronically signed by:  Ryan Joseph M.D.  11/19/2020 11:56 PM    Us Liver    Result Date: 11/15/2020  Incidental right pleural effusion. Unremarkable images of the liver.  Electronically Signed By-Ky Yusuf MD On:11/15/2020 9:24 AM This report was finalized on [id number] by  Ky Yusuf MD.    Xr Chest 1 View    Result Date: 11/21/2020  Similar cardiomegaly and bilateral airspace opacities, may reflect edema and/or infection. Electronically signed by:  Kathy Lopez M.D.  11/21/2020 1:02 AM    Xr Chest 1 View    Result Date: 11/20/2020  1. ET tube tip 6.3 cm above the arnold. 2. Stable esophagogastric tube and left IJ central venous catheter. 3. Airspace disease throughout the right lung and at the left lung base concerning for multifocal pneumonia. 4. No pneumothorax.  Electronically Signed By-Gerardo Avilez MD On:11/20/2020 9:45 AM This report was finalized on [id number] by  Gerardo Avilez MD.    Xr Chest 1 View    Result Date: 11/20/2020  1. ET tube tip now 5 cm above the arnold. 2. Esophagogastric tube below the diaphragm. 3. Placement of a left internal jugular central venous catheter with the tip at the cavoatrial junction. No pneumothorax. 4. Stable bilateral medial lower lobe airspace disease concerning for pneumonia and/or aspiration. 5. Stable cardiomegaly.  Electronically Signed By-Gerardo Avilez MD On:11/20/2020 7:23 AM This report was finalized on [id number] by  Gerardo Avilez MD.    Xr Chest 1 View    Result Date: 11/19/2020  1. Bilateral perihilar prominence  may reflect early edema or atypical infection. 2. Enteric tube in the stomach. A second catheter terminates in the mid chest and is also felt to be located in the esophagus. If this is an endotracheal tube, the tip is partially into the right main bronchus and requires retracting 5.0 cm. Electronically signed by:  Ryan Joseph M.D.  11/19/2020 10:46 PM    Xr Chest 1 View    Result Date: 11/14/2020  Cardiomegaly without active disease  Electronically Signed By-Ky Yusuf MD On:11/14/2020 7:41 PM This report was finalized on [id number] by  Ky Yusuf MD.      For more specific information regarding this patient’s hospital stay at [facility], please refer to patient’s full medical record.  This death summary has been scribed by this nurse practitioner for the attending physician, ZACK Buchanan  11/21/2020    Electronically signed by Miri Harmon APRN at 11/21/20 1829

## 2020-11-23 NOTE — PROGRESS NOTES
Case Management Discharge Note      Final Note:          Selected Continued Care - Discharged on 2020 Admission date: 2020 - Discharge disposition:         Final Discharge Disposition Code: 41 -  in medical facility

## 2020-11-25 LAB
BACTERIA SPEC AEROBE CULT: NORMAL
BACTERIA SPEC AEROBE CULT: NORMAL
CMV DNA SPEC QL NAA+PROBE: NEGATIVE
P JIROVECII DNA # SPEC NAA+PROBE: NEGATIVE {COPIES}/ML
QT INTERVAL: 374 MS
SPECIMEN SOURCE: NORMAL
SPECIMEN SOURCE: NORMAL

## 2020-11-28 LAB — QT INTERVAL: 372 MS

## 2020-11-30 LAB
ARTERIAL PATENCY WRIST A: POSITIVE
ATMOSPHERIC PRESS: ABNORMAL MM[HG]
BASE EXCESS BLDA CALC-SCNC: -17.1 MMOL/L (ref 0–3)
BDY SITE: ABNORMAL
BODY TEMPERATURE: 90.9 C
CO2 BLDA-SCNC: 14.9 MMOL/L (ref 22–29)
HCO3 BLDA-SCNC: 13.3 MMOL/L (ref 21–28)
HEMODILUTION: NO
INHALED O2 CONCENTRATION: 100 %
MODALITY: ABNORMAL
PCO2 BLDA: 52.5 MM HG (ref 35–48)
PCO2 TEMP ADJ BLD: 43.6 MM HG (ref 35–48)
PEEP RESPIRATORY: 14 CM[H2O]
PH BLDA: 7.01 PH UNITS (ref 7.35–7.45)
PH, TEMP CORRECTED: 7.06 PH UNITS (ref 7.35–7.45)
PO2 BLDA: 69 MM HG (ref 83–108)
PO2 TEMP ADJ BLD: 51.6 MM HG (ref 83–108)
RESPIRATORY RATE: 32
SAO2 % BLDCOA: 82.1 % (ref 94–98)
VENTILATOR MODE: ABNORMAL
VT ON VENT VENT: 480 ML

## 2020-12-10 LAB — FUNGUS WND CULT: ABNORMAL

## 2021-01-06 LAB
MYCOBACTERIUM SPEC CULT: ABNORMAL
NIGHT BLUE STAIN TISS: ABNORMAL

## (undated) DEVICE — CATHETER,FOLEY,100%SILICONE,14FR,10ML,LF: Brand: MEDLINE

## (undated) DEVICE — MEDICINE CUP, GRADUATED, STER: Brand: MEDLINE

## (undated) DEVICE — CATH DIAG IMPULSE PIG .056 6F 110CM

## (undated) DEVICE — CATH DIAG IMPULSE FL4 5F 100CM

## (undated) DEVICE — PK ENDO GI 50

## (undated) DEVICE — GW PTFE EMERALD HEPCOAT FC J TIP STD .035 3MM 150CM

## (undated) DEVICE — SUT VIC 1 CTX 36IN J977H

## (undated) DEVICE — CONTRST ISOVUE300 61PCT 50ML

## (undated) DEVICE — PINNACLE INTRODUCER SHEATH: Brand: PINNACLE

## (undated) DEVICE — BAPTIST FLOYD BRONCHOSCOPY: Brand: MEDLINE INDUSTRIES, INC.

## (undated) DEVICE — SUT VIC 3/0 SH CR8 18IN J864D

## (undated) DEVICE — PAPR PRNT PK SONY W RIBN UPC55

## (undated) DEVICE — DEV INFL COMPAK W/ACCESSPLUS IN4530

## (undated) DEVICE — BLAKE SILICONE DRAIN, 19 FR ROUND, HUBLESS WITH 1/4" BENDABLE TROCAR: Brand: BLAKE

## (undated) DEVICE — PK TRY HEART CATH 50

## (undated) DEVICE — SUT PERMAHAND SILK 3/0 SH1 18IN

## (undated) DEVICE — TP SXN YANKR BULB STRL

## (undated) DEVICE — SKIN PREP TRAY W/CHG: Brand: MEDLINE INDUSTRIES, INC.

## (undated) DEVICE — HI-TORQUE BALANCE MIDDLEWEIGHT UNIVERSAL II GUIDE WIRE STRAIGHT TIP PAK  190 CM: Brand: HI-TORQUE BALANCE MIDDLEWEIGHT UNIVERSAL II

## (undated) DEVICE — 6F .070 XB LAD 3.5 100CM: Brand: VISTA BRITE TIP

## (undated) DEVICE — GOWN,REINFORCE,POLY,SIRUS,BREATH SLV,XLG: Brand: MEDLINE

## (undated) DEVICE — DRSNG WND BORDR/ADHS NONADHR/GZ LF 4X4IN STRL

## (undated) DEVICE — CATH DIAG IMPULSE FR4 6F 100CM

## (undated) DEVICE — PROB ELECTROHEMO GLD STD/PLG 7F 300

## (undated) DEVICE — CATH DIAG IMPULSE FL4 6F 100CM

## (undated) DEVICE — COVER,MAYO STAND,STERILE: Brand: MEDLINE

## (undated) DEVICE — DRN WND EVAC BULB 100CC

## (undated) DEVICE — ENSEAL 20 CM SHAFT, LARGE JAW: Brand: ENSEAL X1

## (undated) DEVICE — UNDERGLV SURG BIOGEL INDICAT PF 61/2 GRN

## (undated) DEVICE — SUT SILK 2/0 SH CR8 18IN CR8 C012D

## (undated) DEVICE — KT PK ANGIOPLASTY ACC 9 MERIT

## (undated) DEVICE — DEV HEMOSPRAY ENDO HEMOST 7F 220CM

## (undated) DEVICE — STPCK 3WY HP ROT

## (undated) DEVICE — SUT SILK 2/0 TIES 18IN A185H

## (undated) DEVICE — SUT VIC 3/0 SH 27IN J416H

## (undated) DEVICE — TBG NAMIC PRESS MONTR A/ F/M 12IN

## (undated) DEVICE — RADIFOCUS OBTURATOR: Brand: RADIFOCUS

## (undated) DEVICE — CVR HNDL LT SURG ACCSSRY BLU STRL

## (undated) DEVICE — TOWEL,OR,DSP,ST,WHITE,DLX,4/PK,20PK/CS: Brand: MEDLINE

## (undated) DEVICE — THE CARR-LOCKE INJECTION NEEDLE IS A SINGLE USE, DISPOSABLE, FLEXIBLE SHEATH INJECTION NEEDLE USED FOR THE INJECTION OF VARIOUS TYPES OF MEDIA THROUGH FLEXIBLE ENDOSCOPES.

## (undated) DEVICE — CVR HNDL LT CAM LB54

## (undated) DEVICE — SUT SILK 2/0 FS BLK 18IN 685G

## (undated) DEVICE — SOL IRRIG NACL 9PCT 1000ML BTL

## (undated) DEVICE — BOWL PLSTC MD 16OZ BLU STRL

## (undated) DEVICE — SPNG GZ AVANT 6PLY 4X4IN STRL PK/2

## (undated) DEVICE — GLV SURG BIOGEL SENSR LTX PF SZ6.5

## (undated) DEVICE — ELECTRD DEFIB M/FUNC PROPADZ RADIOL 2PK

## (undated) DEVICE — CATH DIAG IMPULSE PIG 5F 100CM

## (undated) DEVICE — SPNG LAP PREWSH SFTPK 18X18IN STRL PK/5

## (undated) DEVICE — PK MAJ LAPAROTOMY 50

## (undated) DEVICE — GW DIAG EMERALD HEPCOAT MOVE JTIP STD .035 3MM 150CM

## (undated) DEVICE — SLV SCD CALF HEMOFORCE DVT THERP REPR LG

## (undated) DEVICE — BITEBLOCK ENDO W/STRAP 60F A/ LF DISP

## (undated) DEVICE — DRSNG WND BORDR/ADHS NONADHR/GZ LF 4X10IN STRL

## (undated) DEVICE — SOL IRRIG H2O 1000ML STRL

## (undated) DEVICE — PK PROC TURNOVER

## (undated) DEVICE — CATH DIAG IMPULSE FR4 5F 100CM

## (undated) DEVICE — ELECTRD BLD EZ CLN STD 6.5IN

## (undated) DEVICE — PLUG,CATHETER,DRAINAGE PROTECTOR,TUBE: Brand: MEDLINE

## (undated) DEVICE — PENCL HND ROCKRSWTCH HOLSTR EZ CLEAN TP CRD 10FT

## (undated) DEVICE — TB FEED MIC G STD 18F 7 TO 10ML

## (undated) DEVICE — TUBING, SUCTION, 1/4" X 12', STRAIGHT: Brand: MEDLINE